# Patient Record
Sex: MALE | Race: WHITE | NOT HISPANIC OR LATINO | Employment: FULL TIME | URBAN - METROPOLITAN AREA
[De-identification: names, ages, dates, MRNs, and addresses within clinical notes are randomized per-mention and may not be internally consistent; named-entity substitution may affect disease eponyms.]

---

## 2023-06-17 ENCOUNTER — APPOINTMENT (OUTPATIENT)
Dept: RADIOLOGY | Facility: REHABILITATION | Age: 68
DRG: 095 | End: 2023-06-17
Attending: PHYSICAL MEDICINE & REHABILITATION
Payer: MEDICARE

## 2023-06-17 ENCOUNTER — HOSPITAL ENCOUNTER (INPATIENT)
Facility: REHABILITATION | Age: 68
LOS: 23 days | DRG: 095 | End: 2023-07-10
Attending: PHYSICAL MEDICINE & REHABILITATION | Admitting: PHYSICAL MEDICINE & REHABILITATION
Payer: MEDICARE

## 2023-06-17 DIAGNOSIS — N31.9 NEUROGENIC BLADDER: ICD-10-CM

## 2023-06-17 DIAGNOSIS — K59.2 NEUROGENIC BOWEL: ICD-10-CM

## 2023-06-17 DIAGNOSIS — E78.5 DYSLIPIDEMIA: ICD-10-CM

## 2023-06-17 DIAGNOSIS — F51.01 PRIMARY INSOMNIA: ICD-10-CM

## 2023-06-17 DIAGNOSIS — M79.2 NEUROPATHIC PAIN: ICD-10-CM

## 2023-06-17 DIAGNOSIS — G61.0 GBS (GUILLAIN BARRE SYNDROME) (HCC): ICD-10-CM

## 2023-06-17 PROBLEM — E87.1 HYPONATREMIA: Status: ACTIVE | Noted: 2023-06-17

## 2023-06-17 PROBLEM — G90.3 NEUROGENIC ORTHOSTATIC HYPOTENSION (HCC): Status: ACTIVE | Noted: 2023-06-17

## 2023-06-17 PROBLEM — D72.825 BANDEMIA: Status: ACTIVE | Noted: 2023-06-17

## 2023-06-17 PROBLEM — N30.01 ACUTE CYSTITIS WITH HEMATURIA: Status: ACTIVE | Noted: 2023-06-17

## 2023-06-17 PROBLEM — D64.89 OTHER SPECIFIED ANEMIAS: Status: ACTIVE | Noted: 2023-06-17

## 2023-06-17 LAB
FLUAV RNA SPEC QL NAA+PROBE: NEGATIVE
FLUBV RNA SPEC QL NAA+PROBE: NEGATIVE
RSV RNA SPEC QL NAA+PROBE: NEGATIVE
SARS-COV-2 RNA RESP QL NAA+PROBE: NOTDETECTED
SPECIMEN SOURCE: NORMAL

## 2023-06-17 PROCEDURE — 0241U HCHG SARS-COV-2 COVID-19 NFCT DS RESP RNA 4 TRGT MIC: CPT

## 2023-06-17 PROCEDURE — 700111 HCHG RX REV CODE 636 W/ 250 OVERRIDE (IP): Performed by: PHYSICAL MEDICINE & REHABILITATION

## 2023-06-17 PROCEDURE — 71045 X-RAY EXAM CHEST 1 VIEW: CPT

## 2023-06-17 PROCEDURE — 94760 N-INVAS EAR/PLS OXIMETRY 1: CPT

## 2023-06-17 PROCEDURE — 700102 HCHG RX REV CODE 250 W/ 637 OVERRIDE(OP): Performed by: PHYSICAL MEDICINE & REHABILITATION

## 2023-06-17 PROCEDURE — 700105 HCHG RX REV CODE 258: Performed by: PHYSICAL MEDICINE & REHABILITATION

## 2023-06-17 PROCEDURE — A9270 NON-COVERED ITEM OR SERVICE: HCPCS | Performed by: PHYSICAL MEDICINE & REHABILITATION

## 2023-06-17 PROCEDURE — 74018 RADEX ABDOMEN 1 VIEW: CPT

## 2023-06-17 PROCEDURE — 99223 1ST HOSP IP/OBS HIGH 75: CPT | Performed by: PHYSICAL MEDICINE & REHABILITATION

## 2023-06-17 PROCEDURE — 770010 HCHG ROOM/CARE - REHAB SEMI PRIVAT*

## 2023-06-17 RX ORDER — SENNOSIDES A AND B 8.6 MG/1
17.2 TABLET, FILM COATED ORAL
Status: DISCONTINUED | OUTPATIENT
Start: 2023-06-17 | End: 2023-06-26

## 2023-06-17 RX ORDER — DOCUSATE SODIUM 100 MG/1
200 CAPSULE, LIQUID FILLED ORAL 2 TIMES DAILY
Status: DISCONTINUED | OUTPATIENT
Start: 2023-06-17 | End: 2023-06-26

## 2023-06-17 RX ORDER — ACETAMINOPHEN 500 MG
1000 TABLET ORAL EVERY 6 HOURS PRN
Status: DISCONTINUED | OUTPATIENT
Start: 2023-06-22 | End: 2023-06-27

## 2023-06-17 RX ORDER — ATORVASTATIN CALCIUM 10 MG/1
10 TABLET, FILM COATED ORAL
Status: DISCONTINUED | OUTPATIENT
Start: 2023-06-19 | End: 2023-07-10 | Stop reason: HOSPADM

## 2023-06-17 RX ORDER — TEMAZEPAM 15 MG/1
15 CAPSULE ORAL
Status: DISCONTINUED | OUTPATIENT
Start: 2023-06-17 | End: 2023-06-29

## 2023-06-17 RX ORDER — ONDANSETRON 4 MG/1
4 TABLET, ORALLY DISINTEGRATING ORAL 4 TIMES DAILY PRN
Status: DISCONTINUED | OUTPATIENT
Start: 2023-06-17 | End: 2023-07-10 | Stop reason: HOSPADM

## 2023-06-17 RX ORDER — SODIUM CHLORIDE 1 G/1
2 TABLET ORAL
Status: DISCONTINUED | OUTPATIENT
Start: 2023-06-17 | End: 2023-06-21

## 2023-06-17 RX ORDER — LANOLIN ALCOHOL/MO/W.PET/CERES
6 CREAM (GRAM) TOPICAL
Status: DISCONTINUED | OUTPATIENT
Start: 2023-06-17 | End: 2023-07-10 | Stop reason: HOSPADM

## 2023-06-17 RX ORDER — CELECOXIB 100 MG/1
200 CAPSULE ORAL 2 TIMES DAILY
Status: DISCONTINUED | OUTPATIENT
Start: 2023-06-17 | End: 2023-06-22

## 2023-06-17 RX ORDER — ONDANSETRON 2 MG/ML
4 INJECTION INTRAMUSCULAR; INTRAVENOUS 4 TIMES DAILY PRN
Status: DISCONTINUED | OUTPATIENT
Start: 2023-06-17 | End: 2023-07-10 | Stop reason: HOSPADM

## 2023-06-17 RX ORDER — BISACODYL 10 MG/1
10 SUPPOSITORY RECTAL
Status: DISCONTINUED | OUTPATIENT
Start: 2023-06-17 | End: 2023-06-26

## 2023-06-17 RX ORDER — ECHINACEA PURPUREA EXTRACT 125 MG
2 TABLET ORAL PRN
Status: DISCONTINUED | OUTPATIENT
Start: 2023-06-17 | End: 2023-07-10 | Stop reason: HOSPADM

## 2023-06-17 RX ORDER — ALUMINA, MAGNESIA, AND SIMETHICONE 2400; 2400; 240 MG/30ML; MG/30ML; MG/30ML
20 SUSPENSION ORAL
Status: DISCONTINUED | OUTPATIENT
Start: 2023-06-17 | End: 2023-07-10 | Stop reason: HOSPADM

## 2023-06-17 RX ORDER — CARBOXYMETHYLCELLULOSE SODIUM 5 MG/ML
1 SOLUTION/ DROPS OPHTHALMIC PRN
Status: DISCONTINUED | OUTPATIENT
Start: 2023-06-17 | End: 2023-07-10 | Stop reason: HOSPADM

## 2023-06-17 RX ORDER — FUROSEMIDE 20 MG/1
20 TABLET ORAL
Status: DISCONTINUED | OUTPATIENT
Start: 2023-06-18 | End: 2023-06-21

## 2023-06-17 RX ORDER — TAMSULOSIN HYDROCHLORIDE 0.4 MG/1
0.4 CAPSULE ORAL
Status: DISCONTINUED | OUTPATIENT
Start: 2023-06-18 | End: 2023-07-10 | Stop reason: HOSPADM

## 2023-06-17 RX ORDER — M-VIT,TX,IRON,MINS/CALC/FOLIC 27MG-0.4MG
1 TABLET ORAL
Status: DISCONTINUED | OUTPATIENT
Start: 2023-06-18 | End: 2023-07-10 | Stop reason: HOSPADM

## 2023-06-17 RX ORDER — ENOXAPARIN SODIUM 100 MG/ML
40 INJECTION SUBCUTANEOUS
Status: DISCONTINUED | OUTPATIENT
Start: 2023-06-17 | End: 2023-06-22

## 2023-06-17 RX ORDER — LISINOPRIL 20 MG/1
20 TABLET ORAL
Status: DISCONTINUED | OUTPATIENT
Start: 2023-06-18 | End: 2023-06-18

## 2023-06-17 RX ORDER — OMEPRAZOLE 20 MG/1
20 CAPSULE, DELAYED RELEASE ORAL DAILY
Status: DISCONTINUED | OUTPATIENT
Start: 2023-06-17 | End: 2023-06-18

## 2023-06-17 RX ORDER — GABAPENTIN 300 MG/1
300 CAPSULE ORAL 3 TIMES DAILY
Status: DISCONTINUED | OUTPATIENT
Start: 2023-06-17 | End: 2023-06-18

## 2023-06-17 RX ORDER — ACETAMINOPHEN 325 MG/1
650 TABLET ORAL EVERY 4 HOURS PRN
Status: DISCONTINUED | OUTPATIENT
Start: 2023-06-17 | End: 2023-06-27

## 2023-06-17 RX ORDER — OXYCODONE HYDROCHLORIDE 10 MG/1
10 TABLET ORAL
Status: DISCONTINUED | OUTPATIENT
Start: 2023-06-17 | End: 2023-06-28

## 2023-06-17 RX ORDER — LIDOCAINE 50 MG/G
2 PATCH TOPICAL EVERY 24 HOURS
Status: DISCONTINUED | OUTPATIENT
Start: 2023-06-17 | End: 2023-07-10

## 2023-06-17 RX ORDER — ASPIRIN 81 MG/1
81 TABLET, CHEWABLE ORAL DAILY
Status: DISCONTINUED | OUTPATIENT
Start: 2023-06-18 | End: 2023-07-10 | Stop reason: HOSPADM

## 2023-06-17 RX ORDER — OXYCODONE HYDROCHLORIDE 5 MG/1
5 TABLET ORAL
Status: DISCONTINUED | OUTPATIENT
Start: 2023-06-17 | End: 2023-06-28

## 2023-06-17 RX ORDER — TRAZODONE HYDROCHLORIDE 50 MG/1
50 TABLET ORAL
Status: DISCONTINUED | OUTPATIENT
Start: 2023-06-17 | End: 2023-07-10 | Stop reason: HOSPADM

## 2023-06-17 RX ORDER — HYDRALAZINE HYDROCHLORIDE 25 MG/1
25 TABLET, FILM COATED ORAL EVERY 8 HOURS PRN
Status: DISCONTINUED | OUTPATIENT
Start: 2023-06-17 | End: 2023-07-10 | Stop reason: HOSPADM

## 2023-06-17 RX ORDER — ACETAMINOPHEN 500 MG
1000 TABLET ORAL 3 TIMES DAILY
Status: COMPLETED | OUTPATIENT
Start: 2023-06-17 | End: 2023-06-22

## 2023-06-17 RX ADMIN — SENNOSIDES 17.2 MG: 8.6 TABLET, FILM COATED ORAL at 13:15

## 2023-06-17 RX ADMIN — OMEPRAZOLE 20 MG: 20 CAPSULE, DELAYED RELEASE ORAL at 21:37

## 2023-06-17 RX ADMIN — CELECOXIB 200 MG: 100 CAPSULE ORAL at 21:36

## 2023-06-17 RX ADMIN — ACETAMINOPHEN 1000 MG: 500 TABLET ORAL at 16:00

## 2023-06-17 RX ADMIN — ACETAMINOPHEN 1000 MG: 500 TABLET ORAL at 21:37

## 2023-06-17 RX ADMIN — SODIUM CHLORIDE 2 G: 1 TABLET ORAL at 13:13

## 2023-06-17 RX ADMIN — GABAPENTIN 300 MG: 300 CAPSULE ORAL at 21:37

## 2023-06-17 RX ADMIN — OXYCODONE HYDROCHLORIDE 10 MG: 10 TABLET ORAL at 13:13

## 2023-06-17 RX ADMIN — DOCUSATE SODIUM 200 MG: 100 CAPSULE, LIQUID FILLED ORAL at 21:37

## 2023-06-17 RX ADMIN — ENOXAPARIN SODIUM 40 MG: 100 INJECTION SUBCUTANEOUS at 21:40

## 2023-06-17 RX ADMIN — CEFTRIAXONE SODIUM 1000 MG: 1 INJECTION, POWDER, FOR SOLUTION INTRAMUSCULAR; INTRAVENOUS at 17:42

## 2023-06-17 RX ADMIN — TEMAZEPAM 15 MG: 15 CAPSULE ORAL at 21:37

## 2023-06-17 RX ADMIN — Medication 6 MG: at 21:36

## 2023-06-17 RX ADMIN — SODIUM CHLORIDE 2 G: 1 TABLET ORAL at 17:42

## 2023-06-17 RX ADMIN — OXYCODONE HYDROCHLORIDE 10 MG: 10 TABLET ORAL at 18:26

## 2023-06-17 RX ADMIN — GABAPENTIN 300 MG: 300 CAPSULE ORAL at 16:00

## 2023-06-17 RX ADMIN — BISACODYL 10 MG: 10 SUPPOSITORY RECTAL at 21:46

## 2023-06-17 ASSESSMENT — PATIENT HEALTH QUESTIONNAIRE - PHQ9
SUM OF ALL RESPONSES TO PHQ9 QUESTIONS 1 AND 2: 0
2. FEELING DOWN, DEPRESSED, IRRITABLE, OR HOPELESS: NOT AT ALL
1. LITTLE INTEREST OR PLEASURE IN DOING THINGS: NOT AT ALL

## 2023-06-17 ASSESSMENT — LIFESTYLE VARIABLES
TOTAL SCORE: 0
EVER HAD A DRINK FIRST THING IN THE MORNING TO STEADY YOUR NERVES TO GET RID OF A HANGOVER: NO
HOW MANY TIMES IN THE PAST YEAR HAVE YOU HAD 5 OR MORE DRINKS IN A DAY: 0
TOTAL SCORE: 0
HAVE PEOPLE ANNOYED YOU BY CRITICIZING YOUR DRINKING: NO
EVER FELT BAD OR GUILTY ABOUT YOUR DRINKING: NO
HAVE YOU EVER FELT YOU SHOULD CUT DOWN ON YOUR DRINKING: NO
ALCOHOL_USE: NO
AVERAGE NUMBER OF DAYS PER WEEK YOU HAVE A DRINK CONTAINING ALCOHOL: 0
TOTAL SCORE: 0
CONSUMPTION TOTAL: NEGATIVE
ON A TYPICAL DAY WHEN YOU DRINK ALCOHOL HOW MANY DRINKS DO YOU HAVE: 0

## 2023-06-17 ASSESSMENT — COPD QUESTIONNAIRES
COPD SCREENING SCORE: 2
HAVE YOU SMOKED AT LEAST 100 CIGARETTES IN YOUR ENTIRE LIFE: NO/DON'T KNOW
DURING THE PAST 4 WEEKS HOW MUCH DID YOU FEEL SHORT OF BREATH: NONE/LITTLE OF THE TIME
DO YOU EVER COUGH UP ANY MUCUS OR PHLEGM?: NO/ONLY WITH OCCASIONAL COLDS OR INFECTIONS

## 2023-06-17 ASSESSMENT — PAIN DESCRIPTION - PAIN TYPE: TYPE: ACUTE PAIN

## 2023-06-17 ASSESSMENT — FIBROSIS 4 INDEX: FIB4 SCORE: 1.09

## 2023-06-17 NOTE — H&P
Physical Medicine & Rehabilitation  History and Physical (H&P)  &     Post Admission Physician Evaluation (RORO)       Date of Admission: 6/17/2023  Date of Service: 6/17/2023   Bull Banegas  RH08/[id number]    [facility] Code to Support Admission: 0003.4 - Neurologic Conditions: Guillain-Barré Syndrome  Etiologic Diagnosis: GBS (Guillain-[place] syndrome) (Formerly [facility])    _______________________________________________    Chief Complaint: Lower extremity weakness    History of Present Illness:      67-year-old gentleman with past medical history of hypercholesterolemia, essential hypertension, osteoarthritis, GERD, hyperkalemia, type 2 diabetes, who presented to [facility] on 6/5 with progressive lower extremity weakness.  Neurology was consulted and he was diagnosed with Guillain-Barré syndrome.  He underwent 5-day course of IVIG without complications, completed on 6/11.  After completion of IVIG he has noted some improvement in strength in lower extremities but still complains of pins-and-needles in bilateral upper extremities.    On presentation to emergency room patient was noted to have significant elevation in systolic blood pressure of over 200.  This was managed with oral antihypertensives and stabilized during acute hospitalization.  Recent MRI of the brain and the spine were negative.  TSH was normal, B12 was normal, RPP and HIV were nonreactive, ammonia level was normal and ESR was within normal limits.  SPEP was negative, acetylcholine receptor antibody was negative, antimuscarinic antibody was not detected.  Hepatitis panel was performed and was negative.  LP showed high protein and normal cell count.  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  His most recent sodium was 126 on 6/17. He was started on sodium chloride tablets 2 g 3 times daily, 620 mg daily, and placed on 1.8 L fluid restriction with 800 cc free water restriction.  Nephrology was  consulted.    He was also noted to have progressing leukocytosis with WBC of 16.3.  He was started on ceftriaxone for presumed urinary tract infection with improving WBC of 14.0 on 6/17.    His hospitalization was complicated by neurogenic bladder with urinary retention.  Blancas was initially attempted to be removed, but patient failed voiding trial required multiple in and out catheterization so Blancas was replaced.  He also had significant constipation and has not had a bowel movement since presentation.          Review of Systems:       Gen: No fatigue, confusion, significant weight loss  Eyes: no blurry vision, double vision or pain in eyes  ENT: no changes in hearing, runny nose, nose bleeds, sinus pain  CV: No CP, palpitations, symptoms of AUTONOMIC DYSREFLEXIA   Lungs: no shortness of breath, changes in secretions, changes in cough, pain with coughing  Abd: No abd pain, nausea, vomiting, pain with eating  : no blood in urine, pain during storage phase, bladder spasms, suprapubic pain  Ext: No swelling in legs, asymmetric atrophy  Neuro: no changes in strength or sensation in the last 24 hours  Skin: no new ulcers/skin breakdown appreciated by patient  Mood: No changes in mood today,  increase in depression or anxiety  Heme: no bruising, or bleeding appreciated by patient      Past Medical History:  Hypertension  Hyperlipidemia  Insomnia  GERD  Type 2 diabetes    Past Surgical History:  He denies any history of surgical interventions    Family History:    He denies any family history of Guillain-Barré syndrome    Medications:  Current Facility-Administered Medications   Medication Dose    Pharmacy Consult Request ...Pain Management Review 1 Each  1 Each    acetaminophen (TYLENOL) tablet 1,000 mg  1,000 mg    Followed by    [START ON 6/22/2023] acetaminophen (TYLENOL) tablet 1,000 mg  1,000 mg    enoxaparin (Lovenox) inj 40 mg  40 mg    acetaminophen (Tylenol) tablet 650 mg  650 mg    omeprazole (PRILOSEC)  capsule 20 mg  20 mg    carboxymethylcellulose (REFRESH TEARS) 0.5 % ophthalmic drops 1 Drop  1 Drop    benzocaine-menthol (Cepacol) lozenge 1 Lozenge  1 Lozenge    mag hydrox-al hydrox-simeth (MAALOX PLUS ES or MYLANTA DS) suspension 20 mL  20 mL    ondansetron (ZOFRAN ODT) dispertab 4 mg  4 mg    Or    ondansetron (ZOFRAN) syringe/vial injection 4 mg  4 mg    traZODone (DESYREL) tablet 50 mg  50 mg    sodium chloride (OCEAN) 0.65 % nasal spray 2 Spray  2 [place]    Respiratory Therapy Consult      oxyCODONE immediate-release (ROXICODONE) tablet 5 mg  5 mg    Or    oxyCODONE immediate release (ROXICODONE) tablet 10 mg  10 mg    hydrALAZINE (APRESOLINE) tablet 25 mg  25 mg    [START ON 6/18/2023] therapeutic multivitamin-minerals (THERAGRAN-M) tablet 1 Tablet  1 Tablet    docusate sodium (COLACE) capsule 200 mg  200 mg    And    sennosides (SENOKOT) 8.6 MG tablet 17.2 mg  17.2 mg    And    bisacodyl (THE MAGIC BULLET) suppository 10 mg  10 mg    And    magnesium hydroxide (MILK OF MAGNESIA) suspension 30 mL  30 mL    [START ON 6/18/2023] lisinopril (PRINIVIL) tablet 20 mg  20 mg    [START ON 6/18/2023] aspirin (ASA) chewable tab 81 mg  81 mg    [START ON 6/19/2023] atorvastatin (LIPITOR) tablet 10 mg  10 mg    celecoxib (CELEBREX) capsule 200 mg  200 mg    [START ON 6/18/2023] furosemide (LASIX) tablet 20 mg  20 mg    melatonin tablet 6 mg  6 mg    temazepam (Restoril) capsule 15 mg  15 mg    cefTRIAXone (Rocephin) 1,000 mg in  mL IVPB  1,000 mg    sodium chloride (SALT) tablet 2 g  2 g    lidocaine (LIDODERM) 5 % 2 Patch  2 Patch    [START ON 6/18/2023] tamsulosin (FLOMAX) capsule 0.4 mg  0.4 mg    gabapentin (NEURONTIN) capsule 300 mg  300 mg       Allergies:  Patient has no allergy information on record.    Psychosocial History:  Living Site:  Home  Living With:  spouse  Caregiver's availability:   spouse and family  Number of stairs:   1 CLOVER        Level of Function Prior to Disability:  Independent with  mobility and self-care    Level of Function Prior to Admission to [facility]:    Grooming min assist, transfers dependent, lower body dressing dependent    CURRENT LEVEL OF FUNCTION:   Same as level of function prior to admission to [facility]    Physical Examination:     Vitals: There were no vitals taken for this visit.  Gen: NAD, There is no height or weight on file to calculate BMI.  HEENT:  NC/AT, PERRLA, moist mucous membranes  Cardio: RRR, no mumurs  Pulm: CTAB, with normal respiratory effort  Abd: Soft NTND, active bowel sounds,   Ext: No peripheral edema. No calf tenderness. No clubbing/cyanosis. +dorsal pedalis pulses bilaterally.    Mental status: answers questions appropriately follows commands  Speech: fluent, no aphasia or dysarthria  CRANIAL NERVES:  2,3: visual acuity grossly intact, PERRL  3,4,6: EOMI bilaterally, no nystagmus or diplopia  7: no facial asymmetry  8: hearing grossly intact  9,10: symmetric palate elevation  11: SCM/Trapezius strength 5/5 bilaterally  12: tongue protrudes midline    Motor:    Motor Exam Upper Extremities   ? Myotome R L   Shoulder flexion C5 5 5   Elbow flexion C5 5 5   Wrist extension C6 5 5   Elbow extension C7 5 5   Finger flexion C8 5 5   Finger abduction T1 5 5    Motor Exam Lower Extremities    ? Myotome R L   Hip flexion L2 2 1   Knee extension L3 3 3   Ankle dorsiflexion L4 3 3   Toe extension L5 3 3   Ankle plantarflexion S1 3 3      Sensory: Altered sensation to light touch of the face and intermittently throughout the entire body    DTRs: 0+ in bilateral biceps and patellar tendons    Negative babinski b/l  Negative Worthington b/l     Radiology:    MRI of cervical spine 6/6:  1.  No enhancing spinal cord mass. Subtle focus of T2/FLAIR signal   hyperintensity at the level C6-C7 could reflect post stenotic myelomalacia.     2.  Moderate multilevel cervical spondylosis with mild acquired spinal canal   narrowing from C4-C5  through C6-C7 with cord contact.     3.  Multilevel neural foraminal narrowing of varying degrees, severe left C3-C4   and bilaterally at C6-C7. Mild-to-moderate other sites.     MRI of the lumbar spine 5/29:  1.  Transitional lumbosacral anatomy with 4 nonrib-bearing lumbar vertebral   bodies and partial sacralization of the L5. For the purposes of this exam, this   will be labeled as such.     2.  L4-L5 mild spondylolisthesis with annular fissuring and small right paracentral disc extrusion contacting the right L5 nerve root.     3.  Moderate left L4-L5 neural foraminal narrowing.     4.  No other areas of narrowing elsewhere.     Chest x-ray 6/9:  1.  Low lung volumes. Bibasilar atelectasis and mild left pleural effusion     Laboratory Values:  Recent Labs     06/15/23  0511 06/16/23  0523 06/17/23  0516   SODIUM 127* 127* 126*   POTASSIUM 4.3 4.8 4.5   CHLORIDE 95* 94* 94*   CO2 27 26 26   GLUCOSE 120* 111* 130*   BUN 23* 15 21*   CREATININE 0.50* 0.50* 0.57*   CALCIUM 8.8 8.8 8.7     Recent Labs     06/15/23  0511 06/16/23  0523 06/17/23  0516   RBC 3.55* 3.61* 3.57*   HEMOGLOBIN 11.0* 11.0* 11.0*   HEMATOCRIT 30.6* 31.1* 30.9*   MCV 86.0 86.2 86.6   MCH 30.9 30.5 30.7   MCHC 35.9 35.4 35.5   RDW 12.8 12.8 13.1   PLATELETCT 263 279 309   MPV 7.8 7.6 7.7           Primary Rehabilitation Diagnosis:    This patient is a 67 y.o. male admitted for acute inpatient rehabilitation with   GBS (Guillain-[place] syndrome) ([facility]).    Impairments:   ADLs/IADLs  Mobility  Swallow    Secondary Diagnosis/Medical Co-morbidities Affecting Function    Guillain-Barré  Weakness in all 4 extremities  Hypertension  Orthostatic hypotension  Hypomagnesemia  Hyponatremia  Leukocytosis  Urinary tract infection  Primary insomnia  Neuropathic pain  Neurogenic bladder  Neurogenic bowel, severe constipation  Dyslipidemia      Relevant Changes Since Preadmission Evaluation:    Status unchanged    The patient's rehabilitation potential is Very  Good  The patient's medical prognosis is good    Rehabilitation Plan:   Discussion and Recommendations:   1. The patient requires an acute inpatient rehabilitation program with a coordinated program of care at an intensity and frequency not available at a lower level of care. This recommendation is substantiated by the patient's medical physicians who recommend that the patient's intervention and assessment of medical issues needs to be done at an acute level of care for patient's safety and maximum outcome.   2. A coordinated program of care will be supplied by an interdisciplinary team of physical therapy, occupational therapy, rehab physician, rehab nursing, and, if needed, speech therapy and rehab psychology. Rehab team presents a patient-specific rehabilitation and education program concentrating on prevention of future problems related to accessibility, mobility, skin, bowel, bladder, sexuality, and psychosocial and medical/surgical problems.   3. Need for Rehabilitation Physician: The rehab physician will be evaluating the patient on a multi-weekly basis to help coordinate the program of care. The rehab physician communicates between medical physicians, therapists, and nurses to maximize the patient's potential outcome. Specific areas in which the rehab physician will be providing daily assessment include the following:   A. Assessing the patient's heart rate and blood pressure response (vitals monitoring) to activity and making adjustments in medications or conservative measures as needed.   B. The rehab physician will be assessing the frequency at which the program can be increased to allow the patient to reach optimal functional outcome.   C. The rehab physician will also provide assessments in daily skin care, especially in light of patient's impairments in mobility.   D. The rehab physician will provide special expertise in understanding how to work with functional impairment and recommend appropriate  interventions, compensatory techniques, and education that will facilitate the patient's outcome.   4. Rehab R.N.   The rehab RN will be working with patient to carry over in room mobility and activities of daily living when the patient is not in 3 hours of skilled therapy. Rehab nursing will be working in conjunction with rehab physician to address all the medical issues above and continue to assess laboratory work and discuss abnormalities with the treating physicians, assess vitals, and response to activity, and discuss and report abnormalities with the rehab physician. Rehab RN will also continue daily skin care, supervise bladder/bowel program, instruct in medication administration, and ensure patient safety.   5. Rehab Therapy: Therapies to treat at intensity and frequency of (may change after completion of evaluation by all therapeutic disciplines):       PT:  Physical therapy to address mobility, transfer, gait training and evaluation for adaptive equipment needs 1 hour/day at least 5 days/week for the duration of the ELOS (see below)       OT:  Occupational therapy to address ADLs, self-care, home management training, functional mobility/transfers and assistive device evaluation, and community re-integration 1  hour/day at least 5 days/week for the duration of the ELOS (see below).        ST/Dysphagia:  Speech therapy to address speech, language, and cognitive deficits as well as swallowing difficulties with retraining/dysphagia management and community re-integration with comprehension, expression, cognitive training 1  hour/day at least 5 days/week for the duration of the ELOS (see below).     Medical management / Rehabilitation Issues/ Adverse Potential as part of rehabilitation plan     Rehabilitation Issues/Adverse Potential      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  Lower extremity weakness and pain as well as  numbness in upper extremities  Patient demonstrates functional deficits in strength, balance, coordination, and ADL's. Patient is admitted to [facility] for comprehensive rehabilitation therapy as described below.   Rehabilitation nursing monitors bowel and bladder control, educates on medication administration, co-morbidities and monitors patient safety.  -PT and OT for mobility and ADLs. Per guidelines, 15 hours per week between PT, OT and SLP.  - The patient will begin a comprehensive interdisciplinary rehabilitation program.     Neurologic respiratory impairment:   Patient is at high risk for respiratory involvement given neurologic symptoms in the upper extremities.   -We will consult respiratory therapy team to follow the patient during the hospital stay, for education on impaired respiratory status, importance of incentive spirometry, risk of respiratory infection and prevention process.  -Vital capacity daily  - Incentive spirometry  - Check chest x-ray    Hyponatremia: Likely SIADH, followed by nephrology during [facility] hospitalization  - Sodium of 126 on 6/17  - Check CMP in a.m.  - Sodium chloride tablets 2 g 3 times daily  - Lasix 20 mg daily    Hypomagnesemia: Low magnesium during acute hospitalization, supplemented with p.o. magnesium  - Check magnesium level in a.m.    Urinary tract infection/pyelonephritis: Catheter associated UTI in the setting of neurogenic bladder  - WBC of 14 on 6/17, check CBC in a.m.  - Peripheral IV was removed prior to transfer to acute rehabilitation we will replace IV.  - Ceftriaxone 1 g every 24 hours, continue for another 8 days, total of 10 days given systemic factor  --We will work with Eric Riley on following up culture and sensitivities    Neurogenic bladder: Inappropriate management of neurogenic bladder can result and hydronephrosis, increased risk of pyelonephritis, and renal failure  - Plan to continue the Blancas catheter for now to allow  the patient to acclimate to the rehabilitation setting. Following this we will discuss bladder management options with the patient.  Bowel management but well may require large volume administration of medication which would be contraindicated in the setting of intermittent catheterization.    Neurogenic bowel: Inappropriate neurogenic bowel can result in severe constipation, bowel dilation and rupture.  Severe constipation with prolonged period of time without BM.  - We will check KUB to evaluate for stool load and colonic dilation in the setting of neurogenic bowel  - Upper motor neuron neurogenic bowel program with senna 2 tablets q. noon, Colace 200 mg twice daily and Magic bullet suppository WI daily and digital stimulation    orthostatic hypotension  Because of significant autonomic dysfunction associated with some cases of AIDP, the patient is at high risk for orthostatic hypotension.  Goal of SBP greater than 100.  -  Mechanical compression with teds and Tubi  and abd binder used prior to out of bed    Essential hypertension: SBP of greater than 200 on presentation to acute hospital.  - Lisinopril 20 mg daily    Dyslipidemia:  - Check lipid panel in a.m.  -Lipitor 10 mg every Monday Wednesday Friday    Dysphagia: Concern for swallow dysfunction in the setting of certain variants of AIDP  - Consult speech therapy for swallow evaluation.    Skin  Due to the sensory impairments following AIDP, skin protection principles are of the utmost importance.     - every two-hour turning pattern overnight while the patient is in bed. When the patient is out of bed, an every 15 minute repositioning or weight shifting pattern will be utilized in order to help train the patient for long-term skin protection. We will also discuss skin monitoring and its importance with the patient and the caregivers.    Pain:  Postoperative pain:  - Oxycodone 5-10 mg every 3 hours as needed moderate-severe pain  -Tylenol 1000 mg 3 times  daily, given high-dose Tylenol will check LFTs in a.m.  - Lidocaine patch x2 on either side of incision, on for 12 hours off for 12 hours    Neuropathic pain: Burning and tingling in all 4 extremities  - Gabapentin 300 mg 3 times daily, will plan to titrate up depending on tolerance and effect, may require other neuropathic pain agents.  -May benefit from a nonpharmaceutical modalities such as TENS units, compression, ice, heat, will discuss with therapy team.    Vitamin deficiency: High risk of vitamin D deficiency in this population, goal of vitamin D level greater than 30, has been linked to improvement and central nervous system recovery  - Check vitamin D level in a.m.    Insomnia: Significant difficulty during acute hospitalization  - Restarted on Restoril 15 mg nightly  - Trazodone 50 mg nightly as needed insomnia    Nutrition  With the assistance of our dietitian team, we will work to optimize nutrition for wound healing and recovery from spinal cord injury. We will also discussed long-term dietary needs following a spinal injury in order to prevent excessive weight gain in the future.    Mood: Adjustment disorder  Given the abrupt and significant change in the patient's level of function, we will consult our rehabilitation psychologist to evaluate the patient and provide psychological support as necessary. We may also consider the use of an antidepressant medication if needed to help improve the patient's mood.    DVT prophylaxis  In the setting of AIDP, the patient is at high risk of deep venous thrombosis given decreased mobility and alteration to autonomic nervous system. Because of this we have elected to pursue prophylactic anticoagulation utilizing Lovenox 40 mg daily.     I personally performed a complete drug regimen review and no potential clinically significant medication issues were identified.     Goals/Expected Level of Function Based on Current Medical and Functional Status:  (may change based  on patient's medical status and rate of impairment recovery)  Transfers:   Minimal Assistance  Mobility/Gait:   Supervision wheelchair level  ADL's:   Minimal Assistance  Swallowing: Least restrictive diet    DISPOSITION: Discharge to pre-morbid independent living setting with the supportive care of patient's spouse and family.    ELOS: 14-21 days  ____________________________________    Dr. Mario Terrell DO  [facility] - Physical Medicine & Rehabilitation   [facility] - Spinal Cord Injury Medicine  ____________________________________    Pt was seen today for 78 min, and entire time spent in face-to-face contact was >50% in counseling and coordination of care as detailed in A/P above.

## 2023-06-17 NOTE — FLOWSHEET NOTE
"   06/17/23 1600   Incentive Spirometry Treatment   Height 1.727 m (5' 7.99\")   Predicted Inspiratory Capacity 2700   60% of predicted IS capacity 1620 mL   40% of predicted IS capacity 1080 mL   Incentive Spirometer Volume 2000 mL       "

## 2023-06-17 NOTE — FLOWSHEET NOTE
06/17/23 1600   Protocol Assessment   Initial Assessment Yes   Patient History   Pulmonary Diagnosis none here for GBS   Home O2 No   Nocturnal CPAP No   Home Treatments/Frequency No   Sleep Apnea Screening   Have you had a sleep study? No   Have you been diagnosed with sleep apnea? No   S - Have you been told that you SNORE? 1   T - Are you often TIRED during the day? 1   O - Do you know if you stop breathing or has anyone witnessed you stop breathing while you were asleep? (OBSTRUCTION) 0   P - Do you have high blood PRESSURE or on medication to control high blood pressure? 1   B - Is your Body Mass Index greater than 35? (BMI) 1   A - Are you 50 years old or older? (AGE) 1   N - Are you a male with a NECK circumference greater than 17 inches, or a female with a neck circumference greater than 16 inches? 1   G - Are you male? (GENDER) 1   Stop Bang Total Score 7   COPD Risk Screening   Do you have a history of COPD? No   COPD Population Screener   During the past 4 weeks, how much did you feel short of breath? 0   Do you ever cough up any mucus or phlegm? 0   In the past 12 months, you do less than you used to because of your breathing problems 0   Have you smoked at least 100 cigarettes in your entire life? 0   How old are you? 2   COPD Screening Score 2   COPD Coordinator Not Recommended Yes   Protocol Pathways   Protocol Pathways None

## 2023-06-17 NOTE — FLOWSHEET NOTE
06/17/23 1600   Events/Summary/Plan   Events/Summary/Plan new pt assessment done pt on room air doing well, no pulmonary hx   Vital Signs   Pulse 92   Respiration 18   Pulse Oximetry 94 %   $ Pulse Oximetry (Spot Check) Yes   Respiratory Assessment   Respiratory Pattern Within Normal Limits   Level of Consciousness Alert   Chest Exam   Work Of Breathing / Effort Within Normal Limits   Breath Sounds   RUL Breath Sounds Clear   RML Breath Sounds Clear;Diminished   RLL Breath Sounds Diminished   JC Breath Sounds Clear   LLL Breath Sounds Diminished   Oxygen   O2 (LPM) 0   FiO2% 21 %   O2 Delivery Device None - Room Air

## 2023-06-18 ENCOUNTER — APPOINTMENT (OUTPATIENT)
Dept: PHYSICAL THERAPY | Facility: REHABILITATION | Age: 68
DRG: 095 | End: 2023-06-18
Attending: PHYSICAL MEDICINE & REHABILITATION
Payer: MEDICARE

## 2023-06-18 ENCOUNTER — APPOINTMENT (OUTPATIENT)
Dept: OCCUPATIONAL THERAPY | Facility: REHABILITATION | Age: 68
DRG: 095 | End: 2023-06-18
Attending: PHYSICAL MEDICINE & REHABILITATION
Payer: MEDICARE

## 2023-06-18 ENCOUNTER — APPOINTMENT (OUTPATIENT)
Dept: SPEECH THERAPY | Facility: REHABILITATION | Age: 68
DRG: 095 | End: 2023-06-18
Attending: PHYSICAL MEDICINE & REHABILITATION
Payer: MEDICARE

## 2023-06-18 ENCOUNTER — APPOINTMENT (OUTPATIENT)
Dept: RADIOLOGY | Facility: REHABILITATION | Age: 68
DRG: 095 | End: 2023-06-18
Attending: PHYSICAL MEDICINE & REHABILITATION
Payer: MEDICARE

## 2023-06-18 PROBLEM — I10 ESSENTIAL HYPERTENSION: Status: ACTIVE | Noted: 2023-06-18

## 2023-06-18 PROBLEM — N39.0 UTI (URINARY TRACT INFECTION): Status: ACTIVE | Noted: 2023-06-18

## 2023-06-18 PROBLEM — E55.9 VITAMIN D DEFICIENCY: Status: ACTIVE | Noted: 2023-06-18

## 2023-06-18 LAB
25(OH)D3 SERPL-MCNC: 19 NG/ML (ref 30–100)
ALBUMIN SERPL BCP-MCNC: 3 G/DL (ref 3.2–4.9)
ALBUMIN/GLOB SERPL: 0.8 G/DL
ALP SERPL-CCNC: 76 U/L (ref 30–99)
ALT SERPL-CCNC: 30 U/L (ref 2–50)
ANION GAP SERPL CALC-SCNC: 10 MMOL/L (ref 7–16)
AST SERPL-CCNC: 28 U/L (ref 12–45)
BASOPHILS # BLD AUTO: 0.4 % (ref 0–1.8)
BASOPHILS # BLD: 0.05 K/UL (ref 0–0.12)
BILIRUB SERPL-MCNC: 1.1 MG/DL (ref 0.1–1.5)
BUN SERPL-MCNC: 20 MG/DL (ref 8–22)
CALCIUM ALBUM COR SERPL-MCNC: 9.6 MG/DL (ref 8.5–10.5)
CALCIUM SERPL-MCNC: 8.8 MG/DL (ref 8.5–10.5)
CHLORIDE SERPL-SCNC: 93 MMOL/L (ref 96–112)
CHOLEST SERPL-MCNC: 137 MG/DL (ref 100–199)
CO2 SERPL-SCNC: 22 MMOL/L (ref 20–33)
CREAT SERPL-MCNC: 0.48 MG/DL (ref 0.5–1.4)
EOSINOPHIL # BLD AUTO: 0.28 K/UL (ref 0–0.51)
EOSINOPHIL NFR BLD: 2 % (ref 0–6.9)
ERYTHROCYTE [DISTWIDTH] IN BLOOD BY AUTOMATED COUNT: 41.3 FL (ref 35.9–50)
EST. AVERAGE GLUCOSE BLD GHB EST-MCNC: 123 MG/DL
GFR SERPLBLD CREATININE-BSD FMLA CKD-EPI: 114 ML/MIN/1.73 M 2
GLOBULIN SER CALC-MCNC: 4 G/DL (ref 1.9–3.5)
GLUCOSE SERPL-MCNC: 110 MG/DL (ref 65–99)
HBA1C MFR BLD: 5.9 % (ref 4–5.6)
HCT VFR BLD AUTO: 29.3 % (ref 42–52)
HDLC SERPL-MCNC: 44 MG/DL
HGB BLD-MCNC: 10.4 G/DL (ref 14–18)
IMM GRANULOCYTES # BLD AUTO: 0.16 K/UL (ref 0–0.11)
IMM GRANULOCYTES NFR BLD AUTO: 1.1 % (ref 0–0.9)
INR PPP: 1.03 (ref 0.87–1.13)
LDLC SERPL CALC-MCNC: 75 MG/DL
LYMPHOCYTES # BLD AUTO: 3.45 K/UL (ref 1–4.8)
LYMPHOCYTES NFR BLD: 24.6 % (ref 22–41)
MAGNESIUM SERPL-MCNC: 2 MG/DL (ref 1.5–2.5)
MCH RBC QN AUTO: 31.2 PG (ref 27–33)
MCHC RBC AUTO-ENTMCNC: 35.5 G/DL (ref 32.3–36.5)
MCV RBC AUTO: 88 FL (ref 81.4–97.8)
MONOCYTES # BLD AUTO: 0.91 K/UL (ref 0–0.85)
MONOCYTES NFR BLD AUTO: 6.5 % (ref 0–13.4)
NEUTROPHILS # BLD AUTO: 9.16 K/UL (ref 1.82–7.42)
NEUTROPHILS NFR BLD: 65.4 % (ref 44–72)
NRBC # BLD AUTO: 0.02 K/UL
NRBC BLD-RTO: 0.1 /100 WBC (ref 0–0.2)
PHOSPHATE SERPL-MCNC: 3.9 MG/DL (ref 2.5–4.5)
PLATELET # BLD AUTO: 285 K/UL (ref 164–446)
PMV BLD AUTO: 9.7 FL (ref 9–12.9)
POTASSIUM SERPL-SCNC: 4.3 MMOL/L (ref 3.6–5.5)
PROT SERPL-MCNC: 7 G/DL (ref 6–8.2)
PROTHROMBIN TIME: 13.4 SEC (ref 12–14.6)
RBC # BLD AUTO: 3.33 M/UL (ref 4.7–6.1)
SODIUM SERPL-SCNC: 125 MMOL/L (ref 135–145)
TRIGL SERPL-MCNC: 88 MG/DL (ref 0–149)
TSH SERPL DL<=0.005 MIU/L-ACNC: 3.39 UIU/ML (ref 0.38–5.33)
WBC # BLD AUTO: 14 K/UL (ref 4.8–10.8)

## 2023-06-18 PROCEDURE — 97167 OT EVAL HIGH COMPLEX 60 MIN: CPT

## 2023-06-18 PROCEDURE — 97530 THERAPEUTIC ACTIVITIES: CPT

## 2023-06-18 PROCEDURE — 80061 LIPID PANEL: CPT

## 2023-06-18 PROCEDURE — 97162 PT EVAL MOD COMPLEX 30 MIN: CPT

## 2023-06-18 PROCEDURE — 36415 COLL VENOUS BLD VENIPUNCTURE: CPT

## 2023-06-18 PROCEDURE — 700102 HCHG RX REV CODE 250 W/ 637 OVERRIDE(OP): Performed by: HOSPITALIST

## 2023-06-18 PROCEDURE — A9270 NON-COVERED ITEM OR SERVICE: HCPCS | Performed by: HOSPITALIST

## 2023-06-18 PROCEDURE — 84100 ASSAY OF PHOSPHORUS: CPT

## 2023-06-18 PROCEDURE — 83036 HEMOGLOBIN GLYCOSYLATED A1C: CPT

## 2023-06-18 PROCEDURE — 770010 HCHG ROOM/CARE - REHAB SEMI PRIVAT*

## 2023-06-18 PROCEDURE — 94760 N-INVAS EAR/PLS OXIMETRY 1: CPT

## 2023-06-18 PROCEDURE — 84443 ASSAY THYROID STIM HORMONE: CPT

## 2023-06-18 PROCEDURE — 82306 VITAMIN D 25 HYDROXY: CPT

## 2023-06-18 PROCEDURE — 94150 VITAL CAPACITY TEST: CPT

## 2023-06-18 PROCEDURE — 700102 HCHG RX REV CODE 250 W/ 637 OVERRIDE(OP): Performed by: PHYSICAL MEDICINE & REHABILITATION

## 2023-06-18 PROCEDURE — 74018 RADEX ABDOMEN 1 VIEW: CPT

## 2023-06-18 PROCEDURE — 700105 HCHG RX REV CODE 258: Performed by: PHYSICAL MEDICINE & REHABILITATION

## 2023-06-18 PROCEDURE — 700111 HCHG RX REV CODE 636 W/ 250 OVERRIDE (IP): Performed by: PHYSICAL MEDICINE & REHABILITATION

## 2023-06-18 PROCEDURE — 85025 COMPLETE CBC W/AUTO DIFF WBC: CPT

## 2023-06-18 PROCEDURE — A9270 NON-COVERED ITEM OR SERVICE: HCPCS | Performed by: PHYSICAL MEDICINE & REHABILITATION

## 2023-06-18 PROCEDURE — 83735 ASSAY OF MAGNESIUM: CPT

## 2023-06-18 PROCEDURE — 97535 SELF CARE MNGMENT TRAINING: CPT

## 2023-06-18 PROCEDURE — 80053 COMPREHEN METABOLIC PANEL: CPT

## 2023-06-18 PROCEDURE — 99222 1ST HOSP IP/OBS MODERATE 55: CPT | Performed by: HOSPITALIST

## 2023-06-18 PROCEDURE — 85610 PROTHROMBIN TIME: CPT

## 2023-06-18 RX ORDER — GABAPENTIN 400 MG/1
400 CAPSULE ORAL 3 TIMES DAILY
Status: DISCONTINUED | OUTPATIENT
Start: 2023-06-18 | End: 2023-06-19

## 2023-06-18 RX ORDER — MIDODRINE HYDROCHLORIDE 2.5 MG/1
5 TABLET ORAL
Status: DISCONTINUED | OUTPATIENT
Start: 2023-06-18 | End: 2023-06-20

## 2023-06-18 RX ORDER — VITAMIN B COMPLEX
1000 TABLET ORAL DAILY
Status: DISCONTINUED | OUTPATIENT
Start: 2023-06-18 | End: 2023-06-20

## 2023-06-18 RX ORDER — OMEPRAZOLE 20 MG/1
20 CAPSULE, DELAYED RELEASE ORAL DAILY
Status: DISCONTINUED | OUTPATIENT
Start: 2023-06-19 | End: 2023-07-10 | Stop reason: HOSPADM

## 2023-06-18 RX ORDER — MUSCLE RUB CREAM 100; 150 MG/G; MG/G
CREAM TOPICAL 2 TIMES DAILY
Status: DISCONTINUED | OUTPATIENT
Start: 2023-06-18 | End: 2023-07-10 | Stop reason: HOSPADM

## 2023-06-18 RX ADMIN — ACETAMINOPHEN 1000 MG: 500 TABLET ORAL at 20:07

## 2023-06-18 RX ADMIN — SENNOSIDES 17.2 MG: 8.6 TABLET, FILM COATED ORAL at 11:38

## 2023-06-18 RX ADMIN — MENTHOL, METHYL SALICYLATE: 10; 15 CREAM TOPICAL at 13:14

## 2023-06-18 RX ADMIN — CELECOXIB 200 MG: 100 CAPSULE ORAL at 08:26

## 2023-06-18 RX ADMIN — Medication 6 MG: at 20:07

## 2023-06-18 RX ADMIN — Medication 1000 UNITS: at 13:14

## 2023-06-18 RX ADMIN — GABAPENTIN 400 MG: 400 CAPSULE ORAL at 14:52

## 2023-06-18 RX ADMIN — MIDODRINE HYDROCHLORIDE 5 MG: 2.5 TABLET ORAL at 17:46

## 2023-06-18 RX ADMIN — TAMSULOSIN HYDROCHLORIDE 0.4 MG: 0.4 CAPSULE ORAL at 17:47

## 2023-06-18 RX ADMIN — DOCUSATE SODIUM 200 MG: 100 CAPSULE, LIQUID FILLED ORAL at 08:24

## 2023-06-18 RX ADMIN — ASPIRIN 81 MG CHEWABLE TABLET 81 MG: 81 TABLET CHEWABLE at 08:26

## 2023-06-18 RX ADMIN — GABAPENTIN 300 MG: 300 CAPSULE ORAL at 08:26

## 2023-06-18 RX ADMIN — MENTHOL, METHYL SALICYLATE: 10; 15 CREAM TOPICAL at 20:11

## 2023-06-18 RX ADMIN — CELECOXIB 200 MG: 100 CAPSULE ORAL at 20:06

## 2023-06-18 RX ADMIN — ACETAMINOPHEN 1000 MG: 500 TABLET ORAL at 08:25

## 2023-06-18 RX ADMIN — MULTIPLE VITAMINS W/ MINERALS TAB 1 TABLET: TAB at 11:38

## 2023-06-18 RX ADMIN — LISINOPRIL 20 MG: 20 TABLET ORAL at 05:30

## 2023-06-18 RX ADMIN — SODIUM CHLORIDE 2 G: 1 TABLET ORAL at 11:38

## 2023-06-18 RX ADMIN — ENOXAPARIN SODIUM 40 MG: 100 INJECTION SUBCUTANEOUS at 20:05

## 2023-06-18 RX ADMIN — OXYCODONE HYDROCHLORIDE 10 MG: 10 TABLET ORAL at 08:23

## 2023-06-18 RX ADMIN — DOCUSATE SODIUM 200 MG: 100 CAPSULE, LIQUID FILLED ORAL at 20:06

## 2023-06-18 RX ADMIN — CEFTRIAXONE SODIUM 1000 MG: 1 INJECTION, POWDER, FOR SOLUTION INTRAMUSCULAR; INTRAVENOUS at 17:53

## 2023-06-18 RX ADMIN — FUROSEMIDE 20 MG: 20 TABLET ORAL at 05:30

## 2023-06-18 RX ADMIN — SODIUM CHLORIDE 2 G: 1 TABLET ORAL at 17:47

## 2023-06-18 RX ADMIN — ACETAMINOPHEN 1000 MG: 500 TABLET ORAL at 14:52

## 2023-06-18 RX ADMIN — OXYCODONE HYDROCHLORIDE 10 MG: 10 TABLET ORAL at 14:53

## 2023-06-18 RX ADMIN — GABAPENTIN 400 MG: 400 CAPSULE ORAL at 20:06

## 2023-06-18 RX ADMIN — MIDODRINE HYDROCHLORIDE 5 MG: 2.5 TABLET ORAL at 11:38

## 2023-06-18 RX ADMIN — TEMAZEPAM 15 MG: 15 CAPSULE ORAL at 20:06

## 2023-06-18 RX ADMIN — SODIUM CHLORIDE 2 G: 1 TABLET ORAL at 08:25

## 2023-06-18 ASSESSMENT — BRIEF INTERVIEW FOR MENTAL STATUS (BIMS)
WHAT YEAR IS IT: CORRECT
ASKED TO RECALL BED: YES, AFTER CUEING (A PIECE OF FURNITURE")"
WHAT DAY OF THE WEEK IS IT: CORRECT
WHAT MONTH IS IT: ACCURATE WITHIN 5 DAYS
INITIAL REPETITION OF BED BLUE SOCK - FIRST ATTEMPT: 3
ASKED TO RECALL SOCK: YES, NO CUE REQUIRED
BIMS SUMMARY SCORE: 14
ASKED TO RECALL BLUE: YES, NO CUE REQUIRED

## 2023-06-18 ASSESSMENT — PAIN DESCRIPTION - PAIN TYPE: TYPE: ACUTE PAIN

## 2023-06-18 ASSESSMENT — ACTIVITIES OF DAILY LIVING (ADL): TOILETING: INDEPENDENT

## 2023-06-18 ASSESSMENT — GAIT ASSESSMENTS: GAIT LEVEL OF ASSIST: UNABLE TO PARTICIPATE

## 2023-06-18 ASSESSMENT — PULMONARY FUNCTION TESTS: FVC: 4.07

## 2023-06-18 NOTE — PROGRESS NOTES
Transfer Level & Assistive Devices Used: max    Patient Position: bed     Adaptive Equipment Used? n     Patient Sensation and Bowel Urgency Present? y     Incontinence? y     BM Results: no BM    1. Caregiver Present and actively participating in training? n    2. Patient Demonstrated Understanding with Bowel Medications and Purpose: y     3. Patient Participation with Suppository Placement: n     4. Patient Participation with Digital Stimulation: n     5. Patient Participation with Clothing Management: n     6. Patient Participation with Hygiene: n        (If patient meets all 6 of the criteria numbered above, consider rescheduling bowel program to evening 1700 or 0500.)      Other: Pt have two small bm brown mucous in 3 hours after suppository.

## 2023-06-18 NOTE — FLOWSHEET NOTE
06/18/23 1018   Pulmonary Function Group   $ FVC / Vital Capacity (liters)  4.07  (95% of predicted)

## 2023-06-18 NOTE — CARE PLAN
Problem: Knowledge Deficit - Standard  Goal: Patient and family/care givers will demonstrate understanding of plan of care, disease process/condition, diagnostic tests and medications  Outcome: Progressing   Pt education given regarding plan of care with emphasis on adequate hydration, pt shows good understanding, will continue to reinforce education and continue to monitor.         Problem: Fall Risk - Rehab  Goal: Patient will remain free from falls  Outcome: Progressing   Pt education given regarding fall precautions AND safety measures, pt shows good understanding, has not attempted to self transfer this shift, will continue to reinforce education and continue to monitor.

## 2023-06-18 NOTE — FLOWSHEET NOTE
06/18/23 0904   Events/Summary/Plan   Events/Summary/Plan RA pulse ox check and IS   Vital Signs   Pulse 87   Respiration 18   Pulse Oximetry 93 %   $ Pulse Oximetry (Spot Check) Yes   Respiratory Assessment   Level of Consciousness Alert   Chest Exam   Work Of Breathing / Effort Within Normal Limits   Breath Sounds   RUL Breath Sounds Clear   RML Breath Sounds Clear   RLL Breath Sounds Diminished   JC Breath Sounds Clear   LLL Breath Sounds Diminished   Oxygen   O2 Delivery Device Room air w/o2 available

## 2023-06-18 NOTE — PROGRESS NOTES
Pt arrived at [facility] from [facility] via pt transport. MD Terrell to follow for diagnosis of Guillain-[place] Syndrome. Initial assessment initiated. Pt oriented to room and facility routine and safety measures; pt education binder provided and discussed. Pt A/O x 4, cont of bowel and bladder. Max 2 assist for transfers. All wounds photographed and documented; photos uploaded to . Pt reports pain or discomfort at this time. Pt positioned for comfort in bed. Call light within reach, safety measures in place. Will continue to monitor.

## 2023-06-18 NOTE — ASSESSMENT & PLAN NOTE
Was on fluid restriction, Lasix, and NaCl tabs  Lisinopril discontinued per Dr. Ruelas  Na+ levels resolved and now stable  Off NaCl, Lasix, and fluid restriction

## 2023-06-18 NOTE — CARE PLAN
Problem: Pain - Standard  Goal: Alleviation of pain or a reduction in pain to the patient’s comfort goal  Outcome: Progressing   The patient is Watcher - Medium risk of patient condition declining or worsening

## 2023-06-18 NOTE — CARE PLAN
Problem: Knowledge Deficit - Standard  Goal: Patient and family/care givers will demonstrate understanding of plan of care, disease process/condition, diagnostic tests and medications  Outcome: Progressing   Pt education given regarding plan of care with emphasis on adequate hydration, pt education given on Powerade and water consumption limits, pt shows good understanding, will continue to reinforce education and continue to monitor.            Problem: Fall Risk - Rehab  Goal: Patient will remain free from falls  Outcome: Progressing   Pt education given regarding fall precautions AND safety measures, pt shows good understanding, was able to stand w therapy and the Sarasteady this shift. Has not attempted to self transfer this shift, will continue to reinforce education and continue to monitor.

## 2023-06-18 NOTE — ASSESSMENT & PLAN NOTE
Had progressive BLE weakness  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[facility]  S/P IVIG x 5 doses  On Midodrine and Flomax  Ongoing management per Physiatry

## 2023-06-18 NOTE — THERAPY
"Physical Therapy   Daily Treatment     Patient Name: Bull Banegas  Age:  67 y.o., Sex:  male  Medical Record #: [id number]  Today's Date: 6/18/2023     Precautions  Precautions: (P) Fall Risk  Comments: (P) lo, blurred vision    Subjective    \"I'm so tired. I've barely gotten any sleep.\" Pt in bed at arrival, agreeable to PT evaluation. Tech present and assisting for session.     Objective       06/18/23 1245   PT Charge Group   PT Therapeutic Activities (Units) 1   PT Evaluation PT Evaluation Mod   PT Total Time Spent   PT Individual Total Time Spent (Mins) 60   Prior Living Situation   Prior Services Home-Independent   Housing / Facility 1 Story House   Steps Into Home 0, threshold only   Steps In Home 0   Equipment Owned Front-Wheel Walker   Lives with - Patient's Self Care Capacity Spouse;Alone and Able to Care For Self   Comments lives with spouse, ind at baseline   Prior Level of Functional Mobility   Bed Mobility Independent   Transfer Status Independent   Ambulation Independent   Distance Ambulation (Feet)   (community distances, occasionally limited by back pain c/ R radiculopathy)   Assistive Devices Used None   Wheelchair   (N/A)   Stairs Independent   Prior Functioning: Everyday Activities   Self Care Independent   Indoor Mobility (Ambulation) Independent   Stairs Independent   Functional Cognition Independent   Prior Device Use None of the given options  (Independent)   Vitals   Pulse 82   Blood Pressure  107/62   Pulse Oximetry 94 %   O2 Delivery Device None - Room Air   Cognition    Cognition / Consciousness WDL   Passive ROM Lower Body   Passive ROM Lower Body WDL   Active ROM Lower Body    Active ROM Lower Body  X   Gross Active ROM   (decreased hip flx, ext, knee flx/ext due to strength deficits)   Strength Lower Body   Lower Body Strength  X   Rt Hip Extension Strength 2 (P)   Rt Hip Flexion Strength 2 (P)   Rt Hip Abduction Strength 2 (P)   Rt Hip Adduction Strength 2 (P)   Rt Knee Flexion " Strength 2 (P)   Rt Knee Extension Strength 2 (P)   Rt Ankle Dorsiflexion Strength 3 (F)   Rt Ankle Plantar Flexion Strength 3 (F)   Lt Hip Extension Strength 2 (P)   Lt Hip Flexion Strength 2 (P)   Lt Hip Abduction Strength 2 (P)   Lt Hip Adduction Strength 2 (P)   Lt Knee Flexion Strength 2 (P)   Lt Knee Extension Strength 2 (P)   Lt Ankle Dorsiflexion Strength 3 (F)   Lt Ankle Plantar Flexion Strength 3 (F)   Comments Inv/Ev 3/5 BLE   Sensation Lower Body   Lower Extremity Sensation   WDL   Comments to light touch localization   Balance Assessment   Sitting Balance (Static) Fair -   Sitting Balance (Dynamic) Poor   Standing Balance (Static) Dependent   Standing Balance (Dynamic) Dependent   Weight Shift Sitting Fair   Weight Shift Standing Absent   Bed Mobility    Supine to Sit Maximal Assist  (help for BLE and trunk to EOB, help to steady and align self at EOB)   Sit to Supine Total Assist  (help at trunk and BLE into bed, 2nd person CGA at trunk for safety)   Sit to Stand Total Assist  (Max assist x 2 c/ Padmini Steady)   Scooting Maximal Assist   Rolling Maximum Assist to Lt.;Maximal Assist to Rt.   Roll Left and Right   Assistance Needed Physical assistance   Physical Assistance Level 76% or more   CARE Score - Roll Left and Right 2   Roll Left and Right Discharge Goal   Discharge Goal 6   Sit to Lying   Assistance Needed Physical assistance;Verbal cues   Physical Assistance Level Total assistance   CARE Score - Sit to Lying 1   Sit to Lying Discharge Goal   Discharge Goal 6   Lying to Sitting on Side of Bed   Assistance Needed Physical assistance;Verbal cues   Physical Assistance Level 76% or more   CARE Score - Lying to Sitting on Side of Bed 2   Lying to Sitting on Side of Bed Discharge Goal   Discharge Goal 6   Sit to Stand   Assistance Needed Physical assistance;Verbal cues   Physical Assistance Level Total assistance   CARE Score - Sit to Stand 1   Sit to Stand Discharge Goal   Discharge Goal 3    Chair/Bed-to-Chair Transfer   Assistance Needed Physical assistance;Verbal cues   Physical Assistance Level Total assistance   CARE Score - Chair/Bed-to-Chair Transfer 1   Chair/Bed-to-Chair Transfer Discharge Goal   Discharge Goal 3   Toilet Transfer   Reason if not Attempted Safety concerns   CARE Score - Toilet Transfer 88   Car Transfer   Reason if not Attempted Safety concerns   CARE Score - Car Transfer 88   Car Transfer Discharge Goal   Discharge Goal 3   Walk 10 Feet   Reason if not Attempted Safety concerns   CARE Score - Walk 10 Feet 88   Walk 10 Feet Discharge Goal   Discharge Goal 3   Walk 50 Feet with Two Turns   Reason if not Attempted Safety concerns   CARE Score - Walk 50 Feet with Two Turns 88   Walk 50 Feet with Two Turns Discharge Goal   Discharge Goal 3   Walk 150 Feet   Reason if not Attempted Safety concerns   CARE Score - Walk 150 Feet 88   Walk 150 Feet Discharge Goal   Discharge Goal 2   Walking 10 Feet on Uneven Surfaces   Reason if not Attempted Safety concerns   CARE Score - Walking 10 Feet on Uneven Surfaces 88   Walking 10 Feet on Uneven Surfaces Discharge Goal   Discharge Goal 2   1 Step (Curb)   Reason if not Attempted Safety concerns   CARE Score - 1 Step (Curb) 88   1 Step (Curb) Discharge Goal   Discharge Goal 3   4 Steps   Reason if not Attempted Safety concerns   CARE Score - 4 Steps 88   4 Steps Discharge Goal   Discharge Goal 9   12 Steps   Reason if not Attempted Safety concerns   CARE Score - 12 Steps 88   12 Steps Discharge Goal   Discharge Goal 9   Picking Up Object   Reason if not Attempted Safety concerns   CARE Score - Picking Up Object 88   Picking Up Object Discharge Goal   Discharge Goal 6   Wheel 50 Feet with Two Turns   Assistance Needed Incidental touching;Verbal cues;Physical assistance   Physical Assistance Level 25% or less   CARE Score - Wheel 50 Feet with Two Turns 3   Wheel 50 Feet with Two Turns Discharge Goal   Discharge Goal 6   Wheel 150 Feet    Assistance Needed Physical assistance;Incidental touching;Verbal cues   Physical Assistance Level 25% or less   CARE Score - Wheel 150 Feet 3   Wheel 150 Feet Discharge Goal   Discharge Goal 6   Gait Functional Level of Assist    Gait Level Of Assist Unable to Participate   Wheelchair Functional Level of Assist   Wheelchair Assist Minimal Assist   Distance Wheelchair (Feet or Distance) 150   Wheelchair Description Extra time;Leg rest management;Safety concerns;Supervision for safety;Verbal cueing;Requires incidental assist  (improved with gloves donned for improved )   Stairs Functional Level of Assist   Level of Assist with Stairs Unable to Participate   Transfer Functional Level of Assist   Bed, Chair, Wheelchair Transfer Total Assist X 2   Bed Chair Wheelchair Transfer Description Requires lift;Slideboard transfer from bed to wheelchair;Other (comment)  (Total A x 2 for SaraSteady, MaxA x 1, 2nd person CGA<>SBA for slideboard transfer chair to bed)   Problem List    Problems Impaired Bed Mobility;Impaired Transfers;Impaired Ambulation;Functional ROM Deficit;Impaired Balance;Functional Strength Deficit;Impaired Coordination;Impaired Vision;Decreased Activity Tolerance;Safety Awareness Deficits / Cognition;Motor Planning / Sequencing   Precautions   Precautions Fall Risk   Comments teresita blurred vision   Current Discharge Plan   Current Discharge Plan Return to Prior Living Situation   Interdisciplinary Plan of Care Collaboration   IDT Collaboration with  Nursing;Therapy Tech   Patient Position at End of Therapy In Bed;Call Light within Reach;Tray Table within Reach;Phone within Reach   Collaboration Comments physical assist, meds, vitals, CLOF   Benefit   Therapy Benefit Patient Would Benefit from Inpatient Rehabilitation Physical Therapy to Maximize Functional [place] with ADLs, IADLs and Mobility.   Strengths & Barriers   Strengths Able to follow instructions;Alert and oriented;Effective  communication skills;Good carryover of learning;Independent prior level of function;Making steady progress towards goals;Motivated for self care and independence;Pleasant and cooperative;Supportive family;Willingly participates in therapeutic activities   Barriers Decreased endurance;Fatigue;Generalized weakness;Home accessibility;Impaired activity tolerance;Impaired balance;Limited mobility  (Paraplegia)     Utilized SaraSteady for initial STS assessment, orthostatic vitals assessment.      06/18/23 1300 06/18/23 1314   Vitals   Pulse 92 98   Blood Pressure  112/77  Seated /86  (standing c/ sarasteady)   Pulse Oximetry 96 % 96 %   O2 Delivery Device None - Room Air Room air w/o2 available     Patient education: reviewed PT plan of care, rehab expectations, mobility needs and patient passport, orientation to unit, role of PT, fall risk and use of call light. Also discussed safe progression of activity, pathogenesis and prognosis c/ GBS, and not pushing to point of mm fatigue for safety. He reports understanding.    Initiated slideboard transfers from chair to bed at [facility] c/ 2nd person CGA/SBA for help c/ board and incidental steadying.     Assessment    The patient is a 67 y.o. male admitted to [facility] inpatient rehabilitation 6/17/2023 with severe functional debility after dx of GBS. Per H&P, past medical history significant for hypercholesterolemia, essential hypertension, osteoarthritis, GERD, hyperkalemia, type 2 diabetes.     He presented to [facility] on 6/5 with progressive lower extremity weakness.  Neurology was consulted and he was diagnosed with Guillain-Barré syndrome.  He underwent 5-day course of IVIG without complications, completed on 6/11.  After completion of IVIG he has noted some improvement in strength in lower extremities but still complains of pins-and-needles in bilateral upper extremities.    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  Blancas was initially attempted to be removed, but patient failed voiding trial required multiple in and out catheterization so Blancas was replaced. He also had significant constipation and has not had a bowel movement since presentation.       PT evaluation performed today; functional performance at today's assessment is as above. Primary deficits are significant LE weakness, greater proximally than distally, decreased activity tolerance. Pt also endorses new onset vision changes since hospitalization, initially diplopia now double vision and some dizziness sitting EOB that persisted > 5 minutes without hypotension. Visual exam limited by fatigue and decreased room light and merits further assessment.    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 Additional factors influencing patient status and prognosis include: prior level of function, history of LBP c/ RLE radicular sx + herniated disc at L5 on MRI, and the above comorbidities.     The patient is performing well below their baseline level of function and will benefit from an interdisciplinary high intensity rehabilitation program to maximize functional independence, decrease burden of care, and support a safe return to home with family support.     Plan  Recommend Physical Therapy  minutes per day 5-7 days per week for 4 weeks for the following treatments:  PT Group Therapy, PT E Stim Attended, PT Orthotics Training, PT Gait Training, PT Self Care/Home Eval, PT Therapeutic Exercises, PT TENS Application, PT Neuro Re-Ed/Balance, PT Aquatic Therapy, PT Therapeutic Activity, PT Manual Therapy, and PT Evaluation.    Passport items to be completed:  Get in/out of bed safely, in/out of a vehicle, safely use mobility device, walk or wheel around  home/community, navigate up and down stairs, show how to get up/down from the ground, ensure home is accessible, demonstrate HEP, complete caregiver training    Goals:  Long term and short term goals have been discussed with patient and they are in agreement.      Physical Therapy Problems (Active)       Problem: Balance       Dates: Start:  06/18/23         Goal: STG-Within one week, patient will maintain static standing c/ BUE support >/= 2 minutes to support participation in ADLs.       Dates: Start:  06/18/23               Problem: Mobility       Dates: Start:  06/18/23         Goal: STG-Within one week, patient will demonstrate ability to manage wheelchair leg rests, arm rests, and brakes in preparation for transfers at [place] or better.        Dates: Start:  06/18/23               Problem: Mobility Transfers       Dates: Start:  06/18/23         Goal: STG-Within one week, patient will transfer bed to chair c/ LRAD (slideboard or reach pivot) and ModA or better.        Dates: Start:  06/18/23            Goal: STG-Within one week, patient will move supine to sit at SBA or better and use of bed rail.        Dates: Start:  06/18/23               Problem: PT-Long Term Goals       Dates: Start:  06/18/23         Goal: LTG-By discharge, patient will propel wheelchair >/= 300' and navigate inclines up to 3% (curb cuts or equivalent) c/ SBA or better.        Dates: Start:  06/18/23            Goal: LTG-By discharge, patient will ambulate >/= 50' c/ ModA or better and FWW.        Dates: Start:  06/18/23            Goal: LTG-By discharge, patient will transfer one surface to another c/ Naila.        Dates: Start:  06/18/23            Goal: LTG-By discharge, patient will transfer in/out of a car c/ ModA or better.        Dates: Start:  06/18/23

## 2023-06-18 NOTE — PROGRESS NOTES
4 Eyes Skin Assessment Completed by Scotty NOALN and Olga NOLAN.    Head WDL  Ears WDL  Nose WDL  Mouth WDL  Neck WDL  Breast/Chest WDL  Shoulder Blades WDL  Spine WDL  (R) Arm/Elbow/Hand Bruising  (L) Arm/Elbow/Hand Bruising  Abdomen Bruising  Groin WDL  Scrotum/Coccyx/Buttocks WDL  (R) Leg WDL  (L) Leg WDL  (R) Heel/Foot/Toe WDL  (L) Heel/Foot/Toe WDL          Devices In Places Blancas      Interventions In Place N/A    Possible Skin Injury No    Pictures Uploaded Into Epic Yes  Wound Consult Placed N/A  RN Wound Prevention Protocol Ordered No

## 2023-06-18 NOTE — CONSULTS
DATE OF SERVICE:  6/18/2023    REQUESTING PHYSICIAN:  Mario Terrell DO    CHIEF COMPLAINT / REASON FOR CONSULTATION:   Hypertension  Hyponatremia    HISTORY OF PRESENT ILLNESS:  This is a 66 y/o male with a PMH significant for hypertension, osteoarthritis, GERD, and type 2 diabetes who presented to [facility] on 6/5 with progressive lower extremity weakness.  Neurology was consulted and he was diagnosed with Guillain-Barré syndrome.  He underwent 5-day course of IVIG without complications, completed on 6/11.  After completion of IVIG he has noted some improvement in strength in lower extremities but still complains of pins-and-needles in bilateral upper extremities.  Pt was also noted to have significant elevation in systolic blood pressure of over 200.  This was managed with oral antihypertensives and stabilized during acute hospitalization.  MRI of the brain and the spine were negative.  TSH was normal, B12 was normal, RPP and HIV were nonreactive, ammonia level was normal and ESR was within normal limits.  SPEP was negative, acetylcholine receptor antibody was negative, antimuscarinic antibody was not detected.  Hepatitis panel was performed and was negative.  LP showed high protein and normal cell count.  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  Nephrology was consulted and was started on salt tablets 2 g 3 times daily, 620 mg daily, started Lasix, and placed on 1.8 L fluid restriction.  He was also noted to have progressing leukocytosis with WBC of 16.3.  He was started on ceftriaxone for presumed urinary tract infection with improving WBC of 14.0 on 6/17.       Because of the patient's weakness and debility, Rehab was consulted, evaluated the patient, and was deemed a good Rehab candidate.  The patient was transferred over to the Rehab facility on 6/17/2022.      The patient denies fever, chills, nausea, vomiting, headaches, blurry vision, or  chest pain.    REVIEW OF SYSTEMS: All review of systems are negative pre AMA and CMS criteria except for that stated in the HPI.    PAST MEDICAL HISTORY:  No past medical history on file.    PAST SURGICAL HISTORY:  No past surgical history on file.    No Known Allergies    CURRENT MEDICATIONS:    Current Facility-Administered Medications:     midodrine    menthol-methyl salicycate    gabapentin    Pharmacy Consult Request    acetaminophen **FOLLOWED BY** [START ON 6/22/2023] acetaminophen    enoxaparin    acetaminophen    omeprazole    carboxymethylcellulose    benzocaine-menthol    mag hydrox-al hydrox-simeth    ondansetron **OR** ondansetron    traZODone    sodium chloride    Respiratory Therapy Consult    oxyCODONE immediate-release **OR** oxyCODONE immediate-release    hydrALAZINE    therapeutic multivitamin-minerals    docusate sodium **AND** sennosides **AND** bisacodyl **AND** magnesium hydroxide    lisinopril    aspirin    [START ON 6/19/2023] atorvastatin    celecoxib    furosemide    melatonin    temazepam    cefTRIAXone (ROCEPHIN) IV    sodium chloride    lidocaine    tamsulosin         FAMILY HISTORY:  was reviewed and is not pertinent to this consultation.    PHYSICAL EXAMINATION:  VITAL SIGNS:  Temp is 98.1, blood pressure is 102/68, heart rate is 82, respiratory rate is 18.  GENERAL:  Patient was lying in bed in no distress.  HEENT:  Pupils were equal, round and reactive to light and accomodation.  Oral mucosa was pink and moist.  NECK:  Soft.  Supple.  No JVD.  HEART:  Regular rate and rhythm.  Normal S1 and S2.  No murmurs were appreciated.  LUNGS:  Are clear to auscultation bilaterally.  ABDOMEN:  Soft, non tender, non distended.  Bowels sound were positive in all four quadrants.  EXTREMITIES:  No clubbing, cyanosis.  There was no lower extremity edema.  NEUROLOGIC:  Cranial nerves two through twelve were grossly intact.    LABS:  Lab Results   Component Value Date/Time    SODIUM 125 (L) 06/18/2023  05:30 AM    POTASSIUM 4.3 06/18/2023 05:30 AM    CHLORIDE 93 (L) 06/18/2023 05:30 AM    CO2 22 06/18/2023 05:30 AM    GLUCOSE 110 (H) 06/18/2023 05:30 AM    BUN 20 06/18/2023 05:30 AM    CREATININE 0.48 (L) 06/18/2023 05:30 AM    BUNCREATRAT 26 (H) 04/06/2022 04:45 AM    GLOMRATE 143 06/17/2023 05:16 AM      Lab Results   Component Value Date/Time    WBC 14.0 (H) 06/18/2023 05:30 AM    RBC 3.33 (L) 06/18/2023 05:30 AM    HEMOGLOBIN 10.4 (L) 06/18/2023 05:30 AM    HEMATOCRIT 29.3 (L) 06/18/2023 05:30 AM    MCV 88.0 06/18/2023 05:30 AM    MCH 31.2 06/18/2023 05:30 AM    MCHC 35.5 06/18/2023 05:30 AM    MPV 9.7 06/18/2023 05:30 AM    NEUTSPOLYS 65.40 06/18/2023 05:30 AM    LYMPHOCYTES 24.60 06/18/2023 05:30 AM    MONOCYTES 6.50 06/18/2023 05:30 AM    EOSINOPHILS 2.00 06/18/2023 05:30 AM    BASOPHILS 0.40 06/18/2023 05:30 AM      Lab Results   Component Value Date/Time    PROTHROMBTM 13.4 06/18/2023 05:30 AM    INR 1.03 06/18/2023 05:30 AM        GBS (Guillain-Poyen syndrome) (HCC)  Had progressive LE weakness  Dx with GBS at [facility]  S/P IVIG x 5 rounds    Essential hypertension  BP ok  On Lisinopril --> will d/c 2nd to possible SIADH with low NA  If BP goes up after Lisinopril d/c'd, will start another antihypertensive med  Note: pt was on Lisinopril for a while at home  Cont to monitor    Vitamin D deficiency  Vit D: 19  Will start supplements    UTI (urinary tract infection)  Apparently uUrine Cx (+) at [facility]  Has been afebrile  WBC's: 16.3 --> 14.0 (6/19)  CXR (6/17): showed mild left basilar atelectasis  Cont Rocephin  Mgt per Physiatry    Hyponatremia  Na: 127 --> 126 --> 125 (6/18)  Has recent hx since 6/5/23 (at adm to [facility])  Will d/c Lisinopril (has SE of SIADH)  Cont salt tabs: 2g tid  Cont Lasix: 20 mg daily  Cont fluid restriction of 1800 cc/day  K+: 4.3 (6/18)  Note: Nephro saw pt at [facility] and started salt tabs, Lasix, and fl restrictions  Note: pt was on Lisinopril for a while at home  Cont to monitor       This  case has been discussed with the attending Physiatrist.    Thank you for the consultation.  Will follow the patient with you.

## 2023-06-18 NOTE — THERAPY
Occupational Therapy   Initial Evaluation     Patient Name: Bull Banegas  Age:  67 y.o., Sex:  male  Medical Record #: [id number]  Today's Date: 6/18/2023     Subjective    Pt asleep upon arrival, easily awoken. Stating he only got 1.5 hours of sleep last night d/t bowel movements during night following a suppository in the evening yesterday. Pt agreeable to attempt bed level activities.     Objective       06/18/23 0701   OT Charge Group   Charges Yes   OT Self Care / ADL (Units) 1   OT Evaluation OT Evaluation High   OT Total Time Spent   OT Individual Total Time Spent (Mins) 60   Prior Living Situation   Prior Services Home-Independent   Housing / Facility 1 Story House   Steps Into Home 0   Steps In Home 0   Bathroom Set up Walk In Shower;Shower Glass Doors;Bathtub / Shower Combination;Shower Curtain;Shower Chair  (pt has both walk-in shower with glass doors a suction cup GB, and shower chiar as well as a tub/shower combo with shower curtain)   Equipment Owned Front-Wheel Walker;Tub / Shower Seat;Hand Held Shower;Reacher   Lives with - Patient's Self Care Capacity Spouse;Alone and Able to Care For Self   Comments Lives in [place] with spouse who is retired   Prior Level of ADL Function   Self Feeding Independent   Grooming / Hygiene Independent   Bathing Independent   Dressing Independent   Toileting Independent   Prior Level of IADL Function   Medication Management Independent   Laundry Independent   Kitchen Mobility Independent   Finances Independent   Home Management Independent   Shopping Independent   Prior Level Of Mobility Independent Without Device in Community;Independent Without Device in Home   Driving / Transportation Driving Independent   Occupation (Pre-Hospital Vocational) Employed Full Time   Leisure Interests Family;Gardening;Pets;Television  (pt has 2 dogs)   Prior Functioning: Everyday Activities   Self Care Independent   Indoor Mobility (Ambulation) Independent   Stairs Independent   Functional  "Cognition Independent   Prior Device Use None of the given options   Cognitive Pattern Assessment   Cognitive Pattern Assessment Used BIMS   Brief Interview for Mental Status (BIMS)   Repetition of Three Words (First Attempt) 3   Temporal Orientation: Year Correct   Temporal Orientation: Month Accurate within 5 days   Temporal Orientation: Day Correct   Recall: \"Sock\" Yes, no cue required   Recall: \"Blue\" Yes, no cue required   Recall: \"Bed\" Yes, after cueing (\"a piece of furniture\")   BIMS Summary Score 14   Confusion Assessment Method (CAM)   Is there evidence of an acute change in mental status from the patient's baseline? No   Inattention Behavior not present   Disorganized thinking Behavior not present   Altered level of consciousness Behavior not present   Vision Screen   Vision   (Reading glasses, vision has gotten blurry since hospitalization per report. )   Active ROM Upper Body   Active ROM Upper Body  WDL   Dominant Hand Right   Strength Upper Body   Upper Body Strength  X   Sensation Upper Body   Upper Extremity Sensation    (pt reports pins and needles feeling in BUE)   Eating   Assistance Needed Supervision   Physical Assistance Level No physical assistance   CARE Score - Eating 4   Oral Hygiene   Assistance Needed Supervision   Physical Assistance Level No physical assistance   CARE Score - Oral Hygiene 4   Oral Hygiene Discharge Goal   Discharge Goal 6   Shower/Bathe Self   Assistance Needed Physical assistance   Physical Assistance Level 51%-75%   CARE Score - Shower/Bathe Self 2   Shower/Bathe Self Discharge Goal   Discharge Goal 4   Upper Body Dressing   Assistance Needed Physical assistance   Physical Assistance Level 25% or less   CARE Score - Upper Body Dressing 3   Upper Body Dressing Discharge Goal   Discharge Goal 6   Lower Body Dressing   Assistance Needed Physical assistance   Physical Assistance Level Total assistance   CARE Score - Lower Body Dressing 1   Lower Body Dressing Discharge " Goal   Discharge Goal 4   Putting On/Taking Off Footwear   Assistance Needed Physical assistance   Physical Assistance Level Total assistance   CARE Score - Putting On/Taking Off Footwear 1   Putting On/Taking Off Footwear Discharge Goal   Discharge Goal 6   Toileting Hygiene   Assistance Needed Physical assistance   Physical Assistance Level Total assistance   CARE Score - Toileting Hygiene 1   Toileting Hygiene Discharge Goal   Discharge Goal 4   Toilet Transfer   Reason if not Attempted Environmental limitations   CARE Score - Toilet Transfer 10   Toilet Transfer Discharge Goal   Discharge Goal 4   Hearing, Speech, and Vision   Ability to Hear Adequate   Ability to See in Adequate Light Adequate   Expression of Ideas and Wants Without difficulty   Understanding Verbal and Non-Verbal Content Understands   Functional Level of Assist   Eating Supervision  (set-up as needed)   Grooming Supervision;Seated   Bathing Maximal Assist  (completed bed bath only)   Upper Body Dressing Minimal Assist   Lower Body Dressing Total Assist   Toileting Total Assist   Toilet Transfers Unable to Participate   Tub / Shower Transfers Unable to Participate   Problem List   Problem List Decreased Active Daily Living Skills;Decreased Homemaking Skills;Decreased Functional Mobility;Decreased Activity Tolerance;Impaired Sensation Right Upper Extremity;Impaired Sensation Left Upper Extremity;Impaired Vision;Impaired Postural Control / Balance   Precautions   Precautions Fall Risk   Current Discharge Plan   Current Discharge Plan Return to Prior Living Situation   Benefit    Therapy Benefit Patient Would Benefit from Inpatient Rehab Occupational Therapy to Maximize [place] with ADLs, IADLs and Functional Mobility.   Interdisciplinary Plan of Care Collaboration   IDT Collaboration with  Speech Therapist;Nursing;Physical Therapist   Patient Position at End of Therapy In Bed;Bed Alarm On;Call Light within Reach;Tray Table within Reach;Phone  within Reach   Collaboration Comments CLOF   Strengths & Barriers   Strengths Able to follow instructions;Alert and oriented;Effective communication skills;Independent prior level of function;Motivated for self care and independence;Pleasant and cooperative;Willingly participates in therapeutic activities       Assessment  Patient is 67 y.o. male with a diagnosis of Guillain-Barré Syndrome.  Pt with PMH of hypercholesterolemia, essential hypertension, osteoarthritis, GERD, hyperkalemia, and type 2 diabetes. He underwent 5-day course of IVIG without complications, completed on 6/11.    Pt lives in a 1 story home with threshold CLOVER. Pt reports bathroom set-up includes: Walk-in Shower, Tub/Shower Combo, Glass Doors, Shower Curtain, and Shower chair. Pt reports independent with both ADLs and IADLs including driving and working as a dispatcher.    Pt with adequate UB strength in shoulder flexion/extension, elbow flexion/extension, and wrist flexion/extension. R fist stronger than L at this times, though needs further formal testing.     At time of evaluation patient presents below functional baseline w/ primary barriers being overall activity tolerance, endurance, fatigue, reports of visual changes (more blurry than baseline), impaired OOB tolerance, likely requires second person for bathroom transfers d/t overall LB weakness. They will benefit from daily skilled OT to maximize independence w/ ADLs, IADLs, and functional mobility.      Plan  Recommend Occupational Therapy  minutes per day 5-7 days per week for 3 weeks for the following treatments:  OT Self Care/ADL, OT Community Reintegration, OT Neuro Re-Ed/Balance, OT Sensory Int Techniques, OT Therapeutic Activity, OT Evaluation, and OT Therapeutic Exercise.    Passport items to be completed:  Perform bathroom transfers, complete dressing, complete feeding, get ready for the day, prepare a simple meal, participate in household tasks, adapt home for safety needs,  demonstrate home exercise program, complete caregiver training     Goals:  Long term and short term goals have been discussed with patient and they are in agreement.    Occupational Therapy Goals (Active)       Problem: Bathing       Dates: Start:  06/18/23         Goal: STG-Within one week, patient will bathe with min A overall using AE/DME as needed.       Dates: Start:  06/18/23               Problem: Dressing       Dates: Start:  06/18/23         Goal: STG-Within one week, patient will dress UB with supervision overall using AE/DME as needed.       Dates: Start:  06/18/23            Goal: STG-Within one week, patient will dress LB with mod A overall using AE/DME as needed.       Dates: Start:  06/18/23               Problem: Functional Transfers       Dates: Start:  06/18/23         Goal: STG-Within one week, patient will transfer to toilet with max A x1 overall using AE/DME as needed.       Dates: Start:  06/18/23               Problem: OT Long Term Goals       Dates: Start:  06/18/23         Goal: LTG-By discharge, patient will complete basic self care tasks with min-supervision overall using AE/DME as needed.       Dates: Start:  06/18/23            Goal: LTG-By discharge, patient will perform bathroom transfers with SBA overall using AE/DME as needed.       Dates: Start:  06/18/23               Problem: Toileting       Dates: Start:  06/18/23         Goal: STG-Within one week, patient will complete toileting tasks with Max A overall using AE/DME as needed       Dates: Start:  06/18/23

## 2023-06-18 NOTE — FLOWSHEET NOTE
06/18/23 1017   Events/Summary/Plan   Events/Summary/Plan FVC. Laying in bed   Vital Signs   Pulse 82   Respiration 18   Pulse Oximetry 92 %   $ Pulse Oximetry (Spot Check) Yes   Respiratory Assessment   Level of Consciousness Alert   Chest Exam   Work Of Breathing / Effort Within Normal Limits   Oxygen   O2 Delivery Device Room air w/o2 available

## 2023-06-18 NOTE — PROGRESS NOTES
Late entry, upon admit of pt to rehab, pt unable to stand and can not tolerate taking of ortho vital signs.

## 2023-06-18 NOTE — THERAPY
Speech Language Pathology   Initial Assessment     Patient Name: Bull Banegas  AGE:  67 y.o., SEX:  male  Medical Record #: [id number]  Today's Date: 6/18/2023     Subjective    Pt pleasant and cooperative during evaluation.      Objective       06/18/23 0801   Evaluation Charges   Charges Yes   SLP Total Time Spent   SLP Individual Total Time Spent (Mins) 45   Prior Living Situation   Prior Services Home-Independent   Housing / Facility 1 Story House   Lives with - Patient's Self Care Capacity Spouse;Alone and Able to Care For Self   Prior Level Of Function   Communication Within Functional Limits   Swallow Within Functional Limits   Dentition Intact   Dentures None   Hearing Within Functional Limits for Evaluation   Hearing Aid None   Vision Wears Corrective Lenses;Reading    Patient's Primary Language English   Occupation (Pre-Hospital Vocational) Employed Full Time   Speech Mechanisms / Voice Production   Speech Mechanisms / Voice Production (WDL) WDL   Labial Function   Labial Function (WDL) WDL   Lingual Function   Lingual Function (WDL) WDL   Laryngeal Function   Laryngeal Function (WDL) WDL   Swallowing   Swallowing (WDL) WDL   Swallowing/Nutritional Status   Swallowing/Nutritional Status Regular food   Functional Level of Assist   Eating Independent   Current Discharge Plan   Current Discharge Plan Return to Prior Living Situation   Benefit   Therapy Benefit Patient would not benefit from Inpatient Rehab Speech-Language Pathology.  No further Speech Therapy recommended at this time.   Interdisciplinary Plan of Care Collaboration   IDT Collaboration with  Nursing;Occupational Therapist   Collaboration Comments CLOF; medication administration   Strengths & Barriers   Strengths Able to follow instructions;Alert and oriented;Independent prior level of function;Pleasant and cooperative;Willingly participates in therapeutic activities   Speech Language Pathologist Assigned   Assigned SLP / Extension MICHELLE AN          Assessment    Patient is 67 y.o. male with a diagnosis of Guillain Webb syndrome.  Additional factors influencing patient status/progress (ie: cognitive factors, co-morbidities, social support, etc): Bedside swallow evaluation completed this day.  Oral motor examination was unremarkable.  Pt tolerated thin liquids by cup, and by straw without overt s/s of aspiration.  Pt tolerated regular textures without oral residue.  Pt tolerated multiple whole medications, with thin liquid wash, without overt s/s of aspiration.  Pt reported 'no difficulty' with swallow function at this time.  Pt education regarding s/s of aspiration.  Recommend pt inform MD, or nursing, if he notices a change in swallow function.  Pt verbalized understanding.  Lungs clear per nursing.   Skilled dysphagia tx is not warranted at this time.  Pt may be referred back to this service if deemed appropriate.     Plan      AL ST    Speech Therapy Problems (Active)       There are no active problems.

## 2023-06-18 NOTE — ASSESSMENT & PLAN NOTE
Apparently uUrine Cx (+) at [facility]  Has been afebrile  WBC's: 14.0 (6/19)  CXR (6/17): showed mild left basilar atelectasis  Cont Rocephin  Mgt per Physiatry

## 2023-06-19 ENCOUNTER — APPOINTMENT (OUTPATIENT)
Dept: PHYSICAL THERAPY | Facility: REHABILITATION | Age: 68
DRG: 095 | End: 2023-06-19
Attending: PHYSICAL MEDICINE & REHABILITATION
Payer: MEDICARE

## 2023-06-19 ENCOUNTER — APPOINTMENT (OUTPATIENT)
Dept: OCCUPATIONAL THERAPY | Facility: REHABILITATION | Age: 68
DRG: 095 | End: 2023-06-19
Attending: PHYSICAL MEDICINE & REHABILITATION
Payer: MEDICARE

## 2023-06-19 LAB
ANION GAP SERPL CALC-SCNC: 11 MMOL/L (ref 7–16)
BUN SERPL-MCNC: 17 MG/DL (ref 8–22)
CALCIUM SERPL-MCNC: 8.9 MG/DL (ref 8.5–10.5)
CHLORIDE SERPL-SCNC: 94 MMOL/L (ref 96–112)
CO2 SERPL-SCNC: 24 MMOL/L (ref 20–33)
CREAT SERPL-MCNC: 0.54 MG/DL (ref 0.5–1.4)
GFR SERPLBLD CREATININE-BSD FMLA CKD-EPI: 110 ML/MIN/1.73 M 2
GLUCOSE SERPL-MCNC: 101 MG/DL (ref 65–99)
POTASSIUM SERPL-SCNC: 4.8 MMOL/L (ref 3.6–5.5)
SODIUM SERPL-SCNC: 129 MMOL/L (ref 135–145)

## 2023-06-19 PROCEDURE — 700111 HCHG RX REV CODE 636 W/ 250 OVERRIDE (IP): Performed by: PHYSICAL MEDICINE & REHABILITATION

## 2023-06-19 PROCEDURE — 80048 BASIC METABOLIC PNL TOTAL CA: CPT

## 2023-06-19 PROCEDURE — 700101 HCHG RX REV CODE 250: Performed by: PHYSICAL MEDICINE & REHABILITATION

## 2023-06-19 PROCEDURE — 700102 HCHG RX REV CODE 250 W/ 637 OVERRIDE(OP): Performed by: HOSPITALIST

## 2023-06-19 PROCEDURE — 97112 NEUROMUSCULAR REEDUCATION: CPT

## 2023-06-19 PROCEDURE — 97530 THERAPEUTIC ACTIVITIES: CPT

## 2023-06-19 PROCEDURE — 97110 THERAPEUTIC EXERCISES: CPT

## 2023-06-19 PROCEDURE — A9270 NON-COVERED ITEM OR SERVICE: HCPCS | Performed by: PHYSICAL MEDICINE & REHABILITATION

## 2023-06-19 PROCEDURE — 94760 N-INVAS EAR/PLS OXIMETRY 1: CPT

## 2023-06-19 PROCEDURE — 700102 HCHG RX REV CODE 250 W/ 637 OVERRIDE(OP): Performed by: PHYSICAL MEDICINE & REHABILITATION

## 2023-06-19 PROCEDURE — 700105 HCHG RX REV CODE 258: Performed by: PHYSICAL MEDICINE & REHABILITATION

## 2023-06-19 PROCEDURE — 99232 SBSQ HOSP IP/OBS MODERATE 35: CPT | Performed by: HOSPITALIST

## 2023-06-19 PROCEDURE — 770010 HCHG ROOM/CARE - REHAB SEMI PRIVAT*

## 2023-06-19 PROCEDURE — 94150 VITAL CAPACITY TEST: CPT

## 2023-06-19 PROCEDURE — 36415 COLL VENOUS BLD VENIPUNCTURE: CPT

## 2023-06-19 PROCEDURE — 97535 SELF CARE MNGMENT TRAINING: CPT

## 2023-06-19 PROCEDURE — 99232 SBSQ HOSP IP/OBS MODERATE 35: CPT | Performed by: PHYSICAL MEDICINE & REHABILITATION

## 2023-06-19 PROCEDURE — A9270 NON-COVERED ITEM OR SERVICE: HCPCS | Performed by: HOSPITALIST

## 2023-06-19 RX ORDER — VITAMIN B COMPLEX
2000 TABLET ORAL DAILY
Status: DISCONTINUED | OUTPATIENT
Start: 2023-06-19 | End: 2023-07-10 | Stop reason: HOSPADM

## 2023-06-19 RX ORDER — ENEMA 19; 7 G/133ML; G/133ML
1 ENEMA RECTAL ONCE
Status: COMPLETED | OUTPATIENT
Start: 2023-06-19 | End: 2023-06-19

## 2023-06-19 RX ORDER — GABAPENTIN 300 MG/1
600 CAPSULE ORAL 3 TIMES DAILY
Status: DISCONTINUED | OUTPATIENT
Start: 2023-06-19 | End: 2023-06-20

## 2023-06-19 RX ADMIN — ENOXAPARIN SODIUM 40 MG: 100 INJECTION SUBCUTANEOUS at 20:13

## 2023-06-19 RX ADMIN — ATORVASTATIN CALCIUM 10 MG: 10 TABLET, FILM COATED ORAL at 20:15

## 2023-06-19 RX ADMIN — MIDODRINE HYDROCHLORIDE 5 MG: 2.5 TABLET ORAL at 10:52

## 2023-06-19 RX ADMIN — SENNOSIDES 17.2 MG: 8.6 TABLET, FILM COATED ORAL at 12:09

## 2023-06-19 RX ADMIN — OMEPRAZOLE 20 MG: 20 CAPSULE, DELAYED RELEASE ORAL at 08:43

## 2023-06-19 RX ADMIN — CELECOXIB 200 MG: 100 CAPSULE ORAL at 08:42

## 2023-06-19 RX ADMIN — OXYCODONE HYDROCHLORIDE 10 MG: 10 TABLET ORAL at 12:08

## 2023-06-19 RX ADMIN — ASPIRIN 81 MG CHEWABLE TABLET 81 MG: 81 TABLET CHEWABLE at 08:43

## 2023-06-19 RX ADMIN — ACETAMINOPHEN 1000 MG: 500 TABLET ORAL at 08:43

## 2023-06-19 RX ADMIN — MULTIPLE VITAMINS W/ MINERALS TAB 1 TABLET: TAB at 10:52

## 2023-06-19 RX ADMIN — MENTHOL, METHYL SALICYLATE: 10; 15 CREAM TOPICAL at 08:45

## 2023-06-19 RX ADMIN — ACETAMINOPHEN 1000 MG: 500 TABLET ORAL at 20:12

## 2023-06-19 RX ADMIN — DOCUSATE SODIUM 200 MG: 100 CAPSULE, LIQUID FILLED ORAL at 20:12

## 2023-06-19 RX ADMIN — Medication 1000 UNITS: at 08:43

## 2023-06-19 RX ADMIN — GABAPENTIN 600 MG: 300 CAPSULE ORAL at 15:54

## 2023-06-19 RX ADMIN — TAMSULOSIN HYDROCHLORIDE 0.4 MG: 0.4 CAPSULE ORAL at 17:51

## 2023-06-19 RX ADMIN — CEFTRIAXONE SODIUM 1000 MG: 1 INJECTION, POWDER, FOR SOLUTION INTRAMUSCULAR; INTRAVENOUS at 17:54

## 2023-06-19 RX ADMIN — OXYCODONE HYDROCHLORIDE 10 MG: 10 TABLET ORAL at 08:44

## 2023-06-19 RX ADMIN — Medication 2000 UNITS: at 15:54

## 2023-06-19 RX ADMIN — MIDODRINE HYDROCHLORIDE 5 MG: 2.5 TABLET ORAL at 17:51

## 2023-06-19 RX ADMIN — SODIUM CHLORIDE 2 G: 1 TABLET ORAL at 17:51

## 2023-06-19 RX ADMIN — FUROSEMIDE 20 MG: 20 TABLET ORAL at 05:52

## 2023-06-19 RX ADMIN — Medication 6 MG: at 20:12

## 2023-06-19 RX ADMIN — DOCUSATE SODIUM 200 MG: 100 CAPSULE, LIQUID FILLED ORAL at 08:42

## 2023-06-19 RX ADMIN — GABAPENTIN 400 MG: 400 CAPSULE ORAL at 08:45

## 2023-06-19 RX ADMIN — MIDODRINE HYDROCHLORIDE 5 MG: 2.5 TABLET ORAL at 08:48

## 2023-06-19 RX ADMIN — SODIUM PHOSPHATE 133 ML: 7; 19 ENEMA RECTAL at 20:07

## 2023-06-19 RX ADMIN — TEMAZEPAM 15 MG: 15 CAPSULE ORAL at 20:12

## 2023-06-19 RX ADMIN — BISACODYL 10 MG: 10 SUPPOSITORY RECTAL at 20:08

## 2023-06-19 RX ADMIN — GABAPENTIN 600 MG: 300 CAPSULE ORAL at 20:11

## 2023-06-19 RX ADMIN — CELECOXIB 200 MG: 100 CAPSULE ORAL at 20:12

## 2023-06-19 RX ADMIN — ACETAMINOPHEN 1000 MG: 500 TABLET ORAL at 15:54

## 2023-06-19 RX ADMIN — OXYCODONE HYDROCHLORIDE 10 MG: 10 TABLET ORAL at 20:19

## 2023-06-19 RX ADMIN — SODIUM CHLORIDE 2 G: 1 TABLET ORAL at 08:43

## 2023-06-19 RX ADMIN — SODIUM CHLORIDE 2 G: 1 TABLET ORAL at 10:52

## 2023-06-19 SDOH — ECONOMIC STABILITY: TRANSPORTATION INSECURITY
IN THE PAST 12 MONTHS, HAS LACK OF RELIABLE TRANSPORTATION KEPT YOU FROM MEDICAL APPOINTMENTS, MEETINGS, WORK OR FROM GETTING THINGS NEEDED FOR DAILY LIVING?: NO

## 2023-06-19 SDOH — ECONOMIC STABILITY: TRANSPORTATION INSECURITY
IN THE PAST 12 MONTHS, HAS THE LACK OF TRANSPORTATION KEPT YOU FROM MEDICAL APPOINTMENTS OR FROM GETTING MEDICATIONS?: NO

## 2023-06-19 ASSESSMENT — PAIN DESCRIPTION - PAIN TYPE
TYPE: ACUTE PAIN
TYPE: ACUTE PAIN

## 2023-06-19 ASSESSMENT — ENCOUNTER SYMPTOMS
ABDOMINAL PAIN: 0
FEVER: 0
DIARRHEA: 0
NAUSEA: 0
CHILLS: 0
VOMITING: 0
SHORTNESS OF BREATH: 0
NERVOUS/ANXIOUS: 0

## 2023-06-19 ASSESSMENT — PULMONARY FUNCTION TESTS: FVC: 4.02

## 2023-06-19 NOTE — DISCHARGE PLANNING
CM attempted to meet with patient.  Patient in bathroom.  Will attempt at another time.  CM available as needs arise.

## 2023-06-19 NOTE — PROGRESS NOTES
[facility] Medicine Daily Progress Note    Date of Service  6/19/2023    Chief Complaint:  Hypertension  Hyponatremia    Interval History:  Discussed about his salt level have improve decently today.    Review of Systems  Review of Systems   Constitutional:  Negative for chills and fever.   Respiratory:  Negative for shortness of breath.    Cardiovascular:  Negative for chest pain.   Gastrointestinal:  Negative for abdominal pain, diarrhea, nausea and vomiting.   Psychiatric/Behavioral:  The patient is not nervous/anxious.         Physical Exam  Temp:  [36.6 °C (97.8 °F)-37 °C (98.6 °F)] 37 °C (98.6 °F)  Pulse:  [75-98] 80  Resp:  [18-19] 18  BP: (106-115)/(62-86) 106/64  SpO2:  [92 %-96 %] 93 %    Physical Exam  Vitals and nursing note reviewed.   Constitutional:       Appearance: Normal appearance.   HENT:      Head: Atraumatic.   Eyes:      Conjunctiva/sclera: Conjunctivae normal.      Pupils: Pupils are equal, round, and reactive to light.   Cardiovascular:      Rate and Rhythm: Normal rate and regular rhythm.      Heart sounds: No murmur heard.  Pulmonary:      Effort: Pulmonary effort is normal.      Breath sounds: No stridor. No wheezing or rales.   Abdominal:      General: There is no distension.      Palpations: Abdomen is soft.      Tenderness: There is no abdominal tenderness.   Musculoskeletal:      Cervical back: Normal range of motion and neck supple.      Right lower leg: No edema.      Left lower leg: No edema.   Skin:     General: Skin is warm and dry.      Findings: No rash.   Neurological:      Mental Status: He is alert and oriented to person, place, and time.   Psychiatric:         Mood and Affect: Mood normal.         Behavior: Behavior normal.         Fluids    Intake/Output Summary (Last 24 hours) at 6/19/2023 0932  Last data filed at 6/19/2023 0502  Gross per 24 hour   Intake 360 ml   Output 2400 ml   Net -2040 ml       Laboratory  Recent Labs     06/17/23  0516 06/18/23  0530   WBC  --  14.0*    RBC 3.57* 3.33*   HEMOGLOBIN 11.0* 10.4*   HEMATOCRIT 30.9* 29.3*   MCV 86.6 88.0   MCH 30.7 31.2   MCHC 35.5 35.5   RDW 13.1 41.3   PLATELETCT 309 285   MPV 7.7 9.7     Recent Labs     06/17/23  0516 06/18/23  0530 06/19/23  0518   SODIUM 126* 125* 129*   POTASSIUM 4.5 4.3 4.8   CHLORIDE 94* 93* 94*   CO2 26 22 24   GLUCOSE 130* 110* 101*   BUN 21* 20 17   CREATININE 0.57* 0.48* 0.54   CALCIUM 8.7 8.8 8.9     Recent Labs     06/18/23  0530   INR 1.03         Recent Labs     06/18/23  0530   TRIGLYCERIDE 88   HDL 44   LDL 75       Imaging    Assessment/Plan  * GBS (Guillain-Abrams syndrome) ([facility])  Assessment & Plan  Had progressive LE weakness  Dx with GBS at [facility]  S/P IVIG x 5 rounds    Vitamin D deficiency  Assessment & Plan  Vit D: 19  Cont supplements    UTI (urinary tract infection)  Assessment & Plan  Apparently uUrine Cx (+) at [facility]  Has been afebrile  WBC's: 16.3 --> 14.0 (6/19)  CXR (6/17): showed mild left basilar atelectasis  Cont Rocephin  Mgt per Physiatry    Essential hypertension  Assessment & Plan  BP ok  Off Lisinopril -- 2nd to possible SIADH  Now on Midodrine  Note: pt was on Lisinopril for a while at home  Management per Physiatry    Hyponatremia  Assessment & Plan  Na: 127 --> 126 --> 125 (6/18) --> 129 (6/19)  Has recent hx since 6/5/23 (at adm to [facility])  Off Lisinopril (has SE of SIADH) -- last dose 6/18 -- not the reason Na went up today but may be a contributing factor  Cont salt tabs: 2g tid  Cont Lasix: 20 mg daily  Cont fluid restriction of 1800 cc/day  K+: 4.8 (6/19)  Note: Nephro saw pt at [facility] and started salt tabs, Lasix, and fl restrictions  Note: pt was on Lisinopril for a while at home  Cont to monitor

## 2023-06-19 NOTE — THERAPY
Physical Therapy   Daily Treatment     Patient Name: Bull Banegas  Age:  67 y.o., Sex:  male  Medical Record #: [id number]  Today's Date: 6/19/2023     Precautions  Precautions: Fall Risk  Comments: lo, blurred vision    Subjective    Pt agreeable to both sessions. Activity tolerance limited by severe R LE pain     Objective       06/19/23 1101   PT Charge Group   PT Therapeutic Exercise (Units) 1   PT Therapeutic Activities (Units) 1   PT Total Time Spent   PT Individual Total Time Spent (Mins) 30   Pain 0 - 10 Group   Location Leg   Location Orientation Right   Pain Rating Scale (NPRS) 4   Description Sharp;Shooting   Comfort Goal Perform Activity;Comfort with Movement   Therapist Pain Assessment During Activity  (with active hip flexion in supine)   Supine Lower Body Exercise   Short Arc Quad 1 set of 10;Right ;Left   Ankle Pumps 2 sets of 10;Bilateral   Gluteal Isometrics Bilateral;1 set of 10  (5 sec hold)   Quadriceps Isometrics 1 set of 10;Bilateral  (5 sec hold)   Bed Mobility    Rolling Moderate Assist to Rt.;Moderate Assist to Lt.  (with use of bed rails flat bed)   Neuro-Muscular Treatments   Neuro-Muscular Treatments Compensatory Strategies;Facilitation   Comments rolling training with use of bed rail flat bed   Interdisciplinary Plan of Care Collaboration   IDT Collaboration with  Nursing   Patient Position at End of Therapy In Bed;Bed Alarm On;Call Light within Reach;Tray Table within Reach;Phone within Reach   Collaboration Comments MD requested pt returned to bed after being up in WC for >1hr     1st tx- Pt received supine in bed ,agreeable to bed level therex and bed mobility training. Instructed pt in supine HEP, see above. Pt reported OOB tolerance was limited by shooting pain in R LE     06/19/23 1501   PT Charge Group   PT Neuromuscular Re-Education / Balance (Units) 2   PT Therapeutic Activities (Units) 2   PT Total Time Spent   PT Individual Total Time Spent (Mins) 60   Pain 0 - 10 Group    Location Back;Leg   Location Orientation Right;Anterior   Pain Rating Scale (NPRS) 8   Description Sharp;Burning   Comfort Goal Perform Activity   Therapist Pain Assessment During Activity;Nurse Notified   Wheelchair Functional Level of Assist   Wheelchair Assist Stand by Assist   Distance Wheelchair (Feet or Distance) 150   Wheelchair Description Extra time;Verbal cueing;Supervision for safety;Leg rest management   Transfer Functional Level of Assist   Bed, Chair, Wheelchair Transfer Total Assist X 2  (modA pull to stand SaraStedy; modA transfer board with CGA of second person for safety)   Bed Chair Wheelchair Transfer Description Adaptive equipment;Set-up of equipment;Supervision for safety;Verbal cueing;Slideboard transfer from bed to wheelchair;Increased time;Initial preparation for task   Supine Lower Body Exercise   Bridges Two Legged;1 set of 15  (minimal liftoff with VC's for smooth vs ballistic muscle contraction)   Sitting Lower Body Exercises   Long Arc Quad 1 set of 10;Right;Left   Bed Mobility    Supine to Sit Maximal Assist   Sit to Supine Maximal Assist   Sit to Stand Moderate Assist  (pull to stand, B knee hyperextension and decr hip extension)   Scooting Moderate Assist  (edge of firm mat, B knee block)   Rolling Minimal Assist to Rt.;Minimum Assist to Lt.   Neuro-Muscular Treatments   Neuro-Muscular Treatments Postural Facilitation;Co-Contraction;Tactile Cuing;Verbal Cuing;Anterior weight shift   Comments scooting edge of mat with increased anterior weight shift to facilitate liftoff; standing frame 2 bouts with 5 reps sit to stand from sling raised near total extension   Interdisciplinary Plan of Care Collaboration   IDT Collaboration with  Nursing;Therapy Tech   Patient Position at End of Therapy In Bed;Call Light within Reach;Family / Friend in Room;Tray Table within Reach   Collaboration Comments handoff of care, assist with care     2nd tx- Educated pt in logroll to protect spine with  supine<>sit  Completed NRE for scooting and SB transfer mechanics  Completed standing frame with emphasis on glute and quad facilitation to prevent hyperextension of B knees. Pt only able to sustain for 1-2 seconds. Pt did not report dizziness or lightheadedness in standing though he did attest to dizziness after supine>sit with no nystagmus observed  Applied manual lumbar traction in partial hooklying with reported improvement in R LE pain. Pt refused to trial lying prone for pain relief    Assessment    Pt is limited by B LE weakness, particularly in quads and glutes, resulting in significantly impaired stance stability in static standing. He was able to achieve partial liftoff with bridging on firm mat with approximation at femurs and almost achieved full range active knee extension in sitting (lacking approximately 15 degrees). Pt's radicular low back pain into R LE significantly reduces his activity tolerance though he very motivated to participate in therapy.  Strengths: Able to follow instructions, Alert and oriented, Effective communication skills, Good carryover of learning, Independent prior level of function, Making steady progress towards goals, Motivated for self care and independence, Pleasant and cooperative, Supportive family, Willingly participates in therapeutic activities  Barriers: Decreased endurance, Fatigue, Generalized weakness, Home accessibility, Impaired activity tolerance, Impaired balance, Limited mobility (Paraplegia)    Plan    Transfer board training  standing frame standing tolerance  STS with standing frame from elevated mat vs raised sling  B LE NRE  Bed mobility with log roll  Continue problem-solving interventions to ease radicular back pain    Passport items to be completed:  Get in/out of bed safely, in/out of a vehicle, safely use mobility device, walk or wheel around home/community, navigate up and down stairs, show how to get up/down from the ground, ensure home is  accessible, demonstrate HEP, complete caregiver training    Physical Therapy Problems (Active)       Problem: Balance       Dates: Start:  06/18/23         Goal: STG-Within one week, patient will maintain static standing c/ BUE support >/= 2 minutes to support participation in ADLs.       Dates: Start:  06/18/23               Problem: Mobility       Dates: Start:  06/18/23         Goal: STG-Within one week, patient will demonstrate ability to manage wheelchair leg rests, arm rests, and brakes in preparation for transfers at [place] or better.        Dates: Start:  06/18/23               Problem: Mobility Transfers       Dates: Start:  06/18/23         Goal: STG-Within one week, patient will transfer bed to chair c/ LRAD (slideboard or reach pivot) and ModA or better.        Dates: Start:  06/18/23            Goal: STG-Within one week, patient will move supine to sit at SBA or better and use of bed rail.        Dates: Start:  06/18/23               Problem: PT-Long Term Goals       Dates: Start:  06/18/23         Goal: LTG-By discharge, patient will propel wheelchair >/= 300' and navigate inclines up to 3% (curb cuts or equivalent) c/ SBA or better.        Dates: Start:  06/18/23            Goal: LTG-By discharge, patient will ambulate >/= 50' c/ ModA or better and FWW.        Dates: Start:  06/18/23            Goal: LTG-By discharge, patient will transfer one surface to another c/ Naila.        Dates: Start:  06/18/23            Goal: LTG-By discharge, patient will transfer in/out of a car c/ ModA or better.        Dates: Start:  06/18/23

## 2023-06-19 NOTE — THERAPY
Recreational Therapy   Initial Evaluation     Patient Name: Bull Banegas  Age:  67 y.o., Sex:  male  Medical Record #: [id number]  Today's Date: 6/19/2023     Subjective    Patient ready and agreeable to recreation therapy session.     Objective       06/19/23 1031   Procedural Tracking   Procedural Tracking Community Re-Integration;Community Skills Development;Leisure Skills Development;Leisure Skills Awareness;Cognitive Skills Training;Group Treatment;Fine Motor Functional Leisure Skills;Gross Motor Functional Leisure Skills;Social Skills Training   Treatment Time   Total Time Spent (mins) 30   Total Time Missed 0   Leisure History   Leisure Interests Family;Gardening;Pets;Television;Hobbies  (2 pitbulls, loves dogs, still works, son passed about ~14 mo ago in car accident, enjoys exploring with wife and dogs, taking care of house)   Pre-Morbid Leisure Lifestyle Individual;Sedentary;Occasionally Active   Prior Living Arrangements   Lives with - Patient's Self Care Capacity Spouse;Alone and Able to Care For Self   Steps Into Home 0   Steps In Home 0   Ambulation Independent   Assistive Devices Used None   Driving / Transportation Driving Independent   Functional Ability Status - Physical   Endurance Good    Right  Weak   Left  Weak   Right Arm Weak   Left Arm Weak   Right Leg Weak   Left Leg Weak   Functional Ability Status - Cognitive   Attention Span Remains on Task   Comprehension Follows One Step Commands;Follows Two Step Commands   Judgment Able to Make Independent Decisions   Functional Ability Status - Emotional    Affect Appropriate   Mood Appropriate   Behavior Appropriate;Cooperative   Leisure Competence Measure   Leisure Awareness Minimal Assist   Leisure Attitude Minimal Assist   Leisure Skills Minimal Assist   Cultural / Social Behaviors Minimal Assist   Interpersonal Skills Minimal Assist   Community Integration Skills Minimal Assist   Social Contact Minimal Assist   Community Participation  Minimal Assist   Clinical Impression   Clinical Impression Impaired Fine Motor Leisure Functioning;Impaired Gross Motor Leisure Functioning;Impaired Endurance;Impaired Leisure Skills;Impaired Relaxation and Coping Skills;Impaired Community Skills;Impaired Group Interaction Skills   Current Discharge Plan   Current Discharge Plan Return to Prior Living Situation   Benefit    Benefit Patient would Benefit from Inpatient Recreational Therapy to Maximize Independent Leisure Functioning    Interdisciplinary Plan of Care Collaboration   IDT Collaboration with  Nursing   Patient Position at End of Therapy Seated;In Bed;Phone within Reach;Tray Table within Reach;Call Light within Reach   Strengths & Barriers   Strengths Able to follow instructions;Alert and oriented;Willingly participates in therapeutic activities;Pleasant and cooperative;Making steady progress towards goals;Motivated for self care and independence   Barriers Decreased endurance;Fatigue;Generalized weakness;Impaired activity tolerance;Impaired balance;Limited mobility;Pain         Assessment  Patient is 67 y.o. male with a diagnosis of GBC.  Additional factors influencing patient status / progress (ie: cognitive factors, co-morbidities, social support, etc): past medical history of hypercholesterolemia, essential hypertension, osteoarthritis, GERD, hyperkalemia, type 2 diabetes, who presented to [facility] on 6/5 with progressive lower extremity weakness.  Neurology was consulted and he was diagnosed with Guillain-Barré syndrome.  He underwent 5-day course of IVIG without complications, completed on 6/11.  After completion of IVIG he has noted some improvement in strength in lower extremities but still complains of pins-and-needles in bilateral upper extremities.        Plan  Recommend Recreational Therapy 30-60 minutes per day 3-4 days per week for 10 days for the following treatments:  Community Re-Integration, Community Skills Development,  Leisure Skills Awareness, Leisure Skills Development, Social Skills Training, Cognitive Skills Training, Gross Motor Functional Leisure Skills, Fine Motor Functional Leisure Skills, and Group Treatment    Passport items to be completed:  Verbalize two positive leisure activities, discuss returning to work, hobbies, community groups or volunteer activities, explore community resources     Goals:  Long term and short term goals have been discussed with patient and they are in agreement.    Recreation Therapy Problems (Active)       Problem: Recreation Therapy       Dates: Start:  06/19/23         Goal: STG-Within one week, patient will identify two new leisure skills.        Dates: Start:  06/19/23            Goal: STG-Within one week, patient will demonstrate a standing tolerance of 30 sec x 3.        Dates: Start:  06/19/23            Goal: LTG-By discharge, patient will identify and demonstrate three new leisure skills.        Dates: Start:  06/19/23            Goal: LTG-By discharge, patient will demonstrate a standing tolerance of 1 min x 3.        Dates: Start:  06/19/23

## 2023-06-19 NOTE — CARE PLAN
"  Problem: Pain - Standard  Goal: Alleviation of pain or a reduction in pain to the patient’s comfort goal  Note: Pain is manageable with scheduled medications at this time.Repositioned with pillows for comfort.Will continue to monitor and assess pain level and medicate as needed.     Problem: Bladder / Voiding  Goal: Patient will establish and maintain regular urinary output  Note: Pt has lo catheter in place which is draining adequate amount of urine.Will continue to monitor.     Problem: Neurogenic Bowel  Goal: Patient will verbalize signs and symptoms of constipation and how to prevent/alleviate  Note: Pt refused scheduled suppository at .Education given on the importance of medication, pt verbalized understanding continues to refused. \" This is too late for me to do suppository, it will keep me awake like last night. I would like to do it after dinner.\" Verbalized by the pt.LBM 6/18 per report.Will continue to monitor.         "

## 2023-06-19 NOTE — THERAPY
Occupational Therapy  Daily Treatment     Patient Name: Bull Banegas  Age:  67 y.o., Sex:  male  Medical Record #: [id number]  Today's Date: 6/19/2023     Precautions  Precautions: (P) Fall Risk  Comments: (P) lo, blurred vision         Subjective    Pt in bed upon arrival, agreeable to attempt shower this date.      Objective       06/19/23 0831   OT Charge Group   OT Self Care / ADL (Units) 5   OT Therapy Activity (Units) 1   OT Total Time Spent   OT Individual Total Time Spent (Mins) 90   Precautions   Precautions Fall Risk   Comments lo, blurred vision   Functional Level of Assist   Grooming Supervision;Seated   Grooming Description Initial preparation for task;Seated in wheelchair at sink  (for oral hygiene)   Bathing Moderate Assist  (A with BLE for feet and lower part of legs only, back and buttocks)   Bathing Description Grab bar;Hand held shower;Tub bench;Assit with back;Assit wtih lower extremities;Increased time   Upper Body Dressing Supervision   Upper Body Dressing Description   (seated in wc)   Lower Body Dressing Total Assist   Toileting Total Assist  (lo in place, at this time req A for clothing managment.)   Bed, Chair, Wheelchair Transfer Total Assist X 2   Bed Chair Wheelchair Transfer Description   (total A x 2 for SaraSteady)   Toilet Transfers Total Assist X 2  (total A x 2 for SaraSteady)   Tub / Shower Transfers Total Assist X 2  (total A x 2 for SaraSteady)   Balance   Sitting Balance (Static) Fair -   Outcome Measures   Outcome Measures Utilized 9 Hole Peg Test;Box and Blocks Test   9 Hole Peg Test   Right Hand (seconds) 40.89   Left Hand (seconds) 56.16   Box and Blocks Test   Right hand blocks moved in 60 seconds 33   Left hand blocks moved in 60 seconds 31         Assessment    Utilized Sarasteady throughout for transfers this date for safety of patient and staff at this time. PT able to complete pulling up with UB and hold self forwards for A with pants management. Once seated on  tub bench, pt req A with feet, back and buttocks but able to complete all other parts of task. P required increased time for all tasks. Pt noted that he feels like FM tasks are harder attila with L hand. Complete both 9-hole pegs test to assess finger dexterity and in hand manipulation skills as well as the box and blocks test for overall gross dexterity of hand and UE. Compared to the average 20.7 sec on the R and 22.9 sec on the L, pt scored below average for BUE on 9-hole peg test with multiple drops noted during practice round and increased time needed for L hand to complete d/t overall difficulty with in hand manipulation. Compared to the normed average of 68.5 for R hand and 67.5 for L hand, pt scored well below average on box and blocks test indicating impaired FM skills. Pt will benefit from work on these tasks in order to improve ability to complete FM tasks required for ADLs and IADL tasks.     Strengths: Able to follow instructions, Alert and oriented, Effective communication skills, Independent prior level of function, Motivated for self care and independence, Pleasant and cooperative, Willingly participates in therapeutic activities    Plan    UB strengthening, functional transfers, ADLs with AE as needed, IADLs, incorporate FM skills for BUE      Occupational Therapy Goals (Active)       Problem: Bathing       Dates: Start:  06/18/23         Goal: STG-Within one week, patient will bathe with min A overall using AE/DME as needed.       Dates: Start:  06/18/23               Problem: Dressing       Dates: Start:  06/18/23         Goal: STG-Within one week, patient will dress UB with supervision overall using AE/DME as needed.       Dates: Start:  06/18/23            Goal: STG-Within one week, patient will dress LB with mod A overall using AE/DME as needed.       Dates: Start:  06/18/23               Problem: Functional Transfers       Dates: Start:  06/18/23         Goal: STG-Within one week, patient will  transfer to toilet with max A x1 overall using AE/DME as needed.       Dates: Start:  06/18/23               Problem: OT Long Term Goals       Dates: Start:  06/18/23         Goal: LTG-By discharge, patient will complete basic self care tasks with min-supervision overall using AE/DME as needed.       Dates: Start:  06/18/23            Goal: LTG-By discharge, patient will perform bathroom transfers with SBA overall using AE/DME as needed.       Dates: Start:  06/18/23               Problem: Toileting       Dates: Start:  06/18/23         Goal: STG-Within one week, patient will complete toileting tasks with Max A overall using AE/DME as needed       Dates: Start:  06/18/23

## 2023-06-19 NOTE — PROGRESS NOTES
"  Physical Medicine & Rehabilitation Progress Note    Encounter Date: 6/19/2023    Chief Complaint: Weakness and altered sensation in all 4 extremities    Interval Events (Subjective):    He was evaluated in his room sitting up in manual wheelchair, dressed.  He complains of significant burning and tingling going into both legs as well as low back pain.  He also complains of burning and tingling in bilateral hands.  He did note some improvement with gabapentin.  He complains of significant fatigue after therapy this morning.    He reports improvement and bloating after multiple BMs on 6/17  Bladder: Blancas, patient is resistant to removal of the Blancas catheter, asking if we can push it off until tomorrow or the next day.  Bowel: Refused suppository last night, multiple BMs the night before    ROS:  Gen: No fatigue, confusion, significant weight loss  Eyes: no blurry vision, double vision or pain in eyes  ENT: no changes in hearing, runny nose, nose bleeds, sinus pain  CV: No CP, palpitations,   Lungs: no shortness of breath, changes in secretions, changes in cough, pain with coughing  Abd: No abd pain, nausea, vomiting, pain with eating  : no blood in urine, pain during storage phase, bladder spasms, suprapubic pain  Ext: No swelling in legs, asymmetric atrophy  Neuro: no changes in strength or sensation  Skin: no new ulcers/skin breakdown appreciated by patient  Mood: No changes in mood today, increase in depression or anxiety  Heme: no bruising, or bleeding    Objective:  Vitals: /64   Pulse 80   Temp 37 °C (98.6 °F) (Oral)   Resp 18   Ht 1.727 m (5' 7.99\")   Wt 113 kg (250 lb 3.6 oz)   SpO2 93%   Gen: NAD, Body mass index is 38.06 kg/m².  HEENT:  NC/AT, PERRLA, moist mucous membranes  Cardio: RRR, no mumurs  Pulm: CTAB, with normal respiratory effort  Abd: Soft NTND, active bowel sounds,   Ext: No peripheral edema. No calf tenderness. No clubbing/cyanosis. +dorsal pedalis pulses bilaterally.    Motor " Exam Lower Extremities    ? Myotome R L   Hip flexion L2 2 1   Knee extension L3 3 3   Ankle dorsiflexion L4 3 3   Toe extension L5 3 3   Ankle plantarflexion S1 3 3           Laboratory Values:  Recent Results (from the past 72 hour(s))   COMPLETE CBC & AUTO DIFF WBC    Collection Time: 06/17/23  5:16 AM   Result Value Ref Range    Leukocytes, Absolute 14.0 (H) 3.7 - 10.6 x10E3/uL    RBC 3.57 (L) 4.50 - 5.70 x10e6/uL    Hemoglobin 11.0 (L) 13.0 - 16.7 g/dL    Hematocrit 30.9 (L) 38.8 - 49.7 %    MCV 86.6 83.0 - 99.0 fL    MCH 30.7 28.0 - 33.8 pg    MCHC 35.5 33.1 - 36.5 g/dL    RDW 13.1 11.8 - 14.0 %    Platelet Count 309 146 - 390 x10E3/uL    MPV 7.7 6.4 - 10.2 fL    Neutrophils-Polys 68.6 41.7 - 82.3 %    Lymphocytes 22.1 10.8 - 44.4 %    Monocytes 6.5 5.0 - 12.8 %    Eosinophils 2.1 0.0 - 6.6 %    Basophils 0.7 0.0 - 1.3 %    Neutrophils (Absolute) 9.60 (H) 1.40 - 8.00 x10E3/uL    Lymphs (Absolute) 3.10 1.00 - 5.20 x10E3/uL    Monos (Absolute) 0.90 0.16 - 1.00 x10E3/uL    Eos (Absolute) 0.30 0.00 - 0.80 x10E3/uL    Baso (Absolute) 0.10 0.00 - 0.30 x10E3/uL   BASIC METABOLIC PANEL    Collection Time: 06/17/23  5:16 AM   Result Value Ref Range    Sodium 126 (L) 136 - 144 mmol/L    Potassium 4.5 3.6 - 5.1 mmol/L    Chloride 94 (L) 102 - 110 mmol/L    Co2 26 22 - 32 mmol/L    Bun 21 (H) 8 - 20 mg/dL    Creatinine 0.57 (L) 0.80 - 1.40 mg/dL    Calcium 8.7 8.7 - 10.3 mg/dL    Anion Gap 6 2 - 11 mmol/L    Glom Filt Rate, Est 143 >60 mL/min/1.7    Glucose 130 (H) 60 - 99 mg/dL   CoV-2, Flu A/B, And RSV by PCR (fitogram)    Collection Time: 06/17/23 12:15 PM    Specimen: Nasopharyngeal; Respirate   Result Value Ref Range    Influenza virus A RNA Negative Negative    Influenza virus B, PCR Negative Negative    RSV, PCR Negative Negative    SARS-CoV-2 by PCR NotDetected     SARS-CoV-2 Source NP Swab    CBC with Differential    Collection Time: 06/18/23  5:30 AM   Result Value Ref Range    WBC 14.0 (H) 4.8 - 10.8 K/uL    RBC  3.33 (L) 4.70 - 6.10 M/uL    Hemoglobin 10.4 (L) 14.0 - 18.0 g/dL    Hematocrit 29.3 (L) 42.0 - 52.0 %    MCV 88.0 81.4 - 97.8 fL    MCH 31.2 27.0 - 33.0 pg    MCHC 35.5 32.3 - 36.5 g/dL    RDW 41.3 35.9 - 50.0 fL    Platelet Count 285 164 - 446 K/uL    MPV 9.7 9.0 - 12.9 fL    Neutrophils-Polys 65.40 44.00 - 72.00 %    Lymphocytes 24.60 22.00 - 41.00 %    Monocytes 6.50 0.00 - 13.40 %    Eosinophils 2.00 0.00 - 6.90 %    Basophils 0.40 0.00 - 1.80 %    Immature Granulocytes 1.10 (H) 0.00 - 0.90 %    Nucleated RBC 0.10 0.00 - 0.20 /100 WBC    Neutrophils (Absolute) 9.16 (H) 1.82 - 7.42 K/uL    Lymphs (Absolute) 3.45 1.00 - 4.80 K/uL    Monos (Absolute) 0.91 (H) 0.00 - 0.85 K/uL    Eos (Absolute) 0.28 0.00 - 0.51 K/uL    Baso (Absolute) 0.05 0.00 - 0.12 K/uL    Immature Granulocytes (abs) 0.16 (H) 0.00 - 0.11 K/uL    NRBC (Absolute) 0.02 K/uL   Comp Metabolic Panel (CMP)    Collection Time: 06/18/23  5:30 AM   Result Value Ref Range    Sodium 125 (L) 135 - 145 mmol/L    Potassium 4.3 3.6 - 5.5 mmol/L    Chloride 93 (L) 96 - 112 mmol/L    Co2 22 20 - 33 mmol/L    Anion Gap 10.0 7.0 - 16.0    Glucose 110 (H) 65 - 99 mg/dL    Bun 20 8 - 22 mg/dL    Creatinine 0.48 (L) 0.50 - 1.40 mg/dL    Calcium 8.8 8.5 - 10.5 mg/dL    AST(SGOT) 28 12 - 45 U/L    ALT(SGPT) 30 2 - 50 U/L    Alkaline Phosphatase 76 30 - 99 U/L    Total Bilirubin 1.1 0.1 - 1.5 mg/dL    Albumin 3.0 (L) 3.2 - 4.9 g/dL    Total Protein 7.0 6.0 - 8.2 g/dL    Globulin 4.0 (H) 1.9 - 3.5 g/dL    A-G Ratio 0.8 g/dL   Prothrombin time    Collection Time: 06/18/23  5:30 AM   Result Value Ref Range    PT 13.4 12.0 - 14.6 sec    INR 1.03 0.87 - 1.13   TSH with Reflex to FT4    Collection Time: 06/18/23  5:30 AM   Result Value Ref Range    TSH 3.390 0.380 - 5.330 uIU/mL   Vitamin D, 25-hydroxy (blood)    Collection Time: 06/18/23  5:30 AM   Result Value Ref Range    25-Hydroxy   Vitamin D 25 19 (L) 30 - 100 ng/mL   Lipid Profile    Collection Time: 06/18/23  5:30 AM    Result Value Ref Range    Cholesterol,Tot 137 100 - 199 mg/dL    Triglycerides 88 0 - 149 mg/dL    HDL 44 >=40 mg/dL    LDL 75 <100 mg/dL   MAGNESIUM    Collection Time: 06/18/23  5:30 AM   Result Value Ref Range    Magnesium 2.0 1.5 - 2.5 mg/dL   PHOSPHORUS    Collection Time: 06/18/23  5:30 AM   Result Value Ref Range    Phosphorus 3.9 2.5 - 4.5 mg/dL   HEMOGLOBIN A1C    Collection Time: 06/18/23  5:30 AM   Result Value Ref Range    Glycohemoglobin 5.9 (H) 4.0 - 5.6 %    Est Avg Glucose 123 mg/dL   ESTIMATED GFR    Collection Time: 06/18/23  5:30 AM   Result Value Ref Range    GFR (CKD-EPI) 114 >60 mL/min/1.73 m 2   CORRECTED CALCIUM    Collection Time: 06/18/23  5:30 AM   Result Value Ref Range    Correct Calcium 9.6 8.5 - 10.5 mg/dL   Basic Metabolic Panel    Collection Time: 06/19/23  5:18 AM   Result Value Ref Range    Sodium 129 (L) 135 - 145 mmol/L    Potassium 4.8 3.6 - 5.5 mmol/L    Chloride 94 (L) 96 - 112 mmol/L    Co2 24 20 - 33 mmol/L    Glucose 101 (H) 65 - 99 mg/dL    Bun 17 8 - 22 mg/dL    Creatinine 0.54 0.50 - 1.40 mg/dL    Calcium 8.9 8.5 - 10.5 mg/dL    Anion Gap 11.0 7.0 - 16.0   ESTIMATED GFR    Collection Time: 06/19/23  5:18 AM   Result Value Ref Range    GFR (CKD-EPI) 110 >60 mL/min/1.73 m 2       Medications:  Scheduled Medications   Medication Dose Frequency    midodrine  5 mg TID WITH MEALS    menthol-methyl salicycate   BID    gabapentin  400 mg TID    vitamin D3  1,000 Units DAILY    omeprazole  20 mg DAILY    Pharmacy Consult Request  1 Each PHARMACY TO DOSE    acetaminophen  1,000 mg TID    enoxaparin  40 mg QHS    therapeutic multivitamin-minerals  1 Tablet DAILY WITH LUNCH    docusate sodium  200 mg BID    And    sennosides  17.2 mg DAILY AT NOON    And    bisacodyl  10 mg QDAY    aspirin  81 mg DAILY    atorvastatin  10 mg MO, WE + FR    celecoxib  200 mg BID    furosemide  20 mg Q DAY    melatonin  6 mg QHS    temazepam  15 mg QHS    cefTRIAXone (ROCEPHIN) IV  1,000 mg Q24HRS     sodium chloride  2 g TID WITH MEALS    lidocaine  2 Patch Q24HR    tamsulosin  0.4 mg AFTER DINNER     PRN medications: acetaminophen **FOLLOWED BY** [START ON 6/22/2023] acetaminophen, acetaminophen, carboxymethylcellulose, benzocaine-menthol, mag hydrox-al hydrox-simeth, ondansetron **OR** ondansetron, traZODone, sodium chloride, Respiratory Therapy Consult, oxyCODONE immediate-release **OR** oxyCODONE immediate-release, hydrALAZINE, docusate sodium **AND** sennosides **AND** bisacodyl **AND** magnesium hydroxide    Diet:  Current Diet Order   Procedures    Diet Order Diet: Regular; Fluid modifications: (optional): 1800 ml Fluid Restriction       Assessment:  Active Hospital Problems    Diagnosis     *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  Lower extremity weakness and pain as well as numbness in upper extremities.  No known premorbid infection.  -PT and OT for mobility and ADLs. Per guidelines, 15 hours per week between PT, OT.  Cleared by speech therapy for swallow and speech, transition time to PT and OT  - Continue comprehensive acute inpatient rehabilitation     Neurologic respiratory impairment:   Patient is at high risk for respiratory involvement given neurologic symptoms in the upper extremities.   -We will consult respiratory therapy team to follow the patient during the hospital stay, for education on impaired respiratory status, importance of incentive spirometry, risk of respiratory infection and prevention process.  -Vital capacity daily  - Incentive spirometry  - Mild left basilar atelectasis from chest x-ray on 6/17     Hyponatremia: Likely SIADH, followed by nephrology during [facility] hospitalization  - Sodium of 126 on 6/17  --> 129 on 6/19  - Check BMP in a.m.  - Sodium chloride tablets 2 g 3 times daily  - Lasix 20 mg daily  -Free water restriction of 500 cc/day and total p.o. fluid intake of 1.8 L     Hypomagnesemia: Low magnesium during acute hospitalization, supplemented with p.o. magnesium  - Magnesium of 2.0 on 6/18     Urinary tract infection/pyelonephritis: Catheter associated UTI in the setting of neurogenic bladder  - WBC of 14 on 6/17  - Peripheral IV was removed prior to transfer to acute rehabilitation we will replace IV.  - Ceftriaxone 1 g every 24 hours, continue for another 8 days, total of 10 days given systemic factor  --We will work with Eric Riley on following up culture and sensitivities  -Recheck CBC in a.m.     Neurogenic bladder: Inappropriate management of neurogenic bladder can result and hydronephrosis, increased risk of pyelonephritis, and renal failure  - Continue Blancas catheter, patient is resistant to removal of Blancas catheter at this time we will plan for removal of catheter over the next couple of days  - Initiation of Flomax 0.4 mg nightly     Neurogenic bowel: Inappropriate neurogenic bowel can result in severe constipation, bowel dilation and rupture.  Severe constipation with prolonged period of time without BM.  - KUB on 6/18 shows significant amount of stool in the ascending and descending colon  - Upper motor neuron neurogenic bowel program with senna 2 tablets q. noon, Colace 200 mg twice daily and Magic bullet suppository UT daily and digital stimulation  -Fleets enema on 6/19     orthostatic hypotension  Because of significant autonomic dysfunction associated with some cases of AIDP, the patient is at high risk for orthostatic hypotension.  Goal of SBP greater than 100.  -  Mechanical compression with teds and Tubi  and abd binder used prior to out of bed  -Midodrine 5 mg 3 times daily     Essential hypertension: SBP of greater than 200 on presentation to acute hospital.  -112 in  the last 24 hours  - Lisinopril 20 mg daily     Dyslipidemia: Total cholesterol 137, HDL 44, LDL 75  -Lipitor 10 mg every Monday Wednesday Friday     Dysphagia: Concern for swallow dysfunction in the setting of certain variants of AIDP  - Consult speech therapy for swallow evaluation.     Skin  Due to the sensory impairments following AIDP, skin protection principles are of the utmost importance.      - every two-hour turning pattern overnight while the patient is in bed. When the patient is out of bed, an every 15 minute repositioning or weight shifting pattern will be utilized in order to help train the patient for long-term skin protection. We will also discuss skin monitoring and its importance with the patient and the caregivers.     Pain:  Postoperative pain:  - Oxycodone 5-10 mg every 3 hours as needed moderate-severe pain  -Tylenol 1000 mg 3 times daily, AST 28, ALT 30, alk phos 76, within normal limits, continue current dose of scheduled Tylenol  - Lidocaine patch x2 on either side of incision, on for 12 hours off for 12 hours     Neuropathic pain: Burning and tingling in all 4 extremities  - Increase gabapentin to 600 mg 3 times a day, will plan to titrate up depending on tolerance and effect, may require other neuropathic pain agents.  -May benefit from a nonpharmaceutical modalities such as TENS units, compression, ice, heat, will discuss with therapy team.     Vitamin D deficiency: High risk of vitamin D deficiency in this population, goal of vitamin D level greater than 30, has been linked to improvement and central nervous system recovery  - Vitamin D level of 19 on 6/18  - Cholecalciferol 2000 units daily     Insomnia: Significant difficulty during acute hospitalization  - Restarted on Restoril 15 mg nightly  - Trazodone 50 mg nightly as needed insomnia        DVT prophylaxis  In the setting of AIDP, the patient is at high risk of deep venous thrombosis given decreased mobility and alteration to  autonomic nervous system. Because of this we have elected to pursue prophylactic anticoagulation utilizing Lovenox 40 mg daily.     ____________________________________    Mario Terrell DO  [facility] - Physical Medicine & Rehabilitation   [facility] - Spinal Cord Injury Medicine  ____________________________________

## 2023-06-19 NOTE — CARE PLAN
Problem: Knowledge Deficit - Standard  Goal: Patient and family/care givers will demonstrate understanding of plan of care, disease process/condition, diagnostic tests and medications  Outcome: Progressing   Pt education reinforced regarding plan of care with emphasis on adequate hydration, pt education reinforced on Powerade and water consumption limits, pt shows good understanding, will continue to reinforce education and continue to monitor.            Problem: Fall Risk - Rehab  Goal: Patient will remain free from falls  Outcome: Progressing   Pt education given regarding fall precautions AND safety measures, pt shows good understanding. Has not attempted to self transfer this shift, will continue to reinforce education and continue to monitor.

## 2023-06-19 NOTE — DISCHARGE PLANNING
CASE MANAGEMENT INITIAL ASSESSMENT    Admit Date:  6/17/2023     I met with patient to discuss role of case management / discharge planning / team conference.   Patient is a  67 y.o. male transferred from [facility].    Diagnosis: GBS (Guillain [place] syndrome) ([facility]) [G61.0]    Co-morbidities:   Patient Active Problem List    Diagnosis Date Noted    Essential hypertension 06/18/2023    UTI (urinary tract infection) 06/18/2023    Vitamin D deficiency 06/18/2023    GBS (Guillain-[place] syndrome) ([facility]) 06/17/2023    Neuropathic pain 06/17/2023    Hyponatremia 06/17/2023    Other specified anemias 06/17/2023    Neurogenic bowel 06/17/2023    Acute cystitis with hematuria 06/17/2023    Neurogenic bladder 06/17/2023    Neurogenic orthostatic hypotension ([facility]) 06/17/2023    Bandemia 06/17/2023    GBS (Guillain [place] syndrome) ([facility]) 06/17/2023     Prior Living Situation:  Housing / Facility: [address]  Lives with - Patient's Self Care Capacity: Spouse, Alone and Able to Care For Self    Prior Level of Function:  Medication Management: Independent  Finances: Independent  Home Management: Independent  Shopping: Independent  Prior Level Of Mobility: Independent Without Device in Community, Independent Without Device in Home  Driving / Transportation: Driving Independent    Support Systems:  Primary : Basilio Banegas-spouse: [phone]         Previous Services Utilized:   Equipment Owned: Front-Wheel Walker  Prior Services: Home-Independent    Other Information:  Occupation (Pre-Hospital Vocational): Employed Full Time     Primary Payor Source: Medicare A, Medicare B  Secondary Payor Source:  ([place] [employer] [place])    Patient / Family Goal:  Patient / Family Goal: Return home    Plan:  1. Continue to follow patient through hospitalization and provide discharge planning in collaboration with patient, family, physicians and ancillary services.     2. Utilize community resources to ensure a safe discharge.

## 2023-06-20 ENCOUNTER — APPOINTMENT (OUTPATIENT)
Dept: OCCUPATIONAL THERAPY | Facility: REHABILITATION | Age: 68
DRG: 095 | End: 2023-06-20
Attending: PHYSICAL MEDICINE & REHABILITATION
Payer: MEDICARE

## 2023-06-20 ENCOUNTER — APPOINTMENT (OUTPATIENT)
Dept: RADIOLOGY | Facility: MEDICAL CENTER | Age: 68
DRG: 095 | End: 2023-06-20
Attending: PHYSICAL MEDICINE & REHABILITATION
Payer: MEDICARE

## 2023-06-20 ENCOUNTER — APPOINTMENT (OUTPATIENT)
Dept: RADIOLOGY | Facility: REHABILITATION | Age: 68
DRG: 095 | End: 2023-06-20
Attending: PHYSICAL MEDICINE & REHABILITATION
Payer: MEDICARE

## 2023-06-20 ENCOUNTER — ANCILLARY PROCEDURE (OUTPATIENT)
Dept: CARDIOLOGY | Facility: REHABILITATION | Age: 68
DRG: 095 | End: 2023-06-20
Attending: PHYSICAL MEDICINE & REHABILITATION
Payer: MEDICARE

## 2023-06-20 PROBLEM — D64.9 ANEMIA: Status: ACTIVE | Noted: 2023-06-17

## 2023-06-20 PROBLEM — E78.5 DYSLIPIDEMIA: Status: ACTIVE | Noted: 2023-06-20

## 2023-06-20 LAB
ANION GAP SERPL CALC-SCNC: 11 MMOL/L (ref 7–16)
BASOPHILS # BLD AUTO: 0.4 % (ref 0–1.8)
BASOPHILS # BLD: 0.04 K/UL (ref 0–0.12)
BUN SERPL-MCNC: 18 MG/DL (ref 8–22)
CALCIUM SERPL-MCNC: 9 MG/DL (ref 8.5–10.5)
CHLORIDE SERPL-SCNC: 98 MMOL/L (ref 96–112)
CO2 SERPL-SCNC: 24 MMOL/L (ref 20–33)
CREAT SERPL-MCNC: 0.53 MG/DL (ref 0.5–1.4)
EOSINOPHIL # BLD AUTO: 0.21 K/UL (ref 0–0.51)
EOSINOPHIL NFR BLD: 2 % (ref 0–6.9)
ERYTHROCYTE [DISTWIDTH] IN BLOOD BY AUTOMATED COUNT: 45.4 FL (ref 35.9–50)
GFR SERPLBLD CREATININE-BSD FMLA CKD-EPI: 111 ML/MIN/1.73 M 2
GLUCOSE SERPL-MCNC: 138 MG/DL (ref 65–99)
HCT VFR BLD AUTO: 31.8 % (ref 42–52)
HGB BLD-MCNC: 10.6 G/DL (ref 14–18)
IMM GRANULOCYTES # BLD AUTO: 0.09 K/UL (ref 0–0.11)
IMM GRANULOCYTES NFR BLD AUTO: 0.8 % (ref 0–0.9)
LYMPHOCYTES # BLD AUTO: 2.64 K/UL (ref 1–4.8)
LYMPHOCYTES NFR BLD: 24.6 % (ref 22–41)
MCH RBC QN AUTO: 30.5 PG (ref 27–33)
MCHC RBC AUTO-ENTMCNC: 33.3 G/DL (ref 32.3–36.5)
MCV RBC AUTO: 91.6 FL (ref 81.4–97.8)
MONOCYTES # BLD AUTO: 0.47 K/UL (ref 0–0.85)
MONOCYTES NFR BLD AUTO: 4.4 % (ref 0–13.4)
NEUTROPHILS # BLD AUTO: 7.29 K/UL (ref 1.82–7.42)
NEUTROPHILS NFR BLD: 67.8 % (ref 44–72)
NRBC # BLD AUTO: 0.03 K/UL
NRBC BLD-RTO: 0.3 /100 WBC (ref 0–0.2)
PLATELET # BLD AUTO: 314 K/UL (ref 164–446)
PMV BLD AUTO: 9.6 FL (ref 9–12.9)
POTASSIUM SERPL-SCNC: 4.3 MMOL/L (ref 3.6–5.5)
RBC # BLD AUTO: 3.47 M/UL (ref 4.7–6.1)
SODIUM SERPL-SCNC: 133 MMOL/L (ref 135–145)
WBC # BLD AUTO: 10.7 K/UL (ref 4.8–10.8)

## 2023-06-20 PROCEDURE — 97530 THERAPEUTIC ACTIVITIES: CPT

## 2023-06-20 PROCEDURE — 36415 COLL VENOUS BLD VENIPUNCTURE: CPT

## 2023-06-20 PROCEDURE — 94760 N-INVAS EAR/PLS OXIMETRY 1: CPT

## 2023-06-20 PROCEDURE — 700102 HCHG RX REV CODE 250 W/ 637 OVERRIDE(OP): Performed by: PHYSICAL MEDICINE & REHABILITATION

## 2023-06-20 PROCEDURE — 99232 SBSQ HOSP IP/OBS MODERATE 35: CPT | Performed by: HOSPITALIST

## 2023-06-20 PROCEDURE — 93971 EXTREMITY STUDY: CPT | Mod: 26,LT | Performed by: INTERNAL MEDICINE

## 2023-06-20 PROCEDURE — 700102 HCHG RX REV CODE 250 W/ 637 OVERRIDE(OP): Performed by: HOSPITALIST

## 2023-06-20 PROCEDURE — 74018 RADEX ABDOMEN 1 VIEW: CPT

## 2023-06-20 PROCEDURE — 97110 THERAPEUTIC EXERCISES: CPT

## 2023-06-20 PROCEDURE — A9270 NON-COVERED ITEM OR SERVICE: HCPCS | Performed by: PHYSICAL MEDICINE & REHABILITATION

## 2023-06-20 PROCEDURE — 97535 SELF CARE MNGMENT TRAINING: CPT

## 2023-06-20 PROCEDURE — 93971 EXTREMITY STUDY: CPT | Mod: LT

## 2023-06-20 PROCEDURE — 700101 HCHG RX REV CODE 250: Performed by: PHYSICAL MEDICINE & REHABILITATION

## 2023-06-20 PROCEDURE — A9270 NON-COVERED ITEM OR SERVICE: HCPCS | Performed by: HOSPITALIST

## 2023-06-20 PROCEDURE — 97032 APPL MODALITY 1+ESTIM EA 15: CPT

## 2023-06-20 PROCEDURE — 700105 HCHG RX REV CODE 258: Performed by: PHYSICAL MEDICINE & REHABILITATION

## 2023-06-20 PROCEDURE — 770010 HCHG ROOM/CARE - REHAB SEMI PRIVAT*

## 2023-06-20 PROCEDURE — 85025 COMPLETE CBC W/AUTO DIFF WBC: CPT

## 2023-06-20 PROCEDURE — 80048 BASIC METABOLIC PNL TOTAL CA: CPT

## 2023-06-20 PROCEDURE — 700111 HCHG RX REV CODE 636 W/ 250 OVERRIDE (IP): Performed by: PHYSICAL MEDICINE & REHABILITATION

## 2023-06-20 PROCEDURE — 94150 VITAL CAPACITY TEST: CPT

## 2023-06-20 PROCEDURE — 99232 SBSQ HOSP IP/OBS MODERATE 35: CPT | Performed by: PHYSICAL MEDICINE & REHABILITATION

## 2023-06-20 RX ORDER — MIDODRINE HYDROCHLORIDE 10 MG/1
10 TABLET ORAL
Status: DISCONTINUED | OUTPATIENT
Start: 2023-06-20 | End: 2023-07-07

## 2023-06-20 RX ORDER — ENEMA 19; 7 G/133ML; G/133ML
1 ENEMA RECTAL ONCE
Status: DISCONTINUED | OUTPATIENT
Start: 2023-06-20 | End: 2023-06-21

## 2023-06-20 RX ORDER — GABAPENTIN 400 MG/1
800 CAPSULE ORAL 3 TIMES DAILY
Status: DISCONTINUED | OUTPATIENT
Start: 2023-06-20 | End: 2023-06-22

## 2023-06-20 RX ORDER — POLYETHYLENE GLYCOL 3350 17 G/17G
1 POWDER, FOR SOLUTION ORAL
Status: COMPLETED | OUTPATIENT
Start: 2023-06-20 | End: 2023-06-20

## 2023-06-20 RX ORDER — TRAMADOL HYDROCHLORIDE 50 MG/1
50 TABLET ORAL 2 TIMES DAILY
Status: DISCONTINUED | OUTPATIENT
Start: 2023-06-20 | End: 2023-06-20

## 2023-06-20 RX ORDER — TRAMADOL HYDROCHLORIDE 50 MG/1
50 TABLET ORAL EVERY 6 HOURS PRN
Status: DISCONTINUED | OUTPATIENT
Start: 2023-06-20 | End: 2023-07-06

## 2023-06-20 RX ADMIN — CEFTRIAXONE SODIUM 1000 MG: 1 INJECTION, POWDER, FOR SOLUTION INTRAMUSCULAR; INTRAVENOUS at 17:39

## 2023-06-20 RX ADMIN — SODIUM CHLORIDE 2 G: 1 TABLET ORAL at 17:26

## 2023-06-20 RX ADMIN — TRAMADOL HYDROCHLORIDE 50 MG: 50 TABLET, COATED ORAL at 12:33

## 2023-06-20 RX ADMIN — ENOXAPARIN SODIUM 40 MG: 100 INJECTION SUBCUTANEOUS at 20:16

## 2023-06-20 RX ADMIN — DOCUSATE SODIUM 200 MG: 100 CAPSULE, LIQUID FILLED ORAL at 20:00

## 2023-06-20 RX ADMIN — LIDOCAINE 2 PATCH: 50 PATCH TOPICAL at 12:15

## 2023-06-20 RX ADMIN — POLYETHYLENE GLYCOL 3350 1 PACKET: 17 POWDER, FOR SOLUTION ORAL at 18:22

## 2023-06-20 RX ADMIN — POLYETHYLENE GLYCOL 3350 1 PACKET: 17 POWDER, FOR SOLUTION ORAL at 15:44

## 2023-06-20 RX ADMIN — MIDODRINE HYDROCHLORIDE 5 MG: 2.5 TABLET ORAL at 11:45

## 2023-06-20 RX ADMIN — POLYETHYLENE GLYCOL 3350 1 PACKET: 17 POWDER, FOR SOLUTION ORAL at 17:26

## 2023-06-20 RX ADMIN — Medication 6 MG: at 20:00

## 2023-06-20 RX ADMIN — MENTHOL, METHYL SALICYLATE: 10; 15 CREAM TOPICAL at 20:15

## 2023-06-20 RX ADMIN — GABAPENTIN 800 MG: 400 CAPSULE ORAL at 14:38

## 2023-06-20 RX ADMIN — OMEPRAZOLE 20 MG: 20 CAPSULE, DELAYED RELEASE ORAL at 08:28

## 2023-06-20 RX ADMIN — DOCUSATE SODIUM 200 MG: 100 CAPSULE, LIQUID FILLED ORAL at 08:28

## 2023-06-20 RX ADMIN — CELECOXIB 200 MG: 100 CAPSULE ORAL at 20:00

## 2023-06-20 RX ADMIN — ACETAMINOPHEN 1000 MG: 500 TABLET ORAL at 14:38

## 2023-06-20 RX ADMIN — GABAPENTIN 600 MG: 300 CAPSULE ORAL at 08:28

## 2023-06-20 RX ADMIN — Medication 1000 UNITS: at 08:28

## 2023-06-20 RX ADMIN — Medication 2000 UNITS: at 08:29

## 2023-06-20 RX ADMIN — OXYCODONE HYDROCHLORIDE 10 MG: 10 TABLET ORAL at 09:28

## 2023-06-20 RX ADMIN — SENNOSIDES 17.2 MG: 8.6 TABLET, FILM COATED ORAL at 11:45

## 2023-06-20 RX ADMIN — MIDODRINE HYDROCHLORIDE 5 MG: 2.5 TABLET ORAL at 08:28

## 2023-06-20 RX ADMIN — MULTIPLE VITAMINS W/ MINERALS TAB 1 TABLET: TAB at 11:44

## 2023-06-20 RX ADMIN — ASPIRIN 81 MG CHEWABLE TABLET 81 MG: 81 TABLET CHEWABLE at 08:29

## 2023-06-20 RX ADMIN — POLYETHYLENE GLYCOL 3350 1 PACKET: 17 POWDER, FOR SOLUTION ORAL at 16:29

## 2023-06-20 RX ADMIN — TAMSULOSIN HYDROCHLORIDE 0.4 MG: 0.4 CAPSULE ORAL at 17:26

## 2023-06-20 RX ADMIN — ACETAMINOPHEN 1000 MG: 500 TABLET ORAL at 08:28

## 2023-06-20 RX ADMIN — GABAPENTIN 800 MG: 400 CAPSULE ORAL at 20:00

## 2023-06-20 RX ADMIN — SODIUM CHLORIDE 2 G: 1 TABLET ORAL at 11:45

## 2023-06-20 RX ADMIN — ACETAMINOPHEN 1000 MG: 500 TABLET ORAL at 20:01

## 2023-06-20 RX ADMIN — MIDODRINE HYDROCHLORIDE 10 MG: 10 TABLET ORAL at 17:26

## 2023-06-20 RX ADMIN — CELECOXIB 200 MG: 100 CAPSULE ORAL at 08:28

## 2023-06-20 RX ADMIN — FUROSEMIDE 20 MG: 20 TABLET ORAL at 05:12

## 2023-06-20 RX ADMIN — OXYCODONE 5 MG: 5 TABLET ORAL at 20:02

## 2023-06-20 RX ADMIN — SODIUM CHLORIDE 2 G: 1 TABLET ORAL at 08:28

## 2023-06-20 RX ADMIN — TEMAZEPAM 15 MG: 15 CAPSULE ORAL at 20:00

## 2023-06-20 RX ADMIN — MENTHOL, METHYL SALICYLATE: 10; 15 CREAM TOPICAL at 08:31

## 2023-06-20 ASSESSMENT — PAIN DESCRIPTION - PAIN TYPE: TYPE: ACUTE PAIN

## 2023-06-20 ASSESSMENT — ACTIVITIES OF DAILY LIVING (ADL)
BED_CHAIR_WHEELCHAIR_TRANSFER_DESCRIPTION: SLIDEBOARD TRANSFER FROM BED TO WHEELCHAIR;SET-UP OF EQUIPMENT;SUPERVISION FOR SAFETY;VERBAL CUEING;INCREASED TIME

## 2023-06-20 ASSESSMENT — ENCOUNTER SYMPTOMS
COUGH: 0
VOMITING: 0
POLYDIPSIA: 0
NAUSEA: 0
PALPITATIONS: 0
WEAKNESS: 1
FEVER: 0
EYES NEGATIVE: 1
SHORTNESS OF BREATH: 0
BRUISES/BLEEDS EASILY: 0
CHILLS: 0
ABDOMINAL PAIN: 0

## 2023-06-20 ASSESSMENT — PULMONARY FUNCTION TESTS: FVC: 2.66

## 2023-06-20 NOTE — CARE PLAN
Problem: Pain - Standard  Goal: Alleviation of pain or a reduction in pain to the patient’s comfort goal  Outcome: Progressing     Problem: Bowel Elimination  Goal: Patient will participate in bowel management program  Outcome: Progressing     Problem: Neurogenic Bowel  Goal: Patient will perform adequate hygiene with incontinent episodes  Outcome: Progressing     Problem: Fall Risk - Rehab  Goal: Patient will remain free from falls  Outcome: Progressing       The patient is Stable - Low risk of patient condition declining or worsening

## 2023-06-20 NOTE — THERAPY
Occupational Therapy  Daily Treatment     Patient Name: Bull Banegas  Age:  67 y.o., Sex:  male  Medical Record #: [id number]  Today's Date: 6/20/2023     Precautions  Precautions: Fall Risk  Comments: teresita, blurred vision         Subjective    Pt supine in bed and agreeable to OT tx.     Objective       06/20/23 0701   OT Charge Group   OT Self Care / ADL (Units) 1   OT Therapy Activity (Units) 3   OT Total Time Spent   OT Individual Total Time Spent (Mins) 60   Vitals   Pulse 91   Patient BP Position Supine   Blood Pressure  105/66   Pulse Oximetry 94 %   Cognition    Level of Consciousness Alert   Functional Level of Assist   Eating Supervision   Eating Description Set-up of equipment or meal/tube feeding  (setup for opening packages,)   Grooming Seated;Supervision   Grooming Description Initial preparation for task;Seated in wheelchair at sink  (for oral care)   Bed, Chair, Wheelchair Transfer Minimal Assist   Bed Chair Wheelchair Transfer Description Slideboard transfer from bed to wheelchair;Set-up of equipment;Supervision for safety;Verbal cueing;Increased time  (TA for SB placement, Min A-CGA x1 for EOB>w/c<>EOM, completing 4 transfers total progressing to Min A for uphill and CGA for down.)   Balance   Skilled Intervention Postural Facilitation;Sequencing;Verbal Cuing   Comments Pt completed 2x sit<>stands in //bars in preparation for standing toilet transfers. Mod A for sit<>standing with gait belt. Second bout able to stand for 23 seconds. Seated RB required   Interdisciplinary Plan of Care Collaboration   IDT Collaboration with  Therapy Tech;Physical Therapist   Patient Position at End of Therapy Seated;Chair Alarm On;Self Releasing Lap Belt Applied  (Pt left seated in w/c in dining room for breakfast. Added privacy bag for catheter.)   Collaboration Comments tech assisted with session. PT re:CLOF     Reviewed desensitization using rough wash cloth for hands, with pt able to tolerate about 2 min well.  Pt  completed functional wheelchair propel in room and on unit at SBA level.  Pt reporting distal distance diplopia at end of session.    Assessment    Pt tolerated OT session well with focus on functional transfer training, oral care, and desensitization. He required frequent RB due to SOB and fatigue, but able to recover well and gives max effort. Pt may be pushing himself too hard, based on diagnosis but will assess throughout pt's stay.   Strengths: Able to follow instructions, Alert and oriented, Effective communication skills, Independent prior level of function, Motivated for self care and independence, Pleasant and cooperative, Willingly participates in therapeutic activities    Plan    UB strengthening as limited by fatigue, functional transfers progressing to standing from SB, ADLs with AE as needed, IADLs, incorporate FM skills for BUE    Occupational Therapy Goals (Active)       Problem: Bathing       Dates: Start:  06/18/23         Goal: STG-Within one week, patient will bathe with min A overall using AE/DME as needed.       Dates: Start:  06/18/23               Problem: Dressing       Dates: Start:  06/18/23         Goal: STG-Within one week, patient will dress UB with supervision overall using AE/DME as needed.       Dates: Start:  06/18/23            Goal: STG-Within one week, patient will dress LB with mod A overall using AE/DME as needed.       Dates: Start:  06/18/23               Problem: Functional Transfers       Dates: Start:  06/18/23         Goal: STG-Within one week, patient will transfer to toilet with max A x1 overall using AE/DME as needed.       Dates: Start:  06/18/23               Problem: OT Long Term Goals       Dates: Start:  06/18/23         Goal: LTG-By discharge, patient will complete basic self care tasks with min-supervision overall using AE/DME as needed.       Dates: Start:  06/18/23            Goal: LTG-By discharge, patient will perform bathroom transfers with SBA overall  using AE/DME as needed.       Dates: Start:  06/18/23               Problem: Toileting       Dates: Start:  06/18/23         Goal: STG-Within one week, patient will complete toileting tasks with Max A overall using AE/DME as needed       Dates: Start:  06/18/23

## 2023-06-20 NOTE — FLOWSHEET NOTE
06/20/23 0725   Events/Summary/Plan   Events/Summary/Plan RA pulse ox check. Sitting on the edge of bed. OT at bedside   Vital Signs   Pulse (!) 104   Respiration 18   Pulse Oximetry 94 %   $ Pulse Oximetry (Spot Check) Yes   Oxygen   O2 Delivery Device Room air w/o2 available

## 2023-06-20 NOTE — THERAPY
Occupational Therapy  Daily Treatment     Patient Name: Bull Banegas  Age:  67 y.o., Sex:  male  Medical Record #: [id number]  Today's Date: 6/20/2023     Precautions  Precautions: Fall Risk  Comments: lo, blurred vision         Subjective    Pt supine in bed upon arrival, pleasant and cooperative, agreeable to therapy       Objective       06/20/23 1001   OT Charge Group   OT Therapeutic Exercise (Units) 2   OT Total Time Spent   OT Individual Total Time Spent (Mins) 30   Supine Upper Body Exercises   Comments pt at bed pink theraband provided for UB therex - shoulder external rotation pulls, chest pull, diagonal pulls, bicep/tricep pulls x 20 each   Interdisciplinary Plan of Care Collaboration   Patient Position at End of Therapy Call Light within Reach;Tray Table within Reach;In Bed         Assessment    Pt completed UB exercises to improve overall strength and cardiovascular endurance in order to maximize independence and safety with ADL's and functional mobility tasks. Pt completed to the best of their abilities with no complaints of pain.     Strengths: Able to follow instructions, Alert and oriented, Effective communication skills, Independent prior level of function, Motivated for self care and independence, Pleasant and cooperative, Willingly participates in therapeutic activities    Plan    Refer to Primary OT POC/goals     Occupational Therapy Goals (Active)       Problem: Bathing       Dates: Start:  06/18/23         Goal: STG-Within one week, patient will bathe with min A overall using AE/DME as needed.       Dates: Start:  06/18/23               Problem: Dressing       Dates: Start:  06/18/23         Goal: STG-Within one week, patient will dress UB with supervision overall using AE/DME as needed.       Dates: Start:  06/18/23            Goal: STG-Within one week, patient will dress LB with mod A overall using AE/DME as needed.       Dates: Start:  06/18/23               Problem: Functional Transfers        Dates: Start:  06/18/23         Goal: STG-Within one week, patient will transfer to toilet with max A x1 overall using AE/DME as needed.       Dates: Start:  06/18/23               Problem: OT Long Term Goals       Dates: Start:  06/18/23         Goal: LTG-By discharge, patient will complete basic self care tasks with min-supervision overall using AE/DME as needed.       Dates: Start:  06/18/23            Goal: LTG-By discharge, patient will perform bathroom transfers with SBA overall using AE/DME as needed.       Dates: Start:  06/18/23               Problem: Toileting       Dates: Start:  06/18/23         Goal: STG-Within one week, patient will complete toileting tasks with Max A overall using AE/DME as needed       Dates: Start:  06/18/23

## 2023-06-20 NOTE — PROGRESS NOTES
Hospital Medicine Daily Progress Note        Chief Complaint:  Hypertension  Hyponatremia    Interval History:  Pt c/o fatigue after therapies this morning.  Labs reviewed.    Review of Systems  Review of Systems   Constitutional:  Negative for chills and fever.   HENT: Negative.     Eyes: Negative.    Respiratory:  Negative for cough and shortness of breath.    Cardiovascular:  Negative for chest pain and palpitations.   Gastrointestinal:  Negative for abdominal pain, nausea and vomiting.   Skin:  Negative for itching and rash.   Neurological:  Positive for weakness.   Endo/Heme/Allergies:  Negative for polydipsia. Does not bruise/bleed easily.        Physical Exam  Temp:  [36.4 °C (97.6 °F)-37.1 °C (98.8 °F)] 36.9 °C (98.4 °F)  Pulse:  [] 98  Resp:  [18] 18  BP: ()/(58-88) 101/65  SpO2:  [92 %-98 %] 94 %    Physical Exam  Vitals reviewed.   Constitutional:       General: He is not in acute distress.     Appearance: Normal appearance. He is not ill-appearing.   HENT:      Head: Normocephalic and atraumatic.      Right Ear: External ear normal.      Left Ear: External ear normal.      Nose: Nose normal.      Mouth/Throat:      Pharynx: Oropharynx is clear.   Eyes:      General:         Right eye: No discharge.         Left eye: No discharge.      Extraocular Movements: Extraocular movements intact.      Conjunctiva/sclera: Conjunctivae normal.   Cardiovascular:      Rate and Rhythm: Normal rate and regular rhythm.   Pulmonary:      Effort: Pulmonary effort is normal. No respiratory distress.      Breath sounds: Normal breath sounds. No wheezing.   Abdominal:      General: Bowel sounds are normal. There is no distension.      Palpations: Abdomen is soft.      Tenderness: There is no abdominal tenderness.   Musculoskeletal:      Cervical back: Normal range of motion and neck supple.      Right lower leg: No edema.      Left lower leg: No edema.   Skin:     General: Skin is warm and dry.   Neurological:       Mental Status: He is alert and oriented to person, place, and time.         Fluids    Intake/Output Summary (Last 24 hours) at 6/20/2023 1611  Last data filed at 6/20/2023 1400  Gross per 24 hour   Intake 1000 ml   Output 1700 ml   Net -700 ml       Laboratory  Recent Labs     06/18/23  0530 06/20/23  0638   WBC 14.0* 10.7   RBC 3.33* 3.47*   HEMOGLOBIN 10.4* 10.6*   HEMATOCRIT 29.3* 31.8*   MCV 88.0 91.6   MCH 31.2 30.5   MCHC 35.5 33.3   RDW 41.3 45.4   PLATELETCT 285 314   MPV 9.7 9.6     Recent Labs     06/18/23  0530 06/19/23  0518 06/20/23  0638   SODIUM 125* 129* 133*   POTASSIUM 4.3 4.8 4.3   CHLORIDE 93* 94* 98   CO2 22 24 24   GLUCOSE 110* 101* 138*   BUN 20 17 18   CREATININE 0.48* 0.54 0.53   CALCIUM 8.8 8.9 9.0     Recent Labs     06/18/23  0530   INR 1.03         Recent Labs     06/18/23  0530   TRIGLYCERIDE 88   HDL 44   LDL 75           Assessment/Plan  * 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[facility])  Assessment & Plan  Had progressive BLE weakness  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[facility]  S/P IVIG x 5 doses  Ongoing management per Physiatry    Dyslipidemia  Assessment & Plan  On Lipitor    Vitamin D deficiency  Assessment & Plan  Vit D level 19  Continue supplementation    UTI (urinary tract infection)  Assessment & Plan  Reportedly had +UCx at [facility]  CXR atx, start IS  WBC normalized  Continue Rocephin per Physiatry    Essential hypertension  Assessment & Plan  Observe blood pressure trends on Lasix and Midodrine  Monitor for orthostatic hypotension on Flomax    Anemia  Assessment & Plan  Has normocytic indices  Check Fe Panel w/ next draw  Closely monitor H/H on ASA and Celebrex    Hyponatremia  Assessment & Plan  On fluid restriction, Lasix, and NaCl tabs  Off Lisinopril per Dr. Ruelas  Na+ levels steadily improving      Full Code

## 2023-06-20 NOTE — THERAPY
Physical Therapy   Daily Treatment     Patient Name: Bull Banegas  Age:  67 y.o., Sex:  male  Medical Record #: [id number]  Today's Date: 6/20/2023     Precautions  Precautions: Fall Risk  Comments: lo, blurred vision    Subjective    Pt reporting radiating R thigh pain and fatigue. Agreeable to trial TENS with bed level therex. Pt did report some pain relief with TENS. Also c/o feeling dehydrated and increased double vision     Objective       06/20/23 0901   PT Charge Group   PT Electrical Stimulation Attended (Units) 1   PT Therapeutic Exercise (Units) 1   PT Total Time Spent   PT Individual Total Time Spent (Mins) 30   Pain   Intervention Cold Pack;Repositioned  (TENS to low back and anterior R thigh)   Pain 0 - 10 Group   Location Hip;Leg   Location Orientation Right   Pain Rating Scale (NPRS) 8   Description Burning;Shooting   Comfort Goal Comfort with Movement;Perform Activity   Therapist Pain Assessment During Activity;Nurse Notified   Wheelchair Functional Level of Assist   Wheelchair Assist Stand by Assist   Distance Wheelchair (Feet or Distance) 5   Wheelchair Description Leg rest management  (assist to improve positioning for transfer)   Transfer Functional Level of Assist   Bed, Chair, Wheelchair Transfer Moderate Assist   Bed Chair Wheelchair Transfer Description Slideboard transfer from bed to wheelchair;Supervision for safety;Verbal cueing;Increased time;Initial preparation for task  (depA to place board, VC's and assist to improve liftoff for scooting to reduce shear)   Supine Lower Body Exercise   Short Arc Quad 2 sets of 10;Right ;Left   Gluteal Isometrics 1 set of 10;Bilateral   Bed Mobility    Sit to Supine Maximal Assist   Interdisciplinary Plan of Care Collaboration   IDT Collaboration with  Nursing;Therapy Tech   Patient Position at End of Therapy In Bed;Bed Alarm On;Call Light within Reach;Tray Table within Reach;Phone within Reach   Collaboration Comments RN present to administer meds,  tech present but not needed for physical assistance     Completed SB transfer training with emphasis on WC positioning and anterior weightshift to facilitate liftoff. Applied TENS for 15 min with reported pain relief    Assessment    Pt was limited by radiating back pain into R anterior thigh and fatigue limiting OOB activity. He demonstrated fair carryover of SB transfer training and was able to perform with one person to assist. After discussion with team, pt to be placed on 15hr/week set schedule to facilitate participation and benefit of therapy.  Strengths: Able to follow instructions, Alert and oriented, Effective communication skills, Good carryover of learning, Independent prior level of function, Making steady progress towards goals, Motivated for self care and independence, Pleasant and cooperative, Supportive family, Willingly participates in therapeutic activities  Barriers: Decreased endurance, Fatigue, Generalized weakness, Home accessibility, Impaired activity tolerance, Impaired balance, Limited mobility (Paraplegia)    Plan    Transfer board training  standing frame standing tolerance  STS with standing frame from elevated mat vs raised sling  B LE NRE  Bed mobility with log roll  Continue problem-solving interventions to ease radicular back pain     Passport items to be completed:  Get in/out of bed safely, in/out of a vehicle, safely use mobility device, walk or wheel around home/community, navigate up and down stairs, show how to get up/down from the ground, ensure home is accessible, demonstrate HEP, complete caregiver training        Physical Therapy Problems (Active)       Problem: Balance       Dates: Start:  06/18/23         Goal: STG-Within one week, patient will maintain static standing c/ BUE support >/= 2 minutes to support participation in ADLs.       Dates: Start:  06/18/23               Problem: Mobility       Dates: Start:  06/18/23         Goal: STG-Within one week, patient will  demonstrate ability to manage wheelchair leg rests, arm rests, and brakes in preparation for transfers at [place] or better.        Dates: Start:  06/18/23               Problem: Mobility Transfers       Dates: Start:  06/18/23         Goal: STG-Within one week, patient will transfer bed to chair c/ LRAD (slideboard or reach pivot) and ModA or better.        Dates: Start:  06/18/23            Goal: STG-Within one week, patient will move supine to sit at SBA or better and use of bed rail.        Dates: Start:  06/18/23               Problem: PT-Long Term Goals       Dates: Start:  06/18/23         Goal: LTG-By discharge, patient will propel wheelchair >/= 300' and navigate inclines up to 3% (curb cuts or equivalent) c/ SBA or better.        Dates: Start:  06/18/23            Goal: LTG-By discharge, patient will ambulate >/= 50' c/ ModA or better and FWW.        Dates: Start:  06/18/23            Goal: LTG-By discharge, patient will transfer one surface to another c/ [place].        Dates: Start:  06/18/23            Goal: LTG-By discharge, patient will transfer in/out of a car c/ ModA or better.        Dates: Start:  06/18/23

## 2023-06-20 NOTE — THERAPY
Missed Therapy    Patient Name: Bull Banegas  Age:  67 y.o., Sex:  male  Medical Record #: [id number]  Today's Date: 6/20/2023    Discussed missed therapy with MD and . Altered orders for set schedule on 15hr week due to high fatigability associated with GBS and pt's poor endurance currently       06/20/23 1330   Therapy Missed   15 hour? Y   Missed Therapy (Minutes) 60   Reason For Missed Therapy Medical - Patient not Able To Participate  (Pt reporting feeling sluggish and fatigued limiting participation in PT. Discussed with team and recommend altering to 15hr/wk due to high fatigability with GBS)

## 2023-06-20 NOTE — FLOWSHEET NOTE
"   06/20/23 1305   Incentive Spirometry Treatment   Height 1.727 m (5' 7.99\")   Predicted Inspiratory Capacity 2700   60% of predicted IS capacity 1620 mL   40% of predicted IS capacity 1080 mL   Incentive Spirometer Volume 2500 mL   Pulmonary Function Group   $ FVC / Vital Capacity (liters)  2.66  (60% of predicted. Slumped down in bed)       "

## 2023-06-20 NOTE — CARE PLAN
"The patient is Watcher - Medium risk of patient condition declining or worsening         Progress made toward(s) clinical / shift goals:    Problem: Skin Integrity  Goal: Skin integrity is maintained or improved  Outcome: Progressing  Note: Patient's skin remains intact and free from new or accidental injury this shift. Groin area red and moist. Cleansed, and kept it dry. Patient might need Nystatin powder, will notify MD in am.   Will continue to monitor.      Problem: Pain - Standard  Goal: Alleviation of pain or a reduction in pain to the patient’s comfort goal  Note: Patient able to verbalize pain level and verbalize an acceptable level of pain. Oxycodone 10 mg PO given for C/O 8/10 right leg pain with relief.      Transfer Level & Assistive Devices Used: Max Assist    Patient Position:  Bed.  Refused to be OOB at this time.    Adaptive Equipment Used? N/A (E.g., digital stimulator, toileting aid for hygiene, suppository )      Patient Sensation and Bowel Urgency Present? Yes     Incontinence? Yes     BM Results: 3X, small, medium, loose and soft, brown.    1. Caregiver Present and actively participating in training? No    2. Patient Demonstrated Understanding with Bowel Medications and Purpose: Yes     3. Patient Participation with Suppository Placement: No     4. Patient Participation with Digital Stimulation: No     5. Patient Participation with Clothing Management: No     6. Patient Participation with Hygiene: No          Other:  Patient prefer BM program on day time. Patient stated \" I want to sleep and rest at night, I don't want to this at night, this is making me go a few times. \".       "

## 2023-06-20 NOTE — FLOWSHEET NOTE
06/20/23 1305   Events/Summary/Plan   Events/Summary/Plan FVC/IS   Vital Signs   Pulse 96   Respiration 18   Pulse Oximetry 92 %   $ Pulse Oximetry (Spot Check) Yes   Respiratory Assessment   Respiratory Pattern Within Normal Limits   Level of Consciousness Alert   Breath Sounds   RUL Breath Sounds Clear   RML Breath Sounds Clear   RLL Breath Sounds Diminished   JC Breath Sounds Clear   LLL Breath Sounds Diminished   Oxygen   O2 Delivery Device Room air w/o2 available

## 2023-06-20 NOTE — PROGRESS NOTES
"  Physical Medicine & Rehabilitation Progress Note    Encounter Date: 6/20/2023    Chief Complaint: weakness in LE    Interval Events (Subjective):  Patient was evaluated in his room laying in bed, easily arousable.  He complains of significant fatigue after working with physical and occupational therapy this morning.  He is very proud of his progression in his increased strength but concerned that this caused his pain in bilateral lower extremities to worsen.  He describes the pain as pins-and-needles and burning sensation mainly going from the hip down the entire thigh, worse on the right, pain with any light touch to the area.      Bladder: lo, resistant to removal today  Bowel: multiple lg BM yesterday after suppository with BTP.  Begin fatigue after BTP  PRN: 45 morphine equivalents 6/19    ROS:  Gen: No fatigue, confusion, significant weight loss  Eyes: no blurry vision, + double vision when looking to the right  no pain in eyes  ENT: no changes in hearing, runny nose, nose bleeds, sinus pain  CV: No CP, palpitations,   Lungs: no shortness of breath, changes in secretions, changes in cough, pain with coughing  Abd: No abd pain, nausea, vomiting, pain with eating  : no blood in urine, pain during storage phase, bladder spasms, suprapubic pain  Ext: No swelling in legs, asymmetric atrophy  Neuro: no changes in strength or sensation  Skin: no new ulcers/skin breakdown appreciated by patient  Mood: No changes in mood today, increase in depression or anxiety  Heme: no bruising, or bleeding    Objective:  Vitals: /66   Pulse (!) 104   Temp 36.4 °C (97.6 °F) (Oral)   Resp 18   Ht 1.727 m (5' 7.99\")   Wt 113 kg (250 lb 3.6 oz)   SpO2 94%   Gen: NAD, Body mass index is 38.06 kg/m².  HEENT:  NC/AT, PERRLA, moist mucous membranes  Cardio: RRR, no mumurs  Pulm: CTAB, with normal respiratory effort  Abd: Soft NTND, active bowel sounds,   Ext: Left greater than right lower extremity swelling. No calf " tenderness. No clubbing/cyanosis. +dorsal pedalis pulses bilaterally.      Laboratory Values:  Recent Results (from the past 72 hour(s))   CoV-2, Flu A/B, And RSV by PCR ([facility])    Collection Time: 06/17/23 12:15 PM    Specimen: Nasopharyngeal; Respirate   Result Value Ref Range    Influenza virus A RNA Negative Negative    Influenza virus B, PCR Negative Negative    RSV, PCR Negative Negative    SARS-CoV-2 by PCR NotDetected     SARS-CoV-2 Source NP Swab    CBC with Differential    Collection Time: 06/18/23  5:30 AM   Result Value Ref Range    WBC 14.0 (H) 4.8 - 10.8 K/uL    RBC 3.33 (L) 4.70 - 6.10 M/uL    Hemoglobin 10.4 (L) 14.0 - 18.0 g/dL    Hematocrit 29.3 (L) 42.0 - 52.0 %    MCV 88.0 81.4 - 97.8 fL    MCH 31.2 27.0 - 33.0 pg    MCHC 35.5 32.3 - 36.5 g/dL    RDW 41.3 35.9 - 50.0 fL    Platelet Count 285 164 - 446 K/uL    MPV 9.7 9.0 - 12.9 fL    Neutrophils-Polys 65.40 44.00 - 72.00 %    Lymphocytes 24.60 22.00 - 41.00 %    Monocytes 6.50 0.00 - 13.40 %    Eosinophils 2.00 0.00 - 6.90 %    Basophils 0.40 0.00 - 1.80 %    Immature Granulocytes 1.10 (H) 0.00 - 0.90 %    Nucleated RBC 0.10 0.00 - 0.20 /100 WBC    Neutrophils (Absolute) 9.16 (H) 1.82 - 7.42 K/uL    Lymphs (Absolute) 3.45 1.00 - 4.80 K/uL    Monos (Absolute) 0.91 (H) 0.00 - 0.85 K/uL    Eos (Absolute) 0.28 0.00 - 0.51 K/uL    Baso (Absolute) 0.05 0.00 - 0.12 K/uL    Immature Granulocytes (abs) 0.16 (H) 0.00 - 0.11 K/uL    NRBC (Absolute) 0.02 K/uL   Comp Metabolic Panel (CMP)    Collection Time: 06/18/23  5:30 AM   Result Value Ref Range    Sodium 125 (L) 135 - 145 mmol/L    Potassium 4.3 3.6 - 5.5 mmol/L    Chloride 93 (L) 96 - 112 mmol/L    Co2 22 20 - 33 mmol/L    Anion Gap 10.0 7.0 - 16.0    Glucose 110 (H) 65 - 99 mg/dL    Bun 20 8 - 22 mg/dL    Creatinine 0.48 (L) 0.50 - 1.40 mg/dL    Calcium 8.8 8.5 - 10.5 mg/dL    AST(SGOT) 28 12 - 45 U/L    ALT(SGPT) 30 2 - 50 U/L    Alkaline Phosphatase 76 30 - 99 U/L    Total Bilirubin 1.1 0.1 - 1.5  mg/dL    Albumin 3.0 (L) 3.2 - 4.9 g/dL    Total Protein 7.0 6.0 - 8.2 g/dL    Globulin 4.0 (H) 1.9 - 3.5 g/dL    A-G Ratio 0.8 g/dL   Prothrombin time    Collection Time: 06/18/23  5:30 AM   Result Value Ref Range    PT 13.4 12.0 - 14.6 sec    INR 1.03 0.87 - 1.13   TSH with Reflex to FT4    Collection Time: 06/18/23  5:30 AM   Result Value Ref Range    TSH 3.390 0.380 - 5.330 uIU/mL   Vitamin D, 25-hydroxy (blood)    Collection Time: 06/18/23  5:30 AM   Result Value Ref Range    25-Hydroxy   Vitamin D 25 19 (L) 30 - 100 ng/mL   Lipid Profile    Collection Time: 06/18/23  5:30 AM   Result Value Ref Range    Cholesterol,Tot 137 100 - 199 mg/dL    Triglycerides 88 0 - 149 mg/dL    HDL 44 >=40 mg/dL    LDL 75 <100 mg/dL   MAGNESIUM    Collection Time: 06/18/23  5:30 AM   Result Value Ref Range    Magnesium 2.0 1.5 - 2.5 mg/dL   PHOSPHORUS    Collection Time: 06/18/23  5:30 AM   Result Value Ref Range    Phosphorus 3.9 2.5 - 4.5 mg/dL   HEMOGLOBIN A1C    Collection Time: 06/18/23  5:30 AM   Result Value Ref Range    Glycohemoglobin 5.9 (H) 4.0 - 5.6 %    Est Avg Glucose 123 mg/dL   ESTIMATED GFR    Collection Time: 06/18/23  5:30 AM   Result Value Ref Range    GFR (CKD-EPI) 114 >60 mL/min/1.73 m 2   CORRECTED CALCIUM    Collection Time: 06/18/23  5:30 AM   Result Value Ref Range    Correct Calcium 9.6 8.5 - 10.5 mg/dL   Basic Metabolic Panel    Collection Time: 06/19/23  5:18 AM   Result Value Ref Range    Sodium 129 (L) 135 - 145 mmol/L    Potassium 4.8 3.6 - 5.5 mmol/L    Chloride 94 (L) 96 - 112 mmol/L    Co2 24 20 - 33 mmol/L    Glucose 101 (H) 65 - 99 mg/dL    Bun 17 8 - 22 mg/dL    Creatinine 0.54 0.50 - 1.40 mg/dL    Calcium 8.9 8.5 - 10.5 mg/dL    Anion Gap 11.0 7.0 - 16.0   ESTIMATED GFR    Collection Time: 06/19/23  5:18 AM   Result Value Ref Range    GFR (CKD-EPI) 110 >60 mL/min/1.73 m 2   CBC WITH DIFFERENTIAL    Collection Time: 06/20/23  6:38 AM   Result Value Ref Range    WBC 10.7 4.8 - 10.8 K/uL    RBC  3.47 (L) 4.70 - 6.10 M/uL    Hemoglobin 10.6 (L) 14.0 - 18.0 g/dL    Hematocrit 31.8 (L) 42.0 - 52.0 %    MCV 91.6 81.4 - 97.8 fL    MCH 30.5 27.0 - 33.0 pg    MCHC 33.3 32.3 - 36.5 g/dL    RDW 45.4 35.9 - 50.0 fL    Platelet Count 314 164 - 446 K/uL    MPV 9.6 9.0 - 12.9 fL    Neutrophils-Polys 67.80 44.00 - 72.00 %    Lymphocytes 24.60 22.00 - 41.00 %    Monocytes 4.40 0.00 - 13.40 %    Eosinophils 2.00 0.00 - 6.90 %    Basophils 0.40 0.00 - 1.80 %    Immature Granulocytes 0.80 0.00 - 0.90 %    Nucleated RBC 0.30 (H) 0.00 - 0.20 /100 WBC    Neutrophils (Absolute) 7.29 1.82 - 7.42 K/uL    Lymphs (Absolute) 2.64 1.00 - 4.80 K/uL    Monos (Absolute) 0.47 0.00 - 0.85 K/uL    Eos (Absolute) 0.21 0.00 - 0.51 K/uL    Baso (Absolute) 0.04 0.00 - 0.12 K/uL    Immature Granulocytes (abs) 0.09 0.00 - 0.11 K/uL    NRBC (Absolute) 0.03 K/uL   Basic Metabolic Panel    Collection Time: 06/20/23  6:38 AM   Result Value Ref Range    Co2 24 20 - 33 mmol/L    Glucose 138 (H) 65 - 99 mg/dL    Bun 18 8 - 22 mg/dL    Creatinine 0.53 0.50 - 1.40 mg/dL    Calcium 9.0 8.5 - 10.5 mg/dL   ESTIMATED GFR    Collection Time: 06/20/23  6:38 AM   Result Value Ref Range    GFR (CKD-EPI) 111 >60 mL/min/1.73 m 2       Medications:  Scheduled Medications   Medication Dose Frequency    gabapentin  600 mg TID    vitamin D3  2,000 Units DAILY    midodrine  5 mg TID WITH MEALS    menthol-methyl salicycate   BID    vitamin D3  1,000 Units DAILY    omeprazole  20 mg DAILY    Pharmacy Consult Request  1 Each PHARMACY TO DOSE    acetaminophen  1,000 mg TID    enoxaparin  40 mg QHS    therapeutic multivitamin-minerals  1 Tablet DAILY WITH LUNCH    docusate sodium  200 mg BID    And    sennosides  17.2 mg DAILY AT NOON    And    bisacodyl  10 mg QDAY    aspirin  81 mg DAILY    atorvastatin  10 mg MO, WE + FR    celecoxib  200 mg BID    furosemide  20 mg Q DAY    melatonin  6 mg QHS    temazepam  15 mg QHS    cefTRIAXone (ROCEPHIN) IV  1,000 mg Q24HRS     sodium chloride  2 g TID WITH MEALS    lidocaine  2 Patch Q24HR    tamsulosin  0.4 mg AFTER DINNER     PRN medications: acetaminophen **FOLLOWED BY** [START ON 6/22/2023] acetaminophen, acetaminophen, carboxymethylcellulose, benzocaine-menthol, mag hydrox-al hydrox-simeth, ondansetron **OR** ondansetron, traZODone, sodium chloride, Respiratory Therapy Consult, oxyCODONE immediate-release **OR** oxyCODONE immediate-release, hydrALAZINE, docusate sodium **AND** sennosides **AND** bisacodyl **AND** magnesium hydroxide    Diet:  Current Diet Order   Procedures    Diet Order Diet: Regular; Fluid modifications: (optional): 1800 ml Fluid Restriction       Assessment:  Active Hospital Problems    Diagnosis     *GBS (Guillain-Bushnell syndrome) (HCC)     Essential hypertension     UTI (urinary tract infection)     Vitamin D deficiency     Hyponatremia        Medical Decision Making and Plan:    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  Lower extremity weakness and pain as well as numbness in upper extremities.  No known premorbid infection.  -PT and OT for mobility and ADLs. Per guidelines, 15 hours per week between PT, OT.  Cleared by speech therapy for swallow and speech, transition time to PT and OT  - Continue comprehensive acute inpatient rehabilitation     Neurologic respiratory impairment:   Patient is at high risk for respiratory involvement given neurologic symptoms in the upper extremities.   -We will consult respiratory therapy team to follow the patient during the hospital stay, for education on impaired respiratory status, importance of incentive spirometry, risk of respiratory infection and prevention process.  -Vital capacity daily, 1.72 liters on 6/20  - Incentive spirometry  - Mild left basilar atelectasis from chest x-ray on 6/17     Lower extremity swelling: Left greater than right  - Check urgent left lower extremity  Doppler to evaluate for clot formation    Hyponatremia: Likely SIADH, followed by nephrology during [facility] hospitalization  - Sodium of 126 on 6/17 --> 129 on 6/19 --> 133 on 6/20  - Check BMP in a.m.  - Sodium chloride tablets 2 g 3 times daily  - Lasix 20 mg daily  -Free water restriction of 500 cc/day and total p.o. fluid intake of 1.8 L, if sodium continues to improve goal will be to decrease fluid restriction.     Hypomagnesemia: Low magnesium during acute hospitalization, supplemented with p.o. magnesium  - Magnesium of 2.0 on 6/18     Urinary tract infection/pyelonephritis: Catheter associated UTI in the setting of neurogenic bladder  - WBC of 14 on 6/17 --> WBC of 10.7 on 6/20  - Peripheral IV was removed prior to transfer to acute rehabilitation we will replace IV.  - Ceftriaxone 1 g every 24 hours, continue for another 8 days, total of 10 days given systemic factor  --We will work with [facility] on following up culture and sensitivities     Neurogenic bladder: Inappropriate management of neurogenic bladder can result and hydronephrosis, increased risk of pyelonephritis, and renal failure  - Continue Blancas catheter, patient is resistant to removal of Blancas catheter at this time we will plan for removal of catheter over the next couple of days  - Continue Flomax 0.4 mg nightly     Neurogenic bowel: Inappropriate neurogenic bowel can result in severe constipation, bowel dilation and rupture.  Severe constipation with prolonged period of time without BM.  - Recheck KUB-KUB on 6/20 personally evaluated and interpreted as large amount of stool and descending and transverse colon  - Upper motor neuron neurogenic bowel program with senna 2 tablets q. noon, Colace 200 mg twice daily and Magic bullet suppository SC daily and digital stimulation  -Given significant stool on KUB will administer MiraLAX 17 g every hour x4 doses followed by fleets enema on 6/20       orthostatic hypotension  Because of  significant autonomic dysfunction associated with some cases of AIDP, the patient is at high risk for orthostatic hypotension.  Goal of SBP greater than 100.  -  Mechanical compression with teds and Tubi  and abd binder used prior to out of bed  - Increase midodrine to 10 mg 3 times daily     Essential hypertension: SBP of greater than 200 on presentation to acute hospital.  -105 in the last 24 hours  - Lisinopril 20 mg daily, discontinued lisinopril secondary to orthostatic hypotension as above, may restart as an outpatient with recovery of autonomic nervous system     Dyslipidemia: Total cholesterol 137, HDL 44, LDL 75  -Lipitor 10 mg every Monday Wednesday Friday     Dysphagia: Concern for swallow dysfunction in the setting of certain variants of AIDP  - Consult speech therapy for swallow evaluation.  Cleared by speech therapy     Skin  Due to the sensory impairments following AIDP, skin protection principles are of the utmost importance.      - every two-hour turning pattern overnight while the patient is in bed. When the patient is out of bed, an every 15 minute repositioning or weight shifting pattern will be utilized in order to help train the patient for long-term skin protection. We will also discuss skin monitoring and its importance with the patient and the caregivers.     Pain:  Postoperative pain:  - Oxycodone 5-10 mg every 3 hours as needed moderate-severe pain  -Tylenol 1000 mg 3 times daily, AST 28, ALT 30, alk phos 76, within normal limits, continue current dose of scheduled Tylenol  - Lidocaine patch x2 on either side of incision, on for 12 hours off for 12 hours  -Tramadol 50 mg twice daily prior to therapy    Neuropathic pain: Burning and tingling in all 4 extremities  - Increase gabapentin to 800 mg 3 times a day, will plan to titrate up depending on tolerance and effect, may require other neuropathic pain agents.  -Celebrex 200 mg twice daily  -Tramadol 50 mg twice daily prior to  therapy  -May benefit from a nonpharmaceutical modalities such as TENS units, compression, ice, heat, will discuss with therapy team.     Vitamin D deficiency: High risk of vitamin D deficiency in this population, goal of vitamin D level greater than 30, has been linked to improvement and central nervous system recovery  - Vitamin D level of 19 on 6/18  - Cholecalciferol 2000 units daily     Insomnia: Significant difficulty during acute hospitalization  - Restarted on Restoril 15 mg nightly  - Trazodone 50 mg nightly as needed insomnia        DVT prophylaxis  In the setting of AIDP, the patient is at high risk of deep venous thrombosis given decreased mobility and alteration to autonomic nervous system. Because of this we have elected to pursue prophylactic anticoagulation utilizing Lovenox 40 mg daily.   ____________________________________    Mario Terrell DO  [facility] - Physical Medicine & Rehabilitation   [facility] - Spinal Cord Injury Medicine  ____________________________________

## 2023-06-21 ENCOUNTER — APPOINTMENT (OUTPATIENT)
Dept: RADIOLOGY | Facility: REHABILITATION | Age: 68
DRG: 095 | End: 2023-06-21
Attending: PHYSICAL MEDICINE & REHABILITATION
Payer: MEDICARE

## 2023-06-21 ENCOUNTER — APPOINTMENT (OUTPATIENT)
Dept: PHYSICAL THERAPY | Facility: REHABILITATION | Age: 68
End: 2023-06-21
Attending: PHYSICAL MEDICINE & REHABILITATION
Payer: COMMERCIAL

## 2023-06-21 ENCOUNTER — APPOINTMENT (OUTPATIENT)
Dept: OCCUPATIONAL THERAPY | Facility: REHABILITATION | Age: 68
End: 2023-06-21
Attending: PHYSICAL MEDICINE & REHABILITATION
Payer: COMMERCIAL

## 2023-06-21 PROBLEM — R79.89 AZOTEMIA: Status: ACTIVE | Noted: 2023-06-21

## 2023-06-21 LAB
ANION GAP SERPL CALC-SCNC: 10 MMOL/L (ref 7–16)
BUN SERPL-MCNC: 24 MG/DL (ref 8–22)
CALCIUM SERPL-MCNC: 8.7 MG/DL (ref 8.5–10.5)
CHLORIDE SERPL-SCNC: 101 MMOL/L (ref 96–112)
CO2 SERPL-SCNC: 25 MMOL/L (ref 20–33)
CREAT SERPL-MCNC: 0.51 MG/DL (ref 0.5–1.4)
GFR SERPLBLD CREATININE-BSD FMLA CKD-EPI: 112 ML/MIN/1.73 M 2
GLUCOSE SERPL-MCNC: 104 MG/DL (ref 65–99)
POTASSIUM SERPL-SCNC: 4.5 MMOL/L (ref 3.6–5.5)
SODIUM SERPL-SCNC: 136 MMOL/L (ref 135–145)

## 2023-06-21 PROCEDURE — 700102 HCHG RX REV CODE 250 W/ 637 OVERRIDE(OP): Performed by: HOSPITALIST

## 2023-06-21 PROCEDURE — 97535 SELF CARE MNGMENT TRAINING: CPT

## 2023-06-21 PROCEDURE — 700101 HCHG RX REV CODE 250: Performed by: PHYSICAL MEDICINE & REHABILITATION

## 2023-06-21 PROCEDURE — 97110 THERAPEUTIC EXERCISES: CPT

## 2023-06-21 PROCEDURE — 99232 SBSQ HOSP IP/OBS MODERATE 35: CPT | Performed by: PHYSICAL MEDICINE & REHABILITATION

## 2023-06-21 PROCEDURE — 770010 HCHG ROOM/CARE - REHAB SEMI PRIVAT*

## 2023-06-21 PROCEDURE — 36415 COLL VENOUS BLD VENIPUNCTURE: CPT

## 2023-06-21 PROCEDURE — 97530 THERAPEUTIC ACTIVITIES: CPT

## 2023-06-21 PROCEDURE — 700105 HCHG RX REV CODE 258: Performed by: PHYSICAL MEDICINE & REHABILITATION

## 2023-06-21 PROCEDURE — A9270 NON-COVERED ITEM OR SERVICE: HCPCS | Performed by: PHYSICAL MEDICINE & REHABILITATION

## 2023-06-21 PROCEDURE — 99232 SBSQ HOSP IP/OBS MODERATE 35: CPT | Performed by: HOSPITALIST

## 2023-06-21 PROCEDURE — 700111 HCHG RX REV CODE 636 W/ 250 OVERRIDE (IP): Performed by: PHYSICAL MEDICINE & REHABILITATION

## 2023-06-21 PROCEDURE — A9270 NON-COVERED ITEM OR SERVICE: HCPCS | Performed by: HOSPITALIST

## 2023-06-21 PROCEDURE — 80048 BASIC METABOLIC PNL TOTAL CA: CPT

## 2023-06-21 PROCEDURE — 700102 HCHG RX REV CODE 250 W/ 637 OVERRIDE(OP): Performed by: PHYSICAL MEDICINE & REHABILITATION

## 2023-06-21 PROCEDURE — 97112 NEUROMUSCULAR REEDUCATION: CPT

## 2023-06-21 PROCEDURE — 94760 N-INVAS EAR/PLS OXIMETRY 1: CPT

## 2023-06-21 PROCEDURE — 74018 RADEX ABDOMEN 1 VIEW: CPT

## 2023-06-21 PROCEDURE — 94150 VITAL CAPACITY TEST: CPT

## 2023-06-21 RX ORDER — PREGABALIN 25 MG/1
50 CAPSULE ORAL 3 TIMES DAILY
Status: DISCONTINUED | OUTPATIENT
Start: 2023-06-21 | End: 2023-06-22

## 2023-06-21 RX ORDER — ENEMA 19; 7 G/133ML; G/133ML
1 ENEMA RECTAL ONCE
Status: DISCONTINUED | OUTPATIENT
Start: 2023-06-21 | End: 2023-06-21

## 2023-06-21 RX ORDER — POLYETHYLENE GLYCOL 3350 17 G/17G
1 POWDER, FOR SOLUTION ORAL
Status: COMPLETED | OUTPATIENT
Start: 2023-06-21 | End: 2023-06-21

## 2023-06-21 RX ORDER — ENEMA 19; 7 G/133ML; G/133ML
1 ENEMA RECTAL ONCE
Status: COMPLETED | OUTPATIENT
Start: 2023-06-21 | End: 2023-06-21

## 2023-06-21 RX ORDER — SODIUM CHLORIDE 1 G/1
1 TABLET ORAL
Status: DISCONTINUED | OUTPATIENT
Start: 2023-06-21 | End: 2023-06-24

## 2023-06-21 RX ADMIN — DOCUSATE SODIUM 200 MG: 100 CAPSULE, LIQUID FILLED ORAL at 08:47

## 2023-06-21 RX ADMIN — TAMSULOSIN HYDROCHLORIDE 0.4 MG: 0.4 CAPSULE ORAL at 17:29

## 2023-06-21 RX ADMIN — POLYETHYLENE GLYCOL 3350 1 PACKET: 17 POWDER, FOR SOLUTION ORAL at 15:10

## 2023-06-21 RX ADMIN — POLYETHYLENE GLYCOL 3350 1 PACKET: 17 POWDER, FOR SOLUTION ORAL at 12:18

## 2023-06-21 RX ADMIN — ATORVASTATIN CALCIUM 10 MG: 10 TABLET, FILM COATED ORAL at 20:01

## 2023-06-21 RX ADMIN — ASPIRIN 81 MG CHEWABLE TABLET 81 MG: 81 TABLET CHEWABLE at 08:47

## 2023-06-21 RX ADMIN — MENTHOL, METHYL SALICYLATE: 10; 15 CREAM TOPICAL at 08:54

## 2023-06-21 RX ADMIN — GABAPENTIN 800 MG: 400 CAPSULE ORAL at 13:48

## 2023-06-21 RX ADMIN — BISACODYL 10 MG: 10 SUPPOSITORY RECTAL at 20:01

## 2023-06-21 RX ADMIN — POLYETHYLENE GLYCOL 3350 1 PACKET: 17 POWDER, FOR SOLUTION ORAL at 15:00

## 2023-06-21 RX ADMIN — MIDODRINE HYDROCHLORIDE 10 MG: 10 TABLET ORAL at 12:18

## 2023-06-21 RX ADMIN — FUROSEMIDE 20 MG: 20 TABLET ORAL at 05:08

## 2023-06-21 RX ADMIN — MIDODRINE HYDROCHLORIDE 10 MG: 10 TABLET ORAL at 08:51

## 2023-06-21 RX ADMIN — SODIUM CHLORIDE 1 G: 1 TABLET ORAL at 17:29

## 2023-06-21 RX ADMIN — CELECOXIB 200 MG: 100 CAPSULE ORAL at 08:49

## 2023-06-21 RX ADMIN — ACETAMINOPHEN 1000 MG: 500 TABLET ORAL at 13:48

## 2023-06-21 RX ADMIN — POLYETHYLENE GLYCOL 3350 1 PACKET: 17 POWDER, FOR SOLUTION ORAL at 13:47

## 2023-06-21 RX ADMIN — GABAPENTIN 800 MG: 400 CAPSULE ORAL at 20:02

## 2023-06-21 RX ADMIN — OXYCODONE HYDROCHLORIDE 10 MG: 10 TABLET ORAL at 08:45

## 2023-06-21 RX ADMIN — ACETAMINOPHEN 1000 MG: 500 TABLET ORAL at 08:50

## 2023-06-21 RX ADMIN — MULTIPLE VITAMINS W/ MINERALS TAB 1 TABLET: TAB at 12:18

## 2023-06-21 RX ADMIN — ENOXAPARIN SODIUM 40 MG: 100 INJECTION SUBCUTANEOUS at 20:00

## 2023-06-21 RX ADMIN — GABAPENTIN 800 MG: 400 CAPSULE ORAL at 08:48

## 2023-06-21 RX ADMIN — CEFTRIAXONE SODIUM 1000 MG: 1 INJECTION, POWDER, FOR SOLUTION INTRAMUSCULAR; INTRAVENOUS at 17:34

## 2023-06-21 RX ADMIN — PREGABALIN 50 MG: 25 CAPSULE ORAL at 16:27

## 2023-06-21 RX ADMIN — MENTHOL, METHYL SALICYLATE: 10; 15 CREAM TOPICAL at 20:03

## 2023-06-21 RX ADMIN — PREGABALIN 50 MG: 25 CAPSULE ORAL at 20:02

## 2023-06-21 RX ADMIN — SODIUM PHOSPHATE 133 ML: 7; 19 ENEMA RECTAL at 18:21

## 2023-06-21 RX ADMIN — OMEPRAZOLE 20 MG: 20 CAPSULE, DELAYED RELEASE ORAL at 08:51

## 2023-06-21 RX ADMIN — MIDODRINE HYDROCHLORIDE 10 MG: 10 TABLET ORAL at 17:29

## 2023-06-21 RX ADMIN — CELECOXIB 200 MG: 100 CAPSULE ORAL at 20:01

## 2023-06-21 RX ADMIN — SODIUM CHLORIDE 2 G: 1 TABLET ORAL at 08:47

## 2023-06-21 RX ADMIN — SENNOSIDES 17.2 MG: 8.6 TABLET, FILM COATED ORAL at 12:18

## 2023-06-21 RX ADMIN — Medication 2000 UNITS: at 08:49

## 2023-06-21 RX ADMIN — OXYCODONE HYDROCHLORIDE 10 MG: 10 TABLET ORAL at 22:00

## 2023-06-21 RX ADMIN — ACETAMINOPHEN 1000 MG: 500 TABLET ORAL at 20:01

## 2023-06-21 RX ADMIN — TEMAZEPAM 15 MG: 15 CAPSULE ORAL at 20:01

## 2023-06-21 RX ADMIN — Medication 6 MG: at 20:02

## 2023-06-21 RX ADMIN — DOCUSATE SODIUM 200 MG: 100 CAPSULE, LIQUID FILLED ORAL at 20:01

## 2023-06-21 ASSESSMENT — ENCOUNTER SYMPTOMS
VOMITING: 0
WEAKNESS: 1
PALPITATIONS: 0
EYES NEGATIVE: 1
FEVER: 0
CHILLS: 0
SHORTNESS OF BREATH: 0
ABDOMINAL PAIN: 0
COUGH: 0
BRUISES/BLEEDS EASILY: 0
POLYDIPSIA: 0
NAUSEA: 0

## 2023-06-21 ASSESSMENT — PAIN DESCRIPTION - PAIN TYPE: TYPE: ACUTE PAIN

## 2023-06-21 ASSESSMENT — PULMONARY FUNCTION TESTS: FVC: 2.91

## 2023-06-21 NOTE — THERAPY
"Physical Therapy   Daily Treatment     Patient Name: Bull Banegas  Age:  67 y.o., Sex:  male  Medical Record #: [id number]  Today's Date: 6/21/2023     Precautions  Precautions: Fall Risk  Comments: lo, blurred vision    Subjective    \"My whole body feels like it's vibrating\"  \"This feels so good to be able to do\" -referring to motomed     Objective       06/21/23 1330   PT Charge Group   PT Therapeutic Exercise (Units) 1   PT Neuromuscular Re-Education / Balance (Units) 1   PT Therapeutic Activities (Units) 2   PT Total Time Spent   PT Individual Total Time Spent (Mins) 60   Wheelchair Functional Level of Assist   Wheelchair Assist Stand by Assist   Distance Wheelchair (Feet or Distance) 150   Wheelchair Description Leg rest management;Extra time;Limited by fatigue  (B UE propulsion, trialed BLE+UE however too difficult for pt to propel >30ft)   Transfer Functional Level of Assist   Bed, Chair, Wheelchair Transfer Minimal Assist   Bed Chair Wheelchair Transfer Description Slideboard transfer from bed to wheelchair;Set-up of equipment;Supervision for safety;Verbal cueing;Increased time;Initial preparation for task   Supine Lower Body Exercise   Heel Slide 1 set of 10;Right;Left  (AAROM)   Other Exercises R 90/90 hamstring stretch x30sec due to report of cramping pain   Sitting Lower Body Exercises   Other Exercises B LE motomed level 1 10min 1.41mi avg 46spm   Bed Mobility    Supine to Sit Moderate Assist   Sit to Supine Minimal Assist  (logroll)   Sit to Stand Moderate Assist  (pull to stand // bars)   Rolling Contact Guard Assist   Neuro-Muscular Treatments   Neuro-Muscular Treatments Co-Contraction;Postural Facilitation;Verbal Cuing  (demonstration)   Comments static standing balance 2 bouts x1 min ea with emphasis on glute facilitation and reducing R>L knee hyperextension; initially required heavy use of B UE but able to reduce within session   Interdisciplinary Plan of Care Collaboration   IDT Collaboration " with  Physician;Nursing;Occupational Therapist   Patient Position at End of Therapy In Bed;Bed Alarm On;Call Light within Reach;Tray Table within Reach;Phone within Reach   Collaboration Comments fluid restriction, kristyn colored urin, vibration sensation throughout whole body and increased double vision     Educated pt in body mechanics for SB transfer with improved liftoff on transfer returning to bed  Pt initially standing with shoulders and upper trunk excessively anterior to pelvis, able to correct with verbal and tactile cueing  Pt improved in ability to reduce R>L hyperextension with improved posture and cueing for glute recruitment    Assessment    Pt reported increased blurry vision, full body vibration sensation, and double vision that improved with closing one eye. However this did not limit his participation in therapy. He was able to perform 2 bouts of standing with Cathleen and B knee protective block though he did not demonstrate any buckling. Pt was able to correct B knee hyperextension in standing however had difficulty maintaining > 10-15sec.  Strengths: Able to follow instructions, Alert and oriented, Effective communication skills, Good carryover of learning, Independent prior level of function, Making steady progress towards goals, Motivated for self care and independence, Pleasant and cooperative, Supportive family, Willingly participates in therapeutic activities  Barriers: Decreased endurance, Fatigue, Generalized weakness, Home accessibility, Impaired activity tolerance, Impaired balance, Limited mobility (Paraplegia)    Plan    B LE therex with caution not to overfatigue  Static standing tolerance progressing to pregait with WC follow in // bars as appropriate  Seated and supine trunk NRE    Passport items to be completed:  Get in/out of bed safely, in/out of a vehicle, safely use mobility device, walk or wheel around home/community, navigate up and down stairs, show how to get up/down from the  ground, ensure home is accessible, demonstrate HEP, complete caregiver training    Physical Therapy Problems (Active)       Problem: Balance       Dates: Start:  06/18/23         Goal: STG-Within one week, patient will maintain static standing c/ BUE support >/= 2 minutes to support participation in ADLs.       Dates: Start:  06/18/23               Problem: Mobility       Dates: Start:  06/18/23         Goal: STG-Within one week, patient will demonstrate ability to manage wheelchair leg rests, arm rests, and brakes in preparation for transfers at [facility] or better.        Dates: Start:  06/18/23               Problem: Mobility Transfers       Dates: Start:  06/18/23         Goal: STG-Within one week, patient will transfer bed to chair c/ LRAD (slideboard or reach pivot) and ModA or better.        Dates: Start:  06/18/23            Goal: STG-Within one week, patient will move supine to sit at SBA or better and use of bed rail.        Dates: Start:  06/18/23               Problem: PT-Long Term Goals       Dates: Start:  06/18/23         Goal: LTG-By discharge, patient will propel wheelchair >/= 300' and navigate inclines up to 3% (curb cuts or equivalent) c/ SBA or better.        Dates: Start:  06/18/23            Goal: LTG-By discharge, patient will ambulate >/= 50' c/ ModA or better and FWW.        Dates: Start:  06/18/23            Goal: LTG-By discharge, patient will transfer one surface to another c/ Naila.        Dates: Start:  06/18/23            Goal: LTG-By discharge, patient will transfer in/out of a car c/ ModA or better.        Dates: Start:  06/18/23

## 2023-06-21 NOTE — PROGRESS NOTES
Transfer Level & Assistive Devices Used: NA (refused)    Patient Position: Refused suppository (E.g., bed, bed side commode, mobile shower commode, commode over toilet, regular toilet)     Adaptive Equipment Used? refused (E.g., digital stimulator, toileting aid for hygiene, suppository )      Patient Sensation and Bowel Urgency Present? No     Incontinence? No     BM Results: Refused    1. Caregiver Present and actively participating in training? No    2. Patient Demonstrated Understanding with Bowel Medications and Purpose: Reinforce teaching and bowel program     3. Patient Participation with Suppository Placement: No     4. Patient Participation with Digital Stimulation: No     5. Patient Participation with Clothing Management: No     6. Patient Participation with Hygiene: No        (If patient meets all 6 of the criteria numbered above, consider rescheduling bowel program to evening 1700 or 0500.)      Other:

## 2023-06-21 NOTE — THERAPY
"Occupational Therapy  Daily Treatment     Patient Name: Bull Banegas  Age:  67 y.o., Sex:  male  Medical Record #: [id number]  Today's Date: 6/21/2023     Precautions  Precautions: Fall Risk  Comments: lo, blurred vision         Subjective    \"It feels like my whole body is vibrating.\" RN aware and message sent to MD.     Objective       06/21/23 0701   OT Charge Group   OT Self Care / ADL (Units) 3   OT Therapy Activity (Units) 1   OT Total Time Spent   OT Individual Total Time Spent (Mins) 60   Vitals   Vitals Comments See values for BP at 0713, 0720, and 0723   Pain   Intervention Medication (see MAR);Emotional Support;Nurse Notified;Repositioned;Rest   Pain 0 - 10 Group   Location Back   Location Orientation Lower   Description   (\"stiff\")   Comfort Goal Comfort with Movement;Perform Activity   Therapist Pain Assessment Post Activity Pain Same as Prior to Activity   Cognition    Level of Consciousness Alert   Functional Level of Assist   Grooming Seated;Supervision   Grooming Description Set-up of equipment;Supervision for safety  (setup seated EOB for oral care)   Bathing Minimal Assist   Bathing Description Supervision for safety;Set-up of equipment;Initial preparation for task;Increased time  (Setup for bed bath, per pt request. Assist to wash rear, knee to toe, and back. Pt able to reach all other parts seated, and then supine for groin.)   Upper Body Dressing Supervision   Upper Body Dressing Description Set-up of equipment   Lower Body Dressing Maximal Assist   Lower Body Dressing Description Assist with threading into pant leg;Increased time;Initial preparation for task;Set-up of equipment;Supervision for safety;Verbal cueing  (Supine to don/doff with pt bridging and with bilateral rolling for hip hike. Assist to thread pant/UW)   Balance   Comments Pt tolerated sitting EOB for 45 min during session at SPV-Mod I level.   Bed Mobility    Supine to Sit Moderate Assist   Sit to Supine Minimal Assist "   Rolling Contact Guard Assist   Interdisciplinary Plan of Care Collaboration   IDT Collaboration with  Nursing;Therapy Tech   Patient Position at End of Therapy In Bed;Bed Alarm On;Call Light within Reach;Tray Table within Reach;Phone within Reach   Collaboration Comments RN notified of pt's BP and sensory issues.       Education on energy conservation and progression of GB. Pt reported that he usually pushes himself prior to GB. Receptive to education on pacing.    Assessment    Pt with low tolerance to tx, and required significant increased time due to fatigue. Cues for pursed lip breathing and pacing.   Strengths: Able to follow instructions, Alert and oriented, Effective communication skills, Independent prior level of function, Motivated for self care and independence, Pleasant and cooperative, Willingly participates in therapeutic activities    Plan    UB strengthening as limited by fatigue, functional transfers progressing to standing from SB, ADLs with AE as needed, IADLs, incorporate FM skills for BUE    Occupational Therapy Goals (Active)       Problem: Bathing       Dates: Start:  06/18/23         Goal: STG-Within one week, patient will bathe with min A overall using AE/DME as needed.       Dates: Start:  06/18/23               Problem: Dressing       Dates: Start:  06/18/23         Goal: STG-Within one week, patient will dress UB with supervision overall using AE/DME as needed.       Dates: Start:  06/18/23            Goal: STG-Within one week, patient will dress LB with mod A overall using AE/DME as needed.       Dates: Start:  06/18/23               Problem: Functional Transfers       Dates: Start:  06/18/23         Goal: STG-Within one week, patient will transfer to toilet with max A x1 overall using AE/DME as needed.       Dates: Start:  06/18/23               Problem: OT Long Term Goals       Dates: Start:  06/18/23         Goal: LTG-By discharge, patient will complete basic self care tasks with  min-supervision overall using AE/DME as needed.       Dates: Start:  06/18/23            Goal: LTG-By discharge, patient will perform bathroom transfers with SBA overall using AE/DME as needed.       Dates: Start:  06/18/23               Problem: Toileting       Dates: Start:  06/18/23         Goal: STG-Within one week, patient will complete toileting tasks with Max A overall using AE/DME as needed       Dates: Start:  06/18/23

## 2023-06-21 NOTE — FLOWSHEET NOTE
06/21/23 0830   Events/Summary/Plan   Events/Summary/Plan FVC. Slouched down in bed   Vital Signs   Pulse 91   Respiration 18   Pulse Oximetry 94 %   $ Pulse Oximetry (Spot Check) Yes   Respiratory Assessment   Respiratory Pattern Within Normal Limits   Level of Consciousness Alert   Breath Sounds   RUL Breath Sounds Clear   RML Breath Sounds Clear   RLL Breath Sounds Diminished   JC Breath Sounds Clear   LLL Breath Sounds Diminished   Oxygen   O2 Delivery Device Room air w/o2 available

## 2023-06-21 NOTE — PROGRESS NOTES
"Hospital Medicine Daily Progress Note        Chief Complaint:  Hypertension  Hyponatremia    Interval History:  Pt c/o that he \"feels like my whole body is vibrating\" after therapies this morning.  Labs reviewed and discussed.    Review of Systems  Review of Systems   Constitutional:  Negative for chills and fever.   HENT: Negative.     Eyes: Negative.    Respiratory:  Negative for cough and shortness of breath.    Cardiovascular:  Negative for chest pain and palpitations.   Gastrointestinal:  Negative for abdominal pain, nausea and vomiting.   Skin:  Negative for itching and rash.   Neurological:  Positive for weakness.   Endo/Heme/Allergies:  Negative for polydipsia. Does not bruise/bleed easily.        Physical Exam  Temp:  [36.3 °C (97.3 °F)-36.8 °C (98.2 °F)] 36.7 °C (98.1 °F)  Pulse:  [76-95] 76  Resp:  [18-20] 18  BP: (108-139)/(61-89) 126/78  SpO2:  [92 %-96 %] 96 %    Physical Exam  Vitals reviewed.   Constitutional:       General: He is not in acute distress.     Appearance: Normal appearance. He is not ill-appearing.   HENT:      Head: Normocephalic and atraumatic.      Right Ear: External ear normal.      Left Ear: External ear normal.      Nose: Nose normal.      Mouth/Throat:      Pharynx: Oropharynx is clear.   Eyes:      General:         Right eye: No discharge.         Left eye: No discharge.      Extraocular Movements: Extraocular movements intact.      Conjunctiva/sclera: Conjunctivae normal.   Cardiovascular:      Rate and Rhythm: Normal rate and regular rhythm.   Pulmonary:      Effort: Pulmonary effort is normal. No respiratory distress.      Breath sounds: Normal breath sounds. No wheezing.   Abdominal:      General: Bowel sounds are normal. There is no distension.      Palpations: Abdomen is soft.      Tenderness: There is no abdominal tenderness.   Musculoskeletal:      Cervical back: Normal range of motion and neck supple.      Right lower leg: No edema.      Left lower leg: No edema. "   Skin:     General: Skin is warm and dry.   Neurological:      Mental Status: He is alert and oriented to person, place, and time.         Fluids    Intake/Output Summary (Last 24 hours) at 6/21/2023 1537  Last data filed at 6/21/2023 1449  Gross per 24 hour   Intake 660 ml   Output 1200 ml   Net -540 ml       Laboratory  Recent Labs     06/20/23  0638   WBC 10.7   RBC 3.47*   HEMOGLOBIN 10.6*   HEMATOCRIT 31.8*   MCV 91.6   MCH 30.5   MCHC 33.3   RDW 45.4   PLATELETCT 314   MPV 9.6     Recent Labs     06/19/23  0518 06/20/23  0638 06/21/23  0538   SODIUM 129* 133* 136   POTASSIUM 4.8 4.3 4.5   CHLORIDE 94* 98 101   CO2 24 24 25   GLUCOSE 101* 138* 104*   BUN 17 18 24*   CREATININE 0.54 0.53 0.51   CALCIUM 8.9 9.0 8.7                           Assessment/Plan  * 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[facility])  Assessment & Plan  Had progressive BLE weakness  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[facility]  S/P IVIG x 5 doses  Ongoing management per Physiatry    Azotemia  Assessment & Plan  Discontinue Lasix and decrease NaCl  Follow renal function    Dyslipidemia  Assessment & Plan  On Lipitor    Vitamin D deficiency  Assessment & Plan  Vit D level 19  Continue supplementation    UTI (urinary tract infection)  Assessment & Plan  Reportedly had +UCx at [facility]  CXR atx, continue IS  WBC normalized  On Rocephin per Physiatry    Essential hypertension  Assessment & Plan  Observe blood pressure trends off Lasix  On Midodrine and Flomax per Physiatry    Anemia  Assessment & Plan  Has normocytic indices  Check Fe Panel w/ next draw  Closely monitor H/H on ASA and Celebrex    Hyponatremia  Assessment & Plan  On fluid restriction, Lasix, and NaCl tabs  Off Lisinopril per Dr. Ruelas  Na+ levels now normalized  Will discontinue Lasix and decrease NaCl      Full Code    Reviewed w/ RN and Dr. Terrell

## 2023-06-21 NOTE — CARE PLAN
Problem: Knowledge Deficit - Standard  Goal: Patient and family/care givers will demonstrate understanding of plan of care, disease process/condition, diagnostic tests and medications  Outcome: Progressing     Problem: Skin Integrity  Goal: Skin integrity is maintained or improved  Note: Patient's skin remains intact and free from new or accidental injury this shift.  Will continue to monitor.    The patient is Watcher - Medium risk of patient condition declining or worsening    Shift Goals  Clinical Goals: Safety

## 2023-06-21 NOTE — CARE PLAN
"  Note: Patient refused fleet enema, reinforce importance of fleet enema but patient still refused despite health teaching. Pt stated, \" I don't want to have continuous bowel movement when my therapy starts.\" Endorse to next shift RN.    The patient is Stable - Low risk of patient condition declining or worsening    Progress made toward(s) clinical / shift goals:    Problem: Pain - Standard  Goal: Alleviation of pain or a reduction in pain to the patient’s comfort goal  Outcome: Progressing  Note: Patient able to verbalize pain level and verbalize an acceptable level of pain. Patient had oxycodone available as needed.      Problem: Fall Risk - Rehab  Goal: Patient will remain free from falls  Outcome: Progressing  Note: Moss Jaspal Fall risk Assessment Score: 27    High fall risk Interventions   - Alarming seatbelt  - Wander guard  - Bed and strip alarm   - Yellow sign by the door   - Yellow wrist band \"Fall risk\"  - Room near to the nurse station  - Do not leave patient unattended in the bathroom  - Fall risk education provided                      " Patients Father needs to get the patients Anxiety medication refilled but does not know which medication. Please call

## 2023-06-21 NOTE — FLOWSHEET NOTE
"   06/21/23 0830   Incentive Spirometry Treatment   Height 1.727 m (5' 7.99\")   Predicted Inspiratory Capacity 2700   60% of predicted IS capacity 1620 mL   40% of predicted IS capacity 1080 mL   Incentive Spirometer Volume 2500 mL   Pulmonary Function Group   $ FVC / Vital Capacity (liters)  2.91  (66% of predicted. Good effort)       "

## 2023-06-21 NOTE — PROGRESS NOTES
"  Physical Medicine & Rehabilitation Progress Note    Encounter Date: 6/21/2023    Chief Complaint: Weakness in bilateral lower extremities    Interval Events (Subjective):    Patient was evaluated in his room sitting comfortably in bed.    He complains of significant fatigue today as well as his back feeling like it is going on a board.    He reports his arms and legs are all vibrating, pins-and-needles sensation.  This is limiting his participation with therapy.  He also complains of double vision when looking off to the right or the left and extremes.  He is concerned that he pushed himself too hard yesterday.  He refused bowel program last night, did receive MiraLAX x4, is willing to have an enema tonight.    Last BM: 06/19/23, refused suppository/BTP overnight  He was given 4 doses of MiraLAX yesterday without response    ROS:  Gen: No fatigue, confusion, significant weight loss  Eyes: no blurry vision, + double vision no pain in eyes  ENT: no changes in hearing, runny nose, nose bleeds, sinus pain  CV: No CP, palpitations,   Lungs: no shortness of breath, changes in secretions, changes in cough, pain with coughing  Abd: No abd pain, nausea, vomiting, pain with eating  : no blood in urine, pain during storage phase, bladder spasms, suprapubic pain  Ext: No swelling in legs, asymmetric atrophy  Neuro: no changes in strength or sensation  Skin: no new ulcers/skin breakdown appreciated by patient  Mood: No changes in mood today, increase in depression or anxiety  Heme: no bruising, or bleeding    Objective:  Vitals: /61   Pulse 90   Temp 36.8 °C (98.2 °F) (Oral)   Resp 20   Ht 1.727 m (5' 7.99\")   Wt 113 kg (250 lb 3.6 oz)   SpO2 92%   Gen: NAD, Body mass index is 38.06 kg/m².  HEENT:  NC/AT, conjugate gaze, limited smooth pursuit, moist mucous membranes  Cardio: RRR, no mumurs  Pulm: CTAB, with normal respiratory effort  Abd: Soft NTND, active bowel sounds,   Ext: No peripheral edema. No calf " tenderness. No clubbing/cyanosis. +dorsal pedalis pulses bilaterally.      Laboratory Values:  Recent Results (from the past 72 hour(s))   Basic Metabolic Panel    Collection Time: 06/19/23  5:18 AM   Result Value Ref Range    Sodium 129 (L) 135 - 145 mmol/L    Potassium 4.8 3.6 - 5.5 mmol/L    Chloride 94 (L) 96 - 112 mmol/L    Co2 24 20 - 33 mmol/L    Glucose 101 (H) 65 - 99 mg/dL    Bun 17 8 - 22 mg/dL    Creatinine 0.54 0.50 - 1.40 mg/dL    Calcium 8.9 8.5 - 10.5 mg/dL    Anion Gap 11.0 7.0 - 16.0   ESTIMATED GFR    Collection Time: 06/19/23  5:18 AM   Result Value Ref Range    GFR (CKD-EPI) 110 >60 mL/min/1.73 m 2   CBC WITH DIFFERENTIAL    Collection Time: 06/20/23  6:38 AM   Result Value Ref Range    WBC 10.7 4.8 - 10.8 K/uL    RBC 3.47 (L) 4.70 - 6.10 M/uL    Hemoglobin 10.6 (L) 14.0 - 18.0 g/dL    Hematocrit 31.8 (L) 42.0 - 52.0 %    MCV 91.6 81.4 - 97.8 fL    MCH 30.5 27.0 - 33.0 pg    MCHC 33.3 32.3 - 36.5 g/dL    RDW 45.4 35.9 - 50.0 fL    Platelet Count 314 164 - 446 K/uL    MPV 9.6 9.0 - 12.9 fL    Neutrophils-Polys 67.80 44.00 - 72.00 %    Lymphocytes 24.60 22.00 - 41.00 %    Monocytes 4.40 0.00 - 13.40 %    Eosinophils 2.00 0.00 - 6.90 %    Basophils 0.40 0.00 - 1.80 %    Immature Granulocytes 0.80 0.00 - 0.90 %    Nucleated RBC 0.30 (H) 0.00 - 0.20 /100 WBC    Neutrophils (Absolute) 7.29 1.82 - 7.42 K/uL    Lymphs (Absolute) 2.64 1.00 - 4.80 K/uL    Monos (Absolute) 0.47 0.00 - 0.85 K/uL    Eos (Absolute) 0.21 0.00 - 0.51 K/uL    Baso (Absolute) 0.04 0.00 - 0.12 K/uL    Immature Granulocytes (abs) 0.09 0.00 - 0.11 K/uL    NRBC (Absolute) 0.03 K/uL   Basic Metabolic Panel    Collection Time: 06/20/23  6:38 AM   Result Value Ref Range    Sodium 133 (L) 135 - 145 mmol/L    Potassium 4.3 3.6 - 5.5 mmol/L    Chloride 98 96 - 112 mmol/L    Co2 24 20 - 33 mmol/L    Glucose 138 (H) 65 - 99 mg/dL    Bun 18 8 - 22 mg/dL    Creatinine 0.53 0.50 - 1.40 mg/dL    Calcium 9.0 8.5 - 10.5 mg/dL    Anion Gap 11.0 7.0  - 16.0   ESTIMATED GFR    Collection Time: 06/20/23  6:38 AM   Result Value Ref Range    GFR (CKD-EPI) 111 >60 mL/min/1.73 m 2   Basic Metabolic Panel    Collection Time: 06/21/23  5:38 AM   Result Value Ref Range    Sodium 136 135 - 145 mmol/L    Potassium 4.5 3.6 - 5.5 mmol/L    Chloride 101 96 - 112 mmol/L    Co2 25 20 - 33 mmol/L    Glucose 104 (H) 65 - 99 mg/dL    Bun 24 (H) 8 - 22 mg/dL    Creatinine 0.51 0.50 - 1.40 mg/dL    Calcium 8.7 8.5 - 10.5 mg/dL    Anion Gap 10.0 7.0 - 16.0   ESTIMATED GFR    Collection Time: 06/21/23  5:38 AM   Result Value Ref Range    GFR (CKD-EPI) 112 >60 mL/min/1.73 m 2       Medications:  Scheduled Medications   Medication Dose Frequency    gabapentin  800 mg TID    midodrine  10 mg TID WITH MEALS    sodium phosphate  1 Each Once    vitamin D3  2,000 Units DAILY    menthol-methyl salicycate   BID    omeprazole  20 mg DAILY    Pharmacy Consult Request  1 Each PHARMACY TO DOSE    acetaminophen  1,000 mg TID    enoxaparin  40 mg QHS    therapeutic multivitamin-minerals  1 Tablet DAILY WITH LUNCH    docusate sodium  200 mg BID    And    sennosides  17.2 mg DAILY AT NOON    And    bisacodyl  10 mg QDAY    aspirin  81 mg DAILY    atorvastatin  10 mg MO, WE + FR    celecoxib  200 mg BID    furosemide  20 mg Q DAY    melatonin  6 mg QHS    temazepam  15 mg QHS    cefTRIAXone (ROCEPHIN) IV  1,000 mg Q24HRS    sodium chloride  2 g TID WITH MEALS    lidocaine  2 Patch Q24HR    tamsulosin  0.4 mg AFTER DINNER     PRN medications: traMADol, acetaminophen **FOLLOWED BY** [START ON 6/22/2023] acetaminophen, acetaminophen, carboxymethylcellulose, benzocaine-menthol, mag hydrox-al hydrox-simeth, ondansetron **OR** ondansetron, traZODone, sodium chloride, Respiratory Therapy Consult, oxyCODONE immediate-release **OR** oxyCODONE immediate-release, hydrALAZINE, docusate sodium **AND** sennosides **AND** bisacodyl **AND** magnesium hydroxide    Diet:  Current Diet Order   Procedures    Diet Order  Diet: Regular; Fluid modifications: (optional): 1800 ml Fluid Restriction       Assessment:  Active Hospital Problems    Diagnosis     *GBS (Guillain-Humphrey syndrome) (HCC)     Dyslipidemia     Essential hypertension     UTI (urinary tract infection)     Vitamin D deficiency     Hyponatremia     Anemia        Medical Decision Making and Plan:    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  Lower extremity weakness and pain as well as numbness in upper extremities.  No known premorbid infection.  -PT and OT for mobility and ADLs. Per guidelines, 15 hours per week between PT, OT.  Cleared by speech therapy for swallow and speech, transition time to PT and OT  - Continue comprehensive acute inpatient rehabilitation     Neurologic respiratory impairment:   Patient is at high risk for respiratory involvement given neurologic symptoms in the upper extremities.   -Vital capacity daily, 1.72 liters on 6/20  - Incentive spirometry  - Mild left basilar atelectasis from chest x-ray on 6/17     Lower extremity swelling: Left greater than right  - Check urgent left lower extremity Doppler to evaluate for clot formation     Hyponatremia: Likely SIADH, followed by nephrology during [facility] hospitalization  - Sodium of 126 on 6/17 --> 129 on 6/19 --> 133 on 6/20 --> 136 on 6/21  - Check BMP in a.m.  - Sodium chloride tablets 2 g 3 times daily  - Lasix 20 mg daily, will discuss with hospitalist about discontinuing Lasix versus liberalizing fluid restriction  -Free water restriction of 500 cc/day and total p.o. fluid intake of 1.8 L, if sodium continues to improve goal will be to decrease fluid restriction.     Hypomagnesemia: Low magnesium during acute hospitalization, supplemented with p.o. magnesium  - Magnesium of 2.0 on 6/18     Urinary tract infection/pyelonephritis: Catheter associated UTI in the setting of neurogenic bladder  - WBC of 14 on  6/17 --> WBC of 10.7 on 6/20  - Peripheral IV was removed prior to transfer to acute rehabilitation we will replace IV.  - Ceftriaxone 1 g every 24 hours, continue for another 8 days, total of 10 days given systemic factor  - We will work with Eric Riley on following up culture and sensitivities     Neurogenic bladder: Inappropriate management of neurogenic bladder can result and hydronephrosis, increased risk of pyelonephritis, and renal failure  - Continue Blancas catheter, patient is resistant to removal of Blancas catheter at this time we will plan for removal of catheter over the next couple of days  - Continue Flomax 0.4 mg nightly  -Prolonged discussion with patient on removal of Blancas, he is requesting that this be done tomorrow.     Neurogenic bowel: Inappropriate neurogenic bowel can result in severe constipation, bowel dilation and rupture.  Severe constipation with prolonged period of time without BM.  - Recheck KUB  - Upper motor neuron neurogenic bowel program with senna 2 tablets q. noon, Colace 200 mg twice daily and Magic bullet suppository MS daily and digital stimulation  - Repeat MiraLAX 17 g every 1 hour X4 followed by fleets enema     orthostatic hypotension  Because of significant autonomic dysfunction associated with some cases of AIDP, the patient is at high risk for orthostatic hypotension.  Goal of SBP greater than 100.  -  Mechanical compression with teds and Tubi  and abd binder used prior to out of bed  - Continue midodrine 10 mg 3 times daily     Essential hypertension: SBP of greater than 200 on presentation to acute hospital.  -105 in the last 24 hours  - Lisinopril 20 mg daily, discontinued lisinopril secondary to orthostatic hypotension as above, may restart as an outpatient with recovery of autonomic nervous system     Dyslipidemia: Total cholesterol 137, HDL 44, LDL 75  -Lipitor 10 mg every Monday Wednesday Friday     Dysphagia: Concern for swallow dysfunction in the  setting of certain variants of AIDP  - Consult speech therapy for swallow evaluation.  Cleared by speech therapy     Skin  Due to the sensory impairments following AIDP, skin protection principles are of the utmost importance.      - every two-hour turning pattern overnight while the patient is in bed. When the patient is out of bed, an every 15 minute repositioning or weight shifting pattern will be utilized in order to help train the patient for long-term skin protection. We will also discuss skin monitoring and its importance with the patient and the caregivers.     Pain:  Postoperative pain:  - Oxycodone 5-10 mg every 3 hours as needed moderate-severe pain  -Tylenol 1000 mg 3 times daily, AST 28, ALT 30, alk phos 76, within normal limits, continue current dose of scheduled Tylenol  - Lidocaine patch x2 on either side of incision, on for 12 hours off for 12 hours  - Change tramadol 50 mg every 6 hours as needed pain     Neuropathic pain: Burning and tingling in all 4 extremities  - Continue gabapentin 800 mg 3 times a day, will plan to titrate up depending on tolerance and effect, may require other neuropathic pain agents.  -Initiate Lyrica 50 mg 3 times daily  -Celebrex 200 mg twice daily  - Tramadol 50 mg every 6 hours as needed pain  -May benefit from a nonpharmaceutical modalities such as TENS units, compression, ice, heat, will discuss with therapy team.     Vitamin D deficiency: High risk of vitamin D deficiency in this population, goal of vitamin D level greater than 30, has been linked to improvement and central nervous system recovery  - Vitamin D level of 19 on 6/18  - Cholecalciferol 2000 units daily     Insomnia: Significant difficulty during acute hospitalization  - Restarted on Restoril 15 mg nightly  - Trazodone 50 mg nightly as needed insomnia     DVT prophylaxis  In the setting of AIDP, the patient is at high risk of deep venous thrombosis given decreased mobility and alteration to autonomic  nervous system. Because of this we have elected to pursue prophylactic anticoagulation utilizing Lovenox 40 mg daily.   ____________________________________    Mario Terrell DO  [facility] - Physical Medicine & Rehabilitation   [facility] - Spinal Cord Injury Medicine  ____________________________________

## 2023-06-21 NOTE — DISCHARGE PLANNING
CM spoke with patients wife Basilio to update on IDT tomorrow and DC date.  Basilio has p-work to complete for patients place of employment and will email to this CM. CM available as needs arise.

## 2023-06-22 ENCOUNTER — APPOINTMENT (OUTPATIENT)
Dept: PHYSICAL THERAPY | Facility: REHABILITATION | Age: 68
End: 2023-06-22
Attending: PHYSICAL MEDICINE & REHABILITATION
Payer: COMMERCIAL

## 2023-06-22 ENCOUNTER — APPOINTMENT (OUTPATIENT)
Dept: RADIOLOGY | Facility: REHABILITATION | Age: 68
DRG: 095 | End: 2023-06-22
Attending: PHYSICAL MEDICINE & REHABILITATION
Payer: MEDICARE

## 2023-06-22 ENCOUNTER — APPOINTMENT (OUTPATIENT)
Dept: OCCUPATIONAL THERAPY | Facility: REHABILITATION | Age: 68
End: 2023-06-22
Attending: PHYSICAL MEDICINE & REHABILITATION
Payer: COMMERCIAL

## 2023-06-22 ENCOUNTER — APPOINTMENT (OUTPATIENT)
Dept: PHYSICAL THERAPY | Facility: REHABILITATION | Age: 68
DRG: 095 | End: 2023-06-22
Attending: PHYSICAL MEDICINE & REHABILITATION
Payer: MEDICARE

## 2023-06-22 PROCEDURE — 700111 HCHG RX REV CODE 636 W/ 250 OVERRIDE (IP): Performed by: PHYSICAL MEDICINE & REHABILITATION

## 2023-06-22 PROCEDURE — 700102 HCHG RX REV CODE 250 W/ 637 OVERRIDE(OP): Performed by: HOSPITALIST

## 2023-06-22 PROCEDURE — 97535 SELF CARE MNGMENT TRAINING: CPT

## 2023-06-22 PROCEDURE — 700102 HCHG RX REV CODE 250 W/ 637 OVERRIDE(OP): Performed by: PHYSICAL MEDICINE & REHABILITATION

## 2023-06-22 PROCEDURE — 770010 HCHG ROOM/CARE - REHAB SEMI PRIVAT*

## 2023-06-22 PROCEDURE — A9270 NON-COVERED ITEM OR SERVICE: HCPCS | Performed by: HOSPITALIST

## 2023-06-22 PROCEDURE — 97530 THERAPEUTIC ACTIVITIES: CPT

## 2023-06-22 PROCEDURE — 94760 N-INVAS EAR/PLS OXIMETRY 1: CPT

## 2023-06-22 PROCEDURE — A9270 NON-COVERED ITEM OR SERVICE: HCPCS | Performed by: PHYSICAL MEDICINE & REHABILITATION

## 2023-06-22 PROCEDURE — 99233 SBSQ HOSP IP/OBS HIGH 50: CPT | Performed by: PHYSICAL MEDICINE & REHABILITATION

## 2023-06-22 PROCEDURE — 94150 VITAL CAPACITY TEST: CPT

## 2023-06-22 PROCEDURE — 97110 THERAPEUTIC EXERCISES: CPT

## 2023-06-22 PROCEDURE — 74018 RADEX ABDOMEN 1 VIEW: CPT

## 2023-06-22 PROCEDURE — 97112 NEUROMUSCULAR REEDUCATION: CPT

## 2023-06-22 PROCEDURE — 99232 SBSQ HOSP IP/OBS MODERATE 35: CPT | Performed by: HOSPITALIST

## 2023-06-22 PROCEDURE — 700105 HCHG RX REV CODE 258: Performed by: PHYSICAL MEDICINE & REHABILITATION

## 2023-06-22 RX ORDER — GABAPENTIN 300 MG/1
600 CAPSULE ORAL 3 TIMES DAILY
Status: DISCONTINUED | OUTPATIENT
Start: 2023-06-22 | End: 2023-06-26

## 2023-06-22 RX ORDER — PREGABALIN 75 MG/1
75 CAPSULE ORAL 3 TIMES DAILY
Status: DISCONTINUED | OUTPATIENT
Start: 2023-06-22 | End: 2023-06-26

## 2023-06-22 RX ADMIN — APIXABAN 5 MG: 5 TABLET, FILM COATED ORAL at 15:17

## 2023-06-22 RX ADMIN — CELECOXIB 200 MG: 100 CAPSULE ORAL at 08:25

## 2023-06-22 RX ADMIN — OXYCODONE HYDROCHLORIDE 10 MG: 10 TABLET ORAL at 22:55

## 2023-06-22 RX ADMIN — ASPIRIN 81 MG CHEWABLE TABLET 81 MG: 81 TABLET CHEWABLE at 08:25

## 2023-06-22 RX ADMIN — OMEPRAZOLE 20 MG: 20 CAPSULE, DELAYED RELEASE ORAL at 08:25

## 2023-06-22 RX ADMIN — TAMSULOSIN HYDROCHLORIDE 0.4 MG: 0.4 CAPSULE ORAL at 16:55

## 2023-06-22 RX ADMIN — GABAPENTIN 600 MG: 300 CAPSULE ORAL at 20:54

## 2023-06-22 RX ADMIN — DOCUSATE SODIUM 200 MG: 100 CAPSULE, LIQUID FILLED ORAL at 20:54

## 2023-06-22 RX ADMIN — MIDODRINE HYDROCHLORIDE 10 MG: 10 TABLET ORAL at 11:50

## 2023-06-22 RX ADMIN — PREGABALIN 75 MG: 75 CAPSULE ORAL at 20:54

## 2023-06-22 RX ADMIN — DOCUSATE SODIUM 200 MG: 100 CAPSULE, LIQUID FILLED ORAL at 08:24

## 2023-06-22 RX ADMIN — Medication 6 MG: at 20:54

## 2023-06-22 RX ADMIN — CEFTRIAXONE SODIUM 1000 MG: 1 INJECTION, POWDER, FOR SOLUTION INTRAMUSCULAR; INTRAVENOUS at 16:55

## 2023-06-22 RX ADMIN — SENNOSIDES 17.2 MG: 8.6 TABLET, FILM COATED ORAL at 11:50

## 2023-06-22 RX ADMIN — GABAPENTIN 600 MG: 300 CAPSULE ORAL at 15:17

## 2023-06-22 RX ADMIN — APIXABAN 5 MG: 5 TABLET, FILM COATED ORAL at 20:53

## 2023-06-22 RX ADMIN — Medication 2000 UNITS: at 08:24

## 2023-06-22 RX ADMIN — SODIUM CHLORIDE 1 G: 1 TABLET ORAL at 08:25

## 2023-06-22 RX ADMIN — PREGABALIN 75 MG: 75 CAPSULE ORAL at 15:17

## 2023-06-22 RX ADMIN — MIDODRINE HYDROCHLORIDE 10 MG: 10 TABLET ORAL at 08:25

## 2023-06-22 RX ADMIN — MENTHOL, METHYL SALICYLATE: 10; 15 CREAM TOPICAL at 20:55

## 2023-06-22 RX ADMIN — SODIUM CHLORIDE 1 G: 1 TABLET ORAL at 16:55

## 2023-06-22 RX ADMIN — ACETAMINOPHEN 1000 MG: 500 TABLET ORAL at 08:24

## 2023-06-22 RX ADMIN — TEMAZEPAM 15 MG: 15 CAPSULE ORAL at 20:54

## 2023-06-22 RX ADMIN — GABAPENTIN 800 MG: 400 CAPSULE ORAL at 08:24

## 2023-06-22 RX ADMIN — SODIUM CHLORIDE 1 G: 1 TABLET ORAL at 11:50

## 2023-06-22 RX ADMIN — PREGABALIN 50 MG: 25 CAPSULE ORAL at 08:25

## 2023-06-22 RX ADMIN — OXYCODONE HYDROCHLORIDE 10 MG: 10 TABLET ORAL at 15:19

## 2023-06-22 RX ADMIN — MIDODRINE HYDROCHLORIDE 10 MG: 10 TABLET ORAL at 16:55

## 2023-06-22 RX ADMIN — MULTIPLE VITAMINS W/ MINERALS TAB 1 TABLET: TAB at 11:50

## 2023-06-22 RX ADMIN — OXYCODONE HYDROCHLORIDE 10 MG: 10 TABLET ORAL at 11:52

## 2023-06-22 ASSESSMENT — ENCOUNTER SYMPTOMS
PALPITATIONS: 0
EYES NEGATIVE: 1
VOMITING: 0
NAUSEA: 0
POLYDIPSIA: 0
WEAKNESS: 1
CHILLS: 0
FEVER: 0
SHORTNESS OF BREATH: 0
BRUISES/BLEEDS EASILY: 0
COUGH: 0
ABDOMINAL PAIN: 0

## 2023-06-22 ASSESSMENT — PAIN DESCRIPTION - PAIN TYPE
TYPE: ACUTE PAIN

## 2023-06-22 ASSESSMENT — GAIT ASSESSMENTS
ASSISTIVE DEVICE: PARALLEL BARS
GAIT LEVEL OF ASSIST: TOTAL ASSIST X 2
DEVIATION: STEP TO;INCREASED BASE OF SUPPORT;BRADYKINETIC
DISTANCE (FEET): 5

## 2023-06-22 ASSESSMENT — PULMONARY FUNCTION TESTS: FVC: 2.9

## 2023-06-22 ASSESSMENT — ACTIVITIES OF DAILY LIVING (ADL)
TOILETING_LEVEL_OF_ASSIST_DESCRIPTION: OTHER (COMMENT)
BED_CHAIR_WHEELCHAIR_TRANSFER_DESCRIPTION: ADAPTIVE EQUIPMENT;INCREASED TIME;INITIAL PREPARATION FOR TASK;SET-UP OF EQUIPMENT;SUPERVISION FOR SAFETY;VERBAL CUEING
BED_CHAIR_WHEELCHAIR_TRANSFER_DESCRIPTION: SLIDEBOARD TRANSFER FROM BED TO WHEELCHAIR;SET-UP OF EQUIPMENT;SUPERVISION FOR SAFETY;VERBAL CUEING;INCREASED TIME

## 2023-06-22 NOTE — CARE PLAN
Problem: Skin Integrity  Goal: Patient's skin integrity will be maintained or improve  Outcome: Progressing  Note: Patient's skin remains intact and free from new or accidental injury this shift.  Will continue to monitor.    The patient is Stable - Low risk of patient condition declining or worsening    Shift Goals  Clinical Goals: Safety    Progress made toward(s) clinical / shift goals:  pt skin intact, no skin injury    Patient is not progressing towards the following goals:

## 2023-06-22 NOTE — DISCHARGE PLANNING
CM met with with patient to update on IDT and DC date of 7/10/23.  Answered questions.  CM LM on VM of wife Basilio to update on above.      6/29/23: CM spoke with patients dilcia Richmond to update on above.  She was in with patient today and is very pleased with patients progress.  CM answered questions and will continue to monitor for DC needs.

## 2023-06-22 NOTE — CARE PLAN
Problem: Toileting  Goal: STG-Within one week, patient will complete toileting tasks with Max A overall using AE/DME as needed  Outcome: Not Met     Problem: Dressing  Goal: STG-Within one week, patient will dress LB with mod A overall using AE/DME as needed.  Outcome: Progressing     Problem: Bathing  Goal: STG-Within one week, patient will bathe with min A overall using AE/DME as needed.  Outcome: Met     Problem: Dressing  Goal: STG-Within one week, patient will dress UB with supervision overall using AE/DME as needed.  Outcome: Met

## 2023-06-22 NOTE — PROGRESS NOTES
"  Physical Medicine & Rehabilitation Progress Note    Encounter Date: 6/22/2023    Chief Complaint: Weakness in all 4 extremities    Interval Events (Subjective):    Patient was evaluated and his room, lying comfortably in bed.  He reports having a good therapy session with occupational therapy this morning.  He was really happy with his ability to  the parallel bars yesterday with physical therapy.  He reports mild improving burning and tingling in upper and lower extremities, also described as vibration.  He is okay with removal of Blancas today.  He is having multiple bruising from Lovenox.    Bowel: Small incontinence this morning, multiple bowel movements overnight after bowel cleanout  Bladder: Blancas, red-tinged  PRN: 30 morphine equivalents yesterday    ROS:  Gen: No fatigue, confusion, significant weight loss  Eyes: no blurry vision, double vision or pain in eyes  ENT: no changes in hearing, runny nose, nose bleeds, sinus pain  CV: No CP, palpitations,   Lungs: no shortness of breath, changes in secretions, changes in cough, pain with coughing  Abd: No abd pain, nausea, vomiting, pain with eating  : no blood in urine, pain during storage phase, bladder spasms, suprapubic pain  Ext: No swelling in legs, asymmetric atrophy  Neuro: no changes in strength or sensation  Skin: no new ulcers/skin breakdown appreciated by patient  Mood: No changes in mood today, increase in depression or anxiety  Heme: no bruising, or bleeding    Objective:  Vitals: /74   Pulse 81   Temp 37 °C (98.6 °F) (Oral)   Resp 18   Ht 1.727 m (5' 7.99\")   Wt 113 kg (250 lb 3.6 oz)   SpO2 93%   Gen: NAD, Body mass index is 38.06 kg/m².  HEENT:  NC/AT, PERRLA, moist mucous membranes  Cardio: RRR, no mumurs  Pulm: CTAB, with normal respiratory effort  Abd: Soft NTND, active bowel sounds, multiple bruising around the anterior abdomen, small area of umbilical hernia.  Ext: No peripheral edema. No calf tenderness. No " clubbing/cyanosis. +dorsal pedalis pulses bilaterally.      Motor Exam Lower Extremities    ? Myotome R L   Hip flexion L2 3 2   Knee extension L3 4 4   Ankle dorsiflexion L4 4 4   Toe extension L5 4 4   Ankle plantarflexion S1 4 4        Laboratory Values:  Recent Results (from the past 72 hour(s))   CBC WITH DIFFERENTIAL    Collection Time: 06/20/23  6:38 AM   Result Value Ref Range    WBC 10.7 4.8 - 10.8 K/uL    RBC 3.47 (L) 4.70 - 6.10 M/uL    Hemoglobin 10.6 (L) 14.0 - 18.0 g/dL    Hematocrit 31.8 (L) 42.0 - 52.0 %    MCV 91.6 81.4 - 97.8 fL    MCH 30.5 27.0 - 33.0 pg    MCHC 33.3 32.3 - 36.5 g/dL    RDW 45.4 35.9 - 50.0 fL    Platelet Count 314 164 - 446 K/uL    MPV 9.6 9.0 - 12.9 fL    Neutrophils-Polys 67.80 44.00 - 72.00 %    Lymphocytes 24.60 22.00 - 41.00 %    Monocytes 4.40 0.00 - 13.40 %    Eosinophils 2.00 0.00 - 6.90 %    Basophils 0.40 0.00 - 1.80 %    Immature Granulocytes 0.80 0.00 - 0.90 %    Nucleated RBC 0.30 (H) 0.00 - 0.20 /100 WBC    Neutrophils (Absolute) 7.29 1.82 - 7.42 K/uL    Lymphs (Absolute) 2.64 1.00 - 4.80 K/uL    Monos (Absolute) 0.47 0.00 - 0.85 K/uL    Eos (Absolute) 0.21 0.00 - 0.51 K/uL    Baso (Absolute) 0.04 0.00 - 0.12 K/uL    Immature Granulocytes (abs) 0.09 0.00 - 0.11 K/uL    NRBC (Absolute) 0.03 K/uL   Basic Metabolic Panel    Collection Time: 06/20/23  6:38 AM   Result Value Ref Range    Sodium 133 (L) 135 - 145 mmol/L    Potassium 4.3 3.6 - 5.5 mmol/L    Chloride 98 96 - 112 mmol/L    Co2 24 20 - 33 mmol/L    Glucose 138 (H) 65 - 99 mg/dL    Bun 18 8 - 22 mg/dL    Creatinine 0.53 0.50 - 1.40 mg/dL    Calcium 9.0 8.5 - 10.5 mg/dL    Anion Gap 11.0 7.0 - 16.0   ESTIMATED GFR    Collection Time: 06/20/23  6:38 AM   Result Value Ref Range    GFR (CKD-EPI) 111 >60 mL/min/1.73 m 2   Basic Metabolic Panel    Collection Time: 06/21/23  5:38 AM   Result Value Ref Range    Sodium 136 135 - 145 mmol/L    Potassium 4.5 3.6 - 5.5 mmol/L    Chloride 101 96 - 112 mmol/L    Co2 25 20 -  33 mmol/L    Glucose 104 (H) 65 - 99 mg/dL    Bun 24 (H) 8 - 22 mg/dL    Creatinine 0.51 0.50 - 1.40 mg/dL    Calcium 8.7 8.5 - 10.5 mg/dL    Anion Gap 10.0 7.0 - 16.0   ESTIMATED GFR    Collection Time: 06/21/23  5:38 AM   Result Value Ref Range    GFR (CKD-EPI) 112 >60 mL/min/1.73 m 2       Medications:  Scheduled Medications   Medication Dose Frequency    sodium chloride  1 g TID WITH MEALS    pregabalin  50 mg TID    gabapentin  800 mg TID    midodrine  10 mg TID WITH MEALS    vitamin D3  2,000 Units DAILY    menthol-methyl salicycate   BID    omeprazole  20 mg DAILY    Pharmacy Consult Request  1 Each PHARMACY TO DOSE    enoxaparin  40 mg QHS    therapeutic multivitamin-minerals  1 Tablet DAILY WITH LUNCH    docusate sodium  200 mg BID    And    sennosides  17.2 mg DAILY AT NOON    And    bisacodyl  10 mg QDAY    aspirin  81 mg DAILY    atorvastatin  10 mg MO, WE + FR    celecoxib  200 mg BID    melatonin  6 mg QHS    temazepam  15 mg QHS    cefTRIAXone (ROCEPHIN) IV  1,000 mg Q24HRS    lidocaine  2 Patch Q24HR    tamsulosin  0.4 mg AFTER DINNER     PRN medications: traMADol, [COMPLETED] acetaminophen **FOLLOWED BY** acetaminophen, acetaminophen, carboxymethylcellulose, benzocaine-menthol, mag hydrox-al hydrox-simeth, ondansetron **OR** ondansetron, traZODone, sodium chloride, Respiratory Therapy Consult, oxyCODONE immediate-release **OR** oxyCODONE immediate-release, hydrALAZINE, docusate sodium **AND** sennosides **AND** bisacodyl **AND** magnesium hydroxide    Diet:  Current Diet Order   Procedures    Diet Order Diet: Regular; Fluid modifications: (optional): 1800 ml Fluid Restriction       Assessment:  Active Hospital Problems    Diagnosis     *GBS (Guillain-[place] syndrome) ([facility])     Azotemia     Dyslipidemia     Essential hypertension     UTI (urinary tract infection)     Vitamin D deficiency     Hyponatremia     Anemia        Medical Decision Making and Plan:    Guillain-Barré syndrome/AIDP: Positive  bulbar symptoms with difficulty with speech, right ptosis of the eye and altered taste.  Lower extremity weakness and pain as well as numbness in upper extremities.  No known premorbid infection.  -PT and OT for mobility and ADLs. Per guidelines, 15 hours per week between PT, OT.  Cleared by speech therapy for swallow and speech, transition time to PT and OT  - Continue comprehensive acute inpatient rehabilitation     Neurologic respiratory impairment:   Patient is at high risk for respiratory involvement given neurologic symptoms in the upper extremities.   -Vital capacity daily, 1.72 liters on 6/20  - Incentive spirometry  - Mild left basilar atelectasis from chest x-ray on 6/17     Lower extremity swelling: Left greater than right  - Lower extremity Doppler was negative for DVT     Hyponatremia: Likely SIADH, followed by nephrology during [facility] hospitalization  - Sodium of 126 on 6/17 --> 129 on 6/19 --> 133 on 6/20 --> 136 on 6/21  - Check BMP in a.m.  - Decrease sodium chloride tablets 1 g 3 times daily as per hospitalist  - Lasix was discontinued on 6/21 as per hospitalist discontinuing Lasix versus liberalizing fluid restriction  -Free water restriction of 500 cc/day and total p.o. fluid intake of 1.8 L, if sodium continues to improve goal will be to decrease fluid restriction.     Hypomagnesemia: Low magnesium during acute hospitalization, supplemented with p.o. magnesium  - Magnesium of 2.0 on 6/18     Urinary tract infection/pyelonephritis: Catheter associated UTI in the setting of neurogenic bladder  - WBC of 14 on 6/17 --> WBC of 10.7 on 6/20  - Peripheral IV was removed prior to transfer to acute rehabilitation we will replace IV.  - Ceftriaxone 1 g every 24 hours, continue for another 8 days, total of 10 days given systemic factor  - We will work with Eric Riley on following up culture and sensitivities     Neurogenic bladder: Inappropriate management of neurogenic bladder can result and  hydronephrosis, increased risk of pyelonephritis, and renal failure  - Discontinue Blancas on 6/22, Start voiding trial, if can't void in 6 hours or prn check pvr and if >500cc then ICP,if able to void then check PVR, if PVR is >200cc then ICP  - Continue Flomax 0.4 mg nightly  -Prolonged discussion with patient on removal of Blancas, he is requesting that this be done tomorrow.     Neurogenic bowel: Inappropriate neurogenic bowel can result in severe constipation, bowel dilation and rupture.  Severe constipation with prolonged period of time without BM.  - Recheck KUB after bowel cleanout  - Continue upper motor neuron neurogenic bowel program with senna 2 tablets q. noon, Colace 200 mg twice daily and Magic bullet suppository OH daily and digital stimulation     orthostatic hypotension  Because of significant autonomic dysfunction associated with some cases of AIDP, the patient is at high risk for orthostatic hypotension.  Goal of SBP greater than 100.  -  Mechanical compression with teds and Tubi  and abd binder used prior to out of bed  - Continue midodrine 10 mg 3 times daily     Essential hypertension: SBP of greater than 200 on presentation to acute hospital.  -126 in the last 24 hours  - Lisinopril 20 mg daily, discontinued lisinopril secondary to orthostatic hypotension as above, may restart as an outpatient with recovery of autonomic nervous system     Dyslipidemia: Total cholesterol 137, HDL 44, LDL 75  -Lipitor 10 mg every Monday Wednesday Friday     Dysphagia: Concern for swallow dysfunction in the setting of certain variants of AIDP  - Consult speech therapy for swallow evaluation.  Cleared by speech therapy     Skin  Due to the sensory impairments following AIDP, skin protection principles are of the utmost importance.      - every two-hour turning pattern overnight while the patient is in bed. When the patient is out of bed, an every 15 minute repositioning or weight shifting pattern will be  utilized in order to help train the patient for long-term skin protection. We will also discuss skin monitoring and its importance with the patient and the caregivers.     Pain:  Postoperative pain:  - Oxycodone 5-10 mg every 3 hours as needed moderate-severe pain  -Tylenol 1000 mg 3 times daily, AST 28, ALT 30, alk phos 76, within normal limits, continue current dose of scheduled Tylenol  - Lidocaine patch x2 on either side of incision, on for 12 hours off for 12 hours  - Continue tramadol 50 mg every 6 hours as needed for pain     Neuropathic pain: Burning and tingling in all 4 extremities  -Decrease gabapentin to 600 mg 3 times a day, concern for fatigue side effect  - Increase Lyrica to 75 mg 3 times daily  - Celebrex 200 mg twice daily  - Tramadol 50 mg every 6 hours as needed pain  -May benefit from a nonpharmaceutical modalities such as TENS units, compression, ice, heat, will discuss with therapy team.     Vitamin D deficiency: High risk of vitamin D deficiency in this population, goal of vitamin D level greater than 30, has been linked to improvement and central nervous system recovery  - Vitamin D level of 19 on 6/18  - Cholecalciferol 2000 units daily     Insomnia: Significant difficulty during acute hospitalization  - Restarted on Restoril 15 mg nightly  - Trazodone 50 mg nightly as needed insomnia     DVT prophylaxis  In the setting of AIDP, the patient is at high risk of deep venous thrombosis given decreased mobility and alteration to autonomic nervous system.   -DC Lovenox  - Eliquis 5 mg twice daily for prophylaxis    Physician's Interdisciplinary Team Conference Note    Nursing staff, Physical Therapist(s), Occupational Therapist(s), Case Management and Pharmacy staff were present and reported. Where appropriate Speech, Respiratory, and Recreational Therapists were present and reported.     IMario D.O., was present and led the interdisciplinary team conference on 6/22/2023.  I led the  IDT conference and agree with the IDT conference documentation and plan of care as noted below.     RN:  Diet    % Meal     Pain Neuropathic pain   Sleep    Bowel Bowel clean out    Bladder Removal of lo   In's & Out's      Barriers: nerve pain, bowel movements    PT:  Bed Mobility    Transfers Min A with transfer board   Mobility SBA at W/C level   Stairs    Stand for 1 min yesterday  Barriers: balance and LE weakness    OT:  Eating    Grooming    Bathing    UB Dressing    LB Dressing Max A   Toileting Total A   Shower & Transfer 2 person assist transfer out of wet surface   Min A with slide board tx  Barriers:bowel incont         Rec:  Energy conservation      CM:  Continues to work on disposition and DME needs.       DC/Disposition:    7/10    Above is an abridged version of the patient's status and plan of care.  For complete details please see Weekly Interdisciplinary Team Conference Note from this date.     All reporting clinicians have a working knowledge of this patient's plan of care.        ____________________________________    Mario Terrell DO  ABP - Physical Medicine & Rehabilitation   [facility]  ____________________________________    Total time:  58 minutes.  I spent greater than 50% of the time for patient care and coordination on this date, including unit/floor time, and face-to-face time with the patient as per assessment and plan above.  Discussion included coordination of care as per IDT above, neuropathic pain, decrease gabapentin discussed side effects, increase Lyrica to 75 mg 3 times daily, neurogenic bowel, check KUB after bowel cleanout, neurogenic bladder, DC Lo, initiate voiding trial

## 2023-06-22 NOTE — PROGRESS NOTES
Transfer Level & Assistive Devices Used: bed    Patient Position: bed (E.g., bed, bed side commode, mobile shower commode, commode over toilet, regular toilet)     Adaptive Equipment Used? Dig stim, supp. (E.g., digital stimulator, toileting aid for hygiene, suppository )      Patient Sensation and Bowel Urgency Present? yes     Incontinence? yes     BM Results: large bm multiple times     1. Caregiver Present and actively participating in training? no    2. Patient Demonstrated Understanding with Bowel Medications and Purpose: yes     3. Patient Participation with Suppository Placement: no     4. Patient Participation with Digital Stimulation: no     5. Patient Participation with Clothing Management: no     6. Patient Participation with Hygiene: no        (If patient meets all 6 of the criteria numbered above, consider rescheduling bowel program to evening 1700 or 0500.)      Other:

## 2023-06-22 NOTE — FLOWSHEET NOTE
06/22/23 0801   Events/Summary/Plan   Events/Summary/Plan RA pulse ox check. Appears to be napping   Vital Signs   Pulse 81   Respiration 18   Pulse Oximetry 93 %   $ Pulse Oximetry (Spot Check) Yes   Respiratory Assessment   Level of Consciousness Responds to voice   Chest Exam   Work Of Breathing / Effort Within Normal Limits   Oxygen   O2 Delivery Device Room air w/o2 available

## 2023-06-22 NOTE — CARE PLAN
Problem: Balance  Goal: STG-Within one week, patient will maintain static standing c/ BUE support >/= 2 minutes to support participation in ADLs.  Outcome: Progressing  Note: Limited to 1 minute in parallel bars currently     Problem: Mobility  Goal: STG-Within one week, patient will demonstrate ability to manage wheelchair leg rests, arm rests, and brakes in preparation for transfers at [facility] or better.   Outcome: Progressing  Note: Cathleen for leg rest management     Problem: Mobility Transfers  Goal: STG-Within one week, patient will move supine to sit at SBA or better and use of bed rail.   Outcome: Progressing  Note: Fluctuates min-modA     Problem: Mobility Transfers  Goal: STG-Within one week, patient will transfer bed to chair c/ LRAD (slideboard or reach pivot) and ModA or better.   Outcome: Met

## 2023-06-22 NOTE — THERAPY
"Physical Therapy   Daily Treatment     Patient Name: Bull Banegas  Age:  67 y.o., Sex:  male  Medical Record #: [id number]  Today's Date: 6/22/2023     Precautions  Precautions: Fall Risk  Comments: lo, blurred vision    Subjective    Pt reports that \"buzzing\" and \"vibration\" sensations as well as blurry vision increase with exertion     Objective       06/22/23 1401   PT Charge Group   PT Therapeutic Exercise (Units) 1   PT Neuromuscular Re-Education / Balance (Units) 1   PT Therapeutic Activities (Units) 2   PT Total Time Spent   PT Individual Total Time Spent (Mins) 60   Gait Functional Level of Assist    Gait Level Of Assist Total Assist X 2  (modA B knee block and close WC follow)   Assistive Device Parallel Bars   Distance (Feet) 5   # of Times Distance was Traveled 1   Deviation Step To;Increased Base Of Support;Bradykinetic  (B knee instability in stance, heavy use of UEs)   Wheelchair Functional Level of Assist   Wheelchair Assist Stand by Assist   Distance Wheelchair (Feet or Distance) 150x2   Wheelchair Description Extra time;Limited by fatigue;Supervision for safety   Stairs Functional Level of Assist   Level of Assist with Stairs Unable to Participate   Transfer Functional Level of Assist   Bed, Chair, Wheelchair Transfer Moderate Assist  (CGA SB WC<>firm mat with mod assist for board placement; Cathleen squat pivot WC>bed to L)   Bed Chair Wheelchair Transfer Description Adaptive equipment;Increased time;Initial preparation for task;Set-up of equipment;Supervision for safety;Verbal cueing   Supine Lower Body Exercise   Short Arc Quad 1 set of 10;Right ;Left   Sitting Lower Body Exercises   Other Exercises B LE motomed level 2 10min 1.59mi   Bed Mobility    Sit to Supine Minimal Assist  (cues for logroll, reliant upon momentum)   Sit to Stand Moderate Assist  (pull to stand parallel bars)   Scooting Minimal Assist  (B LE stabilization for bridge)   Rolling Contact Guard Assist  (flat bed with bed rail) "   Neuro-Muscular Treatments   Neuro-Muscular Treatments Co-Contraction;Facilitation;Sequencing;Verbal Cuing;Tactile Cuing;Weight Shift Left   Comments static standing balance 1 min Cathleen B UE support and B knee block with mirror feedback for improved midline due to tendency to list R; pregait 5 ft see above   Interdisciplinary Plan of Care Collaboration   IDT Collaboration with  Occupational Therapist;Therapy Tech;Nursing   Patient Position at End of Therapy In Bed;Bed Alarm On;Call Light within Reach;Tray Table within Reach;Phone within Reach   Collaboration Comments CLOF; pt curious about fluid restriction as he states hospitalist removed it; assist with care     Reviewed SB transfer set up, pt able to set up with SPV for WC parts and mod assist for board placement. Pt able to place shorter board with less assistance than 35in board. Pt able to transfer with CGA and improved liftoff  Showed pt aquatic therapy room and discussed adding aquatic txs once incontinence improves    Completed depA clean up for incontinent BM at bed level    Assessment    Pt was able to ambulate 5 ft in parallel bars today with modA and close WC follow. He has bilateral knee instability fluctuating from near-buckling to hyperextension. Pt improved significantly in his ability to weightshift forward and liftoff with slideboard and squat pivot transfers, as well as good retention of head/hip relationship he was instructed in earlier.  Strengths: Able to follow instructions, Alert and oriented, Effective communication skills, Good carryover of learning, Independent prior level of function, Making steady progress towards goals, Motivated for self care and independence, Pleasant and cooperative, Supportive family, Willingly participates in therapeutic activities  Barriers: Decreased endurance, Fatigue, Generalized weakness, Home accessibility, Impaired activity tolerance, Impaired balance, Limited mobility (Paraplegia)    Plan    B LE therex  with caution not to overfatigue  Static standing tolerance progressing to pregait with WC follow in // bars as appropriate  Seated and supine trunk NRE     Passport items to be completed:  Get in/out of bed safely, in/out of a vehicle, safely use mobility device, walk or wheel around home/community, navigate up and down stairs, show how to get up/down from the ground, ensure home is accessible, demonstrate HEP, complete caregiver training        Physical Therapy Problems (Active)       Problem: Balance       Dates: Start:  06/18/23         Goal: STG-Within one week, patient will maintain static standing c/ BUE support >/= 2 minutes to support participation in ADLs.       Dates: Start:  06/18/23         Goal Note filed on 06/22/23 0942 by Colton Guadalupe, PT       Limited to 1 minute in parallel bars currently                 Problem: Mobility       Dates: Start:  06/18/23         Goal: STG-Within one week, patient will demonstrate ability to manage wheelchair leg rests, arm rests, and brakes in preparation for transfers at [facility] or better.        Dates: Start:  06/18/23         Goal Note filed on 06/22/23 0942 by Colton Guadalupe, PT       Cathleen for leg rest management                 Problem: Mobility Transfers       Dates: Start:  06/18/23         Goal: STG-Within one week, patient will move supine to sit at SBA or better and use of bed rail.        Dates: Start:  06/18/23         Goal Note filed on 06/22/23 0942 by Colton Guadalupe, PT       Fluctuates min-modA                 Problem: PT-Long Term Goals       Dates: Start:  06/18/23         Goal: LTG-By discharge, patient will propel wheelchair >/= 300' and navigate inclines up to 3% (curb cuts or equivalent) c/ SBA or better.        Dates: Start:  06/18/23            Goal: LTG-By discharge, patient will ambulate >/= 50' c/ ModA or better and FWW.        Dates: Start:  06/18/23            Goal: LTG-By discharge, patient will transfer one surface to another c/ Naila.         Dates: Start:  06/18/23            Goal: LTG-By discharge, patient will transfer in/out of a car c/ ModA or better.        Dates: Start:  06/18/23

## 2023-06-22 NOTE — THERAPY
Occupational Therapy  Daily Treatment     Patient Name: Bull Banegas  Age:  67 y.o., Sex:  male  Medical Record #: [id number]  Today's Date: 6/22/2023     Precautions  Precautions: Fall Risk  Comments: teresita, blurred vision         Subjective    Pt seen 2x this day and agreeable to both sessions.     Objective       06/22/23 0831 06/22/23 1331   OT Charge Group   OT Self Care / ADL (Units) 3 1   OT Therapy Activity (Units) 1 1   OT Total Time Spent   OT Individual Total Time Spent (Mins) 60 30   Pain   Intervention Declines  --    Cognition    Level of Consciousness Alert  --    Functional Level of Assist   Bathing Minimal Assist  --    Bathing Description Hand held shower;Long handled bath tool;Tub bench;Assit with back;Increased time;Assit with perineal;Initial preparation for task;Set-up of equipment;Set up for shower sleeve;Supervision for safety;Verbal cueing  (Katerina to wash rear with lateral lean and back. Issued LHS with pt able to reach all remaining parts seated on fold down shower bench with cues for pacing.)  --    Upper Body Dressing Supervision  --    Upper Body Dressing Description Set-up of equipment  (to don/doff pullover shirt)  --    Lower Body Dressing Maximal Assist  --    Lower Body Dressing Description Assist with threading into pant leg;Increased time;Initial preparation for task;Set-up of equipment;Supervision for safety;Verbal cueing;Reacher  (Seated on shower bench to doff brief with lateral leaning and reacher for socks.) Other (comment);Shoe horn;Dressing stick  (Use of dressing stick shoe horn seated EOB for donning slip on shoes with increased time and cues for pacing.)   Toileting Total Assist  --    Toileting Description Other (comment)  (pt with bowel incontinence in bed. Total A for all parts)  --    Bed, Chair, Wheelchair Transfer Total Assist X 2 Contact Guard Assist   Bed Chair Wheelchair Transfer Description Slideboard transfer from bed to wheelchair;Adaptive equipment;Increased  time;Set-up of equipment;Supervision for safety;Verbal cueing  (SB Min A x1 EOB>w/c, Min A x2 following bathing w/c>EOB. Dry towel placed under pt to assist with transfer on SB.) Slideboard transfer from bed to wheelchair;Set-up of equipment;Supervision for safety;Verbal cueing;Increased time  (Assist for SB placement, CGA for scooting on board EOB>w/c)   Toilet Transfers  --  Moderate Assist   Toilet Transfer Description  --  Grab bar;Adaptive equipment;Set-up of equipment;Supervision for safety;Verbal cueing;Increased time  (SQPT w/c>bariatric drop arm commode over toilet with cues for hand/foot placement and using of GB with L-hand. Min A >toilet, Mod A >w/c.)   Tub / Shower Transfers Total Assist X 2  --    Tub Shower Transfer Description Shower bench;Adaptive equipment;Increased time;Set-up of equipment;Supervision for safety;Verbal cueing  (Min A x2 SB using towel under rear and over SB due to increased moisture and to prevent sheering.)  --    Bed Mobility    Sit to Supine Contact Guard Assist  --    Scooting Standby Assist  --    Interdisciplinary Plan of Care Collaboration   IDT Collaboration with  Therapy Tech;Physical Therapist;Certified Nursing Assistant Physical Therapist   Patient Position at End of Therapy In Bed;Other (Comments)  (Pass off to PT at end of session.) Seated;Chair Alarm On   Collaboration Comments Tech, CNA, and PT assisted with SB transfers at various times during session. Pass off at end of session in main gym         Assessment    Pt tolerated both OT sessions well with focus on dressing, bathing, and toilet transfers. Pt demonstrated good carryover with AE use for LB dressing, and sustained energy levels with adequate breaks taken during tasks completed. He was receptive to education on pursed lip breathing and pacing during tasks, but requires continued cues for initiation of breaks.  Strengths: Able to follow instructions, Alert and oriented, Effective communication skills,  Independent prior level of function, Motivated for self care and independence, Pleasant and cooperative, Willingly participates in therapeutic activities    Plan    UB strengthening as limited by fatigue, functional transfers progressing to standing from SB, ADLs with AE as needed, IADLs, incorporate FM skills for BUE    Occupational Therapy Goals (Active)       Problem: Dressing       Dates: Start:  06/18/23         Goal: STG-Within one week, patient will dress LB with mod A overall using AE/DME as needed.       Dates: Start:  06/18/23               Problem: Functional Transfers       Dates: Start:  06/18/23         Goal: STG-Within one week, patient will transfer to toilet with max A x1 overall using AE/DME as needed.       Dates: Start:  06/18/23               Problem: OT Long Term Goals       Dates: Start:  06/18/23         Goal: LTG-By discharge, patient will complete basic self care tasks with min-supervision overall using AE/DME as needed.       Dates: Start:  06/18/23            Goal: LTG-By discharge, patient will perform bathroom transfers with SBA overall using AE/DME as needed.       Dates: Start:  06/18/23               Problem: Toileting       Dates: Start:  06/18/23         Goal: STG-Within one week, patient will complete toileting tasks with Max A overall using AE/DME as needed       Dates: Start:  06/18/23

## 2023-06-22 NOTE — FLOWSHEET NOTE
06/22/23 1036   Events/Summary/Plan   Events/Summary/Plan IS/FVC. Slouched down in bed   Vital Signs   Pulse 90   Respiration 18   Pulse Oximetry 92 %   $ Pulse Oximetry (Spot Check) Yes   Respiratory Assessment   Level of Consciousness Alert   Chest Exam   Work Of Breathing / Effort Within Normal Limits   Breath Sounds   RUL Breath Sounds Clear   RML Breath Sounds Clear   RLL Breath Sounds Diminished   JC Breath Sounds Clear   LLL Breath Sounds Diminished   Oxygen   O2 Delivery Device Room air w/o2 available

## 2023-06-22 NOTE — THERAPY
Physical Therapy   Daily Treatment     Patient Name: Bull Banegas  Age:  67 y.o., Sex:  male  Medical Record #: [id number]  Today's Date: 6/22/2023     Precautions  Precautions: Fall Risk  Comments: lo, blurred vision    Subjective    Hand off from OT.  Pt seated in w/c after shower and agreeable to treatment.  Pt reported fatigue.  Assisted OT with transfer.      Objective       06/22/23 0931   PT Charge Group   PT Therapeutic Activities (Units) 2   PT Total Time Spent   PT Individual Total Time Spent (Mins) 30   Precautions   Precautions Fall Risk   Bed Mobility    Rolling Supervised   Interdisciplinary Plan of Care Collaboration   IDT Collaboration with  Physical Therapist   Patient Position at End of Therapy In Bed;Call Light within Reach;Tray Table within Reach;Phone within Reach   Collaboration Comments Review of goals, CLOF     Completed LBD with mod A in supine (pt unable to thread shorts, but able to pull up using reacher and dressing stick and roll to pull up).    Pt able to verbalize sequencing of w/c set up and SB transfer including managing w/c components.  Additional information provided in regards to mechanics of transfer.   Continued education provided on pacing.     Assessment    Pt able to verbalize w/c set up and component management with minor cueing needed for sequencing of transfer with slideboard to maximize heads/hip relationship.  Strengths: Able to follow instructions, Alert and oriented, Effective communication skills, Good carryover of learning, Independent prior level of function, Making steady progress towards goals, Motivated for self care and independence, Pleasant and cooperative, Supportive family, Willingly participates in therapeutic activities  Barriers: Decreased endurance, Fatigue, Generalized weakness, Home accessibility, Impaired activity tolerance, Impaired balance, Limited mobility (Paraplegia)    Plan    B LE therex with caution not to overfatigue  Static standing  tolerance progressing to pregait with WC follow in // bars as appropriate  Seated and supine trunk NRE     Passport items to be completed:  Get in/out of bed safely, in/out of a vehicle, safely use mobility device, walk or wheel around home/community, navigate up and down stairs, show how to get up/down from the ground, ensure home is accessible, demonstrate HEP, complete caregiver training       Physical Therapy Problems (Active)       Problem: Balance       Dates: Start:  06/18/23         Goal: STG-Within one week, patient will maintain static standing c/ BUE support >/= 2 minutes to support participation in ADLs.       Dates: Start:  06/18/23         Goal Note filed on 06/22/23 0942 by Colton Guadalupe, PT       Limited to 1 minute in parallel bars currently                 Problem: Mobility       Dates: Start:  06/18/23         Goal: STG-Within one week, patient will demonstrate ability to manage wheelchair leg rests, arm rests, and brakes in preparation for transfers at [facility] or better.        Dates: Start:  06/18/23         Goal Note filed on 06/22/23 0942 by Colton Guadalupe, PT       Cathleen for leg rest management                 Problem: Mobility Transfers       Dates: Start:  06/18/23         Goal: STG-Within one week, patient will move supine to sit at SBA or better and use of bed rail.        Dates: Start:  06/18/23         Goal Note filed on 06/22/23 0942 by Colton Guadalupe, PT       Fluctuates min-modA                 Problem: PT-Long Term Goals       Dates: Start:  06/18/23         Goal: LTG-By discharge, patient will propel wheelchair >/= 300' and navigate inclines up to 3% (curb cuts or equivalent) c/ SBA or better.        Dates: Start:  06/18/23            Goal: LTG-By discharge, patient will ambulate >/= 50' c/ ModA or better and FWW.        Dates: Start:  06/18/23            Goal: LTG-By discharge, patient will transfer one surface to another c/ Naila.        Dates: Start:  06/18/23            Goal: LTG-By  discharge, patient will transfer in/out of a car c/ ModA or better.        Dates: Start:  06/18/23

## 2023-06-22 NOTE — PROGRESS NOTES
Hospital Medicine Daily Progress Note        Chief Complaint:  Hypertension  Hyponatremia    Interval History:  Pt denies new complaints.    Review of Systems  Review of Systems   Constitutional:  Negative for chills and fever.   HENT: Negative.     Eyes: Negative.    Respiratory:  Negative for cough and shortness of breath.    Cardiovascular:  Negative for chest pain and palpitations.   Gastrointestinal:  Negative for abdominal pain, nausea and vomiting.   Skin:  Negative for itching and rash.   Neurological:  Positive for weakness.   Endo/Heme/Allergies:  Negative for polydipsia. Does not bruise/bleed easily.        Physical Exam  Temp:  [36.7 °C (98.1 °F)-37 °C (98.6 °F)] 37 °C (98.6 °F)  Pulse:  [76-99] 90  Resp:  [16-20] 18  BP: (108-126)/(61-78) 108/74  SpO2:  [92 %-96 %] 92 %    Physical Exam  Vitals reviewed.   Constitutional:       General: He is not in acute distress.     Appearance: Normal appearance. He is not ill-appearing.   HENT:      Head: Normocephalic and atraumatic.      Right Ear: External ear normal.      Left Ear: External ear normal.      Nose: Nose normal.      Mouth/Throat:      Pharynx: Oropharynx is clear.   Eyes:      General:         Right eye: No discharge.         Left eye: No discharge.      Extraocular Movements: Extraocular movements intact.      Conjunctiva/sclera: Conjunctivae normal.   Cardiovascular:      Rate and Rhythm: Normal rate and regular rhythm.   Pulmonary:      Effort: Pulmonary effort is normal. No respiratory distress.      Breath sounds: Normal breath sounds. No wheezing.   Abdominal:      General: Bowel sounds are normal. There is no distension.      Palpations: Abdomen is soft.      Tenderness: There is no abdominal tenderness.   Musculoskeletal:      Cervical back: Normal range of motion and neck supple.      Right lower leg: No edema.      Left lower leg: No edema.   Skin:     General: Skin is warm and dry.   Neurological:      Mental Status: He is alert and  oriented to person, place, and time.         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[facility])  Assessment & Plan  Had progressive BLE weakness  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[facility]  S/P IVIG x 5 doses  Ongoing management per Physiatry    Azotemia  Assessment & Plan  Discontinued Lasix and decreased NaCl  Follow renal function  Check F/U labs in AM    Dyslipidemia  Assessment & Plan  On Lipitor    Vitamin D deficiency  Assessment & Plan  Vit D level 19  Continue supplementation    UTI (urinary tract infection)  Assessment & Plan  Reportedly had +UCx at [facility]  CXR atx, continue IS  WBC normalized  On Rocephin per Physiatry    Essential hypertension  Assessment & Plan  On Midodrine and Flomax per Physiatry    Anemia  Assessment & Plan  Has normocytic indices  Check Fe Panel  Now off Celebrex  Follow H/H  Check F/U labs in AM    Hyponatremia  Assessment & Plan  On fluid restriction, Lasix, and NaCl tabs  Off Lisinopril per Dr. Ruelas  Na+ levels now normalized  Discontinued Lasix and decreased NaCl  Check F/U labs in AM      Full Code

## 2023-06-22 NOTE — FLOWSHEET NOTE
"   06/22/23 1036   Incentive Spirometry Treatment   Height 1.727 m (5' 7.99\")   Predicted Inspiratory Capacity 2700   60% of predicted IS capacity 1620 mL   40% of predicted IS capacity 1080 mL   Incentive Spirometer Volume 2500 mL   Pulmonary Function Group   $ FVC / Vital Capacity (liters)  2.9  (65% of predicted. FVC done with pt slouching down in bed and sitting higher up in bed.)       "

## 2023-06-23 ENCOUNTER — APPOINTMENT (OUTPATIENT)
Dept: PHYSICAL THERAPY | Facility: REHABILITATION | Age: 68
End: 2023-06-23
Attending: PHYSICAL MEDICINE & REHABILITATION
Payer: COMMERCIAL

## 2023-06-23 ENCOUNTER — APPOINTMENT (OUTPATIENT)
Dept: OCCUPATIONAL THERAPY | Facility: REHABILITATION | Age: 68
End: 2023-06-23
Attending: PHYSICAL MEDICINE & REHABILITATION
Payer: COMMERCIAL

## 2023-06-23 LAB
ANION GAP SERPL CALC-SCNC: 12 MMOL/L (ref 7–16)
BUN SERPL-MCNC: 14 MG/DL (ref 8–22)
CALCIUM SERPL-MCNC: 8.9 MG/DL (ref 8.5–10.5)
CHLORIDE SERPL-SCNC: 101 MMOL/L (ref 96–112)
CO2 SERPL-SCNC: 23 MMOL/L (ref 20–33)
CREAT SERPL-MCNC: 0.46 MG/DL (ref 0.5–1.4)
ERYTHROCYTE [DISTWIDTH] IN BLOOD BY AUTOMATED COUNT: 61.2 FL (ref 35.9–50)
GFR SERPLBLD CREATININE-BSD FMLA CKD-EPI: 116 ML/MIN/1.73 M 2
GLUCOSE SERPL-MCNC: 91 MG/DL (ref 65–99)
HCT VFR BLD AUTO: 31.1 % (ref 42–52)
HGB BLD-MCNC: 10.2 G/DL (ref 14–18)
IRON SATN MFR SERPL: 34 % (ref 15–55)
IRON SERPL-MCNC: 96 UG/DL (ref 50–180)
MCH RBC QN AUTO: 31.4 PG (ref 27–33)
MCHC RBC AUTO-ENTMCNC: 32.8 G/DL (ref 32.3–36.5)
MCV RBC AUTO: 95.7 FL (ref 81.4–97.8)
PLATELET # BLD AUTO: 305 K/UL (ref 164–446)
PMV BLD AUTO: 9.3 FL (ref 9–12.9)
POTASSIUM SERPL-SCNC: 4.2 MMOL/L (ref 3.6–5.5)
RBC # BLD AUTO: 3.25 M/UL (ref 4.7–6.1)
SODIUM SERPL-SCNC: 136 MMOL/L (ref 135–145)
TIBC SERPL-MCNC: 280 UG/DL (ref 250–450)
UIBC SERPL-MCNC: 184 UG/DL (ref 110–370)
WBC # BLD AUTO: 7.6 K/UL (ref 4.8–10.8)

## 2023-06-23 PROCEDURE — 97535 SELF CARE MNGMENT TRAINING: CPT

## 2023-06-23 PROCEDURE — 85027 COMPLETE CBC AUTOMATED: CPT

## 2023-06-23 PROCEDURE — 97110 THERAPEUTIC EXERCISES: CPT

## 2023-06-23 PROCEDURE — 700102 HCHG RX REV CODE 250 W/ 637 OVERRIDE(OP): Performed by: HOSPITALIST

## 2023-06-23 PROCEDURE — A9270 NON-COVERED ITEM OR SERVICE: HCPCS | Performed by: HOSPITALIST

## 2023-06-23 PROCEDURE — 700105 HCHG RX REV CODE 258: Performed by: PHYSICAL MEDICINE & REHABILITATION

## 2023-06-23 PROCEDURE — 97112 NEUROMUSCULAR REEDUCATION: CPT

## 2023-06-23 PROCEDURE — 97116 GAIT TRAINING THERAPY: CPT | Mod: CQ

## 2023-06-23 PROCEDURE — 700102 HCHG RX REV CODE 250 W/ 637 OVERRIDE(OP): Performed by: PHYSICAL MEDICINE & REHABILITATION

## 2023-06-23 PROCEDURE — 94760 N-INVAS EAR/PLS OXIMETRY 1: CPT

## 2023-06-23 PROCEDURE — 94150 VITAL CAPACITY TEST: CPT

## 2023-06-23 PROCEDURE — 36415 COLL VENOUS BLD VENIPUNCTURE: CPT

## 2023-06-23 PROCEDURE — 770010 HCHG ROOM/CARE - REHAB SEMI PRIVAT*

## 2023-06-23 PROCEDURE — 80048 BASIC METABOLIC PNL TOTAL CA: CPT

## 2023-06-23 PROCEDURE — A9270 NON-COVERED ITEM OR SERVICE: HCPCS | Performed by: PHYSICAL MEDICINE & REHABILITATION

## 2023-06-23 PROCEDURE — 700111 HCHG RX REV CODE 636 W/ 250 OVERRIDE (IP): Performed by: PHYSICAL MEDICINE & REHABILITATION

## 2023-06-23 PROCEDURE — 83550 IRON BINDING TEST: CPT

## 2023-06-23 PROCEDURE — 99232 SBSQ HOSP IP/OBS MODERATE 35: CPT | Performed by: HOSPITALIST

## 2023-06-23 PROCEDURE — 99232 SBSQ HOSP IP/OBS MODERATE 35: CPT | Performed by: PHYSICAL MEDICINE & REHABILITATION

## 2023-06-23 PROCEDURE — 83540 ASSAY OF IRON: CPT

## 2023-06-23 RX ADMIN — Medication 2000 UNITS: at 08:20

## 2023-06-23 RX ADMIN — SODIUM CHLORIDE 1 G: 1 TABLET ORAL at 08:21

## 2023-06-23 RX ADMIN — GABAPENTIN 600 MG: 300 CAPSULE ORAL at 20:52

## 2023-06-23 RX ADMIN — OMEPRAZOLE 20 MG: 20 CAPSULE, DELAYED RELEASE ORAL at 08:21

## 2023-06-23 RX ADMIN — ATORVASTATIN CALCIUM 10 MG: 10 TABLET, FILM COATED ORAL at 20:52

## 2023-06-23 RX ADMIN — APIXABAN 5 MG: 5 TABLET, FILM COATED ORAL at 08:21

## 2023-06-23 RX ADMIN — DOCUSATE SODIUM 200 MG: 100 CAPSULE, LIQUID FILLED ORAL at 08:21

## 2023-06-23 RX ADMIN — PREGABALIN 75 MG: 75 CAPSULE ORAL at 08:21

## 2023-06-23 RX ADMIN — APIXABAN 5 MG: 5 TABLET, FILM COATED ORAL at 20:52

## 2023-06-23 RX ADMIN — MULTIPLE VITAMINS W/ MINERALS TAB 1 TABLET: TAB at 11:21

## 2023-06-23 RX ADMIN — CEFTRIAXONE SODIUM 1000 MG: 1 INJECTION, POWDER, FOR SOLUTION INTRAMUSCULAR; INTRAVENOUS at 17:04

## 2023-06-23 RX ADMIN — DOCUSATE SODIUM 200 MG: 100 CAPSULE, LIQUID FILLED ORAL at 20:52

## 2023-06-23 RX ADMIN — SODIUM CHLORIDE 1 G: 1 TABLET ORAL at 11:21

## 2023-06-23 RX ADMIN — PREGABALIN 75 MG: 75 CAPSULE ORAL at 14:50

## 2023-06-23 RX ADMIN — MIDODRINE HYDROCHLORIDE 10 MG: 10 TABLET ORAL at 08:21

## 2023-06-23 RX ADMIN — SENNOSIDES 17.2 MG: 8.6 TABLET, FILM COATED ORAL at 11:20

## 2023-06-23 RX ADMIN — MIDODRINE HYDROCHLORIDE 10 MG: 10 TABLET ORAL at 11:22

## 2023-06-23 RX ADMIN — SODIUM CHLORIDE 1 G: 1 TABLET ORAL at 17:00

## 2023-06-23 RX ADMIN — PREGABALIN 75 MG: 75 CAPSULE ORAL at 20:52

## 2023-06-23 RX ADMIN — TAMSULOSIN HYDROCHLORIDE 0.4 MG: 0.4 CAPSULE ORAL at 17:00

## 2023-06-23 RX ADMIN — MIDODRINE HYDROCHLORIDE 10 MG: 10 TABLET ORAL at 17:00

## 2023-06-23 RX ADMIN — MENTHOL, METHYL SALICYLATE: 10; 15 CREAM TOPICAL at 21:00

## 2023-06-23 RX ADMIN — GABAPENTIN 600 MG: 300 CAPSULE ORAL at 14:50

## 2023-06-23 RX ADMIN — GABAPENTIN 600 MG: 300 CAPSULE ORAL at 08:21

## 2023-06-23 RX ADMIN — Medication 6 MG: at 20:52

## 2023-06-23 RX ADMIN — OXYCODONE 5 MG: 5 TABLET ORAL at 08:25

## 2023-06-23 RX ADMIN — ASPIRIN 81 MG CHEWABLE TABLET 81 MG: 81 TABLET CHEWABLE at 08:21

## 2023-06-23 RX ADMIN — TEMAZEPAM 15 MG: 15 CAPSULE ORAL at 21:04

## 2023-06-23 ASSESSMENT — PULMONARY FUNCTION TESTS: FVC: 2.86

## 2023-06-23 ASSESSMENT — GAIT ASSESSMENTS
DEVIATION: STEP TO;INCREASED BASE OF SUPPORT;BRADYKINETIC
ASSISTIVE DEVICE: PARALLEL BARS
GAIT LEVEL OF ASSIST: TOTAL ASSIST X 2

## 2023-06-23 ASSESSMENT — ENCOUNTER SYMPTOMS
SHORTNESS OF BREATH: 0
VOMITING: 0
NAUSEA: 0
PALPITATIONS: 0
BRUISES/BLEEDS EASILY: 0
EYES NEGATIVE: 1
COUGH: 0
POLYDIPSIA: 0
WEAKNESS: 1
ABDOMINAL PAIN: 0
FEVER: 0
CHILLS: 0

## 2023-06-23 ASSESSMENT — ACTIVITIES OF DAILY LIVING (ADL)
TOILETING_LEVEL_OF_ASSIST_DESCRIPTION: ASSIST FOR HYGIENE;ASSIST TO PULL PANTS UP;ASSIST TO PULL PANTS DOWN;ASSIST FOR STANDING BALANCE;GRAB BAR;INCREASED TIME;INITIAL PREPARATION FOR TASK;SET-UP OF EQUIPMENT;SUPERVISION FOR SAFETY;VERBAL CUEING

## 2023-06-23 ASSESSMENT — PAIN DESCRIPTION - PAIN TYPE: TYPE: ACUTE PAIN

## 2023-06-23 NOTE — PROGRESS NOTES
Received shift report and assumed care of patient.  Patient awake, calm and stable, currently positioned in bed for comfort and safety; call light within reach.  Complains of minimal generalized pain at this time.  Will continue to monitor.

## 2023-06-23 NOTE — FLOWSHEET NOTE
"   06/23/23 1400   Incentive Spirometry Treatment   Height 1.727 m (5' 7.99\")   Predicted Inspiratory Capacity 2700   60% of predicted IS capacity 1620 mL   40% of predicted IS capacity 1080 mL   Incentive Spirometer Volume 2900 mL   Pulmonary Function Group   $ FVC / Vital Capacity (liters)  2.86  (64% of predicted. Sitting up in bed)       "

## 2023-06-23 NOTE — PROGRESS NOTES
Hospital Medicine Daily Progress Note        Chief Complaint:  Hypertension  Hyponatremia    Interval History:  Doing well.  Labs reviewed and discussed.    Review of Systems  Review of Systems   Constitutional:  Negative for chills and fever.   HENT: Negative.     Eyes: Negative.    Respiratory:  Negative for cough and shortness of breath.    Cardiovascular:  Negative for chest pain and palpitations.   Gastrointestinal:  Negative for abdominal pain, nausea and vomiting.   Skin:  Negative for itching and rash.   Neurological:  Positive for weakness.   Endo/Heme/Allergies:  Negative for polydipsia. Does not bruise/bleed easily.        Physical Exam  Temp:  [36.6 °C (97.9 °F)-37.1 °C (98.8 °F)] 36.6 °C (97.9 °F)  Pulse:  [] 106  Resp:  [18-20] 18  BP: (109-127)/(68-73) 127/69  SpO2:  [92 %-95 %] 92 %    Physical Exam  Vitals reviewed.   Constitutional:       General: He is not in acute distress.     Appearance: Normal appearance. He is not ill-appearing.   HENT:      Head: Normocephalic and atraumatic.      Right Ear: External ear normal.      Left Ear: External ear normal.      Nose: Nose normal.      Mouth/Throat:      Pharynx: Oropharynx is clear.   Eyes:      General:         Right eye: No discharge.         Left eye: No discharge.      Extraocular Movements: Extraocular movements intact.      Conjunctiva/sclera: Conjunctivae normal.   Cardiovascular:      Rate and Rhythm: Normal rate and regular rhythm.   Pulmonary:      Effort: Pulmonary effort is normal. No respiratory distress.      Breath sounds: Normal breath sounds. No wheezing.   Abdominal:      General: Bowel sounds are normal. There is no distension.      Palpations: Abdomen is soft.      Tenderness: There is no abdominal tenderness.   Musculoskeletal:      Cervical back: Normal range of motion and neck supple.      Right lower leg: No edema.      Left lower leg: No edema.   Skin:     General: Skin is warm and dry.   Neurological:      Mental  Status: He is alert and oriented to person, place, and time.         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[facility])  Assessment & Plan  Had progressive BLE weakness  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[facility]  S/P IVIG x 5 doses  Ongoing management per Physiatry    Azotemia  Assessment & Plan  Discontinued Lasix and decreased NaCl  Renal function resolved    Dyslipidemia  Assessment & Plan  On Lipitor    Vitamin D deficiency  Assessment & Plan  Vit D level 19  Continue supplementation    UTI (urinary tract infection)  Assessment & Plan  Reportedly had +UCx at [facility]  CXR atx, continue IS  WBC normalized  On Rocephin per Physiatry    Essential hypertension  Assessment & Plan  On Midodrine and Flomax per Physiatry    Anemia  Assessment & Plan  Has normocytic indices  Fe 96  Now off Celebrex  Continue to follow H/H    Hyponatremia  Assessment & Plan  On fluid restriction, Lasix, and NaCl tabs  Off Lisinopril per Dr. Ruelas  Na+ levels now normalized  Discontinued Lasix and decreased NaCl  Will also stop fluid restriction      Full Code

## 2023-06-23 NOTE — CARE PLAN
The patient is Stable - Low risk of patient condition declining or worsening      Problem: Pain - Standard  Goal: Alleviation of pain or a reduction in pain to the patient’s comfort goal  Outcome: Progressing  Note: Patient able to verbalize pain level and verbalize an acceptable level of pain.         Problem: Bowel Elimination  Goal: Patient will participate in bowel management program  Outcome: Progressing    Note: Patient on bowel program. Patient refused scheduled suppository. Education provided. Patient had medium, soft BM.

## 2023-06-23 NOTE — FLOWSHEET NOTE
06/23/23 1403   Events/Summary/Plan   Events/Summary/Plan FVC/IS   Vital Signs   Pulse (!) 106   Respiration 18   Pulse Oximetry 92 %   $ Pulse Oximetry (Spot Check) Yes   Respiratory Assessment   Respiratory Pattern Within Normal Limits   Level of Consciousness Alert   Breath Sounds   RUL Breath Sounds Clear   RML Breath Sounds Clear   RLL Breath Sounds Diminished   JC Breath Sounds Clear   LLL Breath Sounds Diminished   Oxygen   O2 Delivery Device Room air w/o2 available

## 2023-06-23 NOTE — THERAPY
Physical Therapy   Daily Treatment     Patient Name: Bull Banegas  Age:  67 y.o., Sex:  male  Medical Record #: [id number]  Today's Date: 6/23/2023     Precautions  Precautions: Fall Risk  Comments: lo, blurred vision    Subjective    Pt seated in w/c upon arrival, agreeable to session.  Pt reported blurred vision after 1st bout of standing tolerance, resolved w/ seated RB.     Objective       06/23/23 1001   PT Charge Group   PT Gait Training (Units) 1   PT Neuromuscular Re-Education / Balance (Units) 1   Supervising Physical Therapist Colton Guadalupe   PT Total Time Spent   PT Individual Total Time Spent (Mins) 30   Cognition    Level of Consciousness Alert   Gait Functional Level of Assist    Gait Level Of Assist Total Assist X 2  (modA for balance+ B knee block and w/c follow)   Assistive Device Parallel Bars   Distance (Feet)   (7+5)   # of Times Distance was Traveled 2   Deviation Step To;Increased Base Of Support;Bradykinetic  (B knee instability in stance, heavy use of UEs)   Wheelchair Functional Level of Assist   Wheelchair Assist Stand by Assist   Distance Wheelchair (Feet or Distance) 100   Wheelchair Description Extra time;Leg rest management;Supervision for safety   Bed Mobility    Sit to Stand Minimal Assist  (// bars)   Neuro-Muscular Treatments   Neuro-Muscular Treatments Postural Facilitation;Facilitation   Comments static standing in // bars w/ BUEs support x2, [id number], 2.5 mins x1. initially CGA for balance but required Cathleen when fatigue and bilateral knee blocking.   Interdisciplinary Plan of Care Collaboration   IDT Collaboration with  Therapy Tech;Occupational Therapist   Patient Position at End of Therapy Seated  (transition care to OT)   Collaboration Comments [facility] assisted         Assessment    Pt is progressing in standing tolerance and ambulation distance today. Continues to be limited by fatigue and bilateral knees stability. Pt is very motivated and pleasant throughout  session.    Strengths: Able to follow instructions, Alert and oriented, Effective communication skills, Good carryover of learning, Independent prior level of function, Making steady progress towards goals, Motivated for self care and independence, Pleasant and cooperative, Supportive family, Willingly participates in therapeutic activities  Barriers: Decreased endurance, Fatigue, Generalized weakness, Home accessibility, Impaired activity tolerance, Impaired balance, Limited mobility (Paraplegia)    Plan    B LE therex with caution not to overfatigue  Static standing tolerance progressing to pregait with WC follow in // bars as appropriate  Seated and supine trunk NRE     Passport items to be completed:  Get in/out of bed safely, in/out of a vehicle, safely use mobility device, walk or wheel around home/community, navigate up and down stairs, show how to get up/down from the ground, ensure home is accessible, demonstrate HEP, complete caregiver training          Physical Therapy Problems (Active)       Problem: Balance       Dates: Start:  06/18/23         Goal: STG-Within one week, patient will maintain static standing c/ BUE support >/= 2 minutes to support participation in ADLs.       Dates: Start:  06/18/23         Goal Note filed on 06/22/23 0942 by Colton Guadalupe, PT       Limited to 1 minute in parallel bars currently                 Problem: Mobility       Dates: Start:  06/18/23         Goal: STG-Within one week, patient will demonstrate ability to manage wheelchair leg rests, arm rests, and brakes in preparation for transfers at [facility] or better.        Dates: Start:  06/18/23         Goal Note filed on 06/22/23 0942 by Colton Guadalupe, PT       Cathleen for leg rest management                 Problem: Mobility Transfers       Dates: Start:  06/18/23         Goal: STG-Within one week, patient will move supine to sit at SBA or better and use of bed rail.        Dates: Start:  06/18/23         Goal Note filed on  06/22/23 0942 by Colton Guadalupe, PT       Fluctuates min-modA                 Problem: PT-Long Term Goals       Dates: Start:  06/18/23         Goal: LTG-By discharge, patient will propel wheelchair >/= 300' and navigate inclines up to 3% (curb cuts or equivalent) c/ SBA or better.        Dates: Start:  06/18/23            Goal: LTG-By discharge, patient will ambulate >/= 50' c/ ModA or better and FWW.        Dates: Start:  06/18/23            Goal: LTG-By discharge, patient will transfer one surface to another c/ Naila.        Dates: Start:  06/18/23            Goal: LTG-By discharge, patient will transfer in/out of a car c/ ModA or better.        Dates: Start:  06/18/23

## 2023-06-23 NOTE — THERAPY
Physical Therapy   Daily Treatment     Patient Name: Bull Banegas  Age:  67 y.o., Sex:  male  Medical Record #: [id number]  Today's Date: 6/23/2023     Precautions  Precautions: Fall Risk  Comments: lo, blurred vision    Subjective    Pt agreeable to tx     Objective       06/23/23 1230   PT Charge Group   PT Therapeutic Exercise (Units) 1   PT Neuromuscular Re-Education / Balance (Units) 1   PT Total Time Spent   PT Individual Total Time Spent (Mins) 30   Supine Lower Body Exercise   Short Arc Quad 2 sets of 10;Right ;Left  (5 sec hold)   Sitting Lower Body Exercises   Long Arc Quad 1 set of 10;Right;Left  (focus on eccentric lowering)   Bed Mobility    Supine to Sit Standby Assist  (with HOBE)   Sit to Supine Contact Guard Assist   Sit to Stand Minimal Assist  (B knee block from elevated bed with bariatric FWW)   Scooting Standby Assist   Neuro-Muscular Treatments   Comments static standing tolerance with emphasis on quad/hamstring co-contraction and glute activation with B UE support on FWW, B knee block due to instability   Interdisciplinary Plan of Care Collaboration   IDT Collaboration with  Occupational Therapist   Patient Position at End of Therapy In Bed;Call Light within Reach;Tray Table within Reach;Phone within Reach   Collaboration Comments possible overexertion     Educated pt on progression of mobility with FWW and goals for next week  Reiterated to avoid overexertion over the weekend while slowly progressing standing mobility    Assessment    Pt was able to progress to performing sit to stand from elevated surface with FWW. He utilizes B UE extension to assist in performing due to significant B LE weakness. His quad strength has progressed to 3/5 though he fatigues quickly.  Strengths: Able to follow instructions, Alert and oriented, Effective communication skills, Good carryover of learning, Independent prior level of function, Making steady progress towards goals, Motivated for self care and  independence, Pleasant and cooperative, Supportive family, Willingly participates in therapeutic activities  Barriers: Decreased endurance, Fatigue, Generalized weakness, Home accessibility, Impaired activity tolerance, Impaired balance, Limited mobility (Paraplegia)    Plan       B LE therex with caution not to overfatigue  Static standing tolerance progressing to pregait with WC follow in // bars as appropriate  Seated and supine trunk NRE  Squat pivot transfer training  B LE motomed vs Nustep     Passport items to be completed:  Get in/out of bed safely, in/out of a vehicle, safely use mobility device, walk or wheel around home/community, navigate up and down stairs, show how to get up/down from the ground, ensure home is accessible, demonstrate HEP, complete caregiver training      Physical Therapy Problems (Active)       Problem: Balance       Dates: Start:  06/18/23         Goal: STG-Within one week, patient will maintain static standing c/ BUE support >/= 2 minutes to support participation in ADLs.       Dates: Start:  06/18/23         Goal Note filed on 06/22/23 0942 by Colton Guadalupe, PT       Limited to 1 minute in parallel bars currently                 Problem: Mobility       Dates: Start:  06/18/23         Goal: STG-Within one week, patient will demonstrate ability to manage wheelchair leg rests, arm rests, and brakes in preparation for transfers at [facility] or better.        Dates: Start:  06/18/23         Goal Note filed on 06/22/23 0942 by Colton Guadalupe, PT       Cathleen for leg rest management                 Problem: Mobility Transfers       Dates: Start:  06/18/23         Goal: STG-Within one week, patient will move supine to sit at SBA or better and use of bed rail.        Dates: Start:  06/18/23         Goal Note filed on 06/22/23 0942 by Colton Guadalupe, PT       Fluctuates min-modA                 Problem: PT-Long Term Goals       Dates: Start:  06/18/23         Goal: LTG-By discharge, patient will  propel wheelchair >/= 300' and navigate inclines up to 3% (curb cuts or equivalent) c/ SBA or better.        Dates: Start:  06/18/23            Goal: LTG-By discharge, patient will ambulate >/= 50' c/ ModA or better and FWW.        Dates: Start:  06/18/23            Goal: LTG-By discharge, patient will transfer one surface to another c/ Naila.        Dates: Start:  06/18/23            Goal: LTG-By discharge, patient will transfer in/out of a car c/ ModA or better.        Dates: Start:  06/18/23

## 2023-06-23 NOTE — PROGRESS NOTES
"Rehab Progress Note     Encounter date: 06/23/23      Chief Complaint: urinary retention     Interval Events (Subjective)  Vitals reviewed: WNL   Labs reviewed: Hgb 10.2, stable     Patient seen and examined in room after working with OT. Patient reports he slept well, and pain has improved. Reports he had new sense of bladder urgency and was able to empty his bladder x 1. Yesterday required cathing, retention improving today. Will keep patient on same dose of flomax. Had BM over night, but BM episodes slowed down today. Does not want to change bowel regimen. Will keep bowel meds the same.   Otherwise, Does not report HA, lightheadedness, SOB, CP, abdominal pain, or changes in numbness/tingling/weakness.         Objective:  VITAL SIGNS: /73   Pulse 78   Temp 37.1 °C (98.7 °F) (Oral)   Resp 18   Ht 1.727 m (5' 7.99\")   Wt 113 kg (250 lb 3.6 oz)   SpO2 94%   BMI 38.06 kg/m²     Physical Exam:  Gen: NAD, laying comfortably in bed   Head:  NC/AT   Eyes/ Nose/ Mouth: PERRLA, moist mucous membranes  Cardio: RRR, good distal perfusion, warm extremities  Pulm: normal respiratory effort, no cyanosis,  no witnessed wheezes or coughing   Abd: Soft NTND, negative borborygmi   Extremities:  -LE edema, NATACHA hose in place     Tone: no spasticity noted, no cogwheeling noted    Recent Results (from the past 72 hour(s))   Basic Metabolic Panel    Collection Time: 06/21/23  5:38 AM   Result Value Ref Range    Sodium 136 135 - 145 mmol/L    Potassium 4.5 3.6 - 5.5 mmol/L    Chloride 101 96 - 112 mmol/L    Co2 25 20 - 33 mmol/L    Glucose 104 (H) 65 - 99 mg/dL    Bun 24 (H) 8 - 22 mg/dL    Creatinine 0.51 0.50 - 1.40 mg/dL    Calcium 8.7 8.5 - 10.5 mg/dL    Anion Gap 10.0 7.0 - 16.0   ESTIMATED GFR    Collection Time: 06/21/23  5:38 AM   Result Value Ref Range    GFR (CKD-EPI) 112 >60 mL/min/1.73 m 2   Basic Metabolic Panel    Collection Time: 06/23/23  5:35 AM   Result Value Ref Range    Sodium 136 135 - 145 mmol/L    " Potassium 4.2 3.6 - 5.5 mmol/L    Chloride 101 96 - 112 mmol/L    Co2 23 20 - 33 mmol/L    Glucose 91 65 - 99 mg/dL    Bun 14 8 - 22 mg/dL    Creatinine 0.46 (L) 0.50 - 1.40 mg/dL    Calcium 8.9 8.5 - 10.5 mg/dL    Anion Gap 12.0 7.0 - 16.0   CBC WITHOUT DIFFERENTIAL    Collection Time: 06/23/23  5:35 AM   Result Value Ref Range    WBC 7.6 4.8 - 10.8 K/uL    RBC 3.25 (L) 4.70 - 6.10 M/uL    Hemoglobin 10.2 (L) 14.0 - 18.0 g/dL    Hematocrit 31.1 (L) 42.0 - 52.0 %    MCV 95.7 81.4 - 97.8 fL    MCH 31.4 27.0 - 33.0 pg    MCHC 32.8 32.3 - 36.5 g/dL    RDW 61.2 (H) 35.9 - 50.0 fL    Platelet Count 305 164 - 446 K/uL    MPV 9.3 9.0 - 12.9 fL   IRON/TOTAL IRON BIND    Collection Time: 06/23/23  5:35 AM   Result Value Ref Range    Iron 96 50 - 180 ug/dL    Total Iron Binding 280 250 - 450 ug/dL    Unsat Iron Binding 184 110 - 370 ug/dL    % Saturation 34 15 - 55 %   ESTIMATED GFR    Collection Time: 06/23/23  5:35 AM   Result Value Ref Range    GFR (CKD-EPI) 116 >60 mL/min/1.73 m 2       Current Facility-Administered Medications   Medication Frequency    pregabalin (LYRICA) capsule 75 mg TID    gabapentin (NEURONTIN) capsule 600 mg TID    apixaban (ELIQUIS) tablet 5 mg BID    sodium chloride (SALT) tablet 1 g TID WITH MEALS    midodrine (PROAMATINE) tablet 10 mg TID WITH MEALS    traMADol (Ultram) 50 MG tablet 50 mg Q6HRS PRN    vitamin D3 (cholecalciferol) tablet 2,000 Units DAILY    menthol-methyl salicycate (MUSCLE RUB) cream BID    omeprazole (PRILOSEC) capsule 20 mg DAILY    Pharmacy Consult Request ...Pain Management Review 1 Each PHARMACY TO DOSE    acetaminophen (TYLENOL) tablet 1,000 mg Q6HRS PRN    acetaminophen (Tylenol) tablet 650 mg Q4HRS PRN    carboxymethylcellulose (REFRESH TEARS) 0.5 % ophthalmic drops 1 Drop PRN    benzocaine-menthol (Cepacol) lozenge 1 Lozenge Q2HRS PRN    mag hydrox-al hydrox-simeth (MAALOX PLUS ES or MYLANTA DS) suspension 20 mL Q2HRS PRN    ondansetron (ZOFRAN ODT) dispertab 4 mg  4X/DAY PRN    Or    ondansetron (ZOFRAN) syringe/vial injection 4 mg 4X/DAY PRN    traZODone (DESYREL) tablet 50 mg QHS PRN    sodium chloride (OCEAN) 0.65 % nasal spray 2 Spray PRN    Respiratory Therapy Consult Continuous RT    oxyCODONE immediate-release (ROXICODONE) tablet 5 mg Q3HRS PRN    Or    oxyCODONE immediate release (ROXICODONE) tablet 10 mg Q3HRS PRN    hydrALAZINE (APRESOLINE) tablet 25 mg Q8HRS PRN    therapeutic multivitamin-minerals (THERAGRAN-M) tablet 1 Tablet DAILY WITH LUNCH    docusate sodium (COLACE) capsule 200 mg BID    And    sennosides (SENOKOT) 8.6 MG tablet 17.2 mg DAILY AT NOON    And    bisacodyl (THE MAGIC BULLET) suppository 10 mg QDAY    And    magnesium hydroxide (MILK OF MAGNESIA) suspension 30 mL QDAY PRN    aspirin (ASA) chewable tab 81 mg DAILY    atorvastatin (LIPITOR) tablet 10 mg MO, WE + FR    melatonin tablet 6 mg QHS    temazepam (Restoril) capsule 15 mg QHS    cefTRIAXone (Rocephin) 1,000 mg in  mL IVPB Q24HRS    lidocaine (LIDODERM) 5 % 2 Patch Q24HR    tamsulosin (FLOMAX) capsule 0.4 mg AFTER DINNER       Orders Placed This Encounter   Procedures    Diet Order Diet: Regular; Fluid modifications: (optional): 1800 ml Fluid Restriction     Standing Status:   Standing     Number of Occurrences:   1     Order Specific Question:   Diet:     Answer:   Regular [1]     Order Specific Question:   Fluid modifications: (optional)     Answer:   1800 ml Fluid Restriction [10]       Assessment:  Active Hospital Problems    Diagnosis     *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 Terrell's A/P on 6/22   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  Lower extremity weakness and pain as well as numbness in  upper extremities.  No known premorbid infection.  -PT and OT for mobility and ADLs. Per guidelines, 15 hours per week between PT, OT.  Cleared by speech therapy for swallow and speech, transition time to PT and OT  - Continue comprehensive acute inpatient rehabilitation     Neurologic respiratory impairment:   Patient is at high risk for respiratory involvement given neurologic symptoms in the upper extremities.   -Vital capacity daily, 1.72 liters on 6/20  - Incentive spirometry  - Mild left basilar atelectasis from chest x-ray on 6/17     Lower extremity swelling, improved : Left greater than right  - Lower extremity Doppler was negative for DVT     Hyponatremia: Likely SIADH, followed by nephrology during [facility] hospitalization  - Sodium of 126 on 6/17 --> 129 on 6/19 --> 133 on 6/20 --> 136 on 6/21  - Check BMP in a.m.  - Decrease sodium chloride tablets 1 g 3 times daily as per hospitalist   - Lasix was discontinued on 6/21 as per hospitalist discontinuing Lasix versus liberalizing fluid restriction  -Free water restriction of 500 cc/day and total p.o. fluid intake of 1.8 L, if sodium continues to improve goal will be to decrease fluid restriction.  -6/23 Na 136        Hypomagnesemia: Low magnesium during acute hospitalization, supplemented with p.o. magnesium  - Magnesium of 2.0 on 6/18     Urinary tract infection/pyelonephritis: Catheter associated UTI in the setting of neurogenic bladder  - WBC of 14 on 6/17 --> WBC of 10.7 on 6/20  - Peripheral IV was removed prior to transfer to acute rehabilitation we will replace IV.  - Ceftriaxone 1 g every 24 hours, continue for another 8 days, total of 10 days given systemic factor , stop date 6/24   - We will work with Eric Riley on following up culture and sensitivities     Neurogenic bladder: Inappropriate management of neurogenic bladder can result and hydronephrosis, increased risk of pyelonephritis, and renal failure  - Discontinue Blancas on 6/22, Start  voiding trial, if can't void in 6 hours or prn check pvr and if >500cc then ICP,if able to void then check PVR, if PVR is >200cc then ICP  - Continue Flomax 0.4 mg nightly  - lo removed 6/22   -unable to void, cath volumte 663  on 6/23, yesterday, able to empty bladder this morning, , continue with current void trials and flomax dose      Neurogenic bowel: Inappropriate neurogenic bowel can result in severe constipation, bowel dilation and rupture.  Severe constipation with prolonged period of time without BM.  - Recheck KUB after bowel cleanout  - Continue upper motor neuron neurogenic bowel program with senna 2 tablets q. noon, Colace 200 mg twice daily and Magic bullet suppository OH daily and digital stimulation  - last BM  6/22      orthostatic hypotension, improving   Because of significant autonomic dysfunction associated with some cases of AIDP, the patient is at high risk for orthostatic hypotension.  Goal of SBP greater than 100.  -  Mechanical compression with teds and Tubi  and abd binder used prior to out of bed  - Continue midodrine 10 mg 3 times daily, BP stable as of 6/23      Essential hypertension: SBP of greater than 200 on presentation to acute hospital.  -126 in the last 24 hours  - Lisinopril 20 mg daily, discontinued lisinopril secondary to orthostatic hypotension as above, may restart as an outpatient with recovery of autonomic nervous system     Dyslipidemia: Total cholesterol 137, HDL 44, LDL 75  -Lipitor 10 mg every Monday Wednesday Friday     Dysphagia: Concern for swallow dysfunction in the setting of certain variants of AIDP  - Consult speech therapy for swallow evaluation.  Cleared by speech therapy     Skin  Due to the sensory impairments following AIDP, skin protection principles are of the utmost importance.   - every two-hour turning pattern overnight while the patient is in bed. When the patient is out of bed, an every 15 minute repositioning or weight  shifting pattern will be utilized in order to help train the patient for long-term skin protection. We will also discuss skin monitoring and its importance with the patient and the caregivers.     Pain:  Postoperative pain:  - Oxycodone 5-10 mg every 3 hours as needed moderate-severe pain, is using approx 2-3 times per day   -Tylenol 1000 mg 3 times daily, AST 28, ALT 30, alk phos 76, within normal limits, continue current dose of scheduled Tylenol  - Lidocaine patch x2 on either side of incision, on for 12 hours off for 12 hours  - Continue tramadol 50 mg every 6 hours as needed for pain     Neuropathic pain: Burning and tingling in all 4 extremities  -Decrease gabapentin to 600 mg 3 times a day, concern for fatigue side effect  - Increase Lyrica to 75 mg 3 times daily, pain improved 6/23    - Celebrex 200 mg twice daily  - Tramadol 50 mg every 6 hours as needed pain  -May benefit from a nonpharmaceutical modalities such as TENS units, compression, ice, heat, will discuss with therapy team.     Vitamin D deficiency: High risk of vitamin D deficiency in this population, goal of vitamin D level greater than 30, has been linked to improvement and central nervous system recovery  - Vitamin D level of 19 on 6/18  - Cholecalciferol 2000 units daily     Insomnia: Significant difficulty during acute hospitalization  - Restarted on Restoril 15 mg nightly  - Trazodone 50 mg nightly as needed insomnia     DVT prophylaxis  In the setting of AIDP, the patient is at high risk of deep venous thrombosis given decreased mobility and alteration to autonomic nervous system.   -DC Lovenox  - Eliquis 5 mg twice daily for prophylaxis    Total time:  37 minutes.  I spent greater than 50% of the time for patient care and coordination on this date, including unit/floor time, and face-to-face time with the patient as per assessment and plan above.    Lynette Dudley D.O.     Physical Medicine and Rehabilitation

## 2023-06-23 NOTE — THERAPY
"Occupational Therapy  Daily Treatment     Patient Name: Bull Banegas  Age:  67 y.o., Sex:  male  Medical Record #: [id number]  Today's Date: 6/23/2023     Precautions  Precautions: Fall Risk  Comments: lo, blurred vision         Subjective    Pt seen 2x this day and agreeable to both sessions. \"I just wish I could sleep more.\" Pt reporting several poor nights of sleep due to timing of bowel program.      Objective       06/23/23 0831 06/23/23 1031   OT Charge Group   OT Self Care / ADL (Units) 4  --    OT Therapeutic Exercise (Units)  --  2   OT Total Time Spent   OT Individual Total Time Spent (Mins) 60 30   Cognition    Level of Consciousness Alert Alert   Functional Level of Assist   Grooming Modified Independent;Seated  --    Grooming Description Seated in wheelchair at sink  (seated at sink for shave)  --    Toileting Total Assist x 2  --    Toileting Description Assist for hygiene;Assist to pull pants up;Assist to pull pants down;Assist for standing balance;Grab bar;Increased time;Initial preparation for task;Set-up of equipment;Supervision for safety;Verbal cueing  (pt with BM incontinence and required Mod A x1 for standing balance and Second person for hygiene and clothing management using GB for balance on L-side)  --    Bed, Chair, Wheelchair Transfer Moderate Assist  --    Bed Chair Wheelchair Transfer Description Adaptive equipment;Increased time;Initial preparation for task;Set-up of equipment;Supervision for safety;Verbal cueing;Slideboard transfer from bed to wheelchair  (Mod A x1 SB EOB>w/c)  --    Toilet Transfers Moderate Assist  --    Toilet Transfer Description Grab bar;Adaptive equipment;Set-up of equipment;Supervision for safety;Verbal cueing;Increased time  (SQPT w/c>bariatric drop arm commode over toilet with cues for hand/foot placement and using of GB with L-hand. Min A >toilet, Mod A >w/c.)  --    Sitting Upper Body Exercises   Sitting Upper Body Exercises  --  Yes   Chest Press  --  2 " sets of 15;Bilateral;Weight (See Comments for lbs)   Bilateral Row  --  2 sets of 15;Bilateral;Weight (See Comments for lbs)   Bicep Curls  --  2 sets of 15;Bilateral;Weight (See Comments for lbs)   Elbow Extension  --  2 sets of 15;Bilateral;Weight (See Comments for lbs)   Comments  --  using 3lb hand weights   Interdisciplinary Plan of Care Collaboration   IDT Collaboration with  Certified Nursing Assistant  --    Patient Position at End of Therapy Seated;Chair Alarm On;Self Releasing Lap Belt Applied;Call Light within Reach;Tray Table within Reach Seated;Chair Alarm On;Tray Table within Reach;Call Light within Reach   Collaboration Comments assisted with toileting  --          Assessment    Pt continues to require education and training for energy conservation and pacing r/t GB. Pt demonstrated emerging attention to body with initiating RB. His level of transfers are becoming more consistent between days, though still limited to SB at this time.  Strengths: Able to follow instructions, Alert and oriented, Effective communication skills, Independent prior level of function, Motivated for self care and independence, Pleasant and cooperative, Willingly participates in therapeutic activities    Plan    UB strengthening as limited by fatigue, functional transfers progressing to standing from SB, ADLs with AE as needed, IADLs, incorporate FM skills for BUE    Occupational Therapy Goals (Active)       Problem: Dressing       Dates: Start:  06/18/23         Goal: STG-Within one week, patient will dress LB with mod A overall using AE/DME as needed.       Dates: Start:  06/18/23               Problem: Functional Transfers       Dates: Start:  06/18/23         Goal: STG-Within one week, patient will transfer to toilet with max A x1 overall using AE/DME as needed.       Dates: Start:  06/18/23               Problem: OT Long Term Goals       Dates: Start:  06/18/23         Goal: LTG-By discharge, patient will complete basic  self care tasks with min-supervision overall using AE/DME as needed.       Dates: Start:  06/18/23            Goal: LTG-By discharge, patient will perform bathroom transfers with SBA overall using AE/DME as needed.       Dates: Start:  06/18/23               Problem: Toileting       Dates: Start:  06/18/23         Goal: STG-Within one week, patient will complete toileting tasks with Max A overall using AE/DME as needed       Dates: Start:  06/18/23

## 2023-06-23 NOTE — CARE PLAN
Problem: Self Care  Goal: Patient will have the ability to perform ADLs independently or with assistance  Outcome: Progressing  Note: Patient able to perform regular activities this shift.  Pain controlled this shift.  Pain management includes PRN pain meds as well as non-pharmacological measures such as emotional support, rest, and repositioning.  Will continue to monitor.    The patient is Stable - Low risk of patient condition declining or worsening    Shift Goals  Clinical Goals: Safety    Progress made toward(s) clinical / shift goals:  pt participates with therapy and is cooperative with nursing    Patient is not progressing towards the following goals:

## 2023-06-24 ENCOUNTER — APPOINTMENT (OUTPATIENT)
Dept: OCCUPATIONAL THERAPY | Facility: REHABILITATION | Age: 68
DRG: 095 | End: 2023-06-24
Attending: PHYSICAL MEDICINE & REHABILITATION
Payer: MEDICARE

## 2023-06-24 ENCOUNTER — APPOINTMENT (OUTPATIENT)
Dept: OCCUPATIONAL THERAPY | Facility: REHABILITATION | Age: 68
End: 2023-06-24
Attending: PHYSICAL MEDICINE & REHABILITATION
Payer: COMMERCIAL

## 2023-06-24 ENCOUNTER — APPOINTMENT (OUTPATIENT)
Dept: PHYSICAL THERAPY | Facility: REHABILITATION | Age: 68
DRG: 095 | End: 2023-06-24
Attending: PHYSICAL MEDICINE & REHABILITATION
Payer: MEDICARE

## 2023-06-24 PROCEDURE — A9270 NON-COVERED ITEM OR SERVICE: HCPCS | Performed by: PHYSICAL MEDICINE & REHABILITATION

## 2023-06-24 PROCEDURE — 97110 THERAPEUTIC EXERCISES: CPT

## 2023-06-24 PROCEDURE — 700102 HCHG RX REV CODE 250 W/ 637 OVERRIDE(OP): Performed by: PHYSICAL MEDICINE & REHABILITATION

## 2023-06-24 PROCEDURE — A9270 NON-COVERED ITEM OR SERVICE: HCPCS | Performed by: HOSPITALIST

## 2023-06-24 PROCEDURE — 700105 HCHG RX REV CODE 258: Performed by: PHYSICAL MEDICINE & REHABILITATION

## 2023-06-24 PROCEDURE — 94150 VITAL CAPACITY TEST: CPT

## 2023-06-24 PROCEDURE — 770010 HCHG ROOM/CARE - REHAB SEMI PRIVAT*

## 2023-06-24 PROCEDURE — 99231 SBSQ HOSP IP/OBS SF/LOW 25: CPT | Performed by: HOSPITALIST

## 2023-06-24 PROCEDURE — 94760 N-INVAS EAR/PLS OXIMETRY 1: CPT

## 2023-06-24 PROCEDURE — 97535 SELF CARE MNGMENT TRAINING: CPT

## 2023-06-24 PROCEDURE — 700102 HCHG RX REV CODE 250 W/ 637 OVERRIDE(OP): Performed by: HOSPITALIST

## 2023-06-24 PROCEDURE — 700111 HCHG RX REV CODE 636 W/ 250 OVERRIDE (IP): Performed by: PHYSICAL MEDICINE & REHABILITATION

## 2023-06-24 PROCEDURE — 97530 THERAPEUTIC ACTIVITIES: CPT

## 2023-06-24 RX ORDER — SODIUM CHLORIDE 1 G/1
1 TABLET ORAL 2 TIMES DAILY WITH MEALS
Status: DISCONTINUED | OUTPATIENT
Start: 2023-06-24 | End: 2023-06-27

## 2023-06-24 RX ADMIN — GABAPENTIN 600 MG: 300 CAPSULE ORAL at 14:06

## 2023-06-24 RX ADMIN — GABAPENTIN 600 MG: 300 CAPSULE ORAL at 20:50

## 2023-06-24 RX ADMIN — SENNOSIDES 17.2 MG: 8.6 TABLET, FILM COATED ORAL at 12:05

## 2023-06-24 RX ADMIN — MENTHOL, METHYL SALICYLATE: 10; 15 CREAM TOPICAL at 20:53

## 2023-06-24 RX ADMIN — SODIUM CHLORIDE 1 G: 1 TABLET ORAL at 17:24

## 2023-06-24 RX ADMIN — SODIUM CHLORIDE 1 G: 1 TABLET ORAL at 09:13

## 2023-06-24 RX ADMIN — Medication 6 MG: at 20:50

## 2023-06-24 RX ADMIN — OXYCODONE HYDROCHLORIDE 10 MG: 10 TABLET ORAL at 00:40

## 2023-06-24 RX ADMIN — MULTIPLE VITAMINS W/ MINERALS TAB 1 TABLET: TAB at 12:04

## 2023-06-24 RX ADMIN — PREGABALIN 75 MG: 75 CAPSULE ORAL at 09:12

## 2023-06-24 RX ADMIN — OMEPRAZOLE 20 MG: 20 CAPSULE, DELAYED RELEASE ORAL at 09:12

## 2023-06-24 RX ADMIN — Medication 2000 UNITS: at 09:11

## 2023-06-24 RX ADMIN — CEFTRIAXONE SODIUM 1000 MG: 1 INJECTION, POWDER, FOR SOLUTION INTRAMUSCULAR; INTRAVENOUS at 17:32

## 2023-06-24 RX ADMIN — TAMSULOSIN HYDROCHLORIDE 0.4 MG: 0.4 CAPSULE ORAL at 17:24

## 2023-06-24 RX ADMIN — TEMAZEPAM 15 MG: 15 CAPSULE ORAL at 20:50

## 2023-06-24 RX ADMIN — PREGABALIN 75 MG: 75 CAPSULE ORAL at 20:50

## 2023-06-24 RX ADMIN — GABAPENTIN 600 MG: 300 CAPSULE ORAL at 09:11

## 2023-06-24 RX ADMIN — ASPIRIN 81 MG CHEWABLE TABLET 81 MG: 81 TABLET CHEWABLE at 09:12

## 2023-06-24 RX ADMIN — APIXABAN 5 MG: 5 TABLET, FILM COATED ORAL at 20:50

## 2023-06-24 RX ADMIN — DOCUSATE SODIUM 200 MG: 100 CAPSULE, LIQUID FILLED ORAL at 09:12

## 2023-06-24 RX ADMIN — DOCUSATE SODIUM 200 MG: 100 CAPSULE, LIQUID FILLED ORAL at 20:50

## 2023-06-24 RX ADMIN — APIXABAN 5 MG: 5 TABLET, FILM COATED ORAL at 09:12

## 2023-06-24 RX ADMIN — PREGABALIN 75 MG: 75 CAPSULE ORAL at 14:06

## 2023-06-24 RX ADMIN — MIDODRINE HYDROCHLORIDE 10 MG: 10 TABLET ORAL at 12:04

## 2023-06-24 RX ADMIN — MIDODRINE HYDROCHLORIDE 10 MG: 10 TABLET ORAL at 17:24

## 2023-06-24 RX ADMIN — MIDODRINE HYDROCHLORIDE 10 MG: 10 TABLET ORAL at 09:12

## 2023-06-24 ASSESSMENT — ACTIVITIES OF DAILY LIVING (ADL)
BED_CHAIR_WHEELCHAIR_TRANSFER_DESCRIPTION: INCREASED TIME;INITIAL PREPARATION FOR TASK;SET-UP OF EQUIPMENT;SQUAT PIVOT TRANSFER TO WHEELCHAIR;SUPERVISION FOR SAFETY;VERBAL CUEING
TOILETING_LEVEL_OF_ASSIST_DESCRIPTION: ASSIST TO PULL PANTS UP;ASSIST TO PULL PANTS DOWN;ASSIST FOR STANDING BALANCE;GRAB BAR;INCREASED TIME;INITIAL PREPARATION FOR TASK;SET-UP OF EQUIPMENT;SUPERVISION FOR SAFETY;VERBAL CUEING
TOILETING_LEVEL_OF_ASSIST_DESCRIPTION: ASSIST TO PULL PANTS UP;ASSIST FOR STANDING BALANCE;GRAB BAR;INCREASED TIME;INITIAL PREPARATION FOR TASK;SET-UP OF EQUIPMENT;SUPERVISION FOR SAFETY;VERBAL CUEING
BED_CHAIR_WHEELCHAIR_TRANSFER_DESCRIPTION: INITIAL PREPARATION FOR TASK;SET-UP OF EQUIPMENT;SUPERVISION FOR SAFETY
BED_CHAIR_WHEELCHAIR_TRANSFER_DESCRIPTION: SQUAT PIVOT TRANSFER TO WHEELCHAIR;SUPERVISION FOR SAFETY;SET-UP OF EQUIPMENT
TOILET_TRANSFER_DESCRIPTION: GRAB BAR;INCREASED TIME;INITIAL PREPARATION FOR TASK;SET-UP OF EQUIPMENT;SUPERVISION FOR SAFETY;VERBAL CUEING

## 2023-06-24 ASSESSMENT — ENCOUNTER SYMPTOMS
COUGH: 0
ABDOMINAL PAIN: 0
VOMITING: 0
POLYDIPSIA: 0
FEVER: 0
CHILLS: 0
BRUISES/BLEEDS EASILY: 0
NAUSEA: 0
EYES NEGATIVE: 1
PALPITATIONS: 0
SHORTNESS OF BREATH: 0

## 2023-06-24 ASSESSMENT — PULMONARY FUNCTION TESTS: FVC: 3.08

## 2023-06-24 NOTE — THERAPY
Occupational Therapy  Daily Treatment     Patient Name: Bull Banegas  Age:  67 y.o., Sex:  male  Medical Record #: [id number]  Today's Date: 6/24/2023     Precautions  Precautions: Fall Risk  Comments: Blurred vision         Subjective    Pt seen 2x this day and agreeable to both sessions.     Objective       06/24/23 1101 06/24/23 1301   OT Charge Group   OT Self Care / ADL (Units) 2  --    OT Therapy Activity (Units) 1 2   OT Therapeutic Exercise (Units)  --  1   OT Total Time Spent   OT Individual Total Time Spent (Mins) 45 45   Pain   Intervention Declines Declines   Cognition    Level of Consciousness Alert Alert   Functional Level of Assist   Grooming Modified Independent;Seated  --    Grooming Description Seated in wheelchair at sink  (for hand washing)  --    Toileting Total Assist x 2  --    Toileting Description Assist to pull pants up;Assist for standing balance;Grab bar;Increased time;Initial preparation for task;Set-up of equipment;Supervision for safety;Verbal cueing  (Pt able to complete hygiene seated on toilet following BM. Mod A for standing balance with GB with standing 2x for all clothing management, and eventual requiring of second person due to fatigue.)  --    Bed, Chair, Wheelchair Transfer Contact Guard Assist Standby Assist   Bed Chair Wheelchair Transfer Description Squat pivot transfer to wheelchair;Supervision for safety;Set-up of equipment Initial preparation for task;Set-up of equipment;Supervision for safety   Toilet Transfers Moderate Assist  --    Toilet Transfer Description Verbal cueing;Supervision for safety;Set-up of equipment;Increased time;Grab bar  (Min A SQPT w/c>padded drop arm commode over toilet, Mod A SQPT commode>w/c)  --    Sitting Upper Body Exercises   Sitting Upper Body Exercises  --  Yes   Chest Press  --  2 sets of 15;Bilateral;Medium Resistance Theraband   Front Arm Raise  --  1 set of 10;Bilateral;Medium Resistance Theraband   Bilateral Row  --  2 sets of  "15;Bilateral;Medium Resistance Theraband   Bicep Curls  --  2 sets of 15;Bilateral;Medium Resistance Theraband   Comments  --  3lb dowel with moderate resistance band   Interdisciplinary Plan of Care Collaboration   IDT Collaboration with  Family / Caregiver Family / Caregiver   Patient Position at End of Therapy Seated;Chair Alarm On;Family / Friend in Room Seated;Chair Alarm On;Family / Friend in Room   Collaboration Comments Wife present for observational training Wife present provided pictures of home setup and education on GB                                                                                 Detailed discussion of pt's home setup and adequate doorway widths to accommodate a w/c, if needed. Per the measurements pictured, pt may be required to lower beds to allow for ease and safety of transfers.    Assessment    Pt tolerated both sessions with cues for pacing and continued education on energy conservation with wife present. Based on pictures provided, recommendations to remove bed frame to lower bed were made, as bed sits at 30\" high and pt is currently transferring at 19\" from w/c to hospital bed. This may change over the course of his stay depending on progress made, but both pt and wife were agreeable to remove frame if needed. Both also agreeable to installing GB in shower if needed for safety. As pictured, they already have a shower chair, and pt was previously walking into shower with FWW and passing it off to his wife.  Strengths: Able to follow instructions, Alert and oriented, Effective communication skills, Independent prior level of function, Motivated for self care and independence, Pleasant and cooperative, Willingly participates in therapeutic activities    Plan    UB strengthening as limited by fatigue, functional transfers progressing to standing from SB, ADLs with AE as needed, IADLs, incorporate FM skills for BUE    Occupational Therapy Goals (Active)       Problem: Dressing       " Dates: Start:  06/18/23         Goal: STG-Within one week, patient will dress LB with mod A overall using AE/DME as needed.       Dates: Start:  06/18/23               Problem: Functional Transfers       Dates: Start:  06/18/23         Goal: STG-Within one week, patient will transfer to toilet with max A x1 overall using AE/DME as needed.       Dates: Start:  06/18/23               Problem: OT Long Term Goals       Dates: Start:  06/18/23         Goal: LTG-By discharge, patient will complete basic self care tasks with min-supervision overall using AE/DME as needed.       Dates: Start:  06/18/23            Goal: LTG-By discharge, patient will perform bathroom transfers with SBA overall using AE/DME as needed.       Dates: Start:  06/18/23               Problem: Toileting       Dates: Start:  06/18/23         Goal: STG-Within one week, patient will complete toileting tasks with Max A overall using AE/DME as needed       Dates: Start:  06/18/23

## 2023-06-24 NOTE — PROGRESS NOTES
Hospital Medicine Daily Progress Note        Chief Complaint:  Hypertension  Hyponatremia    Interval History:  No 24 hour clinical changes.    Review of Systems  Review of Systems   Constitutional:  Negative for chills and fever.   HENT: Negative.     Eyes: Negative.    Respiratory:  Negative for cough and shortness of breath.    Cardiovascular:  Negative for chest pain and palpitations.   Gastrointestinal:  Negative for abdominal pain, nausea and vomiting.   Skin:  Negative for itching and rash.   Endo/Heme/Allergies:  Negative for polydipsia. Does not bruise/bleed easily.        Physical Exam  Temp:  [36.7 °C (98.1 °F)-37.4 °C (99.4 °F)] 36.7 °C (98.1 °F)  Pulse:  [] 100  Resp:  [14-18] 18  BP: (108-125)/(70-78) 125/78  SpO2:  [92 %-94 %] 93 %    Physical Exam  Vitals reviewed.   Constitutional:       General: He is not in acute distress.     Appearance: Normal appearance. He is not ill-appearing.   HENT:      Head: Normocephalic and atraumatic.      Right Ear: External ear normal.      Left Ear: External ear normal.      Nose: Nose normal.      Mouth/Throat:      Pharynx: Oropharynx is clear.   Eyes:      General:         Right eye: No discharge.         Left eye: No discharge.      Extraocular Movements: Extraocular movements intact.      Conjunctiva/sclera: Conjunctivae normal.   Cardiovascular:      Rate and Rhythm: Normal rate and regular rhythm.   Pulmonary:      Effort: Pulmonary effort is normal. No respiratory distress.      Breath sounds: Normal breath sounds. No wheezing.   Abdominal:      General: Bowel sounds are normal. There is no distension.      Palpations: Abdomen is soft.      Tenderness: There is no abdominal tenderness.   Musculoskeletal:      Cervical back: Normal range of motion and neck supple.      Right lower leg: No edema.      Left lower leg: No edema.   Skin:     General: Skin is warm and dry.   Neurological:      Mental Status: He is alert and oriented to person, place, and  time.         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[facility])  Assessment & Plan  Had progressive BLE weakness  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[facility]  S/P IVIG x 5 doses  Ongoing management per Physiatry    Dyslipidemia  Assessment & Plan  On Lipitor    Vitamin D deficiency  Assessment & Plan  Vit D level 19  Continue supplementation    UTI (urinary tract infection)  Assessment & Plan  Reportedly had +UCx at [facility]  On Rocephin per Physiatry    Essential hypertension  Assessment & Plan  On Midodrine and Flomax per Physiatry  Observe blood pressure trends    Anemia  Assessment & Plan  Has normocytic indices  Fe 96  Now off Celebrex  Continue to follow H/H    Hyponatremia  Assessment & Plan  Was on fluid restriction, Lasix, and NaCl tabs  Off Lisinopril per Dr. Ruelas  Na+ levels now normalized  Now off Lasix and fluid restriction  Continue to slowly taper off NaCl      Full Code

## 2023-06-24 NOTE — THERAPY
"Physical Therapy   Daily Treatment     Patient Name: Bull Banegas  Age:  67 y.o., Sex:  male  Medical Record #: [id number]  Today's Date: 6/24/2023     Precautions  Precautions: Fall Risk  Comments: Blurred vision    Subjective    Patient asleep when approached for therapy. Agreeable to participate but reports significant fatigue. \"There is no way I can get out of bed right now, I barely made it back in after my last therapy.\" Agreeable to supine exercise.      Objective       06/24/23 1501   PT Charge Group   PT Therapeutic Exercise (Units) 2   PT Total Time Spent   PT Individual Total Time Spent (Mins) 30   Supine Lower Body Exercise   Hip Abduction Hook Lying;2 sets of 10;Bilateral;Light Resistance Theraband   Hip Adduction  2 sets of 10;Bilateral  (hooklying; isometric ball squeeze)   Short Arc Quad 3 sets of 10;Bilateral   Ankle Pumps Bilateral;2 sets of 15   Gluteal Isometrics 2 sets of 10;Bilateral  (5 second hold)   Interdisciplinary Plan of Care Collaboration   IDT Collaboration with  Occupational Therapist   Patient Position at End of Therapy In Bed;Bed Alarm On;Call Light within Reach;Tray Table within Reach;Phone within Reach   Collaboration Comments Collaborated with primary OT re: patient's increase in fatigue this afternoon         Assessment    Patient with significant increase in fatigue this afternoon, requiring increased time and rest breaks to complete supine exercise. May benefit from continued education re: energy conservation and pacing throughout the day. Despite increased fatigue, patient remains motivated to participate.     Strengths: Able to follow instructions, Alert and oriented, Effective communication skills, Good carryover of learning, Independent prior level of function, Making steady progress towards goals, Motivated for self care and independence, Pleasant and cooperative, Supportive family, Willingly participates in therapeutic activities  Barriers: Decreased endurance, Fatigue, " Generalized weakness, Home accessibility, Impaired activity tolerance, Impaired balance, Limited mobility (Paraplegia)    Plan    B LE therex with caution not to overfatigue  Static standing tolerance progressing to pregait with WC follow in // bars as appropriate  Seated and supine trunk NRE  Squat pivot transfer training  B LE motomed vs Nustep     Passport items to be completed:  Get in/out of bed safely, in/out of a vehicle, safely use mobility device, walk or wheel around home/community, navigate up and down stairs, show how to get up/down from the ground, ensure home is accessible, demonstrate HEP, complete caregiver training       Physical Therapy Problems (Active)       Problem: Balance       Dates: Start:  06/18/23         Goal: STG-Within one week, patient will maintain static standing c/ BUE support >/= 2 minutes to support participation in ADLs.       Dates: Start:  06/18/23         Goal Note filed on 06/22/23 0942 by Colton Guadalupe, PT       Limited to 1 minute in parallel bars currently                 Problem: Mobility       Dates: Start:  06/18/23         Goal: STG-Within one week, patient will demonstrate ability to manage wheelchair leg rests, arm rests, and brakes in preparation for transfers at [place] or better.        Dates: Start:  06/18/23         Goal Note filed on 06/22/23 0942 by Colton Guadalupe, PT       Cathleen for leg rest management                 Problem: Mobility Transfers       Dates: Start:  06/18/23         Goal: STG-Within one week, patient will move supine to sit at SBA or better and use of bed rail.        Dates: Start:  06/18/23         Goal Note filed on 06/22/23 0942 by Colton Guadalupe, PT       Fluctuates min-modA                 Problem: PT-Long Term Goals       Dates: Start:  06/18/23         Goal: LTG-By discharge, patient will propel wheelchair >/= 300' and navigate inclines up to 3% (curb cuts or equivalent) c/ SBA or better.        Dates: Start:  06/18/23            Goal:  LTG-By discharge, patient will ambulate >/= 50' c/ ModA or better and FWW.        Dates: Start:  06/18/23            Goal: LTG-By discharge, patient will transfer one surface to another c/ Naila.        Dates: Start:  06/18/23            Goal: LTG-By discharge, patient will transfer in/out of a car c/ ModA or better.        Dates: Start:  06/18/23

## 2023-06-24 NOTE — THERAPY
Occupational Therapy  Daily Treatment     Patient Name: Bull Banegas  Age:  67 y.o., Sex:  male  Medical Record #: [id number]  Today's Date: 6/24/2023     Precautions  Precautions: Fall Risk  Comments: Blurred vision    Subjective    Pt pleasant and motivated to participate in OT  Pt impressed by sock aide     Objective     06/24/23 0931   OT Charge Group   Charges Yes   OT Self Care / ADL (Units) 1   OT Therapy Activity (Units) 1   OT Total Time Spent   OT Individual Total Time Spent (Mins) 30   Precautions   Precautions Fall Risk   Comments Blurred vision   Vitals   O2 Delivery Device None - Room Air   Pain   Intervention Declines   Cognition    Level of Consciousness Alert   ABS (Agitated Behavior Scale)   Agitated Behavior Scale Performed No   Sleep/Wake Cycle   Sleep & Rest Asleep   Functional Level of Assist   Bathing Standby Assist   Bathing Description Increased time;Initial preparation for task;Set-up of equipment;Supervision for safety  (Pt used bath wipes to clean upper thighs, chest, abdomen, and bilat armpits)   Upper Body Dressing Stand by Assist   Upper Body Dressing Description Increased time;Initial preparation for task;Supervision for safety   Lower Body Dressing Maximal Assist   Lower Body Dressing Description Sock aid;Increased time;Initial preparation for task;Set-up of equipment;Supervision for safety;Verbal cueing  (Educated pt on use of sock aide for increased independence in LB dressing. Pt able to demo back w/ mod A for loading socks and v/c for techniques. Pt was max A to change brief.)   Toileting Total Assist x 2   Toileting Description Assist to pull pants up;Assist to pull pants down;Assist for standing balance;Grab bar;Increased time;Initial preparation for task;Set-up of equipment;Supervision for safety;Verbal cueing  (Therapy Tech assisted w/ standing balance, while therapist managed briefs; pt urinated)   Bed, Chair, Wheelchair Transfer Minimal Assist   Bed Chair Wheelchair Transfer  Description Increased time;Initial preparation for task;Set-up of equipment;Squat pivot transfer to wheelchair;Supervision for safety;Verbal cueing   Toilet Transfers Minimal Assist   Toilet Transfer Description Grab bar;Increased time;Initial preparation for task;Set-up of equipment;Supervision for safety;Verbal cueing   Bed Mobility    Supine to Sit Standby Assist   Sit to Supine Standby Assist   Scooting Standby Assist   Rolling Standby Assist   Interdisciplinary Plan of Care Collaboration   IDT Collaboration with  Therapy Tech   Patient Position at End of Therapy In Bed;Bed Alarm On;Call Light within Reach;Tray Table within Reach;Phone within Reach   Collaboration Comments Therapy Tech assist in pt bathroom for standing balance   Strengths & Barriers   Strengths Able to follow instructions;Alert and oriented;Effective communication skills;Independent prior level of function;Motivated for self care and independence;Pleasant and cooperative;Willingly participates in therapeutic activities     Assessment    Pt seen for tx, addressing toileting, full body dressing, and functional transfers. Pt tolerated activity well w/ decreased assist in functional transfers. Pt is progressing towards goals. Continue OT POC.    Strengths: Able to follow instructions, Alert and oriented, Effective communication skills, Independent prior level of function, Motivated for self care and independence, Pleasant and cooperative, Willingly participates in therapeutic activities    Plan    UB strengthening as able  ADLs w/ AE  FM neuro re-ed  Functional transfers    Passport items to be completed:  Perform bathroom transfers, complete dressing, complete feeding, get ready for the day, prepare a simple meal, participate in household tasks, adapt home for safety needs, demonstrate home exercise program, complete caregiver training     Occupational Therapy Goals (Active)       Problem: Dressing       Dates: Start:  06/18/23         Goal:  STG-Within one week, patient will dress LB with mod A overall using AE/DME as needed.       Dates: Start:  06/18/23               Problem: Functional Transfers       Dates: Start:  06/18/23         Goal: STG-Within one week, patient will transfer to toilet with max A x1 overall using AE/DME as needed.       Dates: Start:  06/18/23               Problem: OT Long Term Goals       Dates: Start:  06/18/23         Goal: LTG-By discharge, patient will complete basic self care tasks with min-supervision overall using AE/DME as needed.       Dates: Start:  06/18/23            Goal: LTG-By discharge, patient will perform bathroom transfers with SBA overall using AE/DME as needed.       Dates: Start:  06/18/23               Problem: Toileting       Dates: Start:  06/18/23         Goal: STG-Within one week, patient will complete toileting tasks with Max A overall using AE/DME as needed       Dates: Start:  06/18/23

## 2023-06-24 NOTE — CARE PLAN
The patient is Watcher - Medium risk of patient condition declining or worsening    Shift Goals  Clinical Goals: Safety    Progress made toward(s) clinical / shift goals:    Problem: Pain - Standard  Goal: Alleviation of pain or a reduction in pain to the patient’s comfort goal  Note: Patient able to verbalize pain level and verbalize an acceptable level of pain. Oxycodone 10 mg PO given for back pain with relief.      Problem: Infection  Goal: Patient will remain free from infection  Outcome: Progressing  Note: Patient remains free from s/s infection; afebrile.  Will continue to monitor.      Refused Dulcolax Suppository despite education. Patient stated he had a few BM today, and had large ones. Unable to void for 6 hrs, ICP performed. Sleeping off and on all night.

## 2023-06-24 NOTE — FLOWSHEET NOTE
06/24/23 1020   Events/Summary/Plan   Events/Summary/Plan FVC/IS   Vital Signs   Pulse 100   Respiration 18   Pulse Oximetry 93 %   $ Pulse Oximetry (Spot Check) Yes   Respiratory Assessment   Respiratory Pattern Within Normal Limits   Level of Consciousness Alert   Breath Sounds   RUL Breath Sounds Diminished   RML Breath Sounds Diminished   RLL Breath Sounds Diminished   JC Breath Sounds Diminished   LLL Breath Sounds Diminished   Oxygen   O2 Delivery Device Room air w/o2 available

## 2023-06-24 NOTE — FLOWSHEET NOTE
"   06/24/23 1022   Incentive Spirometry Treatment   Height 1.727 m (5' 7.99\")   Predicted Inspiratory Capacity 2700   60% of predicted IS capacity 1620 mL   40% of predicted IS capacity 1080 mL   Incentive Spirometer Volume 2750 mL   Pulmonary Function Group   $ FVC / Vital Capacity (liters)  3.08  (69% of predicted. Sitting up in bed)       "

## 2023-06-25 ENCOUNTER — APPOINTMENT (OUTPATIENT)
Dept: OCCUPATIONAL THERAPY | Facility: REHABILITATION | Age: 68
DRG: 095 | End: 2023-06-25
Attending: PHYSICAL MEDICINE & REHABILITATION
Payer: MEDICARE

## 2023-06-25 ENCOUNTER — APPOINTMENT (OUTPATIENT)
Dept: PHYSICAL THERAPY | Facility: REHABILITATION | Age: 68
DRG: 095 | End: 2023-06-25
Attending: PHYSICAL MEDICINE & REHABILITATION
Payer: MEDICARE

## 2023-06-25 ENCOUNTER — APPOINTMENT (OUTPATIENT)
Dept: RADIOLOGY | Facility: REHABILITATION | Age: 68
DRG: 095 | End: 2023-06-25
Attending: HOSPITALIST
Payer: MEDICARE

## 2023-06-25 PROBLEM — H92.09 EAR DISCOMFORT: Status: ACTIVE | Noted: 2023-06-25

## 2023-06-25 LAB
ALBUMIN SERPL BCP-MCNC: 3.5 G/DL (ref 3.2–4.9)
ALBUMIN/GLOB SERPL: 1 G/DL
ALP SERPL-CCNC: 89 U/L (ref 30–99)
ALT SERPL-CCNC: 21 U/L (ref 2–50)
ANION GAP SERPL CALC-SCNC: 14 MMOL/L (ref 7–16)
ANISOCYTOSIS BLD QL SMEAR: ABNORMAL
AST SERPL-CCNC: 29 U/L (ref 12–45)
BASOPHILS # BLD AUTO: 0 % (ref 0–1.8)
BASOPHILS # BLD: 0 K/UL (ref 0–0.12)
BILIRUB SERPL-MCNC: 1 MG/DL (ref 0.1–1.5)
BUN SERPL-MCNC: 14 MG/DL (ref 8–22)
CALCIUM ALBUM COR SERPL-MCNC: 9.3 MG/DL (ref 8.5–10.5)
CALCIUM SERPL-MCNC: 8.9 MG/DL (ref 8.5–10.5)
CHLORIDE SERPL-SCNC: 99 MMOL/L (ref 96–112)
CO2 SERPL-SCNC: 22 MMOL/L (ref 20–33)
CREAT SERPL-MCNC: 0.49 MG/DL (ref 0.5–1.4)
EOSINOPHIL # BLD AUTO: 0.05 K/UL (ref 0–0.51)
EOSINOPHIL NFR BLD: 1 % (ref 0–6.9)
ERYTHROCYTE [DISTWIDTH] IN BLOOD BY AUTOMATED COUNT: 62.3 FL (ref 35.9–50)
GFR SERPLBLD CREATININE-BSD FMLA CKD-EPI: 113 ML/MIN/1.73 M 2
GLOBULIN SER CALC-MCNC: 3.4 G/DL (ref 1.9–3.5)
GLUCOSE SERPL-MCNC: 108 MG/DL (ref 65–99)
HCT VFR BLD AUTO: 32.3 % (ref 42–52)
HGB BLD-MCNC: 11 G/DL (ref 14–18)
LYMPHOCYTES # BLD AUTO: 0.41 K/UL (ref 1–4.8)
LYMPHOCYTES NFR BLD: 9 % (ref 22–41)
MACROCYTES BLD QL SMEAR: ABNORMAL
MANUAL DIFF BLD: NORMAL
MCH RBC QN AUTO: 31.9 PG (ref 27–33)
MCHC RBC AUTO-ENTMCNC: 34.1 G/DL (ref 32.3–36.5)
MCV RBC AUTO: 93.6 FL (ref 81.4–97.8)
MONOCYTES # BLD AUTO: 0.05 K/UL (ref 0–0.85)
MONOCYTES NFR BLD AUTO: 1 % (ref 0–13.4)
MORPHOLOGY BLD-IMP: NORMAL
NEUTROPHILS # BLD AUTO: 4.09 K/UL (ref 1.82–7.42)
NEUTROPHILS NFR BLD: 89 % (ref 44–72)
NRBC # BLD AUTO: 0 K/UL
NRBC BLD-RTO: 0 /100 WBC (ref 0–0.2)
PLATELET # BLD AUTO: 258 K/UL (ref 164–446)
PLATELET BLD QL SMEAR: NORMAL
PMV BLD AUTO: 9.9 FL (ref 9–12.9)
POTASSIUM SERPL-SCNC: 4.3 MMOL/L (ref 3.6–5.5)
PROCALCITONIN SERPL-MCNC: 0.42 NG/ML
PROT SERPL-MCNC: 6.9 G/DL (ref 6–8.2)
RBC # BLD AUTO: 3.45 M/UL (ref 4.7–6.1)
RBC BLD AUTO: PRESENT
SODIUM SERPL-SCNC: 135 MMOL/L (ref 135–145)
WBC # BLD AUTO: 4.6 K/UL (ref 4.8–10.8)

## 2023-06-25 PROCEDURE — 700102 HCHG RX REV CODE 250 W/ 637 OVERRIDE(OP): Performed by: HOSPITALIST

## 2023-06-25 PROCEDURE — 87040 BLOOD CULTURE FOR BACTERIA: CPT | Mod: 91

## 2023-06-25 PROCEDURE — 97535 SELF CARE MNGMENT TRAINING: CPT

## 2023-06-25 PROCEDURE — 85025 COMPLETE CBC W/AUTO DIFF WBC: CPT

## 2023-06-25 PROCEDURE — 81003 URINALYSIS AUTO W/O SCOPE: CPT

## 2023-06-25 PROCEDURE — 97110 THERAPEUTIC EXERCISES: CPT

## 2023-06-25 PROCEDURE — 97530 THERAPEUTIC ACTIVITIES: CPT

## 2023-06-25 PROCEDURE — 770010 HCHG ROOM/CARE - REHAB SEMI PRIVAT*

## 2023-06-25 PROCEDURE — A9270 NON-COVERED ITEM OR SERVICE: HCPCS | Performed by: PHYSICAL MEDICINE & REHABILITATION

## 2023-06-25 PROCEDURE — 80053 COMPREHEN METABOLIC PANEL: CPT

## 2023-06-25 PROCEDURE — 94150 VITAL CAPACITY TEST: CPT

## 2023-06-25 PROCEDURE — A9270 NON-COVERED ITEM OR SERVICE: HCPCS | Performed by: HOSPITALIST

## 2023-06-25 PROCEDURE — 99232 SBSQ HOSP IP/OBS MODERATE 35: CPT | Performed by: PHYSICAL MEDICINE & REHABILITATION

## 2023-06-25 PROCEDURE — 700102 HCHG RX REV CODE 250 W/ 637 OVERRIDE(OP): Performed by: PHYSICAL MEDICINE & REHABILITATION

## 2023-06-25 PROCEDURE — 84145 PROCALCITONIN (PCT): CPT

## 2023-06-25 PROCEDURE — 36415 COLL VENOUS BLD VENIPUNCTURE: CPT

## 2023-06-25 PROCEDURE — 85007 BL SMEAR W/DIFF WBC COUNT: CPT

## 2023-06-25 PROCEDURE — 94760 N-INVAS EAR/PLS OXIMETRY 1: CPT

## 2023-06-25 PROCEDURE — 99232 SBSQ HOSP IP/OBS MODERATE 35: CPT | Performed by: HOSPITALIST

## 2023-06-25 PROCEDURE — 71045 X-RAY EXAM CHEST 1 VIEW: CPT

## 2023-06-25 RX ORDER — DOCUSATE SODIUM 50 MG/5ML
50 LIQUID ORAL ONCE
Status: COMPLETED | OUTPATIENT
Start: 2023-06-25 | End: 2023-06-25

## 2023-06-25 RX ORDER — SODIUM CHLORIDE 9 MG/ML
50 INJECTION, SOLUTION INTRAMUSCULAR; INTRAVENOUS; SUBCUTANEOUS
Status: DISCONTINUED | OUTPATIENT
Start: 2023-06-25 | End: 2023-07-10 | Stop reason: HOSPADM

## 2023-06-25 RX ADMIN — MIDODRINE HYDROCHLORIDE 10 MG: 10 TABLET ORAL at 12:00

## 2023-06-25 RX ADMIN — APIXABAN 5 MG: 5 TABLET, FILM COATED ORAL at 08:38

## 2023-06-25 RX ADMIN — APIXABAN 5 MG: 5 TABLET, FILM COATED ORAL at 21:42

## 2023-06-25 RX ADMIN — PREGABALIN 75 MG: 75 CAPSULE ORAL at 15:11

## 2023-06-25 RX ADMIN — DOCUSATE SODIUM 200 MG: 100 CAPSULE, LIQUID FILLED ORAL at 08:38

## 2023-06-25 RX ADMIN — GABAPENTIN 600 MG: 300 CAPSULE ORAL at 21:38

## 2023-06-25 RX ADMIN — ASPIRIN 81 MG CHEWABLE TABLET 81 MG: 81 TABLET CHEWABLE at 08:38

## 2023-06-25 RX ADMIN — GABAPENTIN 600 MG: 300 CAPSULE ORAL at 15:11

## 2023-06-25 RX ADMIN — Medication 1000 MG: at 08:42

## 2023-06-25 RX ADMIN — Medication 6 MG: at 21:38

## 2023-06-25 RX ADMIN — SODIUM CHLORIDE 1 G: 1 TABLET ORAL at 17:02

## 2023-06-25 RX ADMIN — Medication 1000 MG: at 17:02

## 2023-06-25 RX ADMIN — DOCUSATE SODIUM 50 MG: 50 LIQUID ORAL at 15:12

## 2023-06-25 RX ADMIN — GABAPENTIN 600 MG: 300 CAPSULE ORAL at 08:38

## 2023-06-25 RX ADMIN — PREGABALIN 75 MG: 75 CAPSULE ORAL at 21:39

## 2023-06-25 RX ADMIN — MIDODRINE HYDROCHLORIDE 10 MG: 10 TABLET ORAL at 08:38

## 2023-06-25 RX ADMIN — SODIUM CHLORIDE 1 G: 1 TABLET ORAL at 08:38

## 2023-06-25 RX ADMIN — TEMAZEPAM 15 MG: 15 CAPSULE ORAL at 21:39

## 2023-06-25 RX ADMIN — TAMSULOSIN HYDROCHLORIDE 0.4 MG: 0.4 CAPSULE ORAL at 17:03

## 2023-06-25 RX ADMIN — MIDODRINE HYDROCHLORIDE 10 MG: 10 TABLET ORAL at 17:03

## 2023-06-25 RX ADMIN — DOCUSATE SODIUM 200 MG: 100 CAPSULE, LIQUID FILLED ORAL at 21:39

## 2023-06-25 RX ADMIN — Medication 2000 UNITS: at 08:38

## 2023-06-25 RX ADMIN — PREGABALIN 75 MG: 75 CAPSULE ORAL at 08:38

## 2023-06-25 RX ADMIN — SENNOSIDES 17.2 MG: 8.6 TABLET, FILM COATED ORAL at 12:00

## 2023-06-25 RX ADMIN — OMEPRAZOLE 20 MG: 20 CAPSULE, DELAYED RELEASE ORAL at 08:38

## 2023-06-25 RX ADMIN — MULTIPLE VITAMINS W/ MINERALS TAB 1 TABLET: TAB at 12:00

## 2023-06-25 ASSESSMENT — ENCOUNTER SYMPTOMS
SENSORY CHANGE: 1
VOMITING: 0
COUGH: 0
PALPITATIONS: 0
EYES NEGATIVE: 1
MUSCULOSKELETAL NEGATIVE: 1
POLYDIPSIA: 0
SHORTNESS OF BREATH: 0
NAUSEA: 0
FEVER: 0
ABDOMINAL PAIN: 0
BRUISES/BLEEDS EASILY: 0
CHILLS: 0

## 2023-06-25 ASSESSMENT — PULMONARY FUNCTION TESTS: FVC: 2.58

## 2023-06-25 ASSESSMENT — PAIN DESCRIPTION - PAIN TYPE: TYPE: ACUTE PAIN

## 2023-06-25 ASSESSMENT — FIBROSIS 4 INDEX: FIB4 SCORE: 1.09

## 2023-06-25 NOTE — PROGRESS NOTES
"Rehab Progress Note     Encounter date: 06/25/2023      Chief Complaint: urinary retention     Interval Events (Subjective)  Vitals reviewed: WNL   Labs reviewed: Hgb 10.2, stable     Nursing staff reports patient is reporting left ear pain.  He reports that he has had some left ear tenderness and pain.  Woke up cold with chills last night.  No other new symptoms other than above.    Otherwise, Does not report HA, lightheadedness, SOB, CP, abdominal pain, or changes in numbness/tingling/weakness.         Objective:  VITAL SIGNS: /72   Pulse (!) 106   Temp 37.2 °C (98.9 °F) (Oral)   Resp 16   Ht 1.727 m (5' 7.99\")   Wt 116 kg (256 lb 13.4 oz)   SpO2 96%   BMI 39.06 kg/m²     Physical Exam:  Gen: NAD, laying comfortably in bed   Head:  NC/AT, no otitis externa   Eyes/ Nose/ Mouth: PERRLA, moist mucous membranes  Cardio: RRR, good distal perfusion, warm extremities  Pulm: normal respiratory effort, CTA bilaterally   Abd: Soft NTND, negative borborygmi   Extremities:  -LE edema, NATACHA hose in place     Tone: no spasticity noted, no cogwheeling noted    Recent Results (from the past 72 hour(s))   Basic Metabolic Panel    Collection Time: 06/23/23  5:35 AM   Result Value Ref Range    Sodium 136 135 - 145 mmol/L    Potassium 4.2 3.6 - 5.5 mmol/L    Chloride 101 96 - 112 mmol/L    Co2 23 20 - 33 mmol/L    Glucose 91 65 - 99 mg/dL    Bun 14 8 - 22 mg/dL    Creatinine 0.46 (L) 0.50 - 1.40 mg/dL    Calcium 8.9 8.5 - 10.5 mg/dL    Anion Gap 12.0 7.0 - 16.0   CBC WITHOUT DIFFERENTIAL    Collection Time: 06/23/23  5:35 AM   Result Value Ref Range    WBC 7.6 4.8 - 10.8 K/uL    RBC 3.25 (L) 4.70 - 6.10 M/uL    Hemoglobin 10.2 (L) 14.0 - 18.0 g/dL    Hematocrit 31.1 (L) 42.0 - 52.0 %    MCV 95.7 81.4 - 97.8 fL    MCH 31.4 27.0 - 33.0 pg    MCHC 32.8 32.3 - 36.5 g/dL    RDW 61.2 (H) 35.9 - 50.0 fL    Platelet Count 305 164 - 446 K/uL    MPV 9.3 9.0 - 12.9 fL   IRON/TOTAL IRON BIND    Collection Time: 06/23/23  5:35 AM "   Result Value Ref Range    Iron 96 50 - 180 ug/dL    Total Iron Binding 280 250 - 450 ug/dL    Unsat Iron Binding 184 110 - 370 ug/dL    % Saturation 34 15 - 55 %   ESTIMATED GFR    Collection Time: 06/23/23  5:35 AM   Result Value Ref Range    GFR (CKD-EPI) 116 >60 mL/min/1.73 m 2       Current Facility-Administered Medications   Medication Frequency    docusate sodium (Colace) oral solution 50 mg Once    Followed by    normal saline (PF) 50 mL Once PRN    sodium chloride (SALT) tablet 1 g BID WITH MEALS    pregabalin (LYRICA) capsule 75 mg TID    gabapentin (NEURONTIN) capsule 600 mg TID    apixaban (ELIQUIS) tablet 5 mg BID    midodrine (PROAMATINE) tablet 10 mg TID WITH MEALS    traMADol (Ultram) 50 MG tablet 50 mg Q6HRS PRN    vitamin D3 (cholecalciferol) tablet 2,000 Units DAILY    menthol-methyl salicycate (MUSCLE RUB) cream BID    omeprazole (PRILOSEC) capsule 20 mg DAILY    Pharmacy Consult Request ...Pain Management Review 1 Each PHARMACY TO DOSE    acetaminophen (TYLENOL) tablet 1,000 mg Q6HRS PRN    acetaminophen (Tylenol) tablet 650 mg Q4HRS PRN    carboxymethylcellulose (REFRESH TEARS) 0.5 % ophthalmic drops 1 Drop PRN    benzocaine-menthol (Cepacol) lozenge 1 Lozenge Q2HRS PRN    mag hydrox-al hydrox-simeth (MAALOX PLUS ES or MYLANTA DS) suspension 20 mL Q2HRS PRN    ondansetron (ZOFRAN ODT) dispertab 4 mg 4X/DAY PRN    Or    ondansetron (ZOFRAN) syringe/vial injection 4 mg 4X/DAY PRN    traZODone (DESYREL) tablet 50 mg QHS PRN    sodium chloride (OCEAN) 0.65 % nasal spray 2 Spray PRN    Respiratory Therapy Consult Continuous RT    oxyCODONE immediate-release (ROXICODONE) tablet 5 mg Q3HRS PRN    Or    oxyCODONE immediate release (ROXICODONE) tablet 10 mg Q3HRS PRN    hydrALAZINE (APRESOLINE) tablet 25 mg Q8HRS PRN    therapeutic multivitamin-minerals (THERAGRAN-M) tablet 1 Tablet DAILY WITH LUNCH    docusate sodium (COLACE) capsule 200 mg BID    And    sennosides (SENOKOT) 8.6 MG tablet 17.2 mg  DAILY AT NOON    And    bisacodyl (THE MAGIC BULLET) suppository 10 mg QDAY    And    magnesium hydroxide (MILK OF MAGNESIA) suspension 30 mL QDAY PRN    aspirin (ASA) chewable tab 81 mg DAILY    atorvastatin (LIPITOR) tablet 10 mg MO, WE + FR    melatonin tablet 6 mg QHS    temazepam (Restoril) capsule 15 mg QHS    lidocaine (LIDODERM) 5 % 2 Patch Q24HR    tamsulosin (FLOMAX) capsule 0.4 mg AFTER DINNER       Orders Placed This Encounter   Procedures    Diet Order Diet: Regular     Standing Status:   Standing     Number of Occurrences:   1     Order Specific Question:   Diet:     Answer:   Regular [1]       Assessment:  Active Hospital Problems    Diagnosis     *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 Terrell's A/P on 6/22   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  Lower extremity weakness and pain as well as numbness in upper extremities.  No known premorbid infection.  -PT and OT for mobility and ADLs. Per guidelines, 15 hours per week between PT, OT.  Cleared by speech therapy for swallow and speech, transition time to PT and OT  - Continue comprehensive acute inpatient rehabilitation     Neurologic respiratory impairment:   Patient is at high risk for respiratory involvement given neurologic symptoms in the upper extremities.   -Vital capacity daily, 1.72 liters on 6/20  - Incentive spirometry  - Mild left basilar atelectasis from chest x-ray on 6/17     Lower extremity swelling, improved : Left greater than right  - Lower extremity Doppler was negative for DVT     Hyponatremia: Likely SIADH, followed by nephrology during [facility] hospitalization  - Sodium of 126 on 6/17 --> 129 on 6/19 --> 133 on 6/20 --> 136 on 6/21  - Check  BMP in a.m.  - Decrease sodium chloride tablets 1 g 3 times daily as per hospitalist   - Lasix was discontinued on 6/21 as per hospitalist discontinuing Lasix versus liberalizing fluid restriction  -Free water restriction of 500 cc/day and total p.o. fluid intake of 1.8 L, if sodium continues to improve goal will be to decrease fluid restriction.  -6/23 Na 136        Ear pain  Discussed with hospitalist.  Appreciate assistance    Hypomagnesemia: Low magnesium during acute hospitalization, supplemented with p.o. magnesium  - Magnesium of 2.0 on 6/18     Urinary tract infection/pyelonephritis: Catheter associated UTI in the setting of neurogenic bladder  - WBC of 14 on 6/17 --> WBC of 10.7 on 6/20  - Peripheral IV was removed prior to transfer to acute rehabilitation we will replace IV.  - Ceftriaxone 1 g every 24 hours, continue for another 8 days, total of 10 days given systemic factor , stop date 6/24   - We will work with Eric Riley on following up culture and sensitivities     Neurogenic bladder: Inappropriate management of neurogenic bladder can result and hydronephrosis, increased risk of pyelonephritis, and renal failure  - Discontinue Lo on 6/22, Start voiding trial, if can't void in 6 hours or prn check pvr and if >500cc then ICP,if able to void then check PVR, if PVR is >200cc then ICP  - Continue Flomax 0.4 mg nightly  - lo removed 6/22   -unable to void, cath volumte 663  on 6/23, yesterday, able to empty bladder this morning, , continue with current void trials and flomax dose      Neurogenic bowel: Inappropriate neurogenic bowel can result in severe constipation, bowel dilation and rupture.  Severe constipation with prolonged period of time without BM.  - Recheck KUB after bowel cleanout  - Continue upper motor neuron neurogenic bowel program with senna 2 tablets q. noon, Colace 200 mg twice daily and Magic bullet suppository MN daily and digital stimulation  - last BM  6/25       orthostatic hypotension, improving   Because of significant autonomic dysfunction associated with some cases of AIDP, the patient is at high risk for orthostatic hypotension.  Goal of SBP greater than 100.  -  Mechanical compression with teds and Tubi  and abd binder used prior to out of bed  - Continue midodrine 10 mg 3 times daily, BP stable as of 6/23      Essential hypertension: SBP of greater than 200 on presentation to acute hospital.  -126 in the last 24 hours  - Lisinopril 20 mg daily, discontinued lisinopril secondary to orthostatic hypotension as above, may restart as an outpatient with recovery of autonomic nervous system     Dyslipidemia: Total cholesterol 137, HDL 44, LDL 75  -Lipitor 10 mg every Monday Wednesday Friday     Dysphagia: Concern for swallow dysfunction in the setting of certain variants of AIDP  - Consult speech therapy for swallow evaluation.  Cleared by speech therapy     Skin  Due to the sensory impairments following AIDP, skin protection principles are of the utmost importance.   - every two-hour turning pattern overnight while the patient is in bed. When the patient is out of bed, an every 15 minute repositioning or weight shifting pattern will be utilized in order to help train the patient for long-term skin protection. We will also discuss skin monitoring and its importance with the patient and the caregivers.     Pain:  Postoperative pain:  - Oxycodone 5-10 mg every 3 hours as needed moderate-severe pain, is using approx 2-3 times per day   -Tylenol 1000 mg 3 times daily, AST 28, ALT 30, alk phos 76, within normal limits, continue current dose of scheduled Tylenol  - Lidocaine patch x2 on either side of incision, on for 12 hours off for 12 hours  - Continue tramadol 50 mg every 6 hours as needed for pain     Neuropathic pain: Burning and tingling in all 4 extremities  -Decrease gabapentin to 600 mg 3 times a day, concern for fatigue side effect  - Increase Lyrica to  75 mg 3 times daily, pain improved 6/23    - Celebrex 200 mg twice daily  - Tramadol 50 mg every 6 hours as needed pain  -May benefit from a nonpharmaceutical modalities such as TENS units, compression, ice, heat, will discuss with therapy team.     Vitamin D deficiency: High risk of vitamin D deficiency in this population, goal of vitamin D level greater than 30, has been linked to improvement and central nervous system recovery  - Vitamin D level of 19 on 6/18  - Cholecalciferol 2000 units daily     Insomnia: Significant difficulty during acute hospitalization  - Restarted on Restoril 15 mg nightly  - Trazodone 50 mg nightly as needed insomnia     DVT prophylaxis  In the setting of AIDP, the patient is at high risk of deep venous thrombosis given decreased mobility and alteration to autonomic nervous system.   -DC Lovenox  - Eliquis 5 mg twice daily for prophylaxis    Total time: 36 minutes.  I spent greater than 50% of the time for patient care and coordination on this date, including unit/floor time, and face-to-face time with the patient as per assessment and plan above.    Alli Daniel M.D.     Physical Medicine and Rehabilitation

## 2023-06-25 NOTE — PROGRESS NOTES
Hospital Medicine Daily Progress Note        Chief Complaint:  Hypertension  Hyponatremia    Interval History:  Pt c/o left ear pain.  Examination of both ears attempted but difficult to visualize TM's due to poor quality otoscope.    Review of Systems  Review of Systems   Constitutional:  Negative for chills and fever.   HENT:  Positive for ear pain.    Eyes: Negative.    Respiratory:  Negative for cough and shortness of breath.    Cardiovascular:  Negative for chest pain and palpitations.   Gastrointestinal:  Negative for abdominal pain, nausea and vomiting.   Musculoskeletal: Negative.    Skin:  Negative for itching and rash.   Neurological:  Positive for sensory change.   Endo/Heme/Allergies:  Negative for polydipsia. Does not bruise/bleed easily.        Physical Exam  Temp:  [36.6 °C (97.8 °F)-37.2 °C (98.9 °F)] 37.1 °C (98.8 °F)  Pulse:  [] 91  Resp:  [16-18] 18  BP: ()/(61-80) 94/61  SpO2:  [93 %-96 %] 93 %    Physical Exam  Vitals reviewed.   Constitutional:       General: He is not in acute distress.     Appearance: Normal appearance. He is not ill-appearing.   HENT:      Head: Normocephalic and atraumatic.      Right Ear: External ear normal.      Left Ear: External ear normal.      Nose: Nose normal.      Mouth/Throat:      Pharynx: Oropharynx is clear.   Eyes:      General:         Right eye: No discharge.         Left eye: No discharge.      Extraocular Movements: Extraocular movements intact.      Conjunctiva/sclera: Conjunctivae normal.   Cardiovascular:      Rate and Rhythm: Normal rate and regular rhythm.   Pulmonary:      Effort: Pulmonary effort is normal. No respiratory distress.      Breath sounds: Normal breath sounds. No wheezing.   Abdominal:      General: Bowel sounds are normal. There is no distension.      Palpations: Abdomen is soft.      Tenderness: There is no abdominal tenderness.   Musculoskeletal:      Cervical back: Normal range of motion and neck supple.      Right  lower leg: No edema.      Left lower leg: No edema.   Skin:     General: Skin is warm and dry.   Neurological:      Mental Status: He is alert and oriented to person, place, and time.         Fluids    Intake/Output Summary (Last 24 hours) at 6/25/2023 1442  Last data filed at 6/25/2023 1300  Gross per 24 hour   Intake 1000 ml   Output 3900 ml   Net -2900 ml       Laboratory  Recent Labs     06/23/23  0535   WBC 7.6   RBC 3.25*   HEMOGLOBIN 10.2*   HEMATOCRIT 31.1*   MCV 95.7   MCH 31.4   MCHC 32.8   RDW 61.2*   PLATELETCT 305   MPV 9.3     Recent Labs     06/23/23  0535   SODIUM 136   POTASSIUM 4.2   CHLORIDE 101   CO2 23   GLUCOSE 91   BUN 14   CREATININE 0.46*   CALCIUM 8.9                   Assessment/Plan  * GBS (Guillain-[place] syndrome) ([facility])  Assessment & Plan  Had progressive BLE weakness  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[facility]  S/P IVIG x 5 doses  Ongoing management per Physiatry    Ear discomfort  Assessment & Plan  Debrox no longer on hospital formulary  Will trial Docusate solution per Pharmacy recommendations    Dyslipidemia  Assessment & Plan  On Lipitor    Vitamin D deficiency  Assessment & Plan  Vit D level 19  Continue supplementation    UTI (urinary tract infection)  Assessment & Plan  Reportedly had +UCx at [facility]  Completed Rocephin per Physiatry    Essential hypertension  Assessment & Plan  On Midodrine and Flomax per Physiatry  Observe blood pressure trends    Anemia  Assessment & Plan  Has normocytic indices  Fe 96  Now off Celebrex  Continue to follow H/H  Check F/U labs in AM    Hyponatremia  Assessment & Plan  Was on fluid restriction, Lasix, and NaCl tabs  Off Lisinopril per Dr. Ruelas  Na+ levels now normalized  Lasix and fluid restriction discontinued  Continue to slowly taper off NaCl  Check F/U labs in AM      Full Code    Discussed w/ pt, Pharmacy, and Dr. Daniel

## 2023-06-25 NOTE — FLOWSHEET NOTE
"   06/25/23 1453   Incentive Spirometry Treatment   Height 1.727 m (5' 7.99\")   Predicted Inspiratory Capacity 2700   60% of predicted IS capacity 1620 mL   40% of predicted IS capacity 1080 mL   Incentive Spirometer Volume 2250 mL   Pulmonary Function Group   $ FVC / Vital Capacity (liters)  2.58  (57% predicted)       "

## 2023-06-25 NOTE — FLOWSHEET NOTE
06/25/23 1458   Events/Summary/Plan   Events/Summary/Plan spot check done IS and VC done, pt states he had a bad night sleep and is tired and weak today   Vital Signs   Pulse 90   Respiration 18   Pulse Oximetry 92 %   $ Pulse Oximetry (Spot Check) Yes   Respiratory Assessment   Respiratory Pattern Within Normal Limits   Level of Consciousness Alert   Chest Exam   Work Of Breathing / Effort Within Normal Limits   Breath Sounds   RUL Breath Sounds Clear   RML Breath Sounds Clear;Diminished   RLL Breath Sounds Diminished   JC Breath Sounds Clear   LLL Breath Sounds Diminished   Oxygen   O2 (LPM) 0   FiO2% 21 %   O2 Delivery Device Room air w/o2 available

## 2023-06-25 NOTE — THERAPY
Occupational Therapy  Daily Treatment     Patient Name: Bull Banegas  Age:  67 y.o., Sex:  male  Medical Record #: [id number]  Today's Date: 6/25/2023     Precautions  Precautions: Fall Risk  Comments: Blurred vision         Subjective    Patient was awake in bed and stated he did not know what he could do today due to extreme fatigue and two hours of sleep last night.      Objective       06/25/23 0831   OT Charge Group   OT Self Care / ADL (Units) 4   OT Therapy Activity (Units) 1   OT Total Time Spent   OT Individual Total Time Spent (Mins) 60   Precautions   Precautions Fall Risk   Functional Level of Assist   Grooming Supervision   Grooming Description Set-up of equipment;Other (comment)  (in bed due to waiting for RN to cath patient)   Upper Body Dressing Minimal Assist   Upper Body Dressing Description Set-up of equipment;Supervision for safety  (min A to pull shirt up trunk with patient in bed to doff)   Lower Body Dressing Moderate Assist   Lower Body Dressing Description Set-up of equipment;Supervision for safety;Verbal cueing;Reacher  (able to don shorts in bed with reacher and bridging/rolling, assist to don socks in bed)   Bed Mobility    Rolling Standby Assist  (with rails, R and L)   Interdisciplinary Plan of Care Collaboration   IDT Collaboration with  Nursing   Patient Position at End of Therapy In Bed;Call Light within Reach;Tray Table within Reach;Phone within Reach;Bed Alarm On   Collaboration Comments RN cathed patient and gave meds         Assessment    Patient was able to thread feet in shorts with reacher in bed and pull up over hips with rolling and bridging.  He requested assistance to socks in bed.  Time ran out in session before he was ready to transfer to /.  He was left in bed to nap before Rec therapy and PT.  He was unable to void in urinal and requested to be cathed.  Strengths: Able to follow instructions, Alert and oriented, Effective communication skills, Independent prior level  of function, Motivated for self care and independence, Pleasant and cooperative, Willingly participates in therapeutic activities    Plan    UB strengthening as limited by fatigue, functional transfers progressing to standing from SB, ADLs with AE as needed, IADLs, incorporate FM skills for BUE    Occupational Therapy Goals (Active)       Problem: Dressing       Dates: Start:  06/18/23         Goal: STG-Within one week, patient will dress LB with mod A overall using AE/DME as needed.       Dates: Start:  06/18/23               Problem: Functional Transfers       Dates: Start:  06/18/23         Goal: STG-Within one week, patient will transfer to toilet with max A x1 overall using AE/DME as needed.       Dates: Start:  06/18/23               Problem: OT Long Term Goals       Dates: Start:  06/18/23         Goal: LTG-By discharge, patient will complete basic self care tasks with min-supervision overall using AE/DME as needed.       Dates: Start:  06/18/23            Goal: LTG-By discharge, patient will perform bathroom transfers with SBA overall using AE/DME as needed.       Dates: Start:  06/18/23               Problem: Toileting       Dates: Start:  06/18/23         Goal: STG-Within one week, patient will complete toileting tasks with Max A overall using AE/DME as needed       Dates: Start:  06/18/23

## 2023-06-25 NOTE — CARE PLAN
The patient is Watcher - Medium risk of patient condition declining or worsening    Shift Goals  Clinical Goals: Infection prevention, safety  Patient Goals: Safety  Family Goals: Education    Progress made toward(s) clinical / shift goals:    Problem: Pain - Standard  Goal: Alleviation of pain or a reduction in pain to the patient’s comfort goal  Note: Patient able to verbalize pain level and verbalize an acceptable level of pain.      Problem: Infection  Goal: Patient will remain free from infection  Note: Patient remains free from s/s infection; afebrile. Continue on IV-Rocephin for UTI, no side effects noted. Will continue to monitor.      Problem: Neurogenic Bowel  Goal: Patient will verbalize signs and symptoms of constipation and how to prevent/alleviate  Note: Refused Dulcolax Suppository. Patient reported having  few BM's yesterday and the other day and had no problems moving his bowel at this time.        Patient is not progressing towards the following goals:      Problem: Neurogenic Bladder  Goal: Patient will demonstrate ability to take care of indwelling catheter  Outcome: Not Met  Note: Patient needed to have ICP done due to retention. ICP with 900 ml clear and yellow urine.

## 2023-06-25 NOTE — CARE PLAN
The patient is Stable - Low risk of patient condition declining or worsening      Problem: Neurogenic Bladder  Goal: Patient will demonstrate ability to take care of indwelling catheter  Outcome: Progressing  Note: Pt able to void adequate amounts of clear yellow urine this shift. At 1800 pt  ml after 200 ml void.      Problem: Mobility  Goal: Patient's capacity to carry out activities will improve  Note: Pt uses call light consistently and appropriately. Waits for assistance does not attempt self transfer this shift. Able to verbalize needs.

## 2023-06-25 NOTE — THERAPY
Physical Therapy   Daily Treatment     Patient Name: Bull Banegas  Age:  67 y.o., Sex:  male  Medical Record #: [id number]  Today's Date: 6/25/2023     Precautions  Precautions: (P) Fall Risk  Comments: (P) Blurred vision    Subjective    Pt reported that he did not have the energy to transfer OOB this session' pt is hoping he is better able to sleep tonight- hot/ cold flashes last night.      Objective       06/25/23 1301   PT Charge Group   PT Therapeutic Exercise (Units) 2   PT Therapeutic Activities (Units) 2   PT Total Time Spent   PT Individual Total Time Spent (Mins) 60   Precautions   Precautions Fall Risk   Comments Blurred vision   Transfer Functional Level of Assist   Bed, Chair, Wheelchair Transfer Refused   Supine Lower Body Exercise   Hip Abduction 1 set of 10;Bilateral   Hip Adduction  1 set of 15;Bilateral   Short Arc Quad 1 set of 10;Bilateral   Heel Slide 1 set of 10;Bilateral  (ROM to tolerance)   Ankle Pumps 1 set of 15;Bilateral   Gluteal Isometrics 1 set of 10;Bilateral   Quadriceps Isometrics 1 set of 10;Bilateral   Comments Rest between each set   Bed Mobility    Rolling Standby Assist   Interdisciplinary Plan of Care Collaboration   IDT Collaboration with  Nursing   Patient Position at End of Therapy In Bed;Call Light within Reach;Tray Table within Reach;Phone within Reach  (Left in right sidelying, supported by pillows, for position change/ skin safety)   Collaboration Comments Pt has been very fatigued today, per RN prior to entry         Assessment    Pt participated in supine therapeutic exercise. Pt presented very fatigued, and declined transfer OOB/ EOB. Pt required lengthy rest between each set of exercise. Pt was positioned in side lying post tx for a positional change, and skin safety.     Strengths: Able to follow instructions, Alert and oriented, Effective communication skills, Good carryover of learning, Independent prior level of function, Making steady progress towards goals,  Motivated for self care and independence, Pleasant and cooperative, Supportive family, Willingly participates in therapeutic activities  Barriers: Decreased endurance, Fatigue, Generalized weakness, Home accessibility, Impaired activity tolerance, Impaired balance, Limited mobility (Paraplegia)    Plan    B LE therex with caution not to overfatigue  Static standing tolerance progressing to pregait with WC follow in // bars as appropriate  Seated and supine trunk NRE  Squat pivot transfer training  B LE motomed vs Nustep    Passport items to be completed:  Get in/out of bed safely, in/out of a vehicle, safely use mobility device, walk or wheel around home/community, navigate up and down stairs, show how to get up/down from the ground, ensure home is accessible, demonstrate HEP, complete caregiver training    Physical Therapy Problems (Active)       Problem: Balance       Dates: Start:  06/18/23         Goal: STG-Within one week, patient will maintain static standing c/ BUE support >/= 2 minutes to support participation in ADLs.       Dates: Start:  06/18/23         Goal Note filed on 06/22/23 0942 by Colton Guadalupe, PT       Limited to 1 minute in parallel bars currently                 Problem: Mobility       Dates: Start:  06/18/23         Goal: STG-Within one week, patient will demonstrate ability to manage wheelchair leg rests, arm rests, and brakes in preparation for transfers at [facility] or better.        Dates: Start:  06/18/23         Goal Note filed on 06/22/23 0942 by Colton Guadalupe, PT       Cathleen for leg rest management                 Problem: Mobility Transfers       Dates: Start:  06/18/23         Goal: STG-Within one week, patient will move supine to sit at SBA or better and use of bed rail.        Dates: Start:  06/18/23         Goal Note filed on 06/22/23 0942 by Colton Guadalupe, PT       Fluctuates min-modA                 Problem: PT-Long Term Goals       Dates: Start:  06/18/23         Goal: LTG-By  discharge, patient will propel wheelchair >/= 300' and navigate inclines up to 3% (curb cuts or equivalent) c/ SBA or better.        Dates: Start:  06/18/23            Goal: LTG-By discharge, patient will ambulate >/= 50' c/ ModA or better and FWW.        Dates: Start:  06/18/23            Goal: LTG-By discharge, patient will transfer one surface to another c/ N[place]a.        Dates: Start:  06/18/23            Goal: LTG-By discharge, patient will transfer in/out of a car c/ ModA or better.        Dates: Start:  06/18/23

## 2023-06-26 ENCOUNTER — APPOINTMENT (OUTPATIENT)
Dept: PHYSICAL THERAPY | Facility: REHABILITATION | Age: 68
End: 2023-06-26
Attending: PHYSICAL MEDICINE & REHABILITATION
Payer: COMMERCIAL

## 2023-06-26 ENCOUNTER — APPOINTMENT (OUTPATIENT)
Dept: PHYSICAL THERAPY | Facility: REHABILITATION | Age: 68
DRG: 095 | End: 2023-06-26
Attending: PHYSICAL MEDICINE & REHABILITATION
Payer: MEDICARE

## 2023-06-26 ENCOUNTER — APPOINTMENT (OUTPATIENT)
Dept: OCCUPATIONAL THERAPY | Facility: REHABILITATION | Age: 68
End: 2023-06-26
Attending: PHYSICAL MEDICINE & REHABILITATION
Payer: COMMERCIAL

## 2023-06-26 PROBLEM — R50.9 FEVER: Status: ACTIVE | Noted: 2023-06-26

## 2023-06-26 LAB
ANION GAP SERPL CALC-SCNC: 13 MMOL/L (ref 7–16)
APPEARANCE UR: CLEAR
BILIRUB UR QL STRIP.AUTO: NEGATIVE
BUN SERPL-MCNC: 16 MG/DL (ref 8–22)
CALCIUM SERPL-MCNC: 8.5 MG/DL (ref 8.5–10.5)
CHLORIDE SERPL-SCNC: 98 MMOL/L (ref 96–112)
CO2 SERPL-SCNC: 22 MMOL/L (ref 20–33)
COLOR UR: YELLOW
CREAT SERPL-MCNC: 0.53 MG/DL (ref 0.5–1.4)
ERYTHROCYTE [DISTWIDTH] IN BLOOD BY AUTOMATED COUNT: 60.4 FL (ref 35.9–50)
FLUAV RNA SPEC QL NAA+PROBE: NEGATIVE
FLUBV RNA SPEC QL NAA+PROBE: NEGATIVE
GFR SERPLBLD CREATININE-BSD FMLA CKD-EPI: 111 ML/MIN/1.73 M 2
GLUCOSE SERPL-MCNC: 126 MG/DL (ref 65–99)
GLUCOSE UR STRIP.AUTO-MCNC: NEGATIVE MG/DL
HCT VFR BLD AUTO: 30.3 % (ref 42–52)
HGB BLD-MCNC: 10.5 G/DL (ref 14–18)
KETONES UR STRIP.AUTO-MCNC: NEGATIVE MG/DL
LEUKOCYTE ESTERASE UR QL STRIP.AUTO: NEGATIVE
MCH RBC QN AUTO: 31.7 PG (ref 27–33)
MCHC RBC AUTO-ENTMCNC: 34.7 G/DL (ref 32.3–36.5)
MCV RBC AUTO: 91.5 FL (ref 81.4–97.8)
MICRO URNS: NORMAL
NITRITE UR QL STRIP.AUTO: NEGATIVE
PH UR STRIP.AUTO: 7 [PH] (ref 5–8)
PLATELET # BLD AUTO: 264 K/UL (ref 164–446)
PMV BLD AUTO: 8.9 FL (ref 9–12.9)
POTASSIUM SERPL-SCNC: 4.3 MMOL/L (ref 3.6–5.5)
PROT UR QL STRIP: NEGATIVE MG/DL
RBC # BLD AUTO: 3.31 M/UL (ref 4.7–6.1)
RBC UR QL AUTO: NEGATIVE
RSV RNA SPEC QL NAA+PROBE: NEGATIVE
SARS-COV-2 RNA RESP QL NAA+PROBE: NOTDETECTED
SODIUM SERPL-SCNC: 133 MMOL/L (ref 135–145)
SP GR UR STRIP.AUTO: 1.01
SPECIMEN SOURCE: NORMAL
UROBILINOGEN UR STRIP.AUTO-MCNC: 1 MG/DL
WBC # BLD AUTO: 4.3 K/UL (ref 4.8–10.8)

## 2023-06-26 PROCEDURE — 0241U HCHG SARS-COV-2 COVID-19 NFCT DS RESP RNA 4 TRGT MIC: CPT

## 2023-06-26 PROCEDURE — 94760 N-INVAS EAR/PLS OXIMETRY 1: CPT

## 2023-06-26 PROCEDURE — 700102 HCHG RX REV CODE 250 W/ 637 OVERRIDE(OP): Performed by: HOSPITALIST

## 2023-06-26 PROCEDURE — 80048 BASIC METABOLIC PNL TOTAL CA: CPT

## 2023-06-26 PROCEDURE — A9270 NON-COVERED ITEM OR SERVICE: HCPCS | Performed by: HOSPITALIST

## 2023-06-26 PROCEDURE — 700101 HCHG RX REV CODE 250: Performed by: PHYSICAL MEDICINE & REHABILITATION

## 2023-06-26 PROCEDURE — 99232 SBSQ HOSP IP/OBS MODERATE 35: CPT | Performed by: PHYSICAL MEDICINE & REHABILITATION

## 2023-06-26 PROCEDURE — 85027 COMPLETE CBC AUTOMATED: CPT

## 2023-06-26 PROCEDURE — 36415 COLL VENOUS BLD VENIPUNCTURE: CPT

## 2023-06-26 PROCEDURE — 94150 VITAL CAPACITY TEST: CPT

## 2023-06-26 PROCEDURE — 99232 SBSQ HOSP IP/OBS MODERATE 35: CPT | Performed by: HOSPITALIST

## 2023-06-26 PROCEDURE — A9270 NON-COVERED ITEM OR SERVICE: HCPCS | Performed by: PHYSICAL MEDICINE & REHABILITATION

## 2023-06-26 PROCEDURE — 94010 BREATHING CAPACITY TEST: CPT

## 2023-06-26 PROCEDURE — 770010 HCHG ROOM/CARE - REHAB SEMI PRIVAT*

## 2023-06-26 PROCEDURE — 700102 HCHG RX REV CODE 250 W/ 637 OVERRIDE(OP): Performed by: PHYSICAL MEDICINE & REHABILITATION

## 2023-06-26 RX ORDER — BISACODYL 10 MG/1
10 SUPPOSITORY RECTAL
Status: DISCONTINUED | OUTPATIENT
Start: 2023-06-26 | End: 2023-06-29

## 2023-06-26 RX ORDER — SENNOSIDES A AND B 8.6 MG/1
17.2 TABLET, FILM COATED ORAL
Status: DISCONTINUED | OUTPATIENT
Start: 2023-06-27 | End: 2023-07-10 | Stop reason: HOSPADM

## 2023-06-26 RX ORDER — GABAPENTIN 400 MG/1
400 CAPSULE ORAL 3 TIMES DAILY
Status: DISCONTINUED | OUTPATIENT
Start: 2023-06-26 | End: 2023-06-27

## 2023-06-26 RX ORDER — PREGABALIN 100 MG/1
100 CAPSULE ORAL 3 TIMES DAILY
Status: DISCONTINUED | OUTPATIENT
Start: 2023-06-26 | End: 2023-06-27

## 2023-06-26 RX ORDER — DOCUSATE SODIUM 100 MG/1
100 CAPSULE, LIQUID FILLED ORAL 2 TIMES DAILY
Status: DISCONTINUED | OUTPATIENT
Start: 2023-06-26 | End: 2023-07-10 | Stop reason: HOSPADM

## 2023-06-26 RX ADMIN — APIXABAN 5 MG: 5 TABLET, FILM COATED ORAL at 20:51

## 2023-06-26 RX ADMIN — PREGABALIN 75 MG: 75 CAPSULE ORAL at 08:44

## 2023-06-26 RX ADMIN — MULTIPLE VITAMINS W/ MINERALS TAB 1 TABLET: TAB at 11:54

## 2023-06-26 RX ADMIN — DOCUSATE SODIUM 100 MG: 100 CAPSULE, LIQUID FILLED ORAL at 20:50

## 2023-06-26 RX ADMIN — GABAPENTIN 400 MG: 400 CAPSULE ORAL at 15:09

## 2023-06-26 RX ADMIN — TAMSULOSIN HYDROCHLORIDE 0.4 MG: 0.4 CAPSULE ORAL at 18:00

## 2023-06-26 RX ADMIN — GABAPENTIN 600 MG: 300 CAPSULE ORAL at 08:45

## 2023-06-26 RX ADMIN — MIDODRINE HYDROCHLORIDE 10 MG: 10 TABLET ORAL at 17:42

## 2023-06-26 RX ADMIN — MIDODRINE HYDROCHLORIDE 10 MG: 10 TABLET ORAL at 08:45

## 2023-06-26 RX ADMIN — Medication 6 MG: at 20:50

## 2023-06-26 RX ADMIN — GABAPENTIN 400 MG: 400 CAPSULE ORAL at 20:50

## 2023-06-26 RX ADMIN — APIXABAN 5 MG: 5 TABLET, FILM COATED ORAL at 08:45

## 2023-06-26 RX ADMIN — DOCUSATE SODIUM 200 MG: 100 CAPSULE, LIQUID FILLED ORAL at 08:44

## 2023-06-26 RX ADMIN — ASPIRIN 81 MG CHEWABLE TABLET 81 MG: 81 TABLET CHEWABLE at 08:45

## 2023-06-26 RX ADMIN — SENNOSIDES 17.2 MG: 8.6 TABLET, FILM COATED ORAL at 11:54

## 2023-06-26 RX ADMIN — Medication 1000 MG: at 10:38

## 2023-06-26 RX ADMIN — LIDOCAINE 2 PATCH: 50 PATCH TOPICAL at 11:57

## 2023-06-26 RX ADMIN — MIDODRINE HYDROCHLORIDE 10 MG: 10 TABLET ORAL at 11:54

## 2023-06-26 RX ADMIN — TEMAZEPAM 15 MG: 15 CAPSULE ORAL at 20:50

## 2023-06-26 RX ADMIN — PREGABALIN 100 MG: 100 CAPSULE ORAL at 15:08

## 2023-06-26 RX ADMIN — MENTHOL, METHYL SALICYLATE: 10; 15 CREAM TOPICAL at 09:00

## 2023-06-26 RX ADMIN — PREGABALIN 100 MG: 100 CAPSULE ORAL at 20:50

## 2023-06-26 RX ADMIN — Medication 2000 UNITS: at 08:45

## 2023-06-26 RX ADMIN — SODIUM CHLORIDE 1 G: 1 TABLET ORAL at 17:42

## 2023-06-26 RX ADMIN — ATORVASTATIN CALCIUM 10 MG: 10 TABLET, FILM COATED ORAL at 20:49

## 2023-06-26 RX ADMIN — OXYCODONE HYDROCHLORIDE 10 MG: 10 TABLET ORAL at 20:49

## 2023-06-26 RX ADMIN — OMEPRAZOLE 20 MG: 20 CAPSULE, DELAYED RELEASE ORAL at 08:45

## 2023-06-26 RX ADMIN — SODIUM CHLORIDE 1 G: 1 TABLET ORAL at 08:45

## 2023-06-26 ASSESSMENT — PATIENT HEALTH QUESTIONNAIRE - PHQ9
2. FEELING DOWN, DEPRESSED, IRRITABLE, OR HOPELESS: NOT AT ALL
SUM OF ALL RESPONSES TO PHQ9 QUESTIONS 1 AND 2: 0
1. LITTLE INTEREST OR PLEASURE IN DOING THINGS: NOT AT ALL

## 2023-06-26 ASSESSMENT — ENCOUNTER SYMPTOMS
VOMITING: 0
ABDOMINAL PAIN: 0
SHORTNESS OF BREATH: 0
BRUISES/BLEEDS EASILY: 0
EYES NEGATIVE: 1
MYALGIAS: 1
CHILLS: 1
NAUSEA: 0
PALPITATIONS: 0
FEVER: 1
FOCAL WEAKNESS: 1
POLYDIPSIA: 0
SENSORY CHANGE: 1
COUGH: 0
DIARRHEA: 0

## 2023-06-26 ASSESSMENT — PAIN DESCRIPTION - PAIN TYPE
TYPE: ACUTE PAIN
TYPE: ACUTE PAIN

## 2023-06-26 ASSESSMENT — PULMONARY FUNCTION TESTS: FVC: 2.53

## 2023-06-26 NOTE — PROGRESS NOTES
Hospital Medicine Daily Progress Note        Chief Complaint:  Hypertension  Hyponatremia    Interval History:  Pt c/o F/C and feeling ill.  But denies any specific c/o CP/SOB, abd pain/N/V/diarrhea or focal c/o pain.  Lab, microbiology, and imaging results reviewed and discussed.    Review of Systems  Review of Systems   Constitutional:  Positive for chills, fever and malaise/fatigue.   Eyes: Negative.    Respiratory:  Negative for cough and shortness of breath.    Cardiovascular:  Negative for chest pain and palpitations.   Gastrointestinal:  Negative for abdominal pain, diarrhea, nausea and vomiting.   Musculoskeletal:  Positive for myalgias.   Skin:  Negative for itching and rash.   Neurological:  Positive for sensory change and focal weakness.   Endo/Heme/Allergies:  Negative for polydipsia. Does not bruise/bleed easily.        Physical Exam  Temp:  [36.8 °C (98.2 °F)-38.1 °C (100.5 °F)] 37.1 °C (98.8 °F)  Pulse:  [] 69  Resp:  [18-20] 18  BP: ()/(61-71) 100/65  SpO2:  [92 %-98 %] 97 %    Physical Exam  Vitals reviewed.   Constitutional:       General: He is not in acute distress.     Appearance: Normal appearance. He is not ill-appearing.   HENT:      Head: Normocephalic and atraumatic.      Right Ear: External ear normal.      Left Ear: External ear normal.      Nose: Nose normal.      Mouth/Throat:      Pharynx: Oropharynx is clear.   Eyes:      General:         Right eye: No discharge.         Left eye: No discharge.      Extraocular Movements: Extraocular movements intact.      Conjunctiva/sclera: Conjunctivae normal.   Cardiovascular:      Rate and Rhythm: Normal rate and regular rhythm.   Pulmonary:      Effort: Pulmonary effort is normal. No respiratory distress.      Breath sounds: Normal breath sounds. No wheezing.   Abdominal:      General: Bowel sounds are normal. There is no distension.      Palpations: Abdomen is soft.      Tenderness: There is no abdominal tenderness. There is no  guarding or rebound.   Musculoskeletal:      Cervical back: Normal range of motion and neck supple.      Right lower leg: No edema.      Left lower leg: No edema.   Skin:     General: Skin is warm and dry.   Neurological:      Mental Status: He is alert and oriented to person, place, and time.         Fluids    Intake/Output Summary (Last 24 hours) at 6/26/2023 1216  Last data filed at 6/26/2023 0949  Gross per 24 hour   Intake 680 ml   Output 2600 ml   Net -1920 ml       Laboratory  Recent Labs     06/25/23  1625 06/26/23  0527   WBC 4.6* 4.3*   RBC 3.45* 3.31*   HEMOGLOBIN 11.0* 10.5*   HEMATOCRIT 32.3* 30.3*   MCV 93.6 91.5   MCH 31.9 31.7   MCHC 34.1 34.7   RDW 62.3* 60.4*   PLATELETCT 258 264   MPV 9.9 8.9*     Recent Labs     06/25/23  1625 06/26/23  0527   SODIUM 135 133*   POTASSIUM 4.3 4.3   CHLORIDE 99 98   CO2 22 22   GLUCOSE 108* 126*   BUN 14 16   CREATININE 0.49* 0.53   CALCIUM 8.9 8.5                   Assessment/Plan  * 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[facility])  Assessment & Plan  Had progressive BLE weakness  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[facility]  S/P IVIG x 5 doses  Ongoing management per Physiatry    Fever  Assessment & Plan  Pt c/o malaise  Tmax 100.5  Also has leukopenia  PCT 0.42  COVID/FLU/RSV negative  UA negative  CXR negative acute  BCx x 2 NGTD  Continue to monitor Temp and WBC    Ear discomfort  Assessment & Plan  Debrox no longer on hospital formulary  Trialed Docusate solution per Pharmacy recommendations    Dyslipidemia  Assessment & Plan  On Lipitor    Vitamin D deficiency  Assessment & Plan  Vit D level 19  Continue supplementation    UTI (urinary tract infection)  Assessment & Plan  Reportedly had +UCx at [facility]  Completed Rocephin per Physiatry    Essential hypertension  Assessment & Plan  On Midodrine and Flomax per Physiatry  Observe blood pressure trends    Anemia  Assessment & Plan  Has normocytic indices  Fe 96  Now off Celebrex  Continue to follow H/H  Check F/U labs in  AM    Hyponatremia  Assessment & Plan  Was on fluid restriction, Lasix, and NaCl tabs  Off Lisinopril per Dr. Ruelas  Na+ levels improved  Lasix and fluid restriction discontinued  Continue to slowly taper off NaCl  Check F/U labs in AM      Full Code    Discussed w/ pt, Charge RN (Juliet), and Dr. Terrell

## 2023-06-26 NOTE — THERAPY
"Recreational Therapy  Daily Treatment     Patient Name: Bull Banegas  AGE:  67 y.o., SEX:  male  Medical Record #: [id number]  Today's Date: 6/26/2023       Subjective    \"I have a temperature.\" Pt describing both being hot and cold the prior night.      Objective       06/26/23 0902   Procedural Tracking   Procedural Tracking Gross Motor Functional Leisure Skills   Treatment Time   Total Time Spent (mins) 0   Total Time Missed 30   Reasons for Time Missed Medical-Patient With Nursing   Interdisciplinary Plan of Care Collaboration   IDT Collaboration with  Nursing   Patient Position at End of Therapy In Bed   Collaboration Comments Covid test with nursing         Assessment    Upon speaking with the Pt the nurse was consulted and they informed this writer that they would about to perform a covid test.     Strengths: Able to follow instructions, Alert and oriented, Willingly participates in therapeutic activities, Pleasant and cooperative, Making steady progress towards goals, Motivated for self care and independence  Barriers: Decreased endurance, Fatigue, Generalized weakness, Impaired activity tolerance, Impaired balance, Limited mobility, Pain    Plan    Gross motor in standing and weight shift.     Passport items to be completed:  Verbalize two positive leisure activities, discuss returning to work, hobbies, community groups or volunteer activities, explore community resources   "

## 2023-06-26 NOTE — ASSESSMENT & PLAN NOTE
Has been afebrile for a while  Has leukopenia  PCT 0.42  COVID/FLU/RSV negative  UA negative  CXR negative acute  BC x 2 neg  Monitor for now

## 2023-06-26 NOTE — PROGRESS NOTES
Patient having chills and shaking this shift with temp of 99.3. Dr. Lenz notified and ordered labs with a full workup see orders. Pt is very tired this shift.

## 2023-06-26 NOTE — THERAPY
Missed Therapy    Patient Name: Bull Banegas  Age:  67 y.o., Sex:  male  Medical Record #: [id number]  Today's Date: 6/26/2023    Discussed missed therapy with MD and OT. Pt on hold from therapy due to severe fatigue and fluctuations between chills and diaphoresis       06/26/23 1300   Therapy Missed   Missed Therapy (Minutes) 60   Reason For Missed Therapy Medical - Patient on Hold from Therapy;Medical - Patient not Able To Participate;Medical - Other (Please Comment)  (fatigue, fluctuating between chills and diaphoresis)

## 2023-06-26 NOTE — CARE PLAN
The patient is Watcher - Medium risk of patient condition declining or worsening    Patient is not progressing towards the following goals:      Problem: Infection  Goal: Patient will remain free from infection  Outcome: Not Progressing  Note: Patient having chills and shaking this shift with temp of 99.3. Dr. Lenz notified and ordered labs with a full workup see orders. Pt is very tired this shift. Pt has hx of UTI and just finished IV rocephin on 6/24/23.

## 2023-06-26 NOTE — PROGRESS NOTES
Received bedside shift report from Buffy MORA RN regarding patient and assumed care. Patient awake, calm and stable, currently positioned in bed for comfort and safety; call light within reach. Denies pain or discomfort at this time. Will continue to monitor.

## 2023-06-26 NOTE — PROGRESS NOTES
Pt with inability to void in approximately 6 hours.  Straight cathed using aseptic technique, #14 [place] catheter.  Tolerated well, had 300 mls of clear yellow urine.  Will continue to monitor patient.

## 2023-06-26 NOTE — PROGRESS NOTES
Pt with inability to void in 6 hours.  Straight cathed using aseptic technique, #14 [place] catheter.  Tolerated well, had 750 mls of clear yellow urine.  Will continue to monitor patient.

## 2023-06-26 NOTE — PROGRESS NOTES
Transfer Level & Assistive Devices Used: N/A    Patient Position: Bed (E.g., bed, bed side commode, mobile shower commode, commode over toilet, regular toilet)     Adaptive Equipment Used? N/A (E.g., digital stimulator, toileting aid for hygiene, suppository )      Patient Sensation and Bowel Urgency Present? Yes     Incontinence? Yes     BM Results: Yes, prior to Bowel Program.    1. Caregiver Present and actively participating in training? No    2. Patient Demonstrated Understanding with Bowel Medications and Purpose: Yes     3. Patient Participation with Suppository Placement: N/A, Pt refused bowel program.     4. Patient Participation with Digital Stimulation: No     5. Patient Participation with Clothing Management: No     6. Patient Participation with Hygiene: No        (If patient meets all 6 of the criteria numbered above, consider rescheduling bowel program to evening 1700 or 0500.)      Other:  Pt refused bowel program.  Had incontinence of bowel movement tonight.

## 2023-06-26 NOTE — DISCHARGE PLANNING
CM faxed completed disability p-work to [facility] and emailed a copy to patients wife.  Copy kept to scan into EMR.

## 2023-06-26 NOTE — PROGRESS NOTES
"  Physical Medicine & Rehabilitation Progress Note    Encounter Date: 6/26/2023    Chief Complaint: Weakness in all 4 extremities    Interval Events (Subjective):    He was evaluated in his room laying comfortably in bed.    He complains of fevers chills sweating overnight.  He complains of severe fatigue, having difficulty getting up to participate with therapy.  Fever started Saturday night.  He complained of active fever this morning, reported by nursing.  Patient was given Tylenol prior to evaluation.    Bladder: ICP Q 4-6 hours 300-700 cc per catheterization, unable to void  Bowel: Soft small BM last night with bowel training program  PRN: No morphine was events yesterday    ROS:  Gen: + fatigue, no confusion, significant weight loss  Eyes: no blurry vision, double vision or pain in eyes  ENT: no changes in hearing, runny nose, nose bleeds, sinus pain  CV: No CP, palpitations,   Lungs: no shortness of breath, changes in secretions, changes in cough, pain with coughing  Abd: No abd pain, nausea, vomiting, pain with eating  : no blood in urine, pain during storage phase, bladder spasms, suprapubic pain  Ext: No swelling in legs, asymmetric atrophy  Neuro: no changes in strength or sensation  Skin: no new ulcers/skin breakdown appreciated by patient  Mood: No changes in mood today, increase in depression or anxiety  Heme: no bruising, or bleeding    Objective:  Vitals: /65   Pulse 69   Temp 37.1 °C (98.8 °F)   Resp 18   Ht 1.727 m (5' 7.99\")   Wt 116 kg (256 lb 13.4 oz)   SpO2 97%   Gen: NAD, Body mass index is 39.06 kg/m².  HEENT:  NC/AT, PERRLA, moist mucous membranes  Cardio: RRR, no mumurs  Pulm: CTAB, with normal respiratory effort  Abd: Soft NTND, active bowel sounds,   Ext: No peripheral edema. No calf tenderness. No clubbing/cyanosis. +dorsal pedalis pulses bilaterally.      Laboratory Values:  Recent Results (from the past 72 hour(s))   CBC WITH DIFFERENTIAL    Collection Time: 06/25/23  " 4:25 PM   Result Value Ref Range    WBC 4.6 (L) 4.8 - 10.8 K/uL    RBC 3.45 (L) 4.70 - 6.10 M/uL    Hemoglobin 11.0 (L) 14.0 - 18.0 g/dL    Hematocrit 32.3 (L) 42.0 - 52.0 %    MCV 93.6 81.4 - 97.8 fL    MCH 31.9 27.0 - 33.0 pg    MCHC 34.1 32.3 - 36.5 g/dL    RDW 62.3 (H) 35.9 - 50.0 fL    Platelet Count 258 164 - 446 K/uL    MPV 9.9 9.0 - 12.9 fL    Neutrophils-Polys 89.00 (H) 44.00 - 72.00 %    Lymphocytes 9.00 (L) 22.00 - 41.00 %    Monocytes 1.00 0.00 - 13.40 %    Eosinophils 1.00 0.00 - 6.90 %    Basophils 0.00 0.00 - 1.80 %    Nucleated RBC 0.00 0.00 - 0.20 /100 WBC    Neutrophils (Absolute) 4.09 1.82 - 7.42 K/uL    Lymphs (Absolute) 0.41 (L) 1.00 - 4.80 K/uL    Monos (Absolute) 0.05 0.00 - 0.85 K/uL    Eos (Absolute) 0.05 0.00 - 0.51 K/uL    Baso (Absolute) 0.00 0.00 - 0.12 K/uL    NRBC (Absolute) 0.00 K/uL    Anisocytosis 1+     Macrocytosis 1+    Comp Metabolic Panel    Collection Time: 06/25/23  4:25 PM   Result Value Ref Range    Sodium 135 135 - 145 mmol/L    Potassium 4.3 3.6 - 5.5 mmol/L    Chloride 99 96 - 112 mmol/L    Co2 22 20 - 33 mmol/L    Anion Gap 14.0 7.0 - 16.0    Glucose 108 (H) 65 - 99 mg/dL    Bun 14 8 - 22 mg/dL    Creatinine 0.49 (L) 0.50 - 1.40 mg/dL    Calcium 8.9 8.5 - 10.5 mg/dL    AST(SGOT) 29 12 - 45 U/L    ALT(SGPT) 21 2 - 50 U/L    Alkaline Phosphatase 89 30 - 99 U/L    Total Bilirubin 1.0 0.1 - 1.5 mg/dL    Albumin 3.5 3.2 - 4.9 g/dL    Total Protein 6.9 6.0 - 8.2 g/dL    Globulin 3.4 1.9 - 3.5 g/dL    A-G Ratio 1.0 g/dL   BLOOD CULTURE    Collection Time: 06/25/23  4:25 PM    Specimen: Peripheral; Blood   Result Value Ref Range    Significant Indicator NEG     Source BLD     Site PERIPHERAL     Culture Result       No Growth  Note: Blood cultures are incubated for 5 days and  are monitored continuously.Positive blood cultures  are called to the RN and reported as soon as  they are identified.     PROCALCITONIN    Collection Time: 06/25/23  4:25 PM   Result Value Ref Range     Procalcitonin 0.42 (H) <0.25 ng/mL   CORRECTED CALCIUM    Collection Time: 06/25/23  4:25 PM   Result Value Ref Range    Correct Calcium 9.3 8.5 - 10.5 mg/dL   ESTIMATED GFR    Collection Time: 06/25/23  4:25 PM   Result Value Ref Range    GFR (CKD-EPI) 113 >60 mL/min/1.73 m 2   PERIPHERAL SMEAR REVIEW    Collection Time: 06/25/23  4:25 PM   Result Value Ref Range    Peripheral Smear Review see below    PLATELET ESTIMATE    Collection Time: 06/25/23  4:25 PM   Result Value Ref Range    Plt Estimation Normal    MORPHOLOGY    Collection Time: 06/25/23  4:25 PM   Result Value Ref Range    RBC Morphology Present    DIFFERENTIAL MANUAL    Collection Time: 06/25/23  4:25 PM   Result Value Ref Range    Manual Diff Status PERFORMED    BLOOD CULTURE    Collection Time: 06/25/23  4:35 PM    Specimen: Peripheral; Blood   Result Value Ref Range    Significant Indicator NEG     Source BLD     Site PERIPHERAL     Culture Result       No Growth  Note: Blood cultures are incubated for 5 days and  are monitored continuously.Positive blood cultures  are called to the RN and reported as soon as  they are identified.     URINALYSIS    Collection Time: 06/25/23  9:40 PM   Result Value Ref Range    Color Yellow     Character Clear     Specific Gravity 1.014 <1.035    Ph 7.0 5.0 - 8.0    Glucose Negative Negative mg/dL    Ketones Negative Negative mg/dL    Protein Negative Negative mg/dL    Bilirubin Negative Negative    Urobilinogen, Urine 1.0 Negative    Nitrite Negative Negative    Leukocyte Esterase Negative Negative    Occult Blood Negative Negative    Micro Urine Req see below    CBC WITHOUT DIFFERENTIAL    Collection Time: 06/26/23  5:27 AM   Result Value Ref Range    WBC 4.3 (L) 4.8 - 10.8 K/uL    RBC 3.31 (L) 4.70 - 6.10 M/uL    Hemoglobin 10.5 (L) 14.0 - 18.0 g/dL    Hematocrit 30.3 (L) 42.0 - 52.0 %    MCV 91.5 81.4 - 97.8 fL    MCH 31.7 27.0 - 33.0 pg    MCHC 34.7 32.3 - 36.5 g/dL    RDW 60.4 (H) 35.9 - 50.0 fL    Platelet  Count 264 164 - 446 K/uL    MPV 8.9 (L) 9.0 - 12.9 fL   Basic Metabolic Panel    Collection Time: 06/26/23  5:27 AM   Result Value Ref Range    Sodium 133 (L) 135 - 145 mmol/L    Potassium 4.3 3.6 - 5.5 mmol/L    Chloride 98 96 - 112 mmol/L    Co2 22 20 - 33 mmol/L    Glucose 126 (H) 65 - 99 mg/dL    Bun 16 8 - 22 mg/dL    Creatinine 0.53 0.50 - 1.40 mg/dL    Calcium 8.5 8.5 - 10.5 mg/dL    Anion Gap 13.0 7.0 - 16.0   ESTIMATED GFR    Collection Time: 06/26/23  5:27 AM   Result Value Ref Range    GFR (CKD-EPI) 111 >60 mL/min/1.73 m 2   CoV-2, Flu A/B, And RSV by PCR ([facility])    Collection Time: 06/26/23  9:00 AM    Specimen: Respirate   Result Value Ref Range    Influenza virus A RNA Negative Negative    Influenza virus B, PCR Negative Negative    RSV, PCR Negative Negative    SARS-CoV-2 by PCR NotDetected     SARS-CoV-2 Source NP Swab        Medications:  Scheduled Medications   Medication Dose Frequency    sodium chloride  1 g BID WITH MEALS    pregabalin  75 mg TID    gabapentin  600 mg TID    apixaban  5 mg BID    midodrine  10 mg TID WITH MEALS    vitamin D3  2,000 Units DAILY    menthol-methyl salicycate   BID    omeprazole  20 mg DAILY    Pharmacy Consult Request  1 Each PHARMACY TO DOSE    therapeutic multivitamin-minerals  1 Tablet DAILY WITH LUNCH    docusate sodium  200 mg BID    And    sennosides  17.2 mg DAILY AT NOON    And    bisacodyl  10 mg QDAY    aspirin  81 mg DAILY    atorvastatin  10 mg MO, WE + FR    melatonin  6 mg QHS    temazepam  15 mg QHS    lidocaine  2 Patch Q24HR    tamsulosin  0.4 mg AFTER DINNER     PRN medications: [COMPLETED] docusate sodium **FOLLOWED BY** normal saline (PF), traMADol, [COMPLETED] acetaminophen **FOLLOWED BY** acetaminophen, acetaminophen, carboxymethylcellulose, benzocaine-menthol, mag hydrox-al hydrox-simeth, ondansetron **OR** ondansetron, traZODone, sodium chloride, Respiratory Therapy Consult, oxyCODONE immediate-release **OR** oxyCODONE immediate-release,  hydrALAZINE, docusate sodium **AND** sennosides **AND** bisacodyl **AND** magnesium hydroxide    Diet:  Current Diet Order   Procedures    Diet Order Diet: Regular       Assessment:  Active Hospital Problems    Diagnosis     *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  Lower extremity weakness and pain as well as numbness in upper extremities.  No known premorbid infection.  -PT and OT for mobility and ADLs. Per guidelines, 15 hours per week between PT, OT.  Cleared by speech therapy for swallow and speech, transition time to PT and OT  - We will hold inpatient rehabilitation with physical and Occupational Therapy for continued medical work-up and management.     Fever: 100.5 on 6/26  - Infectious work-up as per hospitalist, COVID and flu were negative UA is negative, procalcitonin was elevated on 6/25, chest x-ray was negative blood cultures to date are negative  - Check CBC and CMP in a.m.    Neurologic respiratory impairment:   Patient is at high risk for respiratory involvement given neurologic symptoms in the upper extremities.   -Vital capacity daily, 1.72 liters on 6/20  - Incentive spirometry     Lower extremity swelling: Left greater than right  - Lower extremity Doppler was negative for DVT     Hyponatremia: Likely SIADH, followed by nephrology during [facility] hospitalization  - Sodium of 126 on 6/17 --> 129 on 6/19 --> 133 on 6/20 --> 136 on 6/21 --> 133 on 6/26  - Check CMP in a.m.  - Decrease sodium chloride tablets 1 g 2 times daily as per hospitalist  - Lasix was discontinued on 6/21 as per hospitalist discontinuing Lasix versus liberalizing fluid restriction  -Free  water restriction of 500 cc/day and total p.o. fluid intake of 1.8 L, if sodium continues to improve goal will be to decrease fluid restriction.     Hypomagnesemia: Low magnesium during acute hospitalization, supplemented with p.o. magnesium  - Magnesium of 2.0 on 6/18     Urinary tract infection/pyelonephritis: Catheter associated UTI in the setting of neurogenic bladder  - WBC of 14 on 6/17 --> WBC of 10.7 on 6/20 --> 4.3 on 6/26  - Peripheral IV was removed prior to transfer to acute rehabilitation we will replace IV.  - Ceftriaxone 1 g every 24 hours, continue for another 8 days, total of 10 days given systemic factor     Neurogenic bladder: Inappropriate management of neurogenic bladder can result and hydronephrosis, increased risk of pyelonephritis, and renal failure  - Discontinued Blancas on 6/22, Start voiding trial, if can't void in 6 hours or prn check pvr and if >500cc then ICP,if able to void then check PVR, if PVR is >200cc then ICP  - Continue Flomax 0.4 mg nightly     Neurogenic bowel: Inappropriate neurogenic bowel can result in severe constipation, bowel dilation and rupture.  Severe constipation with prolonged period of time without BM.  - Continue upper motor neuron neurogenic bowel program with senna 2 tablets q. noon, Colace 100 mg twice daily and Magic bullet suppository WA daily and digital stimulation     orthostatic hypotension  Because of significant autonomic dysfunction associated with some cases of AIDP, the patient is at high risk for orthostatic hypotension.  Goal of SBP greater than 100.  -  Mechanical compression with teds and Tubi  and abd binder used prior to out of bed  - Continue midodrine 10 mg 3 times daily     Essential hypertension: SBP of greater than 200 on presentation to acute hospital.  -112 in the last 24 hours  - Lisinopril 20 mg daily held, discontinued lisinopril secondary to orthostatic hypotension as above, may restart as an outpatient with recovery of  autonomic nervous system     Dyslipidemia: Total cholesterol 137, HDL 44, LDL 75  -Lipitor 10 mg every Monday Wednesday Friday     Dysphagia: Concern for swallow dysfunction in the setting of certain variants of AIDP  - Consult speech therapy for swallow evaluation.  Cleared by speech therapy     Skin  Due to the sensory impairments following AIDP, skin protection principles are of the utmost importance.      - every two-hour turning pattern overnight while the patient is in bed. When the patient is out of bed, an every 15 minute repositioning or weight shifting pattern will be utilized in order to help train the patient for long-term skin protection. We will also discuss skin monitoring and its importance with the patient and the caregivers.     Pain:  Postoperative pain:  - Oxycodone 5-10 mg every 3 hours as needed moderate-severe pain  -Tylenol 1000 mg 3 times daily, AST 28, ALT 30, alk phos 76, within normal limits, continue current dose of scheduled Tylenol  - Lidocaine patch x2 on either side of incision, on for 12 hours off for 12 hours  - Continue tramadol 50 mg every 6 hours as needed for pain     Neuropathic pain: Burning and tingling in all 4 extremities  - Decrease gabapentin to 400 mg 3 times a day, concern for fatigue side effect  - I increase Lyrica to 100 mg 3 times daily  - Celebrex 200 mg twice daily  - Tramadol 50 mg every 6 hours as needed pain  -May benefit from a nonpharmaceutical modalities such as TENS units, compression, ice, heat, will discuss with therapy team.     Vitamin D deficiency: High risk of vitamin D deficiency in this population, goal of vitamin D level greater than 30, has been linked to improvement and central nervous system recovery  - Vitamin D level of 19 on 6/18  - Cholecalciferol 2000 units daily     Insomnia: Significant difficulty during acute hospitalization  - Restarted on Restoril 15 mg nightly  - Trazodone 50 mg nightly as needed insomnia     DVT prophylaxis  In the  setting of AIDP, the patient is at high risk of deep venous thrombosis given decreased mobility and alteration to autonomic nervous system.   -DC Lovenox  - Eliquis 5 mg twice daily for prophylaxis  ____________________________________    Mario Terrell DO  [facility] - Physical Medicine & Rehabilitation   [facility] - Spinal Cord Injury Medicine  ____________________________________

## 2023-06-26 NOTE — THERAPY
Missed Therapy    Patient Name: Bull Banegas  Age:  67 y.o., Sex:  male  Medical Record #: [id number]  Today's Date: 6/26/2023    Discussed missed therapy with Physician         06/26/23 0931   Therapy Missed   Missed Therapy (Minutes) 60   Reason For Missed Therapy Medical - Patient on Hold from Therapy;Medical - Patient with Nursing  (Covid rule out, patient unable to participate, chills)   Interdisciplinary Plan of Care Collaboration   IDT Collaboration with  Physician   Collaboration Comments Medical Hold

## 2023-06-27 ENCOUNTER — APPOINTMENT (OUTPATIENT)
Dept: PHYSICAL THERAPY | Facility: REHABILITATION | Age: 68
DRG: 095 | End: 2023-06-27
Attending: PHYSICAL MEDICINE & REHABILITATION
Payer: MEDICARE

## 2023-06-27 ENCOUNTER — APPOINTMENT (OUTPATIENT)
Dept: OCCUPATIONAL THERAPY | Facility: REHABILITATION | Age: 68
DRG: 095 | End: 2023-06-27
Attending: PHYSICAL MEDICINE & REHABILITATION
Payer: MEDICARE

## 2023-06-27 LAB
ALBUMIN SERPL BCP-MCNC: 3.1 G/DL (ref 3.2–4.9)
ALBUMIN/GLOB SERPL: 0.8 G/DL
ALP SERPL-CCNC: 73 U/L (ref 30–99)
ALT SERPL-CCNC: 20 U/L (ref 2–50)
ANION GAP SERPL CALC-SCNC: 12 MMOL/L (ref 7–16)
AST SERPL-CCNC: 22 U/L (ref 12–45)
BASOPHILS # BLD AUTO: 0.8 % (ref 0–1.8)
BASOPHILS # BLD: 0.03 K/UL (ref 0–0.12)
BILIRUB SERPL-MCNC: 0.5 MG/DL (ref 0.1–1.5)
BUN SERPL-MCNC: 14 MG/DL (ref 8–22)
CALCIUM ALBUM COR SERPL-MCNC: 9.5 MG/DL (ref 8.5–10.5)
CALCIUM SERPL-MCNC: 8.8 MG/DL (ref 8.5–10.5)
CHLORIDE SERPL-SCNC: 100 MMOL/L (ref 96–112)
CO2 SERPL-SCNC: 24 MMOL/L (ref 20–33)
CREAT SERPL-MCNC: 0.54 MG/DL (ref 0.5–1.4)
EOSINOPHIL # BLD AUTO: 0.17 K/UL (ref 0–0.51)
EOSINOPHIL NFR BLD: 4.3 % (ref 0–6.9)
ERYTHROCYTE [DISTWIDTH] IN BLOOD BY AUTOMATED COUNT: 59.7 FL (ref 35.9–50)
GFR SERPLBLD CREATININE-BSD FMLA CKD-EPI: 110 ML/MIN/1.73 M 2
GLOBULIN SER CALC-MCNC: 3.8 G/DL (ref 1.9–3.5)
GLUCOSE SERPL-MCNC: 99 MG/DL (ref 65–99)
HCT VFR BLD AUTO: 30.2 % (ref 42–52)
HGB BLD-MCNC: 10.2 G/DL (ref 14–18)
IMM GRANULOCYTES # BLD AUTO: 0.02 K/UL (ref 0–0.11)
IMM GRANULOCYTES NFR BLD AUTO: 0.5 % (ref 0–0.9)
LYMPHOCYTES # BLD AUTO: 1.11 K/UL (ref 1–4.8)
LYMPHOCYTES NFR BLD: 28.2 % (ref 22–41)
MCH RBC QN AUTO: 31.2 PG (ref 27–33)
MCHC RBC AUTO-ENTMCNC: 33.8 G/DL (ref 32.3–36.5)
MCV RBC AUTO: 92.4 FL (ref 81.4–97.8)
MONOCYTES # BLD AUTO: 0.45 K/UL (ref 0–0.85)
MONOCYTES NFR BLD AUTO: 11.4 % (ref 0–13.4)
NEUTROPHILS # BLD AUTO: 2.16 K/UL (ref 1.82–7.42)
NEUTROPHILS NFR BLD: 54.8 % (ref 44–72)
NRBC # BLD AUTO: 0 K/UL
NRBC BLD-RTO: 0 /100 WBC (ref 0–0.2)
PLATELET # BLD AUTO: 264 K/UL (ref 164–446)
PMV BLD AUTO: 8.9 FL (ref 9–12.9)
POTASSIUM SERPL-SCNC: 4.4 MMOL/L (ref 3.6–5.5)
PROT SERPL-MCNC: 6.9 G/DL (ref 6–8.2)
RBC # BLD AUTO: 3.27 M/UL (ref 4.7–6.1)
SODIUM SERPL-SCNC: 136 MMOL/L (ref 135–145)
WBC # BLD AUTO: 3.9 K/UL (ref 4.8–10.8)

## 2023-06-27 PROCEDURE — 94010 BREATHING CAPACITY TEST: CPT

## 2023-06-27 PROCEDURE — A9270 NON-COVERED ITEM OR SERVICE: HCPCS | Performed by: HOSPITALIST

## 2023-06-27 PROCEDURE — 97110 THERAPEUTIC EXERCISES: CPT

## 2023-06-27 PROCEDURE — 97530 THERAPEUTIC ACTIVITIES: CPT

## 2023-06-27 PROCEDURE — 97535 SELF CARE MNGMENT TRAINING: CPT

## 2023-06-27 PROCEDURE — A9270 NON-COVERED ITEM OR SERVICE: HCPCS | Performed by: PHYSICAL MEDICINE & REHABILITATION

## 2023-06-27 PROCEDURE — 94150 VITAL CAPACITY TEST: CPT

## 2023-06-27 PROCEDURE — 700105 HCHG RX REV CODE 258: Performed by: PHYSICAL MEDICINE & REHABILITATION

## 2023-06-27 PROCEDURE — 80053 COMPREHEN METABOLIC PANEL: CPT

## 2023-06-27 PROCEDURE — 97112 NEUROMUSCULAR REEDUCATION: CPT

## 2023-06-27 PROCEDURE — 99232 SBSQ HOSP IP/OBS MODERATE 35: CPT | Performed by: HOSPITALIST

## 2023-06-27 PROCEDURE — 36415 COLL VENOUS BLD VENIPUNCTURE: CPT

## 2023-06-27 PROCEDURE — 700102 HCHG RX REV CODE 250 W/ 637 OVERRIDE(OP): Performed by: HOSPITALIST

## 2023-06-27 PROCEDURE — 99232 SBSQ HOSP IP/OBS MODERATE 35: CPT | Performed by: PHYSICAL MEDICINE & REHABILITATION

## 2023-06-27 PROCEDURE — 770010 HCHG ROOM/CARE - REHAB SEMI PRIVAT*

## 2023-06-27 PROCEDURE — 700102 HCHG RX REV CODE 250 W/ 637 OVERRIDE(OP): Performed by: PHYSICAL MEDICINE & REHABILITATION

## 2023-06-27 PROCEDURE — 85025 COMPLETE CBC W/AUTO DIFF WBC: CPT

## 2023-06-27 RX ORDER — GABAPENTIN 300 MG/1
300 CAPSULE ORAL 3 TIMES DAILY
Status: DISCONTINUED | OUTPATIENT
Start: 2023-06-27 | End: 2023-06-28

## 2023-06-27 RX ORDER — SODIUM CHLORIDE 1 G/1
1 TABLET ORAL DAILY
Status: DISCONTINUED | OUTPATIENT
Start: 2023-06-28 | End: 2023-06-28

## 2023-06-27 RX ORDER — ACETAMINOPHEN 325 MG/1
650 TABLET ORAL EVERY 4 HOURS PRN
Status: DISCONTINUED | OUTPATIENT
Start: 2023-06-27 | End: 2023-07-10 | Stop reason: HOSPADM

## 2023-06-27 RX ORDER — SODIUM CHLORIDE 9 MG/ML
INJECTION, SOLUTION INTRAVENOUS CONTINUOUS
Status: ACTIVE | OUTPATIENT
Start: 2023-06-27 | End: 2023-06-27

## 2023-06-27 RX ORDER — PREGABALIN 150 MG/1
150 CAPSULE ORAL 3 TIMES DAILY
Status: DISCONTINUED | OUTPATIENT
Start: 2023-06-27 | End: 2023-06-30

## 2023-06-27 RX ADMIN — OMEPRAZOLE 20 MG: 20 CAPSULE, DELAYED RELEASE ORAL at 08:46

## 2023-06-27 RX ADMIN — MIDODRINE HYDROCHLORIDE 10 MG: 10 TABLET ORAL at 18:13

## 2023-06-27 RX ADMIN — OXYCODONE HYDROCHLORIDE 10 MG: 10 TABLET ORAL at 21:17

## 2023-06-27 RX ADMIN — Medication 2000 UNITS: at 08:45

## 2023-06-27 RX ADMIN — PREGABALIN 150 MG: 150 CAPSULE ORAL at 15:37

## 2023-06-27 RX ADMIN — APIXABAN 5 MG: 5 TABLET, FILM COATED ORAL at 21:17

## 2023-06-27 RX ADMIN — MIDODRINE HYDROCHLORIDE 10 MG: 10 TABLET ORAL at 08:45

## 2023-06-27 RX ADMIN — MIDODRINE HYDROCHLORIDE 10 MG: 10 TABLET ORAL at 12:29

## 2023-06-27 RX ADMIN — DOCUSATE SODIUM 100 MG: 100 CAPSULE, LIQUID FILLED ORAL at 08:46

## 2023-06-27 RX ADMIN — GABAPENTIN 400 MG: 400 CAPSULE ORAL at 08:45

## 2023-06-27 RX ADMIN — GABAPENTIN 300 MG: 300 CAPSULE ORAL at 15:37

## 2023-06-27 RX ADMIN — PREGABALIN 150 MG: 150 CAPSULE ORAL at 21:17

## 2023-06-27 RX ADMIN — GABAPENTIN 300 MG: 300 CAPSULE ORAL at 21:17

## 2023-06-27 RX ADMIN — MULTIPLE VITAMINS W/ MINERALS TAB 1 TABLET: TAB at 12:30

## 2023-06-27 RX ADMIN — MENTHOL, METHYL SALICYLATE: 10; 15 CREAM TOPICAL at 08:47

## 2023-06-27 RX ADMIN — SODIUM CHLORIDE: 9 INJECTION, SOLUTION INTRAVENOUS at 13:00

## 2023-06-27 RX ADMIN — DOCUSATE SODIUM 100 MG: 100 CAPSULE, LIQUID FILLED ORAL at 21:17

## 2023-06-27 RX ADMIN — TAMSULOSIN HYDROCHLORIDE 0.4 MG: 0.4 CAPSULE ORAL at 18:13

## 2023-06-27 RX ADMIN — Medication 6 MG: at 21:17

## 2023-06-27 RX ADMIN — PREGABALIN 100 MG: 100 CAPSULE ORAL at 08:45

## 2023-06-27 RX ADMIN — SODIUM CHLORIDE 1 G: 1 TABLET ORAL at 08:45

## 2023-06-27 RX ADMIN — ASPIRIN 81 MG CHEWABLE TABLET 81 MG: 81 TABLET CHEWABLE at 08:45

## 2023-06-27 RX ADMIN — TEMAZEPAM 15 MG: 15 CAPSULE ORAL at 21:17

## 2023-06-27 RX ADMIN — SENNOSIDES 17.2 MG: 8.6 TABLET, FILM COATED ORAL at 12:28

## 2023-06-27 RX ADMIN — APIXABAN 5 MG: 5 TABLET, FILM COATED ORAL at 08:45

## 2023-06-27 ASSESSMENT — ENCOUNTER SYMPTOMS
SHORTNESS OF BREATH: 0
DIARRHEA: 0
ABDOMINAL PAIN: 0
NERVOUS/ANXIOUS: 0
VOMITING: 0
FEVER: 0
NAUSEA: 0
CHILLS: 0

## 2023-06-27 ASSESSMENT — ACTIVITIES OF DAILY LIVING (ADL)
TOILET_TRANSFER_DESCRIPTION: GRAB BAR;INCREASED TIME;INITIAL PREPARATION FOR TASK;SET-UP OF EQUIPMENT;SUPERVISION FOR SAFETY;VERBAL CUEING

## 2023-06-27 ASSESSMENT — PAIN DESCRIPTION - PAIN TYPE
TYPE: ACUTE PAIN
TYPE: ACUTE PAIN

## 2023-06-27 ASSESSMENT — PULMONARY FUNCTION TESTS: FVC: 3.02

## 2023-06-27 NOTE — PROGRESS NOTES
"  Physical Medicine & Rehabilitation Progress Note    Encounter Date: 6/27/2023    Chief Complaint:     Interval Events (Subjective):    He was evaluated outside of his room sitting in manual wheelchair after working with occupational therapy this morning.  He reports feeling much better today, improved fatigue, no fevers chills last night.    He complains of vibration in the hands and lower extremities.    Bowel: No large incontinence  Bladder: ICP every 4 hours, 300-750 cc per catheterization  PRN: 15 MEq    ROS:  Gen: No fatigue, confusion, significant weight loss  Eyes: no blurry vision, double vision or pain in eyes  ENT: no changes in hearing, runny nose, nose bleeds, sinus pain  CV: No CP, palpitations,   Lungs: no shortness of breath, changes in secretions, changes in cough, pain with coughing  Abd: No abd pain, nausea, vomiting, pain with eating  : no blood in urine, pain during storage phase, bladder spasms, suprapubic pain  Ext: No swelling in legs, asymmetric atrophy  Neuro: no changes in strength or sensation  Skin: no new ulcers/skin breakdown appreciated by patient  Mood: No changes in mood today, increase in depression or anxiety  Heme: no bruising, or bleeding    Objective:  Vitals: /76   Pulse 83   Temp 36.6 °C (97.9 °F) (Oral)   Resp 18   Ht 1.727 m (5' 7.99\")   Wt 116 kg (256 lb 13.4 oz)   SpO2 94%   Gen: NAD, Body mass index is 39.06 kg/m².  HEENT:  NC/AT, PERRLA, moist mucous membranes  Cardio: RRR, no mumurs  Pulm: CTAB, with normal respiratory effort  Abd: Soft NTND, active bowel sounds,   Ext: No peripheral edema. No calf tenderness. No clubbing/cyanosis. +dorsal pedalis pulses bilaterally.      Laboratory Values:  Recent Results (from the past 72 hour(s))   CBC WITH DIFFERENTIAL    Collection Time: 06/25/23  4:25 PM   Result Value Ref Range    WBC 4.6 (L) 4.8 - 10.8 K/uL    RBC 3.45 (L) 4.70 - 6.10 M/uL    Hemoglobin 11.0 (L) 14.0 - 18.0 g/dL    Hematocrit 32.3 (L) 42.0 - 52.0 " %    MCV 93.6 81.4 - 97.8 fL    MCH 31.9 27.0 - 33.0 pg    MCHC 34.1 32.3 - 36.5 g/dL    RDW 62.3 (H) 35.9 - 50.0 fL    Platelet Count 258 164 - 446 K/uL    MPV 9.9 9.0 - 12.9 fL    Neutrophils-Polys 89.00 (H) 44.00 - 72.00 %    Lymphocytes 9.00 (L) 22.00 - 41.00 %    Monocytes 1.00 0.00 - 13.40 %    Eosinophils 1.00 0.00 - 6.90 %    Basophils 0.00 0.00 - 1.80 %    Nucleated RBC 0.00 0.00 - 0.20 /100 WBC    Neutrophils (Absolute) 4.09 1.82 - 7.42 K/uL    Lymphs (Absolute) 0.41 (L) 1.00 - 4.80 K/uL    Monos (Absolute) 0.05 0.00 - 0.85 K/uL    Eos (Absolute) 0.05 0.00 - 0.51 K/uL    Baso (Absolute) 0.00 0.00 - 0.12 K/uL    NRBC (Absolute) 0.00 K/uL    Anisocytosis 1+     Macrocytosis 1+    Comp Metabolic Panel    Collection Time: 06/25/23  4:25 PM   Result Value Ref Range    Sodium 135 135 - 145 mmol/L    Potassium 4.3 3.6 - 5.5 mmol/L    Chloride 99 96 - 112 mmol/L    Co2 22 20 - 33 mmol/L    Anion Gap 14.0 7.0 - 16.0    Glucose 108 (H) 65 - 99 mg/dL    Bun 14 8 - 22 mg/dL    Creatinine 0.49 (L) 0.50 - 1.40 mg/dL    Calcium 8.9 8.5 - 10.5 mg/dL    AST(SGOT) 29 12 - 45 U/L    ALT(SGPT) 21 2 - 50 U/L    Alkaline Phosphatase 89 30 - 99 U/L    Total Bilirubin 1.0 0.1 - 1.5 mg/dL    Albumin 3.5 3.2 - 4.9 g/dL    Total Protein 6.9 6.0 - 8.2 g/dL    Globulin 3.4 1.9 - 3.5 g/dL    A-G Ratio 1.0 g/dL   BLOOD CULTURE    Collection Time: 06/25/23  4:25 PM    Specimen: Peripheral; Blood   Result Value Ref Range    Significant Indicator NEG     Source BLD     Site PERIPHERAL     Culture Result       No Growth  Note: Blood cultures are incubated for 5 days and  are monitored continuously.Positive blood cultures  are called to the RN and reported as soon as  they are identified.     PROCALCITONIN    Collection Time: 06/25/23  4:25 PM   Result Value Ref Range    Procalcitonin 0.42 (H) <0.25 ng/mL   CORRECTED CALCIUM    Collection Time: 06/25/23  4:25 PM   Result Value Ref Range    Correct Calcium 9.3 8.5 - 10.5 mg/dL   ESTIMATED GFR     Collection Time: 06/25/23  4:25 PM   Result Value Ref Range    GFR (CKD-EPI) 113 >60 mL/min/1.73 m 2   PERIPHERAL SMEAR REVIEW    Collection Time: 06/25/23  4:25 PM   Result Value Ref Range    Peripheral Smear Review see below    PLATELET ESTIMATE    Collection Time: 06/25/23  4:25 PM   Result Value Ref Range    Plt Estimation Normal    MORPHOLOGY    Collection Time: 06/25/23  4:25 PM   Result Value Ref Range    RBC Morphology Present    DIFFERENTIAL MANUAL    Collection Time: 06/25/23  4:25 PM   Result Value Ref Range    Manual Diff Status PERFORMED    BLOOD CULTURE    Collection Time: 06/25/23  4:35 PM    Specimen: Peripheral; Blood   Result Value Ref Range    Significant Indicator NEG     Source BLD     Site PERIPHERAL     Culture Result       No Growth  Note: Blood cultures are incubated for 5 days and  are monitored continuously.Positive blood cultures  are called to the RN and reported as soon as  they are identified.     URINALYSIS    Collection Time: 06/25/23  9:40 PM   Result Value Ref Range    Color Yellow     Character Clear     Specific Gravity 1.014 <1.035    Ph 7.0 5.0 - 8.0    Glucose Negative Negative mg/dL    Ketones Negative Negative mg/dL    Protein Negative Negative mg/dL    Bilirubin Negative Negative    Urobilinogen, Urine 1.0 Negative    Nitrite Negative Negative    Leukocyte Esterase Negative Negative    Occult Blood Negative Negative    Micro Urine Req see below    CBC WITHOUT DIFFERENTIAL    Collection Time: 06/26/23  5:27 AM   Result Value Ref Range    WBC 4.3 (L) 4.8 - 10.8 K/uL    RBC 3.31 (L) 4.70 - 6.10 M/uL    Hemoglobin 10.5 (L) 14.0 - 18.0 g/dL    Hematocrit 30.3 (L) 42.0 - 52.0 %    MCV 91.5 81.4 - 97.8 fL    MCH 31.7 27.0 - 33.0 pg    MCHC 34.7 32.3 - 36.5 g/dL    RDW 60.4 (H) 35.9 - 50.0 fL    Platelet Count 264 164 - 446 K/uL    MPV 8.9 (L) 9.0 - 12.9 fL   Basic Metabolic Panel    Collection Time: 06/26/23  5:27 AM   Result Value Ref Range    Sodium 133 (L) 135 - 145 mmol/L     Potassium 4.3 3.6 - 5.5 mmol/L    Chloride 98 96 - 112 mmol/L    Co2 22 20 - 33 mmol/L    Glucose 126 (H) 65 - 99 mg/dL    Bun 16 8 - 22 mg/dL    Creatinine 0.53 0.50 - 1.40 mg/dL    Calcium 8.5 8.5 - 10.5 mg/dL    Anion Gap 13.0 7.0 - 16.0   ESTIMATED GFR    Collection Time: 06/26/23  5:27 AM   Result Value Ref Range    GFR (CKD-EPI) 111 >60 mL/min/1.73 m 2   CoV-2, Flu A/B, And RSV by PCR ([facility])    Collection Time: 06/26/23  9:00 AM    Specimen: Respirate   Result Value Ref Range    Influenza virus A RNA Negative Negative    Influenza virus B, PCR Negative Negative    RSV, PCR Negative Negative    SARS-CoV-2 by PCR NotDetected     SARS-CoV-2 Source NP Swab    Comp Metabolic Panel    Collection Time: 06/27/23  5:33 AM   Result Value Ref Range    Sodium 136 135 - 145 mmol/L    Potassium 4.4 3.6 - 5.5 mmol/L    Chloride 100 96 - 112 mmol/L    Co2 24 20 - 33 mmol/L    Anion Gap 12.0 7.0 - 16.0    Glucose 99 65 - 99 mg/dL    Bun 14 8 - 22 mg/dL    Creatinine 0.54 0.50 - 1.40 mg/dL    Calcium 8.8 8.5 - 10.5 mg/dL    AST(SGOT) 22 12 - 45 U/L    ALT(SGPT) 20 2 - 50 U/L    Alkaline Phosphatase 73 30 - 99 U/L    Total Bilirubin 0.5 0.1 - 1.5 mg/dL    Albumin 3.1 (L) 3.2 - 4.9 g/dL    Total Protein 6.9 6.0 - 8.2 g/dL    Globulin 3.8 (H) 1.9 - 3.5 g/dL    A-G Ratio 0.8 g/dL   CBC WITH DIFFERENTIAL    Collection Time: 06/27/23  5:33 AM   Result Value Ref Range    WBC 3.9 (L) 4.8 - 10.8 K/uL    RBC 3.27 (L) 4.70 - 6.10 M/uL    Hemoglobin 10.2 (L) 14.0 - 18.0 g/dL    Hematocrit 30.2 (L) 42.0 - 52.0 %    MCV 92.4 81.4 - 97.8 fL    MCH 31.2 27.0 - 33.0 pg    MCHC 33.8 32.3 - 36.5 g/dL    RDW 59.7 (H) 35.9 - 50.0 fL    Platelet Count 264 164 - 446 K/uL    MPV 8.9 (L) 9.0 - 12.9 fL    Neutrophils-Polys 54.80 44.00 - 72.00 %    Lymphocytes 28.20 22.00 - 41.00 %    Monocytes 11.40 0.00 - 13.40 %    Eosinophils 4.30 0.00 - 6.90 %    Basophils 0.80 0.00 - 1.80 %    Immature Granulocytes 0.50 0.00 - 0.90 %    Nucleated RBC 0.00  0.00 - 0.20 /100 WBC    Neutrophils (Absolute) 2.16 1.82 - 7.42 K/uL    Lymphs (Absolute) 1.11 1.00 - 4.80 K/uL    Monos (Absolute) 0.45 0.00 - 0.85 K/uL    Eos (Absolute) 0.17 0.00 - 0.51 K/uL    Baso (Absolute) 0.03 0.00 - 0.12 K/uL    Immature Granulocytes (abs) 0.02 0.00 - 0.11 K/uL    NRBC (Absolute) 0.00 K/uL   CORRECTED CALCIUM    Collection Time: 06/27/23  5:33 AM   Result Value Ref Range    Correct Calcium 9.5 8.5 - 10.5 mg/dL   ESTIMATED GFR    Collection Time: 06/27/23  5:33 AM   Result Value Ref Range    GFR (CKD-EPI) 110 >60 mL/min/1.73 m 2       Medications:  Scheduled Medications   Medication Dose Frequency    docusate sodium  100 mg BID    And    sennosides  17.2 mg DAILY AT NOON    And    bisacodyl  10 mg QDAY    pregabalin  100 mg TID    gabapentin  400 mg TID    sodium chloride  1 g BID WITH MEALS    apixaban  5 mg BID    midodrine  10 mg TID WITH MEALS    vitamin D3  2,000 Units DAILY    menthol-methyl salicycate   BID    omeprazole  20 mg DAILY    Pharmacy Consult Request  1 Each PHARMACY TO DOSE    therapeutic multivitamin-minerals  1 Tablet DAILY WITH LUNCH    aspirin  81 mg DAILY    atorvastatin  10 mg MO, WE + FR    melatonin  6 mg QHS    temazepam  15 mg QHS    lidocaine  2 Patch Q24HR    tamsulosin  0.4 mg AFTER DINNER     PRN medications: docusate sodium **AND** sennosides **AND** bisacodyl **AND** magnesium hydroxide, [COMPLETED] docusate sodium **FOLLOWED BY** normal saline (PF), traMADol, [COMPLETED] acetaminophen **FOLLOWED BY** acetaminophen, acetaminophen, carboxymethylcellulose, benzocaine-menthol, mag hydrox-al hydrox-simeth, ondansetron **OR** ondansetron, traZODone, sodium chloride, Respiratory Therapy Consult, oxyCODONE immediate-release **OR** oxyCODONE immediate-release, hydrALAZINE    Diet:  Current Diet Order   Procedures    Diet Order Diet: Regular       Assessment:  Active Hospital Problems    Diagnosis     *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  Lower extremity weakness and pain as well as numbness in upper extremities.  No known premorbid infection.  -PT and OT for mobility and ADLs. Per guidelines, 15 hours per week between PT, OT.  Cleared by speech therapy for swallow and speech, transition time to PT and OT  - Continue comprehensive acute inpatient rehabilitation     Fever: 100.5 on 6/26  - Infectious work-up as per hospitalist, COVID and flu were negative, UA is negative, procalcitonin was elevated on 6/25, chest x-ray was negative blood cultures to date are negative  - Discontinue Tylenol, concerned that this may be masking his fevers.  -Check BMP in a.m.     Neurologic respiratory impairment:   Patient is at high risk for respiratory involvement given neurologic symptoms in the upper extremities.   -Vital capacity daily, 1.72 liters on 6/20  - Incentive spirometry     Lower extremity swelling: Left greater than right  - Lower extremity Doppler was negative for DVT     Hyponatremia: Likely SIADH, followed by nephrology during [facility] hospitalization  - Sodium of 126 on 6/17 --> 129 on 6/19 --> 133 on 6/20 --> 136 on 6/21 --> 133 on 6/26 --> 136 on 6/27  -Decrease sodium chloride tablets to 1 g daily as per hospitalist  - Lasix was discontinued on 6/21 as per hospitalist discontinuing Lasix versus liberalizing fluid restriction  -Free water restriction of 500 cc/day and total p.o. fluid intake of 1.8 L, if sodium continues to improve goal will be to decrease fluid restriction.  -Check BMP in a.m.     Hypomagnesemia: Low magnesium during acute hospitalization, supplemented with p.o. magnesium  - Magnesium of 2.0 on 6/18     Urinary tract  infection/pyelonephritis: Catheter associated UTI in the setting of neurogenic bladder  - WBC of 14 on 6/17 --> WBC of 10.7 on 6/20 --> 4.3 on 6/26  - Peripheral IV was removed prior to transfer to acute rehabilitation we will replace IV.  - Ceftriaxone 1 g every 24 hours, continue for another 8 days, total of 10 days given systemic factor     Neurogenic bladder: Inappropriate management of neurogenic bladder can result and hydronephrosis, increased risk of pyelonephritis, and renal failure  - Discontinued Blnacas on 6/22, continue voiding trial, if can't void in 6 hours or prn check pvr and if >500cc then ICP,if able to void then check PVR, if PVR is >200cc then ICP  - Continue Flomax 0.4 mg nightly     Neurogenic bowel: Inappropriate neurogenic bowel can result in severe constipation, bowel dilation and rupture.  Severe constipation with prolonged period of time without BM.  - Continue upper motor neuron neurogenic bowel program with senna 2 tablets q. noon, Colace 100 mg twice daily and Magic bullet suppository [place] daily and digital stimulation     orthostatic hypotension  Because of significant autonomic dysfunction associated with some cases of AIDP, the patient is at high risk for orthostatic hypotension.  Goal of SBP greater than 100.  -  Mechanical compression with teds and Tubi  and abd binder used prior to out of bed  - Continue midodrine 10 mg 3 times daily     Essential hypertension: SBP of greater than 200 on presentation to acute hospital.  -112 in the last 24 hours  - Lisinopril 20 mg daily held, discontinued lisinopril secondary to orthostatic hypotension as above, may restart as an outpatient with recovery of autonomic nervous system     Dyslipidemia: Total cholesterol 137, HDL 44, LDL 75  -Lipitor 10 mg every Monday Wednesday Friday     Dysphagia: Concern for swallow dysfunction in the setting of certain variants of AIDP  - Consult speech therapy for swallow evaluation.  Cleared by speech  therapy     Skin  Due to the sensory impairments following AIDP, skin protection principles are of the utmost importance.      - every two-hour turning pattern overnight while the patient is in bed. When the patient is out of bed, an every 15 minute repositioning or weight shifting pattern will be utilized in order to help train the patient for long-term skin protection. We will also discuss skin monitoring and its importance with the patient and the caregivers.     Pain:  Postoperative pain:  - Oxycodone 5-10 mg every 3 hours as needed moderate-severe pain  - Discontinue Tylenol as this may be masking fevers  - Lidocaine patch x2 on either side of incision, on for 12 hours off for 12 hours  - Continue tramadol 50 mg every 6 hours as needed for pain     Neuropathic pain: Burning and tingling in all 4 extremities  - Decrease gabapentin to 300 mg 3 times daily, concern for fatigue side effect  - Increase Lyrica to 125 mg 3 times daily  -Discontinue Celebrex  - Tramadol 50 mg every 6 hours as needed pain  -May benefit from a nonpharmaceutical modalities such as TENS units, compression, ice, heat, will discuss with therapy team.     Vitamin D deficiency: High risk of vitamin D deficiency in this population, goal of vitamin D level greater than 30, has been linked to improvement and central nervous system recovery  - Vitamin D level of 19 on 6/18  - Cholecalciferol 2000 units daily     Insomnia: Significant difficulty during acute hospitalization  - Restarted on Restoril 15 mg nightly  - Trazodone 50 mg nightly as needed insomnia     DVT prophylaxis  In the setting of AIDP, the patient is at high risk of deep venous thrombosis given decreased mobility and alteration to autonomic nervous system.   -MICHELLE Lovenox  - Eliquis 5 mg twice daily for prophylaxis    ____________________________________    Mario Terrell DO  [facility] - Physical Medicine & Rehabilitation   [facility] - Spinal Cord Injury  Medicine  ____________________________________

## 2023-06-27 NOTE — PROGRESS NOTES
Received bedside shift report from Laya CRAMER RN regarding patient and assumed care. Patient awake, calm and stable, currently positioned in bed for comfort and safety; call light within reach. Denies pain or discomfort at this time. Will continue to monitor.

## 2023-06-27 NOTE — PROGRESS NOTES
Hospital Medicine Daily Progress Note        Chief Complaint:  Hypertension  Hyponatremia    Interval History:  Discussed about his salt levels better and have decreased his salt tabs.    Review of Systems  Review of Systems   Constitutional:  Positive for malaise/fatigue. Negative for chills and fever.   Respiratory:  Negative for shortness of breath.    Cardiovascular:  Negative for chest pain.   Gastrointestinal:  Negative for abdominal pain, diarrhea, nausea and vomiting.   Psychiatric/Behavioral:  The patient is not nervous/anxious.         Physical Exam  Temp:  [36.6 °C (97.9 °F)-36.9 °C (98.4 °F)] 36.6 °C (97.9 °F)  Pulse:  [65-94] 83  Resp:  [18] 18  BP: ()/(57-87) 117/76  SpO2:  [93 %-95 %] 94 %    Physical Exam  Vitals and nursing note reviewed.   Constitutional:       Appearance: Normal appearance.   HENT:      Head: Atraumatic.   Eyes:      Conjunctiva/sclera: Conjunctivae normal.      Pupils: Pupils are equal, round, and reactive to light.   Cardiovascular:      Rate and Rhythm: Normal rate and regular rhythm.      Heart sounds: No murmur heard.  Pulmonary:      Effort: Pulmonary effort is normal.      Breath sounds: No stridor. No wheezing or rales.   Abdominal:      General: There is no distension.      Palpations: Abdomen is soft.      Tenderness: There is no abdominal tenderness.   Musculoskeletal:      Cervical back: Normal range of motion and neck supple.      Right lower leg: No edema.      Left lower leg: No edema.   Skin:     General: Skin is warm and dry.      Findings: No rash.   Neurological:      Mental Status: He is alert and oriented to person, place, and time.   Psychiatric:         Mood and Affect: Mood normal.         Behavior: Behavior normal.         Fluids    Intake/Output Summary (Last 24 hours) at 6/27/2023 0912  Last data filed at 6/27/2023 0550  Gross per 24 hour   Intake 360 ml   Output 2150 ml   Net -1790 ml         Laboratory  Recent Labs     06/25/23  0492  06/26/23  0527 06/27/23  0533   WBC 4.6* 4.3* 3.9*   RBC 3.45* 3.31* 3.27*   HEMOGLOBIN 11.0* 10.5* 10.2*   HEMATOCRIT 32.3* 30.3* 30.2*   MCV 93.6 91.5 92.4   MCH 31.9 31.7 31.2   MCHC 34.1 34.7 33.8   RDW 62.3* 60.4* 59.7*   PLATELETCT 258 264 264   MPV 9.9 8.9* 8.9*       Recent Labs     06/25/23  1625 06/26/23  0527 06/27/23  0533   SODIUM 135 133* 136   POTASSIUM 4.3 4.3 4.4   CHLORIDE 99 98 100   CO2 22 22 24   GLUCOSE 108* 126* 99   BUN 14 16 14   CREATININE 0.49* 0.53 0.54   CALCIUM 8.9 8.5 8.8                     Assessment/Plan  * GBS (Guillain-[place] syndrome) ([facility])  Assessment & Plan  Had progressive BLE weakness  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[facility]  S/P IVIG x 5 doses     Fever  Assessment & Plan  Has occ low grade fever for a while  Currently afebrile (6/27)  Has leukopenia  PCT 0.42  COVID/FLU/RSV negative  UA negative  CXR negative acute  BC x 2 neg  Cont to monitor for now    Ear discomfort  Assessment & Plan  Debrox no longer on hospital formulary  Trialed Docusate solution per Pharmacy recommendations    Dyslipidemia  Assessment & Plan  Cont Lipitor    Vitamin D deficiency  Assessment & Plan  Vit D: 19  Cont supplements    Essential hypertension  Assessment & Plan  BP ok  Cont Midodrine  Note: on Flomax   Mgt per Physiatry    Hyponatremia  Assessment & Plan  Na: 135 --> 133 --> 136 (6/27)  Off fluid restriction  Off Lisinopril  Off Lasix  On salt tabs: 2g bid (6/24) --> will will decrease to 1g daily  Cont to monitor

## 2023-06-27 NOTE — THERAPY
Physical Therapy   Daily Treatment     Patient Name: Bull Banegas  Age:  67 y.o., Sex:  male  Medical Record #: [id number]  Today's Date: 6/27/2023     Precautions  Precautions: (P) Fall Risk  Comments: Blurred vision    Subjective    Pt was seated in w/c upon arrival and agreeable to treatment.       Objective       06/27/23 1030   PT Charge Group   PT Therapeutic Activities (Units) 2   PT Total Time Spent   PT Individual Total Time Spent (Mins) 30   Precautions   Precautions Fall Risk   Interdisciplinary Plan of Care Collaboration   Patient Position at End of Therapy Seated;Other (Comments)  (Hand off to PT)     Discussion of recent illness and recovery, POC, and goals.  Completed LE ludwig med x 5 min, level 5, 0.68 miles, 13W, 45 rpm, followed by a second bout x 5 min, 0.59 miles, 12W, level 1, 38 rpm.  Continued education on pacing and GB.     Assessment    Pt continues to be limited d/t fatigue with repeated education on pacing.  Pt is motivated to progress.   Strengths: Able to follow instructions, Alert and oriented, Effective communication skills, Good carryover of learning, Independent prior level of function, Making steady progress towards goals, Motivated for self care and independence, Pleasant and cooperative, Supportive family, Willingly participates in therapeutic activities  Barriers: Decreased endurance, Fatigue, Generalized weakness, Home accessibility, Impaired activity tolerance, Impaired balance, Limited mobility (Paraplegia)    Plan    B LE therex with caution not to overfatigue  Static standing tolerance progressing to pregait with WC follow in // bars as appropriate  Seated and supine trunk NRE  Squat pivot transfer training  B LE motomed vs Nustep     Passport items to be completed:  Get in/out of bed safely, in/out of a vehicle, safely use mobility device, walk or wheel around home/community, navigate up and down stairs, show how to get up/down from the ground, ensure home is accessible,  demonstrate HEP, complete caregiver training    Physical Therapy Problems (Active)       Problem: Balance       Dates: Start:  06/18/23         Goal: STG-Within one week, patient will maintain static standing c/ BUE support >/= 2 minutes to support participation in ADLs.       Dates: Start:  06/18/23         Goal Note filed on 06/22/23 0942 by Colton Guadalupe, PT       Limited to 1 minute in parallel bars currently                 Problem: Mobility       Dates: Start:  06/18/23         Goal: STG-Within one week, patient will demonstrate ability to manage wheelchair leg rests, arm rests, and brakes in preparation for transfers at [facility] or better.        Dates: Start:  06/18/23         Goal Note filed on 06/22/23 0942 by Colton Guadalupe, PT       Cathleen for leg rest management                 Problem: Mobility Transfers       Dates: Start:  06/18/23         Goal: STG-Within one week, patient will move supine to sit at SBA or better and use of bed rail.        Dates: Start:  06/18/23         Goal Note filed on 06/22/23 0942 by Colton Guadalupe, PT       Fluctuates min-modA                 Problem: PT-Long Term Goals       Dates: Start:  06/18/23         Goal: LTG-By discharge, patient will propel wheelchair >/= 300' and navigate inclines up to 3% (curb cuts or equivalent) c/ SBA or better.        Dates: Start:  06/18/23            Goal: LTG-By discharge, patient will ambulate >/= 50' c/ ModA or better and FWW.        Dates: Start:  06/18/23            Goal: LTG-By discharge, patient will transfer one surface to another c/ Naila.        Dates: Start:  06/18/23            Goal: LTG-By discharge, patient will transfer in/out of a car c/ ModA or better.        Dates: Start:  06/18/23

## 2023-06-27 NOTE — PROGRESS NOTES
Transfer Level & Assistive Devices Used: N/A    Patient Position: N/A (E.g., bed, bed side commode, mobile shower commode, commode over toilet, regular toilet)     Adaptive Equipment Used? N/A (E.g., digital stimulator, toileting aid for hygiene, suppository )      Patient Sensation and Bowel Urgency Present? Yes     Incontinence? Yes     BM Results: Yes, prior to bowel program.    1. Caregiver Present and actively participating in training? No    2. Patient Demonstrated Understanding with Bowel Medications and Purpose: No     3. Patient Participation with Suppository Placement: N/A     4. Patient Participation with Digital Stimulation: N/A     5. Patient Participation with Clothing Management: N/A     6. Patient Participation with Hygiene: No        (If patient meets all 6 of the criteria numbered above, consider rescheduling bowel program to evening 1700 or 0500.)      Other:   Pt refused bowel program tonight.  Re-Educated pt importance of bowel program.  Per pt, he had a bowel movement tonight prior to bowel program.  Will continue to monitor patient.

## 2023-06-27 NOTE — PROGRESS NOTES
Pt with inability to void in approximately 6 hours.  Straight cathed using aseptic technique, #14 [place] catheter.  Tolerated well, had 750 mls of clear yellow urine.  Will continue to monitor patient.

## 2023-06-27 NOTE — PROGRESS NOTES
Pt with inability to void in approximately 6 hours.  Straight cathed using aseptic technique, #14 Niuean catheter.  Tolerated well, had 500 mls of clear yellow urine.  Will continue to monitor patient.

## 2023-06-27 NOTE — CARE PLAN
"  Problem: Knowledge Deficit - Standard  Goal: Patient and family/care givers will demonstrate understanding of plan of care, disease process/condition, diagnostic tests and medications  Outcome: Progressing  Note: Pt agrees with plan of care tonight regarding medications and safety.  Will continue to monitor patient.      Problem: Fall Risk - Rehab  Goal: Patient will remain free from falls  Outcome: Progressing  Note: sIa Shay Fall risk Assessment Score:  21    High fall risk Interventions   - Alarming seatbelt  - Wander guard  - Bed and strip alarm   - Yellow sign by the door   - Yellow wrist band \"Fall risk\"  - Room near to the nurse station  - Do not leave patient unattended in the bathroom  - Fall risk education provided           The patient is Stable - Low risk of patient condition declining or worsening    Shift Goals  Clinical Goals: Safety  Patient Goals: Sleep well  Family Goals: Education    Progress made toward(s) clinical / shift goals:  progressing    "

## 2023-06-27 NOTE — THERAPY
"Occupational Therapy  Daily Treatment     Patient Name: Bull Banegas  Age:  67 y.o., Sex:  male  Medical Record #: [id number]  Today's Date: 6/27/2023     Precautions  Precautions: Fall Risk  Comments: Blurred vision         Subjective    \"I finally slept last night, and am feeling better from the weekend. I didn't get up for the last 2 days.\"     Objective       06/27/23 0701   OT Charge Group   OT Self Care / ADL (Units) 3   OT Therapy Activity (Units) 1   OT Total Time Spent   OT Individual Total Time Spent (Mins) 60   Vitals   Pulse 93   Patient BP Position Sitting   Blood Pressure  123/87   Pulse Oximetry 93 %   O2 (LPM) 0   O2 Delivery Device None - Room Air   Pain   Intervention Declines   Cognition    Level of Consciousness Alert   Functional Level of Assist   Grooming Modified Independent;Seated   Grooming Description Increased time;Seated in wheelchair at sink  (For oral care)   Bathing Supervision   Bathing Description Adaptive equipment;Grab bar;Hand held shower;Long handled bath tool;Tub bench;Assit with back;Increased time;Set-up of equipment;Set up for shower sleeve;Supervision for safety  (Pt able to reach all parts while seated on fold down shower bench using LHS for feet and lateral lean for rear.)   Upper Body Dressing Supervision   Upper Body Dressing Description Set-up of equipment  (setup to don/doff pullover shirt seated)   Lower Body Dressing Minimal Assist   Lower Body Dressing Description Grab bar;Reacher;Increased time;Initial preparation for task;Supervision for safety;Verbal cueing  (Attempted use of sock aid, but due to high humidity following bathing, OTR assisted with donning socks.)   Bed, Chair, Wheelchair Transfer Contact Guard Assist   Bed Chair Wheelchair Transfer Description Initial preparation for task;Set-up of equipment;Supervision for safety;Squat pivot transfer to wheelchair   Tub / Shower Transfers Contact Guard Assist   Tub Shower Transfer Description Shower bench;Grab " bar;Increased time;Set-up of equipment;Supervision for safety;Verbal cueing   Interdisciplinary Plan of Care Collaboration   IDT Collaboration with  Nursing   Patient Position at End of Therapy Seated;Chair Alarm On;Self Releasing Lap Belt Applied;Tray Table within Reach;Call Light within Reach   Collaboration Comments Nursing notified of rashing over uper back that was stinging and ichy.         Assessment    Pt demonstrating improved energy levels and continued motivation to achieve ind with self-care and functional transfers. He continues to required CGA-Min A for SQPT and demonstrated good carryover for AE use.  Strengths: Able to follow instructions, Alert and oriented, Effective communication skills, Independent prior level of function, Motivated for self care and independence, Pleasant and cooperative, Willingly participates in therapeutic activities    Plan    UB strengthening as limited by fatigue, functional transfers progressing to standing from SB, ADLs with AE as needed, IADLs, incorporate FM skills for BUE    Occupational Therapy Goals (Active)       Problem: Dressing       Dates: Start:  06/18/23         Goal: STG-Within one week, patient will dress LB with mod A overall using AE/DME as needed.       Dates: Start:  06/18/23               Problem: Functional Transfers       Dates: Start:  06/18/23         Goal: STG-Within one week, patient will transfer to toilet with max A x1 overall using AE/DME as needed.       Dates: Start:  06/18/23               Problem: OT Long Term Goals       Dates: Start:  06/18/23         Goal: LTG-By discharge, patient will complete basic self care tasks with min-supervision overall using AE/DME as needed.       Dates: Start:  06/18/23            Goal: LTG-By discharge, patient will perform bathroom transfers with SBA overall using AE/DME as needed.       Dates: Start:  06/18/23               Problem: Toileting       Dates: Start:  06/18/23         Goal: STG-Within one  week, patient will complete toileting tasks with Max A overall using AE/DME as needed       Dates: Start:  06/18/23

## 2023-06-27 NOTE — THERAPY
"Physical Therapy   Daily Treatment     Patient Name: Bull Banegas  Age:  67 y.o., Sex:  male  Medical Record #: [id number]  Today's Date: 6/27/2023     Precautions  Precautions: Fall Risk  Comments: Blurred vision    Subjective    \"I feel way better today. The buzzing is gone and my vision seems better. I'm still a little tingly though\"     Objective       06/27/23 1030 06/27/23 1330   PT Charge Group   PT Therapeutic Exercise (Units)  --  1   PT Neuromuscular Re-Education / Balance (Units)  --  1   PT Therapeutic Activities (Units) 2 2   PT Total Time Spent   PT Individual Total Time Spent (Mins) 30 60   Precautions   Precautions Fall Risk  --    Wheelchair Functional Level of Assist   Wheelchair Assist Stand by Assist Stand by Assist   Distance Wheelchair (Feet or Distance) 200x2  (indoors and outdoors) 200   Wheelchair Description Extra time;Leg rest management;Limited by fatigue;Supervision for safety;Verbal cueing Extra time;Limited by fatigue   Transfer Functional Level of Assist   Bed, Chair, Wheelchair Transfer  --  Minimal Assist   Bed Chair Wheelchair Transfer Description  --  Squat pivot transfer to wheelchair;Set-up of equipment;Supervision for safety;Verbal cueing;Increased time;Initial preparation for task   Toilet Transfers  --  Moderate Assist   Toilet Transfer Description  --  Grab bar;Increased time;Initial preparation for task;Set-up of equipment;Supervision for safety;Verbal cueing  (squat pivot WC>commode; modA semi stand step commode>WC)   Sitting Lower Body Exercises   Hip Abduction  --  Bilateral;1 set of 15  (manual resistance 3 sec hold)   Hip Adduction  --  1 set of 15;Bilateral  (pillow squeeze 3 sec hold)   Long Arc Quad 2 sets of 10;Right;Left 1 set of 15;Right;Left   Marching  --  2 sets of 10;Reciprocal   Bed Mobility    Supine to Sit  --  Standby Assist   Sit to Supine  --  Standby Assist   Sit to Stand Minimal Assist  (pull to stand // bars) Minimal Assist  (pull to stand // bars vs " push with 1 UE from WC armrest)   Neuro-Muscular Treatments   Comments static standing tolerance with emphasis on quad/hamstring co-contraction and glute activation with B UE support on FWW, B knee block due to instability; 2 bouts 60sec ea  --    Interdisciplinary Plan of Care Collaboration   IDT Collaboration with  Physical Therapist;Occupational Therapist;Family / Caregiver Therapy Tech;Nursing   Patient Position at End of Therapy Seated;Chair Alarm On;Call Light within Reach;Tray Table within Reach;Phone within Reach  (Simultaneous filing. User may not have seen previous data.) In Bed;Bed Alarm On;Call Light within Reach;Tray Table within Reach;Phone within Reach   Collaboration Comments improvement in function, POC; spouse present for second half of session assist with donning Vector harness, return to bed to ICP     1st tx- Completed outdoor WC mobility training with emphasis on controlling descent on ramps and allowing rest breaks when fatigued    2nd tx- Assisted pt with toileting with continent BM and depA for pericare thoroughness and pants management      Assessment    Pt demonstrated significantly improved activity tolerance today as he was able to participate in 60min straight of OOB activity on 3 occasions today, which he has been unable to do the last 3 days. Pt also demonstrated improved stability with decr knee hyperextension in static standing than previously observed.  Strengths: Able to follow instructions, Alert and oriented, Effective communication skills, Good carryover of learning, Independent prior level of function, Making steady progress towards goals, Motivated for self care and independence, Pleasant and cooperative, Supportive family, Willingly participates in therapeutic activities  Barriers: Decreased endurance, Fatigue, Generalized weakness, Home accessibility, Impaired activity tolerance, Impaired balance, Limited mobility (Paraplegia)    Plan    caution to avoid over-fatigue  Set  up Vector profile with 3XL harness  STS and gait with Vector harness and ARJO  Consider stand step vs stand pivot transfer training with bariatric FWW  Seated and standing therex  B LE motomed    Passport items to be completed:  Get in/out of bed safely, in/out of a vehicle, safely use mobility device, walk or wheel around home/community, navigate up and down stairs, show how to get up/down from the ground, ensure home is accessible, demonstrate HEP, complete caregiver training    Physical Therapy Problems (Active)       Problem: Balance       Dates: Start:  06/18/23         Goal: STG-Within one week, patient will maintain static standing c/ BUE support >/= 2 minutes to support participation in ADLs.       Dates: Start:  06/18/23         Goal Note filed on 06/22/23 0942 by Colton Guadalupe, PT       Limited to 1 minute in parallel bars currently                 Problem: Mobility       Dates: Start:  06/18/23         Goal: STG-Within one week, patient will demonstrate ability to manage wheelchair leg rests, arm rests, and brakes in preparation for transfers at [place] or better.        Dates: Start:  06/18/23         Goal Note filed on 06/22/23 0942 by Colton Guadalupe, PT       Cathleen for leg rest management                 Problem: Mobility Transfers       Dates: Start:  06/18/23         Goal: STG-Within one week, patient will move supine to sit at SBA or better and use of bed rail.        Dates: Start:  06/18/23         Goal Note filed on 06/22/23 0942 by Colton Guadalupe, PT       Fluctuates min-modA                 Problem: PT-Long Term Goals       Dates: Start:  06/18/23         Goal: LTG-By discharge, patient will propel wheelchair >/= 300' and navigate inclines up to 3% (curb cuts or equivalent) c/ SBA or better.        Dates: Start:  06/18/23            Goal: LTG-By discharge, patient will ambulate >/= 50' c/ ModA or better and FWW.        Dates: Start:  06/18/23            Goal: LTG-By discharge, patient will transfer  one surface to another c/ Naila.        Dates: Start:  06/18/23            Goal: LTG-By discharge, patient will transfer in/out of a car c/ ModA or better.        Dates: Start:  06/18/23

## 2023-06-27 NOTE — PROGRESS NOTES
Patient unable to void, ICP done q 6 hrs- see flow sheet. Patient stated not feeling good, therapies held as ordered.  Encouraged/assisted to reposition to sides.

## 2023-06-28 ENCOUNTER — APPOINTMENT (OUTPATIENT)
Dept: OCCUPATIONAL THERAPY | Facility: REHABILITATION | Age: 68
DRG: 095 | End: 2023-06-28
Attending: PHYSICAL MEDICINE & REHABILITATION
Payer: MEDICARE

## 2023-06-28 PROCEDURE — 94760 N-INVAS EAR/PLS OXIMETRY 1: CPT

## 2023-06-28 PROCEDURE — 97116 GAIT TRAINING THERAPY: CPT

## 2023-06-28 PROCEDURE — 99232 SBSQ HOSP IP/OBS MODERATE 35: CPT | Performed by: HOSPITALIST

## 2023-06-28 PROCEDURE — 97530 THERAPEUTIC ACTIVITIES: CPT

## 2023-06-28 PROCEDURE — 97110 THERAPEUTIC EXERCISES: CPT

## 2023-06-28 PROCEDURE — 770010 HCHG ROOM/CARE - REHAB SEMI PRIVAT*

## 2023-06-28 PROCEDURE — A9270 NON-COVERED ITEM OR SERVICE: HCPCS | Performed by: PHYSICAL MEDICINE & REHABILITATION

## 2023-06-28 PROCEDURE — 700102 HCHG RX REV CODE 250 W/ 637 OVERRIDE(OP): Performed by: HOSPITALIST

## 2023-06-28 PROCEDURE — 97112 NEUROMUSCULAR REEDUCATION: CPT

## 2023-06-28 PROCEDURE — 99232 SBSQ HOSP IP/OBS MODERATE 35: CPT | Performed by: PHYSICAL MEDICINE & REHABILITATION

## 2023-06-28 PROCEDURE — A9270 NON-COVERED ITEM OR SERVICE: HCPCS | Performed by: HOSPITALIST

## 2023-06-28 PROCEDURE — 700102 HCHG RX REV CODE 250 W/ 637 OVERRIDE(OP): Performed by: PHYSICAL MEDICINE & REHABILITATION

## 2023-06-28 PROCEDURE — 94150 VITAL CAPACITY TEST: CPT

## 2023-06-28 PROCEDURE — 97535 SELF CARE MNGMENT TRAINING: CPT

## 2023-06-28 RX ORDER — OXYCODONE HYDROCHLORIDE 5 MG/1
5 TABLET ORAL EVERY 4 HOURS PRN
Status: DISCONTINUED | OUTPATIENT
Start: 2023-06-28 | End: 2023-07-10

## 2023-06-28 RX ORDER — OXYCODONE HYDROCHLORIDE 10 MG/1
10 TABLET ORAL EVERY 4 HOURS PRN
Status: DISCONTINUED | OUTPATIENT
Start: 2023-06-28 | End: 2023-07-10

## 2023-06-28 RX ADMIN — MIDODRINE HYDROCHLORIDE 10 MG: 10 TABLET ORAL at 08:21

## 2023-06-28 RX ADMIN — MENTHOL, METHYL SALICYLATE: 10; 15 CREAM TOPICAL at 08:40

## 2023-06-28 RX ADMIN — Medication 2000 UNITS: at 08:21

## 2023-06-28 RX ADMIN — APIXABAN 5 MG: 5 TABLET, FILM COATED ORAL at 08:22

## 2023-06-28 RX ADMIN — PREGABALIN 150 MG: 150 CAPSULE ORAL at 08:21

## 2023-06-28 RX ADMIN — MENTHOL, METHYL SALICYLATE: 10; 15 CREAM TOPICAL at 20:24

## 2023-06-28 RX ADMIN — ATORVASTATIN CALCIUM 10 MG: 10 TABLET, FILM COATED ORAL at 20:21

## 2023-06-28 RX ADMIN — MULTIPLE VITAMINS W/ MINERALS TAB 1 TABLET: TAB at 11:36

## 2023-06-28 RX ADMIN — DOCUSATE SODIUM 100 MG: 100 CAPSULE, LIQUID FILLED ORAL at 08:21

## 2023-06-28 RX ADMIN — ACETAMINOPHEN 650 MG: 325 TABLET ORAL at 20:24

## 2023-06-28 RX ADMIN — TAMSULOSIN HYDROCHLORIDE 0.4 MG: 0.4 CAPSULE ORAL at 17:38

## 2023-06-28 RX ADMIN — PREGABALIN 150 MG: 150 CAPSULE ORAL at 14:35

## 2023-06-28 RX ADMIN — MIDODRINE HYDROCHLORIDE 10 MG: 10 TABLET ORAL at 17:38

## 2023-06-28 RX ADMIN — GABAPENTIN 300 MG: 300 CAPSULE ORAL at 08:21

## 2023-06-28 RX ADMIN — TEMAZEPAM 15 MG: 15 CAPSULE ORAL at 20:21

## 2023-06-28 RX ADMIN — SENNOSIDES 17.2 MG: 8.6 TABLET, FILM COATED ORAL at 11:36

## 2023-06-28 RX ADMIN — ASPIRIN 81 MG CHEWABLE TABLET 81 MG: 81 TABLET CHEWABLE at 08:21

## 2023-06-28 RX ADMIN — Medication 6 MG: at 20:21

## 2023-06-28 RX ADMIN — SODIUM CHLORIDE 1 G: 1 TABLET ORAL at 08:26

## 2023-06-28 RX ADMIN — APIXABAN 5 MG: 5 TABLET, FILM COATED ORAL at 20:20

## 2023-06-28 RX ADMIN — PREGABALIN 150 MG: 150 CAPSULE ORAL at 20:21

## 2023-06-28 RX ADMIN — MIDODRINE HYDROCHLORIDE 10 MG: 10 TABLET ORAL at 11:36

## 2023-06-28 RX ADMIN — OMEPRAZOLE 20 MG: 20 CAPSULE, DELAYED RELEASE ORAL at 08:21

## 2023-06-28 ASSESSMENT — PATIENT HEALTH QUESTIONNAIRE - PHQ9
SUM OF ALL RESPONSES TO PHQ9 QUESTIONS 1 AND 2: 0
1. LITTLE INTEREST OR PLEASURE IN DOING THINGS: NOT AT ALL
2. FEELING DOWN, DEPRESSED, IRRITABLE, OR HOPELESS: NOT AT ALL

## 2023-06-28 ASSESSMENT — ENCOUNTER SYMPTOMS
SHORTNESS OF BREATH: 0
FEVER: 0
BLURRED VISION: 0
NAUSEA: 0
DIZZINESS: 0
VOMITING: 0
PALPITATIONS: 0
HEADACHES: 0
HALLUCINATIONS: 0

## 2023-06-28 ASSESSMENT — GAIT ASSESSMENTS
GAIT LEVEL OF ASSIST: TOTAL ASSIST X 2
DISTANCE (FEET): 15
DEVIATION: ATAXIC;INCREASED BASE OF SUPPORT;DECREASED HEEL STRIKE

## 2023-06-28 ASSESSMENT — PAIN DESCRIPTION - PAIN TYPE
TYPE: ACUTE PAIN
TYPE: ACUTE PAIN

## 2023-06-28 ASSESSMENT — ACTIVITIES OF DAILY LIVING (ADL)
BED_CHAIR_WHEELCHAIR_TRANSFER_DESCRIPTION: SQUAT PIVOT TRANSFER TO WHEELCHAIR;SUPERVISION FOR SAFETY;SET-UP OF EQUIPMENT
BED_CHAIR_WHEELCHAIR_TRANSFER_DESCRIPTION: SET-UP OF EQUIPMENT;SUPERVISION FOR SAFETY;VERBAL CUEING;INCREASED TIME;INITIAL PREPARATION FOR TASK

## 2023-06-28 ASSESSMENT — PULMONARY FUNCTION TESTS: FVC: 3.05

## 2023-06-28 NOTE — PROGRESS NOTES
Pt with inability to void in approximately 6 hours.  Straight cathed using aseptic technique, #14 [place] Islander catheter.  Tolerated well, had 650 mls of clear yellow urine.  Will continue to monitor patient.

## 2023-06-28 NOTE — PROGRESS NOTES
Hospital Medicine Daily Progress Note        Chief Complaint:  Hypertension  Hyponatremia    Interval History:  Discussed about stopping his salt tabs and will check his levels in a few days.    Review of Systems  Review of Systems   Constitutional:  Negative for fever.   Eyes:  Negative for blurred vision.   Respiratory:  Negative for shortness of breath.    Cardiovascular:  Negative for palpitations.   Gastrointestinal:  Negative for nausea and vomiting.   Neurological:  Negative for dizziness and headaches.   Psychiatric/Behavioral:  Negative for hallucinations.         Physical Exam  Temp:  [36.7 °C (98 °F)-36.9 °C (98.4 °F)] 36.7 °C (98 °F)  Pulse:  [88-92] 92  Resp:  [18] 18  BP: (102-108)/(60-65) 102/64  SpO2:  [92 %-95 %] 93 %    Physical Exam  Vitals and nursing note reviewed.   Constitutional:       General: He is not in acute distress.  HENT:      Mouth/Throat:      Mouth: Mucous membranes are moist.      Pharynx: Oropharynx is clear.   Eyes:      General: No scleral icterus.  Cardiovascular:      Rate and Rhythm: Normal rate and regular rhythm.      Heart sounds: No murmur heard.  Pulmonary:      Effort: Pulmonary effort is normal.      Breath sounds: Normal breath sounds. No stridor.   Abdominal:      General: There is no distension.      Palpations: Abdomen is soft.      Tenderness: There is no abdominal tenderness.   Musculoskeletal:      Cervical back: No rigidity.      Right lower leg: No edema.      Left lower leg: No edema.   Skin:     General: Skin is warm and dry.      Findings: No rash.   Neurological:      Mental Status: He is alert and oriented to person, place, and time.   Psychiatric:         Mood and Affect: Mood normal.         Behavior: Behavior normal.         Fluids    Intake/Output Summary (Last 24 hours) at 6/28/2023 0907  Last data filed at 6/28/2023 0533  Gross per 24 hour   Intake 360 ml   Output 2500 ml   Net -2140 ml         Laboratory  Recent Labs     06/25/23  0945  06/26/23  0527 06/27/23  0533   WBC 4.6* 4.3* 3.9*   RBC 3.45* 3.31* 3.27*   HEMOGLOBIN 11.0* 10.5* 10.2*   HEMATOCRIT 32.3* 30.3* 30.2*   MCV 93.6 91.5 92.4   MCH 31.9 31.7 31.2   MCHC 34.1 34.7 33.8   RDW 62.3* 60.4* 59.7*   PLATELETCT 258 264 264   MPV 9.9 8.9* 8.9*       Recent Labs     06/25/23  1625 06/26/23  0527 06/27/23  0533   SODIUM 135 133* 136   POTASSIUM 4.3 4.3 4.4   CHLORIDE 99 98 100   CO2 22 22 24   GLUCOSE 108* 126* 99   BUN 14 16 14   CREATININE 0.49* 0.53 0.54   CALCIUM 8.9 8.5 8.8                     Assessment/Plan  * 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[facility])  Assessment & Plan  Had progressive BLE weakness  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[facility]  S/P IVIG x 5 doses     Fever  Assessment & Plan  Has been afebrile x 2 days  Currently afebrile (6/27)  Has leukopenia  PCT 0.42  COVID/FLU/RSV negative  UA negative  CXR negative acute  BC x 2 neg  Cont to monitor for now    Ear discomfort  Assessment & Plan  Debrox no longer on hospital formulary  Trialed Docusate solution per Pharmacy recommendations    Dyslipidemia  Assessment & Plan  Cont Lipitor    Vitamin D deficiency  Assessment & Plan  Vit D: 19  Cont supplements    Essential hypertension  Assessment & Plan  BP ok  Cont Midodrine  Note: on Flomax   Mgt per Physiatry    Hyponatremia  Assessment & Plan  Na: 135 --> 133 --> 136 (6/27)  Off fluid restriction  Off Lisinopril  Off Lasix  On salt tabs: 2g bid (6/24) --> will d/c (last dose 6/28)  Cont to monitor

## 2023-06-28 NOTE — FLOWSHEET NOTE
"   06/28/23 0845   Incentive Spirometry Treatment   Height 1.727 m (5' 7.99\")   Predicted Inspiratory Capacity 2700   60% of predicted IS capacity 1620 mL   40% of predicted IS capacity 1080 mL   Incentive Spirometer Volume 2750 mL   Pulmonary Function Group   $ FVC / Vital Capacity (liters)  3.05  (69% of predicted. Good effort)       "

## 2023-06-28 NOTE — CARE PLAN
"  Problem: Knowledge Deficit - Standard  Goal: Patient and family/care givers will demonstrate understanding of plan of care, disease process/condition, diagnostic tests and medications  Outcome: Progressing  Note: Pt agrees with plan of care tonight regarding medications and safety.  Will continue to monitor patient.      Problem: Fall Risk - Rehab  Goal: Patient will remain free from falls  Outcome: Progressing  Note: Isa Shay Fall risk Assessment Score:  21    High fall risk Interventions   - Alarming seatbelt  - Wander guard  - Bed and strip alarm   - Yellow sign by the door   - Yellow wrist band \"Fall risk\"  - Room near to the nurse station  - Do not leave patient unattended in the bathroom  - Fall risk education provided           The patient is Stable - Low risk of patient condition declining or worsening    Shift Goals  Clinical Goals: Safety  Patient Goals: Sleep well  Family Goals: Education    Progress made toward(s) clinical / shift goals:  progressing    "

## 2023-06-28 NOTE — THERAPY
Occupational Therapy  Daily Treatment     Patient Name: Bull Banegas  Age:  67 y.o., Sex:  male  Medical Record #: [id number]  Today's Date: 6/28/2023     Precautions  Precautions: Fall Risk  Comments: blurred vision, avoid over-fatiguing         Subjective    Pt supine in bed reporting a good night sleep and motivated for OT tx.     Objective       06/28/23 0701   OT Charge Group   OT Self Care / ADL (Units) 2   OT Therapy Activity (Units) 1   OT Therapeutic Exercise (Units) 1   OT Total Time Spent   OT Individual Total Time Spent (Mins) 60   Cognition    Level of Consciousness Alert   Functional Level of Assist   Grooming Modified Independent;Seated   Grooming Description Increased time;Seated in wheelchair at sink  (seated at sink for oral care and shave.)   Upper Body Dressing Modified Independent   Upper Body Dressing Description   (Mod I for clothing retrieval seated in w/c and ind to don/doff pullover shirt.)   Lower Body Dressing Standby Assist   Lower Body Dressing Description Grab bar;Reacher;Increased time;Supervision for safety;Set-up of equipment;Verbal cueing  (SBA for hip hike standing at GB in BR. AE for threading.)   Bed, Chair, Wheelchair Transfer Contact Guard Assist   Bed Chair Wheelchair Transfer Description Squat pivot transfer to wheelchair;Supervision for safety;Set-up of equipment   Sitting Upper Body Exercises   Chest Press 2 sets of 10;2 sets of 15;Bilateral;Weight (See Comments for lbs)  (15lbs equilizer)   Lat Pull 2 sets of 10;2 sets of 15;Bilateral;Weight (See Comments for lbs)  (30lbs equilizer)   Interdisciplinary Plan of Care Collaboration   IDT Collaboration with  Physical Therapist   Patient Position at End of Therapy Seated;Chair Alarm On;Self Releasing Lap Belt Applied  (Pt left seated in w/c in dining holden for breakfast.)   Collaboration Comments CLOF     Pt completed functional w/c mobility on unit at Mod I level with increased time.    Assessment    Pt continues to make good  progress towards functional goals with LB dressing this session, due to increased standing balance/endurance. He demonstrates good carryover learning of AE and has been taking appropriate breaks during session without cues. No reports of visual changes this session.  Strengths: Able to follow instructions, Alert and oriented, Effective communication skills, Independent prior level of function, Motivated for self care and independence, Pleasant and cooperative, Willingly participates in therapeutic activities    Plan    UB strengthening as limited by fatigue, functional transfers progressing to standing from SB, ADLs with AE as needed, IADLs, incorporate FM skills for BUE    Occupational Therapy Goals (Active)       Problem: Dressing       Dates: Start:  06/18/23         Goal: STG-Within one week, patient will dress LB with mod A overall using AE/DME as needed.       Dates: Start:  06/18/23               Problem: Functional Transfers       Dates: Start:  06/18/23         Goal: STG-Within one week, patient will transfer to toilet with max A x1 overall using AE/DME as needed.       Dates: Start:  06/18/23               Problem: OT Long Term Goals       Dates: Start:  06/18/23         Goal: LTG-By discharge, patient will complete basic self care tasks with min-supervision overall using AE/DME as needed.       Dates: Start:  06/18/23            Goal: LTG-By discharge, patient will perform bathroom transfers with SBA overall using AE/DME as needed.       Dates: Start:  06/18/23               Problem: Toileting       Dates: Start:  06/18/23         Goal: STG-Within one week, patient will complete toileting tasks with Max A overall using AE/DME as needed       Dates: Start:  06/18/23

## 2023-06-28 NOTE — THERAPY
"Physical Therapy   Daily Treatment     Patient Name: Bull Banegas  Age:  67 y.o., Sex:  male  Medical Record #: [id number]  Today's Date: 6/28/2023     Precautions  Precautions: Fall Risk  Comments: blurred vision, avoid over-fatiguing    Subjective    \"I feel a lot more stable when I'm standing\"     Objective       06/28/23 0930   PT Charge Group   PT Gait Training (Units) 1   PT Therapeutic Exercise (Units) 1   PT Neuromuscular Re-Education / Balance (Units) 1   PT Therapeutic Activities (Units) 1   PT Total Time Spent   PT Individual Total Time Spent (Mins) 60   Precautions   Precautions Fall Risk   Comments blurred vision, avoid over-fatiguing   Gait Functional Level of Assist    Gait Level Of Assist Total Assist X 2   Assistive Device   (Arjo walker with 20% BWS Vector)   Distance (Feet) 15   # of Times Distance was Traveled 1  (as well as 10ft x1 // bars with WC follow)   Deviation Ataxic;Increased Base Of Support;Decreased Heel Strike  (B knee instability in stance)   Wheelchair Functional Level of Assist   Wheelchair Assist Supervised   Distance Wheelchair (Feet or Distance) 200   Wheelchair Description Extra time;Supervision for safety   Stairs Functional Level of Assist   Level of Assist with Stairs Unable to Participate   Transfer Functional Level of Assist   Bed, Chair, Wheelchair Transfer Minimal Assist   Bed Chair Wheelchair Transfer Description Squat pivot transfer to wheelchair;Verbal cueing;Increased time;Initial preparation for task   Sitting Lower Body Exercises   Nustep Resistance Level 2  (B UE/LE 10 min 521 steps)   Bed Mobility    Supine to Sit Supervised   Sit to Supine Supervised   Sit to Stand Minimal Assist   Scooting Supervised   Rolling Modified Independent   Neuro-Muscular Treatments   Comments static standing balance with B UE support; pregait training with Vector harness   Interdisciplinary Plan of Care Collaboration   IDT Collaboration with  Therapy Tech   Patient Position at End of " Therapy In Bed;Call Light within Reach;Tray Table within Reach;Phone within Reach   Collaboration Comments assist with care     Pt improved standing tolerance with B UE to >2min with CGA     06/28/23 1315   Therapy Missed   Missed Therapy (Minutes) 30   Reason For Missed Therapy   (Pt with nursing for bladder catheterization, session terminated early due to fatigue and concern for over-exertion; Pt on an altered schedule)   PT Charge Group   PT Therapeutic Activities (Units) 2   PT Total Time Spent   PT Individual Total Time Spent (Mins) 30   Transfer Functional Level of Assist   Bed, Chair, Wheelchair Transfer Total Assist X 2  (CGAx2 for safety with intial trial of stand step bariatric FWW; Cathleen squt pivot)   Bed Chair Wheelchair Transfer Description Set-up of equipment;Supervision for safety;Verbal cueing;Increased time;Initial preparation for task   Toilet Transfers Minimal Assist   Toilet Transfer Description Grab bar;Increased time;Initial preparation for task;Verbal cueing;Supervision for safety;Set-up of equipment  (stand step with GB, depA for pants management)   Bed Mobility    Supine to Sit Modified Independent   Sit to Supine Modified Independent   Sit to Stand Minimal Assist   Interdisciplinary Plan of Care Collaboration   IDT Collaboration with  Therapy Tech;Nursing   Patient Position at End of Therapy In Bed;Tray Table within Reach;Call Light within Reach;Phone within Reach   Collaboration Comments assist with car; DC alarms as pt is cognitively intact and uses call light appropriately     Assisted pt to bathroom with continent HEBER, RN notified. Demonstrated stand step transfer with FWW and performed with CGAx2 for safety. No buckling observed however pt had increased instability compared to standing with // bar or grab bar support. Discussed goals for tomorrow    Assessment    Pt was able to progress ambulation distance to 15ft with [employer] platform walker and 20% body weight support with Vector overhead  harness system. Ambulation terminated due to increased knee instability and pt report of fatigue. Pt was also able to progress transfer method to perform stand step with shelbie FWW.  Strengths: Able to follow instructions, Alert and oriented, Effective communication skills, Good carryover of learning, Independent prior level of function, Making steady progress towards goals, Motivated for self care and independence, Pleasant and cooperative, Supportive family, Willingly participates in therapeutic activities  Barriers: Decreased endurance, Fatigue, Generalized weakness, Home accessibility, Impaired activity tolerance, Impaired balance, Limited mobility (Paraplegia)    Plan    caution to avoid over-fatigue  STS and gait with Vector harness and [facility] ([facility])  Continue progressing stability with stand step shelbie FWW transfers  Seated and standing therex  B LE motomed vs Nustep  WC mobility outdoors  Vibration for LE sensory integration     Passport items to be completed:  Get in/out of bed safely, in/out of a vehicle, safely use mobility device, walk or wheel around home/community, navigate up and down stairs, show how to get up/down from the ground, ensure home is accessible, demonstrate HEP, complete caregiver training        Physical Therapy Problems (Active)       Problem: Balance       Dates: Start:  06/18/23         Goal: STG-Within one week, patient will maintain static standing c/ BUE support >/= 2 minutes to support participation in ADLs.       Dates: Start:  06/18/23         Goal Note filed on 06/22/23 0942 by Colton Guadalupe, PT       Limited to 1 minute in parallel bars currently                 Problem: Mobility       Dates: Start:  06/18/23         Goal: STG-Within one week, patient will demonstrate ability to manage wheelchair leg rests, arm rests, and brakes in preparation for transfers at [place] or better.        Dates: Start:  06/18/23         Goal Note filed on 06/22/23 0942 by Colton Guadalupe, PT        Cathleen for leg rest management                 Problem: Mobility Transfers       Dates: Start:  06/18/23         Goal: STG-Within one week, patient will move supine to sit at SBA or better and use of bed rail.        Dates: Start:  06/18/23         Goal Note filed on 06/22/23 0942 by Colton Guadalupe, PT       Fluctuates min-modA                 Problem: PT-Long Term Goals       Dates: Start:  06/18/23         Goal: LTG-By discharge, patient will propel wheelchair >/= 300' and navigate inclines up to 3% (curb cuts or equivalent) c/ SBA or better.        Dates: Start:  06/18/23            Goal: LTG-By discharge, patient will ambulate >/= 50' c/ ModA or better and FWW.        Dates: Start:  06/18/23            Goal: LTG-By discharge, patient will transfer one surface to another c/ Naila.        Dates: Start:  06/18/23            Goal: LTG-By discharge, patient will transfer in/out of a car c/ ModA or better.        Dates: Start:  06/18/23

## 2023-06-28 NOTE — PROGRESS NOTES
Received bedside shift report from Marina MCARTHUR RN regarding patient and assumed care. Patient awake, calm and stable, currently positioned in bed for comfort and safety; call light within reach. Denies pain or discomfort at this time. Will continue to monitor.

## 2023-06-28 NOTE — FLOWSHEET NOTE
06/28/23 0840   Events/Summary/Plan   Events/Summary/Plan FVC/IS. Sitting up in wheelchair   Vital Signs   Pulse (!) 117   Respiration 18   Pulse Oximetry 94 %   $ Pulse Oximetry (Spot Check) Yes   Respiratory Assessment   Level of Consciousness Alert   Chest Exam   Work Of Breathing / Effort Within Normal Limits   Breath Sounds   RUL Breath Sounds Clear   RML Breath Sounds Clear   RLL Breath Sounds Clear;Diminished   JC Breath Sounds Clear   LLL Breath Sounds Clear;Diminished   Oxygen   O2 Delivery Device Room air w/o2 available

## 2023-06-28 NOTE — CARE PLAN
Problem: Recreation Therapy  Goal: STG-Within one week, patient will identify two new leisure skills.   Outcome: Met  Goal: STG-Within one week, patient will demonstrate a standing tolerance of 30 sec x 3.   Outcome: Progressing  Goal: LTG-By discharge, patient will identify and demonstrate three new leisure skills.   Outcome: Progressing  Goal: LTG-By discharge, patient will demonstrate a standing tolerance of 1 min x 3.   Outcome: Progressing

## 2023-06-28 NOTE — PROGRESS NOTES
Pt with ability to void, had 200 mls of clear yellow urine in urinal.  Pt felt that he still has a full bladder.  Straight cathed using aseptic technique, #14 Malaysian catheter.  Tolerated well, had 750 mls of clear yellow urine.  Will continue to monitor patient.

## 2023-06-28 NOTE — PROGRESS NOTES
Transfer Level & Assistive Devices Used: Max Assist x 2. Refused bowel program tonight.    Patient Position: N/A. Refused bowel program. (E.g., bed, bed side commode, mobile shower commode, commode over toilet, regular toilet)     Adaptive Equipment Used? N/A (E.g., digital stimulator, toileting aid for hygiene, suppository )      Patient Sensation and Bowel Urgency Present? Yes     Incontinence? No     BM Results: Yes, prior to bowel program.    1. Caregiver Present and actively participating in training? No    2. Patient Demonstrated Understanding with Bowel Medications and Purpose: Yes     3. Patient Participation with Suppository Placement: N/A     4. Patient Participation with Digital Stimulation: N/A     5. Patient Participation with Clothing Management: N/A     6. Patient Participation with Hygiene: N/A        (If patient meets all 6 of the criteria numbered above, consider rescheduling bowel program to evening 1700 or 0500.)      Other:  Refused bowel program tonight.  Pt stated he had 2 bowel movements earlier today.  Will continue to monitor patient.

## 2023-06-28 NOTE — CARE PLAN
"The patient is Stable - Low risk of patient condition declining or worsening    Shift Goals  Clinical Goals: Safety  Patient Goals: Sleep well  Family Goals: Education    Progress made toward(s) clinical / shift goals:    Problem: Fall Risk - Rehab  Goal: Patient will remain free from falls  Outcome: Progressing   Isa Shay Fall risk Assessment : 12    Moderate fall risk Interventions  - Bed and strip alarm   - Yellow sign by the door   - Yellow wrist band \"Fall risk\"  - Room near to the nurse station  - Do not leave patient unattended in the bathroom  - Fall risk education provided    Pt uses call light consistently and appropriately. Waits for assistance does not attempt self transfer this shift. Able to verbalize needs.        "

## 2023-06-28 NOTE — PROGRESS NOTES
"  Physical Medicine & Rehabilitation Progress Note    Encounter Date: 6/28/2023    Chief Complaint: Weakness in all 4 extremities    Interval Events (Subjective):    He was evaluated in his room laying comfortably in bed.  He reports improving sensation around, double vision is resolved, he reports improving strength in bilateral lower extremities.    He denies any increase and burning or tingling in bilateral lower extremities and hands with decreased dose of gabapentin.    He is participating well with therapy    Bowel: Large incontinence on 6/26, no BM yesterday  Bladder: Voiding trial, ICP volume 400-750 cc per catheterization  PRN: 15 MEq    ROS:  Gen: No fatigue, confusion, significant weight loss  Eyes: no blurry vision, double vision or pain in eyes  ENT: no changes in hearing, runny nose, nose bleeds, sinus pain  CV: No CP, palpitations,   Lungs: no shortness of breath, changes in secretions, changes in cough, pain with coughing  Abd: No abd pain, nausea, vomiting, pain with eating  : no blood in urine, pain during storage phase, bladder spasms, suprapubic pain  Ext: No swelling in legs, asymmetric atrophy  Neuro: no changes in strength or sensation  Skin: no new ulcers/skin breakdown appreciated by patient  Mood: No changes in mood today, increase in depression or anxiety  Heme: no bruising, or bleeding    Objective:  Vitals: /64   Pulse 92   Temp 36.7 °C (98 °F) (Oral)   Resp 18   Ht 1.727 m (5' 7.99\")   Wt 116 kg (256 lb 13.4 oz)   SpO2 93%   Gen: NAD, Body mass index is 39.06 kg/m².  HEENT:  NC/AT, PERRLA, moist mucous membranes  Cardio: RRR, no mumurs  Pulm: CTAB, with normal respiratory effort  Abd: Soft NTND, active bowel sounds,   Ext: No peripheral edema. No calf tenderness. No clubbing/cyanosis. +dorsal pedalis pulses bilaterally.      Laboratory Values:  Recent Results (from the past 72 hour(s))   CBC WITH DIFFERENTIAL    Collection Time: 06/25/23  4:25 PM   Result Value Ref Range "    WBC 4.6 (L) 4.8 - 10.8 K/uL    RBC 3.45 (L) 4.70 - 6.10 M/uL    Hemoglobin 11.0 (L) 14.0 - 18.0 g/dL    Hematocrit 32.3 (L) 42.0 - 52.0 %    MCV 93.6 81.4 - 97.8 fL    MCH 31.9 27.0 - 33.0 pg    MCHC 34.1 32.3 - 36.5 g/dL    RDW 62.3 (H) 35.9 - 50.0 fL    Platelet Count 258 164 - 446 K/uL    MPV 9.9 9.0 - 12.9 fL    Neutrophils-Polys 89.00 (H) 44.00 - 72.00 %    Lymphocytes 9.00 (L) 22.00 - 41.00 %    Monocytes 1.00 0.00 - 13.40 %    Eosinophils 1.00 0.00 - 6.90 %    Basophils 0.00 0.00 - 1.80 %    Nucleated RBC 0.00 0.00 - 0.20 /100 WBC    Neutrophils (Absolute) 4.09 1.82 - 7.42 K/uL    Lymphs (Absolute) 0.41 (L) 1.00 - 4.80 K/uL    Monos (Absolute) 0.05 0.00 - 0.85 K/uL    Eos (Absolute) 0.05 0.00 - 0.51 K/uL    Baso (Absolute) 0.00 0.00 - 0.12 K/uL    NRBC (Absolute) 0.00 K/uL    Anisocytosis 1+     Macrocytosis 1+    Comp Metabolic Panel    Collection Time: 06/25/23  4:25 PM   Result Value Ref Range    Sodium 135 135 - 145 mmol/L    Potassium 4.3 3.6 - 5.5 mmol/L    Chloride 99 96 - 112 mmol/L    Co2 22 20 - 33 mmol/L    Anion Gap 14.0 7.0 - 16.0    Glucose 108 (H) 65 - 99 mg/dL    Bun 14 8 - 22 mg/dL    Creatinine 0.49 (L) 0.50 - 1.40 mg/dL    Calcium 8.9 8.5 - 10.5 mg/dL    AST(SGOT) 29 12 - 45 U/L    ALT(SGPT) 21 2 - 50 U/L    Alkaline Phosphatase 89 30 - 99 U/L    Total Bilirubin 1.0 0.1 - 1.5 mg/dL    Albumin 3.5 3.2 - 4.9 g/dL    Total Protein 6.9 6.0 - 8.2 g/dL    Globulin 3.4 1.9 - 3.5 g/dL    A-G Ratio 1.0 g/dL   BLOOD CULTURE    Collection Time: 06/25/23  4:25 PM    Specimen: Peripheral; Blood   Result Value Ref Range    Significant Indicator NEG     Source BLD     Site PERIPHERAL     Culture Result       No Growth  Note: Blood cultures are incubated for 5 days and  are monitored continuously.Positive blood cultures  are called to the RN and reported as soon as  they are identified.     PROCALCITONIN    Collection Time: 06/25/23  4:25 PM   Result Value Ref Range    Procalcitonin 0.42 (H) <0.25 ng/mL    CORRECTED CALCIUM    Collection Time: 06/25/23  4:25 PM   Result Value Ref Range    Correct Calcium 9.3 8.5 - 10.5 mg/dL   ESTIMATED GFR    Collection Time: 06/25/23  4:25 PM   Result Value Ref Range    GFR (CKD-EPI) 113 >60 mL/min/1.73 m 2   PERIPHERAL SMEAR REVIEW    Collection Time: 06/25/23  4:25 PM   Result Value Ref Range    Peripheral Smear Review see below    PLATELET ESTIMATE    Collection Time: 06/25/23  4:25 PM   Result Value Ref Range    Plt Estimation Normal    MORPHOLOGY    Collection Time: 06/25/23  4:25 PM   Result Value Ref Range    RBC Morphology Present    DIFFERENTIAL MANUAL    Collection Time: 06/25/23  4:25 PM   Result Value Ref Range    Manual Diff Status PERFORMED    BLOOD CULTURE    Collection Time: 06/25/23  4:35 PM    Specimen: Peripheral; Blood   Result Value Ref Range    Significant Indicator NEG     Source BLD     Site PERIPHERAL     Culture Result       No Growth  Note: Blood cultures are incubated for 5 days and  are monitored continuously.Positive blood cultures  are called to the RN and reported as soon as  they are identified.     URINALYSIS    Collection Time: 06/25/23  9:40 PM   Result Value Ref Range    Color Yellow     Character Clear     Specific Gravity 1.014 <1.035    Ph 7.0 5.0 - 8.0    Glucose Negative Negative mg/dL    Ketones Negative Negative mg/dL    Protein Negative Negative mg/dL    Bilirubin Negative Negative    Urobilinogen, Urine 1.0 Negative    Nitrite Negative Negative    Leukocyte Esterase Negative Negative    Occult Blood Negative Negative    Micro Urine Req see below    CBC WITHOUT DIFFERENTIAL    Collection Time: 06/26/23  5:27 AM   Result Value Ref Range    WBC 4.3 (L) 4.8 - 10.8 K/uL    RBC 3.31 (L) 4.70 - 6.10 M/uL    Hemoglobin 10.5 (L) 14.0 - 18.0 g/dL    Hematocrit 30.3 (L) 42.0 - 52.0 %    MCV 91.5 81.4 - 97.8 fL    MCH 31.7 27.0 - 33.0 pg    MCHC 34.7 32.3 - 36.5 g/dL    RDW 60.4 (H) 35.9 - 50.0 fL    Platelet Count 264 164 - 446 K/uL    MPV 8.9  (L) 9.0 - 12.9 fL   Basic Metabolic Panel    Collection Time: 06/26/23  5:27 AM   Result Value Ref Range    Sodium 133 (L) 135 - 145 mmol/L    Potassium 4.3 3.6 - 5.5 mmol/L    Chloride 98 96 - 112 mmol/L    Co2 22 20 - 33 mmol/L    Glucose 126 (H) 65 - 99 mg/dL    Bun 16 8 - 22 mg/dL    Creatinine 0.53 0.50 - 1.40 mg/dL    Calcium 8.5 8.5 - 10.5 mg/dL    Anion Gap 13.0 7.0 - 16.0   ESTIMATED GFR    Collection Time: 06/26/23  5:27 AM   Result Value Ref Range    GFR (CKD-EPI) 111 >60 mL/min/1.73 m 2   CoV-2, Flu A/B, And RSV by PCR (Intrinsity)    Collection Time: 06/26/23  9:00 AM    Specimen: Respirate   Result Value Ref Range    Influenza virus A RNA Negative Negative    Influenza virus B, PCR Negative Negative    RSV, PCR Negative Negative    SARS-CoV-2 by PCR NotDetected     SARS-CoV-2 Source NP Swab    Comp Metabolic Panel    Collection Time: 06/27/23  5:33 AM   Result Value Ref Range    Sodium 136 135 - 145 mmol/L    Potassium 4.4 3.6 - 5.5 mmol/L    Chloride 100 96 - 112 mmol/L    Co2 24 20 - 33 mmol/L    Anion Gap 12.0 7.0 - 16.0    Glucose 99 65 - 99 mg/dL    Bun 14 8 - 22 mg/dL    Creatinine 0.54 0.50 - 1.40 mg/dL    Calcium 8.8 8.5 - 10.5 mg/dL    AST(SGOT) 22 12 - 45 U/L    ALT(SGPT) 20 2 - 50 U/L    Alkaline Phosphatase 73 30 - 99 U/L    Total Bilirubin 0.5 0.1 - 1.5 mg/dL    Albumin 3.1 (L) 3.2 - 4.9 g/dL    Total Protein 6.9 6.0 - 8.2 g/dL    Globulin 3.8 (H) 1.9 - 3.5 g/dL    A-G Ratio 0.8 g/dL   CBC WITH DIFFERENTIAL    Collection Time: 06/27/23  5:33 AM   Result Value Ref Range    WBC 3.9 (L) 4.8 - 10.8 K/uL    RBC 3.27 (L) 4.70 - 6.10 M/uL    Hemoglobin 10.2 (L) 14.0 - 18.0 g/dL    Hematocrit 30.2 (L) 42.0 - 52.0 %    MCV 92.4 81.4 - 97.8 fL    MCH 31.2 27.0 - 33.0 pg    MCHC 33.8 32.3 - 36.5 g/dL    RDW 59.7 (H) 35.9 - 50.0 fL    Platelet Count 264 164 - 446 K/uL    MPV 8.9 (L) 9.0 - 12.9 fL    Neutrophils-Polys 54.80 44.00 - 72.00 %    Lymphocytes 28.20 22.00 - 41.00 %    Monocytes 11.40 0.00 -  13.40 %    Eosinophils 4.30 0.00 - 6.90 %    Basophils 0.80 0.00 - 1.80 %    Immature Granulocytes 0.50 0.00 - 0.90 %    Nucleated RBC 0.00 0.00 - 0.20 /100 WBC    Neutrophils (Absolute) 2.16 1.82 - 7.42 K/uL    Lymphs (Absolute) 1.11 1.00 - 4.80 K/uL    Monos (Absolute) 0.45 0.00 - 0.85 K/uL    Eos (Absolute) 0.17 0.00 - 0.51 K/uL    Baso (Absolute) 0.03 0.00 - 0.12 K/uL    Immature Granulocytes (abs) 0.02 0.00 - 0.11 K/uL    NRBC (Absolute) 0.00 K/uL   CORRECTED CALCIUM    Collection Time: 06/27/23  5:33 AM   Result Value Ref Range    Correct Calcium 9.5 8.5 - 10.5 mg/dL   ESTIMATED GFR    Collection Time: 06/27/23  5:33 AM   Result Value Ref Range    GFR (CKD-EPI) 110 >60 mL/min/1.73 m 2       Medications:  Scheduled Medications   Medication Dose Frequency    sodium chloride  1 g DAILY    pregabalin  150 mg TID    gabapentin  300 mg TID    docusate sodium  100 mg BID    And    sennosides  17.2 mg DAILY AT NOON    And    bisacodyl  10 mg QDAY    apixaban  5 mg BID    midodrine  10 mg TID WITH MEALS    vitamin D3  2,000 Units DAILY    menthol-methyl salicycate   BID    omeprazole  20 mg DAILY    Pharmacy Consult Request  1 Each PHARMACY TO DOSE    therapeutic multivitamin-minerals  1 Tablet DAILY WITH LUNCH    aspirin  81 mg DAILY    atorvastatin  10 mg MO, WE + FR    melatonin  6 mg QHS    temazepam  15 mg QHS    lidocaine  2 Patch Q24HR    tamsulosin  0.4 mg AFTER DINNER     PRN medications: acetaminophen, docusate sodium **AND** sennosides **AND** bisacodyl **AND** magnesium hydroxide, [COMPLETED] docusate sodium **FOLLOWED BY** normal saline (PF), traMADol, carboxymethylcellulose, benzocaine-menthol, mag hydrox-al hydrox-simeth, ondansetron **OR** ondansetron, traZODone, sodium chloride, Respiratory Therapy Consult, oxyCODONE immediate-release **OR** oxyCODONE immediate-release, hydrALAZINE    Diet:  Current Diet Order   Procedures    Diet Order Diet: Regular       Assessment:  Active Hospital Problems     Diagnosis     *GBS (Guillain-[place] syndrome) (Formerly [facility])     Fever     Ear discomfort     Azotemia     Dyslipidemia     Essential hypertension     UTI (urinary tract infection)     Vitamin D deficiency     Hyponatremia     Anemia        Medical Decision Making and Plan:    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  Lower extremity weakness and pain as well as numbness in upper extremities.  No known premorbid infection.  -PT and OT for mobility and ADLs. Per guidelines, 15 hours per week between PT, OT.  Cleared by speech therapy for swallow and speech, transition time to PT and OT  - Continue comprehensive acute inpatient rehabilitation     Fever: 100.5 on 6/26, afebrile last 48 hours  - Infectious work-up as per hospitalist, COVID and flu were negative, UA is negative, procalcitonin was elevated on 6/25, chest x-ray was negative blood cultures to date are negative  - Discontinue Tylenol, concerned that this may be masking his fevers.     Neurologic respiratory impairment:   Patient is at high risk for respiratory involvement given neurologic symptoms in the upper extremities.   -Vital capacity daily, 1.72 liters on 6/20  - Incentive spirometry     Lower extremity swelling: Left greater than right  - Lower extremity Doppler was negative for DVT     Hyponatremia: Likely SIADH, followed by nephrology during [facility] hospitalization  - Sodium of 126 on 6/17 --> 129 on 6/19 --> 133 on 6/20 --> 136 on 6/21 --> 133 on 6/26 --> 136 on 6/27  -Decrease sodium chloride tablets to 1 g daily as per hospitalist  - Lasix was discontinued on 6/21 as per hospitalist discontinuing Lasix versus liberalizing fluid restriction  -Free water restriction of 500 cc/day and total p.o. fluid intake of 1.8 L, if sodium continues to improve goal will be to decrease fluid restriction.  -Status post 500 cc of normal saline IV fluid on 6/27  - Check BMP in a.m.      Hypomagnesemia: Low magnesium during acute hospitalization, supplemented with p.o. magnesium  - Magnesium of 2.0 on 6/18     Urinary tract infection/pyelonephritis: Catheter associated UTI in the setting of neurogenic bladder  - WBC of 14 on 6/17 --> WBC of 10.7 on 6/20 --> 4.3 on 6/26  - Peripheral IV was removed prior to transfer to acute rehabilitation we will replace IV.  - Ceftriaxone 1 g every 24 hours, completed course    Neurogenic bladder: Inappropriate management of neurogenic bladder can result and hydronephrosis, increased risk of pyelonephritis, and renal failure  - Discontinued Blancas on 6/22,   - Continue voiding trial, if can't void in 6 hours or prn check pvr and if >500cc then ICP,if able to void then check PVR, if PVR is >200cc then ICP  - Continue Flomax 0.4 mg nightly     Neurogenic bowel: Inappropriate neurogenic bowel can result in severe constipation, bowel dilation and rupture.  Severe constipation with prolonged period of time without BM.  - Continue upper motor neuron neurogenic bowel program with senna 2 tablets q. noon, Colace 100 mg twice daily and Magic bullet suppository MO daily and digital stimulation     orthostatic hypotension  Because of significant autonomic dysfunction associated with some cases of AIDP, the patient is at high risk for orthostatic hypotension.  Goal of SBP greater than 100.  -  Mechanical compression with teds and Tubi  and abd binder used prior to out of bed  - Continue midodrine 10 mg 3 times daily     Essential hypertension: SBP of greater than 200 on presentation to acute hospital.  -108 in the last 24 hours  - Lisinopril 20 mg daily held, discontinued lisinopril secondary to orthostatic hypotension as above, may restart as an outpatient with recovery of autonomic nervous system     Dyslipidemia: Total cholesterol 137, HDL 44, LDL 75  -Lipitor 10 mg every Monday Wednesday Friday     Dysphagia: Concern for swallow dysfunction in the setting of  certain variants of AIDP  - Consult speech therapy for swallow evaluation.  Cleared by speech therapy     Skin  Due to the sensory impairments following AIDP, skin protection principles are of the utmost importance.      - every two-hour turning pattern overnight while the patient is in bed. When the patient is out of bed, an every 15 minute repositioning or weight shifting pattern will be utilized in order to help train the patient for long-term skin protection. We will also discuss skin monitoring and its importance with the patient and the caregivers.     Pain:  Postoperative pain:  - Decrease oxycodone to 5-10 mg every 4 hours as needed for pain moderate to severe  - Discontinue Tylenol as this may be masking fevers  - Lidocaine patch x2 on either side of incision, on for 12 hours off for 12 hours  - Continue tramadol 50 mg every 6 hours as needed for pain     Neuropathic pain: Burning and tingling in all 4 extremities  -Discontinue gabapentin  - Increase Lyrica to 150 mg 3 times daily  - Discontinue Celebrex  - Tramadol 50 mg every 6 hours as needed pain  -May benefit from a nonpharmaceutical modalities such as TENS units, compression, ice, heat, will discuss with therapy team.     Vitamin D deficiency: High risk of vitamin D deficiency in this population, goal of vitamin D level greater than 30, has been linked to improvement and central nervous system recovery  - Vitamin D level of 19 on 6/18  - Cholecalciferol 2000 units daily     Insomnia: Significant difficulty during acute hospitalization  - Restarted on Restoril 15 mg nightly  - Trazodone 50 mg nightly as needed insomnia     DVT prophylaxis  In the setting of AIDP, the patient is at high risk of deep venous thrombosis given decreased mobility and alteration to autonomic nervous system.   -MICHELLE Lovenox  - Eliquis 5 mg twice daily for prophylaxis  ____________________________________    Mario Terrell DO  [facility] - Physical Medicine & Rehabilitation   [facility] -  Spinal Cord Injury Medicine  ____________________________________

## 2023-06-29 ENCOUNTER — APPOINTMENT (OUTPATIENT)
Dept: PHYSICAL THERAPY | Facility: REHABILITATION | Age: 68
DRG: 095 | End: 2023-06-29
Attending: PHYSICAL MEDICINE & REHABILITATION
Payer: MEDICARE

## 2023-06-29 ENCOUNTER — APPOINTMENT (OUTPATIENT)
Dept: OCCUPATIONAL THERAPY | Facility: REHABILITATION | Age: 68
DRG: 095 | End: 2023-06-29
Attending: PHYSICAL MEDICINE & REHABILITATION
Payer: MEDICARE

## 2023-06-29 LAB
ANION GAP SERPL CALC-SCNC: 14 MMOL/L (ref 7–16)
BASOPHILS # BLD AUTO: 0.6 % (ref 0–1.8)
BASOPHILS # BLD: 0.02 K/UL (ref 0–0.12)
BUN SERPL-MCNC: 14 MG/DL (ref 8–22)
CALCIUM SERPL-MCNC: 8.9 MG/DL (ref 8.5–10.5)
CHLORIDE SERPL-SCNC: 100 MMOL/L (ref 96–112)
CO2 SERPL-SCNC: 22 MMOL/L (ref 20–33)
CREAT SERPL-MCNC: 0.51 MG/DL (ref 0.5–1.4)
EOSINOPHIL # BLD AUTO: 0.04 K/UL (ref 0–0.51)
EOSINOPHIL NFR BLD: 1.3 % (ref 0–6.9)
ERYTHROCYTE [DISTWIDTH] IN BLOOD BY AUTOMATED COUNT: 57.6 FL (ref 35.9–50)
GFR SERPLBLD CREATININE-BSD FMLA CKD-EPI: 112 ML/MIN/1.73 M 2
GLUCOSE SERPL-MCNC: 101 MG/DL (ref 65–99)
HCT VFR BLD AUTO: 29.6 % (ref 42–52)
HGB BLD-MCNC: 10.1 G/DL (ref 14–18)
IMM GRANULOCYTES # BLD AUTO: 0.01 K/UL (ref 0–0.11)
IMM GRANULOCYTES NFR BLD AUTO: 0.3 % (ref 0–0.9)
LYMPHOCYTES # BLD AUTO: 0.76 K/UL (ref 1–4.8)
LYMPHOCYTES NFR BLD: 23.9 % (ref 22–41)
MCH RBC QN AUTO: 31.3 PG (ref 27–33)
MCHC RBC AUTO-ENTMCNC: 34.1 G/DL (ref 32.3–36.5)
MCV RBC AUTO: 91.6 FL (ref 81.4–97.8)
MONOCYTES # BLD AUTO: 0.43 K/UL (ref 0–0.85)
MONOCYTES NFR BLD AUTO: 13.5 % (ref 0–13.4)
NEUTROPHILS # BLD AUTO: 1.92 K/UL (ref 1.82–7.42)
NEUTROPHILS NFR BLD: 60.4 % (ref 44–72)
NRBC # BLD AUTO: 0 K/UL
NRBC BLD-RTO: 0 /100 WBC (ref 0–0.2)
PLATELET # BLD AUTO: 255 K/UL (ref 164–446)
PMV BLD AUTO: 9.2 FL (ref 9–12.9)
POTASSIUM SERPL-SCNC: 4.3 MMOL/L (ref 3.6–5.5)
RBC # BLD AUTO: 3.23 M/UL (ref 4.7–6.1)
SODIUM SERPL-SCNC: 136 MMOL/L (ref 135–145)
WBC # BLD AUTO: 3.2 K/UL (ref 4.8–10.8)

## 2023-06-29 PROCEDURE — 97530 THERAPEUTIC ACTIVITIES: CPT

## 2023-06-29 PROCEDURE — 36415 COLL VENOUS BLD VENIPUNCTURE: CPT

## 2023-06-29 PROCEDURE — 700102 HCHG RX REV CODE 250 W/ 637 OVERRIDE(OP): Performed by: HOSPITALIST

## 2023-06-29 PROCEDURE — 99231 SBSQ HOSP IP/OBS SF/LOW 25: CPT | Performed by: HOSPITALIST

## 2023-06-29 PROCEDURE — 97112 NEUROMUSCULAR REEDUCATION: CPT

## 2023-06-29 PROCEDURE — 97116 GAIT TRAINING THERAPY: CPT

## 2023-06-29 PROCEDURE — A9270 NON-COVERED ITEM OR SERVICE: HCPCS | Performed by: PHYSICAL MEDICINE & REHABILITATION

## 2023-06-29 PROCEDURE — 97110 THERAPEUTIC EXERCISES: CPT

## 2023-06-29 PROCEDURE — 94150 VITAL CAPACITY TEST: CPT

## 2023-06-29 PROCEDURE — A9270 NON-COVERED ITEM OR SERVICE: HCPCS | Performed by: HOSPITALIST

## 2023-06-29 PROCEDURE — 700101 HCHG RX REV CODE 250: Performed by: PHYSICAL MEDICINE & REHABILITATION

## 2023-06-29 PROCEDURE — 85025 COMPLETE CBC W/AUTO DIFF WBC: CPT

## 2023-06-29 PROCEDURE — 99233 SBSQ HOSP IP/OBS HIGH 50: CPT | Performed by: PHYSICAL MEDICINE & REHABILITATION

## 2023-06-29 PROCEDURE — 94760 N-INVAS EAR/PLS OXIMETRY 1: CPT

## 2023-06-29 PROCEDURE — 97535 SELF CARE MNGMENT TRAINING: CPT

## 2023-06-29 PROCEDURE — 700102 HCHG RX REV CODE 250 W/ 637 OVERRIDE(OP): Performed by: PHYSICAL MEDICINE & REHABILITATION

## 2023-06-29 PROCEDURE — 770010 HCHG ROOM/CARE - REHAB SEMI PRIVAT*

## 2023-06-29 PROCEDURE — 80048 BASIC METABOLIC PNL TOTAL CA: CPT

## 2023-06-29 PROCEDURE — 97533 SENSORY INTEGRATION: CPT

## 2023-06-29 RX ORDER — TEMAZEPAM 15 MG/1
30 CAPSULE ORAL
Status: DISCONTINUED | OUTPATIENT
Start: 2023-06-29 | End: 2023-06-30

## 2023-06-29 RX ADMIN — OMEPRAZOLE 20 MG: 20 CAPSULE, DELAYED RELEASE ORAL at 08:26

## 2023-06-29 RX ADMIN — MENTHOL, METHYL SALICYLATE: 10; 15 CREAM TOPICAL at 09:26

## 2023-06-29 RX ADMIN — APIXABAN 5 MG: 5 TABLET, FILM COATED ORAL at 20:46

## 2023-06-29 RX ADMIN — ASPIRIN 81 MG CHEWABLE TABLET 81 MG: 81 TABLET CHEWABLE at 08:26

## 2023-06-29 RX ADMIN — MIDODRINE HYDROCHLORIDE 10 MG: 10 TABLET ORAL at 17:30

## 2023-06-29 RX ADMIN — PREGABALIN 150 MG: 150 CAPSULE ORAL at 15:24

## 2023-06-29 RX ADMIN — APIXABAN 5 MG: 5 TABLET, FILM COATED ORAL at 08:25

## 2023-06-29 RX ADMIN — Medication 6 MG: at 20:46

## 2023-06-29 RX ADMIN — SENNOSIDES 17.2 MG: 8.6 TABLET, FILM COATED ORAL at 12:05

## 2023-06-29 RX ADMIN — LIDOCAINE 2 PATCH: 50 PATCH TOPICAL at 12:06

## 2023-06-29 RX ADMIN — MIDODRINE HYDROCHLORIDE 10 MG: 10 TABLET ORAL at 08:25

## 2023-06-29 RX ADMIN — MENTHOL, METHYL SALICYLATE: 10; 15 CREAM TOPICAL at 20:48

## 2023-06-29 RX ADMIN — DOCUSATE SODIUM 100 MG: 100 CAPSULE, LIQUID FILLED ORAL at 08:26

## 2023-06-29 RX ADMIN — PREGABALIN 150 MG: 150 CAPSULE ORAL at 20:46

## 2023-06-29 RX ADMIN — MULTIPLE VITAMINS W/ MINERALS TAB 1 TABLET: TAB at 12:05

## 2023-06-29 RX ADMIN — PREGABALIN 150 MG: 150 CAPSULE ORAL at 08:26

## 2023-06-29 RX ADMIN — TEMAZEPAM 30 MG: 15 CAPSULE ORAL at 20:46

## 2023-06-29 RX ADMIN — TAMSULOSIN HYDROCHLORIDE 0.4 MG: 0.4 CAPSULE ORAL at 17:30

## 2023-06-29 RX ADMIN — DOCUSATE SODIUM 100 MG: 100 CAPSULE, LIQUID FILLED ORAL at 20:46

## 2023-06-29 RX ADMIN — MIDODRINE HYDROCHLORIDE 10 MG: 10 TABLET ORAL at 12:05

## 2023-06-29 RX ADMIN — Medication 2000 UNITS: at 09:26

## 2023-06-29 ASSESSMENT — PAIN DESCRIPTION - PAIN TYPE
TYPE: ACUTE PAIN
TYPE: ACUTE PAIN

## 2023-06-29 ASSESSMENT — GAIT ASSESSMENTS
DEVIATION: ATAXIC;INCREASED BASE OF SUPPORT;DECREASED HEEL STRIKE
DISTANCE (FEET): 10
GAIT LEVEL OF ASSIST: TOTAL ASSIST X 2
ASSISTIVE DEVICE: FRONT WHEEL WALKER;PARALLEL BARS

## 2023-06-29 ASSESSMENT — PULMONARY FUNCTION TESTS: FVC: 2.76

## 2023-06-29 ASSESSMENT — ACTIVITIES OF DAILY LIVING (ADL)
BED_CHAIR_WHEELCHAIR_TRANSFER_DESCRIPTION: ADAPTIVE EQUIPMENT;INCREASED TIME;SET-UP OF EQUIPMENT;SUPERVISION FOR SAFETY;VERBAL CUEING

## 2023-06-29 ASSESSMENT — ENCOUNTER SYMPTOMS
NERVOUS/ANXIOUS: 0
FEVER: 0
DIZZINESS: 0
DIARRHEA: 0
BLURRED VISION: 0
COUGH: 0

## 2023-06-29 ASSESSMENT — PATIENT HEALTH QUESTIONNAIRE - PHQ9
1. LITTLE INTEREST OR PLEASURE IN DOING THINGS: NOT AT ALL
SUM OF ALL RESPONSES TO PHQ9 QUESTIONS 1 AND 2: 0
2. FEELING DOWN, DEPRESSED, IRRITABLE, OR HOPELESS: NOT AT ALL

## 2023-06-29 NOTE — THERAPY
Physical Therapy   Daily Treatment     Patient Name: Bull Banegas  Age:  67 y.o., Sex:  male  Medical Record #: [id number]  Today's Date: 6/29/2023     Precautions  Precautions: Fall Risk  Comments: blurred vision, avoid over-fatiguing    Subjective    Pt happy with progress with standing tolerance and gait training     Objective       06/29/23 1301   PT Charge Group   PT Gait Training (Units) 1   PT Therapeutic Exercise (Units) 1   PT Neuromuscular Re-Education / Balance (Units) 1   PT Therapeutic Activities (Units) 1   PT Total Time Spent   PT Individual Total Time Spent (Mins) 60   Gait Functional Level of Assist    Gait Level Of Assist Total Assist X 2  (CGAx2 with close WC follow)   Assistive Device Front Wheel Walker;Parallel Bars   Distance (Feet) 10   # of Times Distance was Traveled 3   Deviation Ataxic;Increased Base Of Support;Decreased Heel Strike   Wheelchair Functional Level of Assist   Wheelchair Assist Modified Independent   Distance Wheelchair (Feet or Distance) 200   Wheelchair Description Extra time   Stairs Functional Level of Assist   Level of Assist with Stairs Total Assist X 2  (CGAx2 step up to 2inch step in // bars)   Transfer Functional Level of Assist   Bed, Chair, Wheelchair Transfer Minimal Assist   Bed Chair Wheelchair Transfer Description Adaptive equipment;Initial preparation for task;Set-up of equipment;Squat pivot transfer to wheelchair;Supervision for safety;Verbal cueing  (squat pivot vs stand step FWW)   Sitting Lower Body Exercises   Nustep Resistance Level 3  (10 min 608 steps BUE/LE)   Bed Mobility    Supine to Sit Modified Independent   Sit to Supine Modified Independent   Sit to Stand Contact Guard Assist  (VC's for hand placement)   Neuro-Muscular Treatments   Comments gait training // bars vs shelbie FWW vs std FWW, step ups with emphasis on eccentric control and avoiding hyperextension in terminal stance   Interdisciplinary Plan of Care Collaboration   IDT Collaboration with   Family / Caregiver;Therapy Tech   Patient Position at End of Therapy Seated;Chair Alarm On;Self Releasing Lap Belt Applied;Family / Friend in Room;Call Light within Reach;Tray Table within Reach   Collaboration Comments assist with care, eduacted spouse on GBS recovery     Discussed potential for aquatic tx once no longer incontinent    Assessment    Pt was able to progress to ambulate 10ft with FWW and close WC follow without any buckling or knee instability. He did demonstrate increased B knee instability after static standing no UE support for 10 seconds.  Strengths: Able to follow instructions, Alert and oriented, Effective communication skills, Good carryover of learning, Independent prior level of function, Making steady progress towards goals, Motivated for self care and independence, Pleasant and cooperative, Supportive family, Willingly participates in therapeutic activities  Barriers: Decreased endurance, Fatigue, Generalized weakness, Home accessibility, Impaired activity tolerance, Impaired balance, Limited mobility (Paraplegia)    Plan    caution to avoid over-fatigue  STS and gait with Vector harness and ARJO (West trolley)  Continue progressing stability with stand step shelbie FWW transfers  Seated and standing therex  B LE motomed vs Nustep  WC mobility outdoors  Vibration for LE sensory integration     Passport items to be completed:  Get in/out of bed safely, in/out of a vehicle, safely use mobility device, walk or wheel around home/community, navigate up and down stairs, show how to get up/down from the ground, ensure home is accessible, demonstrate HEP, complete caregiver training      Physical Therapy Problems (Active)       Problem: PT-Long Term Goals       Dates: Start:  06/18/23         Goal: LTG-By discharge, patient will propel wheelchair >/= 300' and navigate inclines up to 3% (curb cuts or equivalent) c/ SBA or better.        Dates: Start:  06/18/23            Goal: LTG-By discharge,  patient will ambulate >/= 50' c/ ModA or better and FWW.        Dates: Start:  06/18/23            Goal: LTG-By discharge, patient will transfer one surface to another c/ Naila.        Dates: Start:  06/18/23            Goal: LTG-By discharge, patient will transfer in/out of a car c/ ModA or better.        Dates: Start:  06/18/23

## 2023-06-29 NOTE — PROGRESS NOTES
Patient care assumed. Report received from day RN. Patient is alert and calm, resting in bed/chair with family at bedside. Call light and bedside table within reach.

## 2023-06-29 NOTE — FLOWSHEET NOTE
06/29/23 1000   Incentive Spirometry Treatment   Incentive Spirometer Volume 3000 mL   Pulmonary Function Group   $ FVC / Vital Capacity (liters)  2.76  (62% of predicted, slouched down in bed. Good effort)

## 2023-06-29 NOTE — PROGRESS NOTES
"  Physical Medicine & Rehabilitation Progress Note    Encounter Date: 6/29/2023    Chief Complaint: Weakness in all 4 extremities    Interval Events (Subjective):    Patient was evaluated in his room laying comfortably in bed, wife is present at bedside.    He complains of difficulty with sleep last night, and urine incontinence in the middle of the night, required intermittent catheterization, woke up to pain.  He did not trial trazodone.  He reports continued improvement in sensation in hands and lower extremities.  Occupational Therapy was working with vibratory devices which has improved his hand sensation.  He denies any significant change in neuropathic pain with DC of gabapentin.    He was happy with his progression of his lower extremity strength and ability to stand up without assistance today.    He denies any fevers or chills wife reports improvement in his color he was significantly pale at beginning of the week.    Bowel: Medium soft BM, continent  bladder: Voiding trial, requiring multiple catheterizations, episode of incontinence, overflow voiding last night    ROS:  Gen: No fatigue, confusion, significant weight loss  Eyes: no blurry vision, double vision or pain in eyes  ENT: no changes in hearing, runny nose, nose bleeds, sinus pain  CV: No CP, palpitations,   Lungs: no shortness of breath, changes in secretions, changes in cough, pain with coughing  Abd: No abd pain, nausea, vomiting, pain with eating  : no blood in urine, pain during storage phase, bladder spasms, suprapubic pain  Ext: No swelling in legs, asymmetric atrophy  Neuro: Improved sensation and strength as above  Skin: no new ulcers/skin breakdown appreciated by patient  Mood: No changes in mood today, increase in depression or anxiety  Heme: no bruising, or bleeding    Objective:  Vitals: /68   Pulse 98   Temp 36.6 °C (97.8 °F) (Oral)   Resp 18   Ht 1.727 m (5' 7.99\")   Wt 116 kg (256 lb 13.4 oz)   SpO2 93%   Gen: NAD, " Body mass index is 39.06 kg/m².  HEENT:  NC/AT, PERRLA, moist mucous membranes  Cardio: RRR, no mumurs  Pulm: CTAB, with normal respiratory effort  Abd: Soft NTND, active bowel sounds,   Ext: No peripheral edema. No calf tenderness. No clubbing/cyanosis. +dorsal pedalis pulses bilaterally.      Laboratory Values:  Recent Results (from the past 72 hour(s))   Comp Metabolic Panel    Collection Time: 06/27/23  5:33 AM   Result Value Ref Range    Sodium 136 135 - 145 mmol/L    Potassium 4.4 3.6 - 5.5 mmol/L    Chloride 100 96 - 112 mmol/L    Co2 24 20 - 33 mmol/L    Anion Gap 12.0 7.0 - 16.0    Glucose 99 65 - 99 mg/dL    Bun 14 8 - 22 mg/dL    Creatinine 0.54 0.50 - 1.40 mg/dL    Calcium 8.8 8.5 - 10.5 mg/dL    AST(SGOT) 22 12 - 45 U/L    ALT(SGPT) 20 2 - 50 U/L    Alkaline Phosphatase 73 30 - 99 U/L    Total Bilirubin 0.5 0.1 - 1.5 mg/dL    Albumin 3.1 (L) 3.2 - 4.9 g/dL    Total Protein 6.9 6.0 - 8.2 g/dL    Globulin 3.8 (H) 1.9 - 3.5 g/dL    A-G Ratio 0.8 g/dL   CBC WITH DIFFERENTIAL    Collection Time: 06/27/23  5:33 AM   Result Value Ref Range    WBC 3.9 (L) 4.8 - 10.8 K/uL    RBC 3.27 (L) 4.70 - 6.10 M/uL    Hemoglobin 10.2 (L) 14.0 - 18.0 g/dL    Hematocrit 30.2 (L) 42.0 - 52.0 %    MCV 92.4 81.4 - 97.8 fL    MCH 31.2 27.0 - 33.0 pg    MCHC 33.8 32.3 - 36.5 g/dL    RDW 59.7 (H) 35.9 - 50.0 fL    Platelet Count 264 164 - 446 K/uL    MPV 8.9 (L) 9.0 - 12.9 fL    Neutrophils-Polys 54.80 44.00 - 72.00 %    Lymphocytes 28.20 22.00 - 41.00 %    Monocytes 11.40 0.00 - 13.40 %    Eosinophils 4.30 0.00 - 6.90 %    Basophils 0.80 0.00 - 1.80 %    Immature Granulocytes 0.50 0.00 - 0.90 %    Nucleated RBC 0.00 0.00 - 0.20 /100 WBC    Neutrophils (Absolute) 2.16 1.82 - 7.42 K/uL    Lymphs (Absolute) 1.11 1.00 - 4.80 K/uL    Monos (Absolute) 0.45 0.00 - 0.85 K/uL    Eos (Absolute) 0.17 0.00 - 0.51 K/uL    Baso (Absolute) 0.03 0.00 - 0.12 K/uL    Immature Granulocytes (abs) 0.02 0.00 - 0.11 K/uL    NRBC (Absolute) 0.00 K/uL    CORRECTED CALCIUM    Collection Time: 06/27/23  5:33 AM   Result Value Ref Range    Correct Calcium 9.5 8.5 - 10.5 mg/dL   ESTIMATED GFR    Collection Time: 06/27/23  5:33 AM   Result Value Ref Range    GFR (CKD-EPI) 110 >60 mL/min/1.73 m 2   CBC WITH DIFFERENTIAL    Collection Time: 06/29/23  5:50 AM   Result Value Ref Range    WBC 3.2 (L) 4.8 - 10.8 K/uL    RBC 3.23 (L) 4.70 - 6.10 M/uL    Hemoglobin 10.1 (L) 14.0 - 18.0 g/dL    Hematocrit 29.6 (L) 42.0 - 52.0 %    MCV 91.6 81.4 - 97.8 fL    MCH 31.3 27.0 - 33.0 pg    MCHC 34.1 32.3 - 36.5 g/dL    RDW 57.6 (H) 35.9 - 50.0 fL    Platelet Count 255 164 - 446 K/uL    MPV 9.2 9.0 - 12.9 fL    Neutrophils-Polys 60.40 44.00 - 72.00 %    Lymphocytes 23.90 22.00 - 41.00 %    Monocytes 13.50 (H) 0.00 - 13.40 %    Eosinophils 1.30 0.00 - 6.90 %    Basophils 0.60 0.00 - 1.80 %    Immature Granulocytes 0.30 0.00 - 0.90 %    Nucleated RBC 0.00 0.00 - 0.20 /100 WBC    Neutrophils (Absolute) 1.92 1.82 - 7.42 K/uL    Lymphs (Absolute) 0.76 (L) 1.00 - 4.80 K/uL    Monos (Absolute) 0.43 0.00 - 0.85 K/uL    Eos (Absolute) 0.04 0.00 - 0.51 K/uL    Baso (Absolute) 0.02 0.00 - 0.12 K/uL    Immature Granulocytes (abs) 0.01 0.00 - 0.11 K/uL    NRBC (Absolute) 0.00 K/uL   Basic Metabolic Panel    Collection Time: 06/29/23  5:50 AM   Result Value Ref Range    Sodium 136 135 - 145 mmol/L    Potassium 4.3 3.6 - 5.5 mmol/L    Chloride 100 96 - 112 mmol/L    Co2 22 20 - 33 mmol/L    Glucose 101 (H) 65 - 99 mg/dL    Bun 14 8 - 22 mg/dL    Creatinine 0.51 0.50 - 1.40 mg/dL    Calcium 8.9 8.5 - 10.5 mg/dL    Anion Gap 14.0 7.0 - 16.0   ESTIMATED GFR    Collection Time: 06/29/23  5:50 AM   Result Value Ref Range    GFR (CKD-EPI) 112 >60 mL/min/1.73 m 2       Medications:  Scheduled Medications   Medication Dose Frequency    pregabalin  150 mg TID    docusate sodium  100 mg BID    And    sennosides  17.2 mg DAILY AT NOON    And    bisacodyl  10 mg QDAY    apixaban  5 mg BID    midodrine  10 mg  TID WITH MEALS    vitamin D3  2,000 Units DAILY    menthol-methyl salicycate   BID    omeprazole  20 mg DAILY    Pharmacy Consult Request  1 Each PHARMACY TO DOSE    therapeutic multivitamin-minerals  1 Tablet DAILY WITH LUNCH    aspirin  81 mg DAILY    atorvastatin  10 mg MO, WE + FR    melatonin  6 mg QHS    temazepam  15 mg QHS    lidocaine  2 Patch Q24HR    tamsulosin  0.4 mg AFTER DINNER     PRN medications: oxyCODONE immediate-release **OR** oxyCODONE immediate-release, acetaminophen, docusate sodium **AND** sennosides **AND** bisacodyl **AND** magnesium hydroxide, [COMPLETED] docusate sodium **FOLLOWED BY** normal saline (PF), traMADol, carboxymethylcellulose, benzocaine-menthol, mag hydrox-al hydrox-simeth, ondansetron **OR** ondansetron, traZODone, sodium chloride, Respiratory Therapy Consult, hydrALAZINE    Diet:  Current Diet Order   Procedures    Diet Order Diet: Regular       Assessment:  Active Hospital Problems    Diagnosis     *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  Lower extremity weakness and pain as well as numbness in upper extremities.  No known premorbid infection.  -PT and OT for mobility and ADLs. Per guidelines, 15 hours per week between PT, OT.  Cleared by speech therapy for swallow and speech, transition time to PT and OT  -Continue comprehensive acute inpatient rehabilitation     Fever: 100.5 on 6/26, afebrile within the last 24 hours  - Infectious work-up as per hospitalist, COVID and flu were negative, UA is negative, procalcitonin was elevated on 6/25, chest x-ray was negative blood cultures to date are negative  - Discontinue Tylenol, concerned that  this may be masking his fevers.     Neurologic respiratory impairment:   Patient is at high risk for respiratory involvement given neurologic symptoms in the upper extremities.   -Vital capacity daily, 1.72 liters on 6/20  - Incentive spirometry     Lower extremity swelling: Left greater than right  - Lower extremity Doppler was negative for DVT     Hyponatremia: Likely SIADH, followed by nephrology during [facility] hospitalization  - Sodium of 126 on 6/17 --> 129 on 6/19 --> 133 on 6/20 --> 136 on 6/21 --> 133 on 6/26 --> 136 on 6/27 --> 136 on 6/29  -Discontinue sodium chloride tablets as per hospitalist  - Lasix was discontinued on 6/21 as per hospitalist discontinuing Lasix versus liberalizing fluid restriction  -Free water restriction of 500 cc/day and total p.o. fluid intake of 1.8 L, if sodium continues to improve goal will be to decrease fluid restriction.  -Status post 500 cc of normal saline IV fluid on 6/27     Hypomagnesemia: Low magnesium during acute hospitalization, supplemented with p.o. magnesium  - Magnesium of 2.0 on 6/18     Urinary tract infection/pyelonephritis: Catheter associated UTI in the setting of neurogenic bladder  - WBC of 14 on 6/17 --> WBC of 10.7 on 6/20 --> 4.3 on 6/26  - Peripheral IV was removed prior to transfer to acute rehabilitation we will replace IV.  - Ceftriaxone 1 g every 24 hours, completed course    Leukopenia:  - WBC of 3.2  - Discussed with hospitalist  - Check CBC in a.m.    Neurogenic bladder: Inappropriate management of neurogenic bladder can result and hydronephrosis, increased risk of pyelonephritis, and renal failure  - Discontinued Blancas on 6/22,   -Continue voiding trial, if can't void in 6 hours or prn check pvr and if >500cc then ICP,if able to void then check PVR, if PVR is >200cc then ICP  - Continue Flomax 0.4 mg nightly     Neurogenic bowel: Inappropriate neurogenic bowel can result in severe constipation, bowel dilation and rupture.  Severe  constipation with prolonged period of time without BM.  -Continue upper motor neuron neurogenic bowel program with senna 2 tablets q. noon, Colace 100 mg twice daily and hold Magic bullet suppository TN daily and digital stimulation     orthostatic hypotension  Because of significant autonomic dysfunction associated with some cases of AIDP, the patient is at high risk for orthostatic hypotension.  Goal of SBP greater than 100.  -  Mechanical compression with teds and Tubi  and abd binder used prior to out of bed  - Continue midodrine 10 mg 3 times daily     Essential hypertension: SBP of greater than 200 on presentation to acute hospital.  SBP  in the last 24 hours  - Lisinopril 20 mg daily held, discontinued lisinopril secondary to orthostatic hypotension as above, may restart as an outpatient with recovery of autonomic nervous system     Dyslipidemia: Total cholesterol 137, HDL 44, LDL 75  -Lipitor 10 mg every Monday Wednesday Friday     Dysphagia: Concern for swallow dysfunction in the setting of certain variants of AIDP  - Consult speech therapy for swallow evaluation.  Cleared by speech therapy     Skin  Due to the sensory impairments following AIDP, skin protection principles are of the utmost importance.      - every two-hour turning pattern overnight while the patient is in bed. When the patient is out of bed, an every 15 minute repositioning or weight shifting pattern will be utilized in order to help train the patient for long-term skin protection. We will also discuss skin monitoring and its importance with the patient and the caregivers.    Insomnia:  - Increase temazepam to 30 mg nightly    Pain:  Postoperative pain:  -Continue oxycodone 5-10 mg every 4 hours as needed for pain moderate to severe  - Discontinue Tylenol as this may be masking fevers  - Lidocaine patch x2 on either side of incision, on for 12 hours off for 12 hours  - Continue tramadol 50 mg every 6 hours as needed for pain      Neuropathic pain: Burning and tingling in all 4 extremities  -Discontinue gabapentin  -Continue Lyrica 150 mg 3 times daily, discussed decreasing frequency to twice daily  - Discontinue Celebrex  - Tramadol 50 mg every 6 hours as needed pain  -May benefit from a nonpharmaceutical modalities such as TENS units, compression, ice, heat, will discuss with therapy team.     Vitamin D deficiency: High risk of vitamin D deficiency in this population, goal of vitamin D level greater than 30, has been linked to improvement and central nervous system recovery  - Vitamin D level of 19 on 6/18  - Cholecalciferol 2000 units daily     Insomnia: Significant difficulty during acute hospitalization  - Restarted on Restoril 15 mg nightly  - Trazodone 50 mg nightly as needed insomnia     DVT prophylaxis  In the setting of AIDP, the patient is at high risk of deep venous thrombosis given decreased mobility and alteration to autonomic nervous system.   -DC Lovenox  - Eliquis 5 mg twice daily for prophylaxis    Physician's Interdisciplinary Team Conference Note    Nursing staff, Physical Therapist(s), Occupational Therapist(s), Case Management and Pharmacy staff were present and reported. Where appropriate Speech, Respiratory, and Recreational Therapists were present and reported.     I, Mario Terrell D.O., was present and led the interdisciplinary team conference on 6/29/2023.  I led the IDT conference and agree with the IDT conference documentation and plan of care as noted below.     RN:  Diet    % Meal     Pain improving   Sleep    Bowel  BTP   Bladder ICP + incont last night   In's & Out's      Barriers: bowel and bladder    PT:  Bed Mobility    Transfers Stand step transfers CGA X2    Mobility Total A X2, working on vector   Stairs nt   Met all STG  Barriers: LE weakness    OT:  Eating    Grooming    Bathing    UB Dressing    LB Dressing Min A   Toileting Total A   Shower & Transfer CGA - Min A   Working closing management  from standing position  No family training yet  Barriers: LE weakness and dexterity        CM:  Continues to work on disposition and DME needs.         DC/Disposition:    7/10    Above is an abridged version of the patient's status and plan of care.  For complete details please see Weekly Interdisciplinary Team Conference Note from this date.     All reporting clinicians have a working knowledge of this patient's plan of care.        ____________________________________    Mario Terrell DO  ABP - Physical Medicine & Rehabilitation   [facility] - Spinal Cord Injury Medicine  ____________________________________    Total time:  58 minutes.  I spent greater than 50% of the time for patient care and coordination on this date, including unit/floor time, and face-to-face time with the patient as per assessment and plan above.  Discussion included coordination of care as per IDT above, discussion with patient and wife on prognosis of GBS and expectations of going home, neurogenic bladder, ICP, neurogenic bowel, decreased bowel medication, insomnia, increase temazepam

## 2023-06-29 NOTE — THERAPY
"Physical Therapy   Daily Treatment     Patient Name: uBll Banegas  Age:  67 y.o., Sex:  male  Medical Record #: [id number]  Today's Date: 6/29/2023     Precautions  Precautions: Fall Risk  Comments: blurred vision, avoid over-fatiguing    Subjective    Pt received up in wc from tech in main gym after bfast, agreeable to participate. Reported that he was \"buzzing\" from using vibration with OT in earlier session. Requested to work on UB strengthening to conserve energy for gait training with PT later today.     Objective       06/29/23 0831   PT Charge Group   PT Therapeutic Exercise (Units) 2   PT Total Time Spent   PT Individual Total Time Spent (Mins) 30   Wheelchair Functional Level of Assist   Wheelchair Assist Supervised   Distance Wheelchair (Feet or Distance) 160  (plus 100 ft)   Wheelchair Description Extra time;Limited by fatigue;Supervision for safety  (BUE propulsion)   Sitting Lower Body Exercises   Comments see PT note for UB exercises performed   Interdisciplinary Plan of Care Collaboration   Patient Position at End of Therapy Seated;Chair Alarm On;Call Light within Reach;Tray Table within Reach;Phone within Reach     Seated UB strengthening exercises:  Lat pulldown 30# 4x15  Chest press 25# 4x15  Tricep pressdown forw facing 30# 2x15    Assessment    Pt with good tolerance for session focused on UB strengthening. Requested appropriate adjustments to weights and took RB prn without need for cueing.    Strengths: Able to follow instructions, Alert and oriented, Effective communication skills, Good carryover of learning, Independent prior level of function, Making steady progress towards goals, Motivated for self care and independence, Pleasant and cooperative, Supportive family, Willingly participates in therapeutic activities  Barriers: Decreased endurance, Fatigue, Generalized weakness, Home accessibility, Impaired activity tolerance, Impaired balance, Limited mobility (Paraplegia)    Plan    caution to " avoid over-fatigue  STS and gait with Vector harness and ARJO ([place])  Continue progressing stability with stand step shelbie FWW transfers  Seated and standing therex  B LE motomed vs Nustep  WC mobility outdoors  Vibration for LE sensory integration     Passport items to be completed:  Get in/out of bed safely, in/out of a vehicle, safely use mobility device, walk or wheel around home/community, navigate up and down stairs, show how to get up/down from the ground, ensure home is accessible, demonstrate HEP, complete caregiver training    Physical Therapy Problems (Active)       Problem: PT-Long Term Goals       Dates: Start:  06/18/23         Goal: LTG-By discharge, patient will propel wheelchair >/= 300' and navigate inclines up to 3% (curb cuts or equivalent) c/ SBA or better.        Dates: Start:  06/18/23            Goal: LTG-By discharge, patient will ambulate >/= 50' c/ ModA or better and FWW.        Dates: Start:  06/18/23            Goal: LTG-By discharge, patient will transfer one surface to another c/ Naila.        Dates: Start:  06/18/23            Goal: LTG-By discharge, patient will transfer in/out of a car c/ ModA or better.        Dates: Start:  06/18/23

## 2023-06-29 NOTE — PROGRESS NOTES
Hospital Medicine Daily Progress Note        Chief Complaint:  Hyponatremia    Interval History:  No complaints.  Doing ok.    Review of Systems  Review of Systems   Constitutional:  Negative for fever.   Eyes:  Negative for blurred vision.   Respiratory:  Negative for cough.    Cardiovascular:  Negative for chest pain.   Gastrointestinal:  Negative for diarrhea.   Musculoskeletal:  Negative for joint pain.   Neurological:  Negative for dizziness.   Psychiatric/Behavioral:  The patient is not nervous/anxious.         Physical Exam  Temp:  [36.5 °C (97.7 °F)-36.6 °C (97.8 °F)] 36.6 °C (97.8 °F)  Pulse:  [72-84] 84  Resp:  [18-20] 18  BP: ()/(50-70) 104/68  SpO2:  [94 %] 94 %    Physical Exam  Vitals and nursing note reviewed.   Constitutional:       Appearance: He is not diaphoretic.   HENT:      Mouth/Throat:      Pharynx: No oropharyngeal exudate or posterior oropharyngeal erythema.   Eyes:      Extraocular Movements: Extraocular movements intact.   Neck:      Vascular: No carotid bruit.   Cardiovascular:      Rate and Rhythm: Normal rate and regular rhythm.      Heart sounds: No murmur heard.  Pulmonary:      Effort: Pulmonary effort is normal.      Breath sounds: Normal breath sounds. No stridor.   Abdominal:      General: Bowel sounds are normal.      Palpations: Abdomen is soft.   Musculoskeletal:      Right lower leg: No edema.      Left lower leg: No edema.   Skin:     General: Skin is warm and dry.      Findings: No rash.   Neurological:      Mental Status: He is alert and oriented to person, place, and time.   Psychiatric:         Mood and Affect: Mood normal.         Behavior: Behavior normal.         Fluids    Intake/Output Summary (Last 24 hours) at 6/29/2023 0927  Last data filed at 6/29/2023 0900  Gross per 24 hour   Intake 1400 ml   Output 3250 ml   Net -1850 ml         Laboratory  Recent Labs     06/27/23  0533 06/29/23  0550   WBC 3.9* 3.2*   RBC 3.27* 3.23*   HEMOGLOBIN 10.2* 10.1*    HEMATOCRIT 30.2* 29.6*   MCV 92.4 91.6   MCH 31.2 31.3   MCHC 33.8 34.1   RDW 59.7* 57.6*   PLATELETCT 264 255   MPV 8.9* 9.2       Recent Labs     06/27/23  0533 06/29/23  0550   SODIUM 136 136   POTASSIUM 4.4 4.3   CHLORIDE 100 100   CO2 24 22   GLUCOSE 99 101*   BUN 14 14   CREATININE 0.54 0.51   CALCIUM 8.8 8.9                     Assessment/Plan  * 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[facility])  Assessment & Plan  Had progressive BLE weakness  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[facility]  S/P IVIG x 5 doses     Fever  Assessment & Plan  Has been afebrile for a while  Has leukopenia  PCT 0.42  COVID/FLU/RSV negative  UA negative  CXR negative acute  BC x 2 neg  Monitor for now    Ear discomfort  Assessment & Plan  Debrox no longer on hospital formulary  Trialed Docusate solution per Pharmacy recommendations    Dyslipidemia  Assessment & Plan  Cont Lipitor    Vitamin D deficiency  Assessment & Plan  Vit D: 19  Cont supplements    Essential hypertension  Assessment & Plan  BP ok  Cont Midodrine  Note: on Flomax   Mgt per Physiatry    Hyponatremia  Assessment & Plan  Na: 135 --> 133 --> 136 (6/27) --> 136 (6/29)  Off fluid restriction  Off Lisinopril  Off Lasix  Off salt tabs (last dose 1g on 6/28)  Cont to monitor

## 2023-06-29 NOTE — CARE PLAN
Problem: Balance  Goal: STG-Within one week, patient will maintain static standing c/ BUE support >/= 2 minutes to support participation in ADLs.  Outcome: Met     Problem: Mobility  Goal: STG-Within one week, patient will demonstrate ability to manage wheelchair leg rests, arm rests, and brakes in preparation for transfers at [place] or better.   Outcome: Met     Problem: Mobility Transfers  Goal: STG-Within one week, patient will move supine to sit at SBA or better and use of bed rail.   Outcome: Met

## 2023-06-29 NOTE — THERAPY
Occupational Therapy  Daily Treatment     Patient Name: Bull Banegas  Age:  67 y.o., Sex:  male  Medical Record #: [id number]  Today's Date: 6/29/2023     Precautions  Precautions: Fall Risk  Comments: blurred vision, avoid over-fatiguing         Subjective    Pt supine in bed and agreeable to OT tx, reporting poor night of sleeping.     Objective       06/29/23 0701   OT Charge Group   OT Self Care / ADL (Units) 1   OT Sensory Integration Techniques (Units) 1   OT Therapy Activity (Units) 2   OT Total Time Spent   OT Individual Total Time Spent (Mins) 60   Cognition    Level of Consciousness Alert   Functional Level of Assist   Lower Body Dressing Minimal Assist   Lower Body Dressing Description Sock aid;Reacher;Assistive devices;Increased time;Supervision for safety;Verbal cueing  (Min A for sit<>standing and steady with FWW for UB support during alternating hand hip hike following threading seated EOB with reacher. Min A for heel cup of slip on shoe due to broken down heel. Sock aid with cues for socks.)   Bed, Chair, Wheelchair Transfer Minimal Assist   Bed Chair Wheelchair Transfer Description Adaptive equipment;Increased time;Set-up of equipment;Supervision for safety;Verbal cueing  (Stand step with FWW, Min A for boost to standing with cues for hand placement. Completed 3x during session, including to mat table.)   Hand Strengthening   Hand Strengthening Theraputty (Comment on Resistance)   Comment Med-soft theraputty used for bilateral hand strengthening. See HEP in note for details.   Neuro-Muscular Treatments   Neuro-Muscular Treatments Vibration   Comments Low frequency vibration used for 5 min each over B-hands and feet for re-sensitization with pt reporting good effect, though temporary   Interdisciplinary Plan of Care Collaboration   Patient Position at End of Therapy Seated;Chair Alarm On  (Pt left seated in w/c dining room for breakfast.)     Pt completed all w/c mobility at Mod I level on  unit.    Assessment    Pt making significant progress towards goals with functional transfers, AE use, and tolerated vibration well for sensory re-training. No reports of visual changes, vision is still clear.  Strengths: Able to follow instructions, Alert and oriented, Effective communication skills, Independent prior level of function, Motivated for self care and independence, Pleasant and cooperative, Willingly participates in therapeutic activities    Plan    UB strengthening as limited by fatigue, sensory re-integration, functional transfers progressing to standing from SB, ADLs with AE as needed, IADLs, incorporate FM skills for BUE    Occupational Therapy Goals (Active)       Problem: Dressing       Dates: Start:  06/18/23         Goal: STG-Within one week, patient will dress LB with CGA overall using AE/DME as needed.       Dates: Start:  06/29/23       Description:             Problem: OT Long Term Goals       Dates: Start:  06/18/23         Goal: LTG-By discharge, patient will complete basic self care tasks with min-supervision overall using AE/DME as needed.       Dates: Start:  06/18/23            Goal: LTG-By discharge, patient will perform bathroom transfers with SBA overall using AE/DME as needed.       Dates: Start:  06/18/23               Problem: Toileting       Dates: Start:  06/18/23         Goal: STG-Within one week, patient will complete toileting tasks with Max A overall using AE/DME as needed       Dates: Start:  06/18/23

## 2023-06-29 NOTE — CARE PLAN
"The patient is Stable - Low risk of patient condition declining or worsening      Problem: Bowel Elimination  Goal: Patient will participate in bowel management program  Outcome: Progressing   Note: Patient refused scheduled suppository. Had large BM today. Will continue to monitor.       Problem: Fall Risk - Rehab  Goal: Patient will remain free from falls  Outcome: Progressing   Isa Shay Fall risk Assessment Score: 21    High fall risk Interventions     - Bed and strip alarm   - Yellow sign by the door   - Yellow wrist band \"Fall risk\"  - Do not leave patient unattended in the bathroom  - Fall risk education provided            "

## 2023-06-29 NOTE — FLOWSHEET NOTE
06/29/23 0948   Events/Summary/Plan   Events/Summary/Plan FVC   Vital Signs   Pulse 98   Respiration 18   Pulse Oximetry 93 %   $ Pulse Oximetry (Spot Check) Yes   Respiratory Assessment   Level of Consciousness Alert   Chest Exam   Work Of Breathing / Effort Within Normal Limits   Breath Sounds   RUL Breath Sounds Clear   RML Breath Sounds Clear   RLL Breath Sounds Diminished   JC Breath Sounds Clear   LLL Breath Sounds Diminished   Oxygen   O2 Delivery Device Room air w/o2 available

## 2023-06-29 NOTE — CARE PLAN
Problem: Toileting  Goal: STG-Within one week, patient will complete toileting tasks with Max A overall using AE/DME as needed  Outcome: Not Met     Problem: Dressing  Goal: STG-Within one week, patient will dress LB with mod A overall using AE/DME as needed.  Outcome: Met  Updated to: STG-Within one week, patient will dress LB with CGA overall using AE/DME as needed. (Reason: Pt achieved goal)     Problem: Functional Transfers  Goal: STG-Within one week, patient will transfer to toilet with max A x1 overall using AE/DME as needed.  Outcome: Met

## 2023-06-30 ENCOUNTER — APPOINTMENT (OUTPATIENT)
Dept: OCCUPATIONAL THERAPY | Facility: REHABILITATION | Age: 68
DRG: 095 | End: 2023-06-30
Attending: PHYSICAL MEDICINE & REHABILITATION
Payer: MEDICARE

## 2023-06-30 ENCOUNTER — APPOINTMENT (OUTPATIENT)
Dept: PHYSICAL THERAPY | Facility: REHABILITATION | Age: 68
DRG: 095 | End: 2023-06-30
Attending: PHYSICAL MEDICINE & REHABILITATION
Payer: MEDICARE

## 2023-06-30 PROBLEM — D72.819 LEUKOPENIA: Status: ACTIVE | Noted: 2023-06-30

## 2023-06-30 LAB
BACTERIA BLD CULT: NORMAL
BACTERIA BLD CULT: NORMAL
BASOPHILS # BLD AUTO: 0.3 % (ref 0–1.8)
BASOPHILS # BLD: 0.01 K/UL (ref 0–0.12)
EOSINOPHIL # BLD AUTO: 0.05 K/UL (ref 0–0.51)
EOSINOPHIL NFR BLD: 1.5 % (ref 0–6.9)
ERYTHROCYTE [DISTWIDTH] IN BLOOD BY AUTOMATED COUNT: 56 FL (ref 35.9–50)
HCT VFR BLD AUTO: 28.1 % (ref 42–52)
HGB BLD-MCNC: 9.8 G/DL (ref 14–18)
IMM GRANULOCYTES # BLD AUTO: 0.01 K/UL (ref 0–0.11)
IMM GRANULOCYTES NFR BLD AUTO: 0.3 % (ref 0–0.9)
LYMPHOCYTES # BLD AUTO: 1.05 K/UL (ref 1–4.8)
LYMPHOCYTES NFR BLD: 31.1 % (ref 22–41)
MCH RBC QN AUTO: 31.9 PG (ref 27–33)
MCHC RBC AUTO-ENTMCNC: 34.9 G/DL (ref 32.3–36.5)
MCV RBC AUTO: 91.5 FL (ref 81.4–97.8)
MONOCYTES # BLD AUTO: 0.53 K/UL (ref 0–0.85)
MONOCYTES NFR BLD AUTO: 15.7 % (ref 0–13.4)
NEUTROPHILS # BLD AUTO: 1.73 K/UL (ref 1.82–7.42)
NEUTROPHILS NFR BLD: 51.1 % (ref 44–72)
NRBC # BLD AUTO: 0 K/UL
NRBC BLD-RTO: 0 /100 WBC (ref 0–0.2)
PLATELET # BLD AUTO: 259 K/UL (ref 164–446)
PMV BLD AUTO: 9.4 FL (ref 9–12.9)
RBC # BLD AUTO: 3.07 M/UL (ref 4.7–6.1)
SIGNIFICANT IND 70042: NORMAL
SIGNIFICANT IND 70042: NORMAL
SITE SITE: NORMAL
SITE SITE: NORMAL
SOURCE SOURCE: NORMAL
SOURCE SOURCE: NORMAL
WBC # BLD AUTO: 3.4 K/UL (ref 4.8–10.8)

## 2023-06-30 PROCEDURE — 700102 HCHG RX REV CODE 250 W/ 637 OVERRIDE(OP): Performed by: PHYSICAL MEDICINE & REHABILITATION

## 2023-06-30 PROCEDURE — 36415 COLL VENOUS BLD VENIPUNCTURE: CPT

## 2023-06-30 PROCEDURE — 97110 THERAPEUTIC EXERCISES: CPT

## 2023-06-30 PROCEDURE — 97116 GAIT TRAINING THERAPY: CPT

## 2023-06-30 PROCEDURE — A9270 NON-COVERED ITEM OR SERVICE: HCPCS | Performed by: PHYSICAL MEDICINE & REHABILITATION

## 2023-06-30 PROCEDURE — A9270 NON-COVERED ITEM OR SERVICE: HCPCS | Performed by: HOSPITALIST

## 2023-06-30 PROCEDURE — 94150 VITAL CAPACITY TEST: CPT

## 2023-06-30 PROCEDURE — 97535 SELF CARE MNGMENT TRAINING: CPT

## 2023-06-30 PROCEDURE — 700102 HCHG RX REV CODE 250 W/ 637 OVERRIDE(OP): Performed by: HOSPITALIST

## 2023-06-30 PROCEDURE — 94760 N-INVAS EAR/PLS OXIMETRY 1: CPT

## 2023-06-30 PROCEDURE — 770010 HCHG ROOM/CARE - REHAB SEMI PRIVAT*

## 2023-06-30 PROCEDURE — 99232 SBSQ HOSP IP/OBS MODERATE 35: CPT | Performed by: PHYSICAL MEDICINE & REHABILITATION

## 2023-06-30 PROCEDURE — 85025 COMPLETE CBC W/AUTO DIFF WBC: CPT

## 2023-06-30 PROCEDURE — 99231 SBSQ HOSP IP/OBS SF/LOW 25: CPT | Performed by: HOSPITALIST

## 2023-06-30 RX ORDER — PREGABALIN 100 MG/1
200 CAPSULE ORAL 2 TIMES DAILY
Status: DISCONTINUED | OUTPATIENT
Start: 2023-06-30 | End: 2023-07-05

## 2023-06-30 RX ORDER — TRAZODONE HYDROCHLORIDE 50 MG/1
50 TABLET ORAL
Status: DISCONTINUED | OUTPATIENT
Start: 2023-06-30 | End: 2023-07-10 | Stop reason: HOSPADM

## 2023-06-30 RX ADMIN — PREGABALIN 150 MG: 150 CAPSULE ORAL at 08:35

## 2023-06-30 RX ADMIN — OXYCODONE 5 MG: 5 TABLET ORAL at 01:34

## 2023-06-30 RX ADMIN — MENTHOL, METHYL SALICYLATE: 10; 15 CREAM TOPICAL at 21:41

## 2023-06-30 RX ADMIN — ASPIRIN 81 MG CHEWABLE TABLET 81 MG: 81 TABLET CHEWABLE at 08:34

## 2023-06-30 RX ADMIN — APIXABAN 5 MG: 5 TABLET, FILM COATED ORAL at 20:36

## 2023-06-30 RX ADMIN — DOCUSATE SODIUM 100 MG: 100 CAPSULE, LIQUID FILLED ORAL at 20:37

## 2023-06-30 RX ADMIN — Medication 6 MG: at 20:36

## 2023-06-30 RX ADMIN — TRAZODONE HYDROCHLORIDE 50 MG: 50 TABLET ORAL at 20:38

## 2023-06-30 RX ADMIN — MULTIPLE VITAMINS W/ MINERALS TAB 1 TABLET: TAB at 11:23

## 2023-06-30 RX ADMIN — ATORVASTATIN CALCIUM 10 MG: 10 TABLET, FILM COATED ORAL at 20:36

## 2023-06-30 RX ADMIN — SENNOSIDES 17.2 MG: 8.6 TABLET, FILM COATED ORAL at 11:24

## 2023-06-30 RX ADMIN — MIDODRINE HYDROCHLORIDE 10 MG: 10 TABLET ORAL at 08:34

## 2023-06-30 RX ADMIN — MIDODRINE HYDROCHLORIDE 10 MG: 10 TABLET ORAL at 18:04

## 2023-06-30 RX ADMIN — DOCUSATE SODIUM 100 MG: 100 CAPSULE, LIQUID FILLED ORAL at 08:34

## 2023-06-30 RX ADMIN — Medication 2000 UNITS: at 08:34

## 2023-06-30 RX ADMIN — MIDODRINE HYDROCHLORIDE 10 MG: 10 TABLET ORAL at 11:24

## 2023-06-30 RX ADMIN — PREGABALIN 200 MG: 100 CAPSULE ORAL at 20:37

## 2023-06-30 RX ADMIN — TAMSULOSIN HYDROCHLORIDE 0.4 MG: 0.4 CAPSULE ORAL at 18:04

## 2023-06-30 RX ADMIN — OMEPRAZOLE 20 MG: 20 CAPSULE, DELAYED RELEASE ORAL at 08:35

## 2023-06-30 RX ADMIN — APIXABAN 5 MG: 5 TABLET, FILM COATED ORAL at 08:34

## 2023-06-30 ASSESSMENT — ENCOUNTER SYMPTOMS
SHORTNESS OF BREATH: 0
CHILLS: 0
NERVOUS/ANXIOUS: 0
ABDOMINAL PAIN: 0
FEVER: 0
DIARRHEA: 0
VOMITING: 0
NAUSEA: 0

## 2023-06-30 ASSESSMENT — ACTIVITIES OF DAILY LIVING (ADL)
BED_CHAIR_WHEELCHAIR_TRANSFER_DESCRIPTION: ADAPTIVE EQUIPMENT;INITIAL PREPARATION FOR TASK;INCREASED TIME;SET-UP OF EQUIPMENT;SUPERVISION FOR SAFETY;VERBAL CUEING

## 2023-06-30 ASSESSMENT — GAIT ASSESSMENTS
ASSISTIVE DEVICE: FRONT WHEEL WALKER
GAIT LEVEL OF ASSIST: TOTAL ASSIST X 2
DEVIATION: ATAXIC;INCREASED BASE OF SUPPORT;BRADYKINETIC
DISTANCE (FEET): 45

## 2023-06-30 ASSESSMENT — PULMONARY FUNCTION TESTS: FVC: 3.01

## 2023-06-30 ASSESSMENT — PAIN DESCRIPTION - PAIN TYPE: TYPE: ACUTE PAIN

## 2023-06-30 NOTE — THERAPY
Physical Therapy   Daily Treatment     Patient Name: Bull Banegas  Age:  67 y.o., Sex:  male  Medical Record #: [id number]  Today's Date: 6/30/2023     Precautions  Precautions: Fall Risk  Comments: blurred vision, avoid over-fatiguing    Subjective    Pt reports resolution of double vision and denies pain. He attests to some vibration sensations and blurry vision, though improving.         Objective       06/30/23 1330   PT Charge Group   PT Gait Training (Units) 1   PT Therapeutic Exercise (Units) 3   PT Total Time Spent   PT Individual Total Time Spent (Mins) 60   Gait Functional Level of Assist    Gait Level Of Assist Total Assist X 2  (Cathleen with close WC follow)   Assistive Device Front Wheel Walker   Distance (Feet) 45   # of Times Distance was Traveled 1   Deviation Ataxic;Increased Base Of Support;Bradykinetic  (mildly ataxic, occasional knee hyperextension, heavy use of UEs)   Wheelchair Functional Level of Assist   Wheelchair Assist Modified Independent   Distance Wheelchair (Feet or Distance) 200   Wheelchair Description Extra time;Limited by fatigue   Transfer Functional Level of Assist   Bed, Chair, Wheelchair Transfer Contact Guard Assist   Bed Chair Wheelchair Transfer Description Adaptive equipment;Initial preparation for task;Increased time;Set-up of equipment;Supervision for safety;Verbal cueing  (stand step FWW)   Supine Lower Body Exercise   Bridges Two Legged;2 sets of 10   Hip Flexion 2 sets of 10;Right;Left  (hookyling marching)   Hip Abduction Side Lying;2 sets of 15;Right ;Left   Hip Adduction  2 sets of 10;Bilateral  (ball squeeze)   Short Arc Quad 2 sets of 10;Right ;Left  (2.5lb ankle weight)   Bed Mobility    Supine to Sit Modified Independent   Sit to Supine Modified Independent   Sit to Stand Contact Guard Assist   Scooting Modified Independent   Rolling Modified Independent   Interdisciplinary Plan of Care Collaboration   IDT Collaboration with  Therapy Tech   Patient Position at End of  Therapy Seated;Self Releasing Lap Belt Applied;Call Light within Reach;Phone within Reach   Collaboration Comments assist with care     Discussed accomplishments of this week and set goals with pt for upcoming week including increasing ambulation distance before requiring a seated rest from room to east gym, progress to standing therex, and trialing floor recovery    Assessment    Pt was able to progress endurance with ambulation to 45ft Cathleen with FWW before requiring a seated rest break. Pt also was able to tolerate 30 minutes of supine therex, which he was previously unable to do due to back pain.  Strengths: Able to follow instructions, Alert and oriented, Effective communication skills, Good carryover of learning, Independent prior level of function, Making steady progress towards goals, Motivated for self care and independence, Pleasant and cooperative, Supportive family, Willingly participates in therapeutic activities  Barriers: Decreased endurance, Fatigue, Generalized weakness, Home accessibility, Impaired activity tolerance, Impaired balance, Limited mobility (Paraplegia)    Plan    caution to avoid over-fatigue  Gait training FWW with close WC follow  Seated and standing therex  B LE motomed vs Nustep  WC mobility outdoors  Vibration for LE sensory integration     Passport items to be completed:  Get in/out of bed safely, in/out of a vehicle, safely use mobility device, walk or wheel around home/community, navigate up and down stairs, show how to get up/down from the ground, ensure home is accessible, demonstrate HEP, complete caregiver training        Physical Therapy Problems (Active)       Problem: PT-Long Term Goals       Dates: Start:  06/18/23         Goal: LTG-By discharge, patient will propel wheelchair >/= 300' and navigate inclines up to 3% (curb cuts or equivalent) c/ SBA or better.        Dates: Start:  06/18/23            Goal: LTG-By discharge, patient will ambulate >/= 50' c/ ModA or better  and FWW.        Dates: Start:  06/18/23            Goal: LTG-By discharge, patient will transfer one surface to another c/ Naila.        Dates: Start:  06/18/23            Goal: LTG-By discharge, patient will transfer in/out of a car c/ ModA or better.        Dates: Start:  06/18/23

## 2023-06-30 NOTE — CARE PLAN
"The patient is Stable - Low risk of patient condition declining or worsening      Problem: Fall Risk - Rehab  Goal: Patient will remain free from falls  Outcome: Progressing   Isa Shay Fall risk Assessment Score: 21    High fall risk Interventions   - Bed and strip alarm   - Yellow sign by the door   - Yellow wrist band \"Fall risk\"  - Do not leave patient unattended in the bathroom  - Fall risk education provided    Problem: Neurogenic Bladder  Goal: Patient will demonstrate self-cath technique using clean technique and care of the catheter  Outcome: Progressing   Note: Reinforced education on self catheterization. Patient verbalized understanding.           "

## 2023-06-30 NOTE — FLOWSHEET NOTE
06/30/23 0840   Events/Summary/Plan   Events/Summary/Plan FVC/IS. Sitting up in wheelchair   Vital Signs   Pulse (!) 119   Respiration 20   Pulse Oximetry 94 %   $ Pulse Oximetry (Spot Check) Yes   Respiratory Assessment   Level of Consciousness Alert   Chest Exam   Work Of Breathing / Effort Within Normal Limits   Breath Sounds   RUL Breath Sounds Clear   RML Breath Sounds Clear   RLL Breath Sounds Diminished   JC Breath Sounds Clear   LLL Breath Sounds Diminished   Oxygen   O2 Delivery Device Room air w/o2 available

## 2023-06-30 NOTE — THERAPY
Occupational Therapy  Daily Treatment     Patient Name: Bull Banegas  Age:  67 y.o., Sex:  male  Medical Record #: [id number]  Today's Date: 6/30/2023     Precautions  Precautions: Fall Risk  Comments: blurred vision, avoid over-fatiguing         Subjective    Pt in bed upon arrival, reports fatigue from poor sleep but agreeable to exercises at the EOB.      Objective     06/30/23 0901   OT Charge Group   OT Therapeutic Exercise (Units) 2   OT Total Time Spent   OT Individual Total Time Spent (Mins) 30   Sitting Upper Body Exercises   Shoulder Press 2 sets of 15;Bilateral;Weight (See Comments for lbs)  (4#)   Internal Shoulder Rotation 2 sets of 15;Bilateral;Weight (See Comments for lbs)  (4#)   External Shoulder Rotation 2 sets of 15;Bilateral;Weight (See Comments for lbs)  (4#)   Bicep Curls 2 sets of 15;Bilateral;Weight (See Comments for lbs)  (4#)   Wrist Flexion / Extension 2 sets of 15;Bilateral;Weight (See Comments for lbs)  (3#)   Interdisciplinary Plan of Care Collaboration   Patient Position at End of Therapy In Bed;Tray Table within Reach;Call Light within Reach;Phone within Reach       Assessment    Pt tolerated UE exercises well to progress strength and endurance for transfers and functional daily activities. Min cues for technique with exercises, no complaints of pain or discomfort.     Strengths: Able to follow instructions, Alert and oriented, Effective communication skills, Independent prior level of function, Motivated for self care and independence, Pleasant and cooperative, Willingly participates in therapeutic activities    Plan    UB strengthening as limited by fatigue, sensory re-integration, functional transfers progressing to standing from SB, ADLs with AE as needed, IADLs, incorporate FM skills for BUE    Passport items to be completed:  Perform bathroom transfers, complete dressing, complete feeding, get ready for the day, prepare a simple meal, participate in household tasks, adapt home for  safety needs, demonstrate home exercise program, complete caregiver training     Occupational Therapy Goals (Active)       Problem: Dressing       Dates: Start:  06/18/23         Goal: STG-Within one week, patient will dress LB with CGA overall using AE/DME as needed.       Dates: Start:  06/29/23       Description:             Problem: OT Long Term Goals       Dates: Start:  06/18/23         Goal: LTG-By discharge, patient will complete basic self care tasks with min-supervision overall using AE/DME as needed.       Dates: Start:  06/18/23            Goal: LTG-By discharge, patient will perform bathroom transfers with SBA overall using AE/DME as needed.       Dates: Start:  06/18/23               Problem: Toileting       Dates: Start:  06/18/23         Goal: STG-Within one week, patient will complete toileting tasks with Max A overall using AE/DME as needed       Dates: Start:  06/18/23

## 2023-06-30 NOTE — PROGRESS NOTES
"  Physical Medicine & Rehabilitation Progress Note    Encounter Date: 6/30/2023    Chief Complaint: Weakness in all 4 extremities    Interval Events (Subjective):    Patient was evaluated in his room lying comfortably in bed.  He reports having a horrible night sleep last night, he woke up approximately 4 hours after falling asleep and could not go back to sleep.    While the tingling is still present in his hands the burning sensation has significantly improved in the lower extremities.  He is okay with decreasing the dose of his Lyrica.      Bladder: Voiding trial, 2 large ICPs of 800 850 cc at 9 PM and 4 AM, discussed mobilization of fluid  Bowel:Last BM: 06/29/23, no incont    ROS:  Gen: No fatigue, confusion, significant weight loss  Eyes: no blurry vision, double vision or pain in eyes  ENT: no changes in hearing, runny nose, nose bleeds, sinus pain  CV: No CP, palpitations,   Lungs: no shortness of breath, changes in secretions, changes in cough, pain with coughing  Abd: No abd pain, nausea, vomiting, pain with eating  : no blood in urine, pain during storage phase, bladder spasms, suprapubic pain  Ext: No swelling in legs, asymmetric atrophy  Neuro: no changes in strength or sensation  Skin: no new ulcers/skin breakdown appreciated by patient  Mood: No changes in mood today, increase in depression or anxiety  Heme: no bruising, or bleeding    Objective:  Vitals: /82   Pulse 100   Temp 37.1 °C (98.7 °F) (Oral)   Resp 17   Ht 1.727 m (5' 7.99\")   Wt 116 kg (256 lb 13.4 oz)   SpO2 95%   Gen: NAD, Body mass index is 39.06 kg/m².  HEENT:  NC/AT, PERRLA, moist mucous membranes  Cardio: RRR, no mumurs  Pulm: CTAB, with normal respiratory effort  Abd: Soft NTND, active bowel sounds,   Ext: No peripheral edema. No calf tenderness. No clubbing/cyanosis. +dorsal pedalis pulses bilaterally.    Motor Exam Lower Extremities    ? Myotome R L   Hip flexion L2 4 3   Knee extension L3 4 4   Ankle dorsiflexion L4 " 4 4   Toe extension L5 4 4   Ankle plantarflexion S1 4 4        Laboratory Values:  Recent Results (from the past 72 hour(s))   CBC WITH DIFFERENTIAL    Collection Time: 06/29/23  5:50 AM   Result Value Ref Range    WBC 3.2 (L) 4.8 - 10.8 K/uL    RBC 3.23 (L) 4.70 - 6.10 M/uL    Hemoglobin 10.1 (L) 14.0 - 18.0 g/dL    Hematocrit 29.6 (L) 42.0 - 52.0 %    MCV 91.6 81.4 - 97.8 fL    MCH 31.3 27.0 - 33.0 pg    MCHC 34.1 32.3 - 36.5 g/dL    RDW 57.6 (H) 35.9 - 50.0 fL    Platelet Count 255 164 - 446 K/uL    MPV 9.2 9.0 - 12.9 fL    Neutrophils-Polys 60.40 44.00 - 72.00 %    Lymphocytes 23.90 22.00 - 41.00 %    Monocytes 13.50 (H) 0.00 - 13.40 %    Eosinophils 1.30 0.00 - 6.90 %    Basophils 0.60 0.00 - 1.80 %    Immature Granulocytes 0.30 0.00 - 0.90 %    Nucleated RBC 0.00 0.00 - 0.20 /100 WBC    Neutrophils (Absolute) 1.92 1.82 - 7.42 K/uL    Lymphs (Absolute) 0.76 (L) 1.00 - 4.80 K/uL    Monos (Absolute) 0.43 0.00 - 0.85 K/uL    Eos (Absolute) 0.04 0.00 - 0.51 K/uL    Baso (Absolute) 0.02 0.00 - 0.12 K/uL    Immature Granulocytes (abs) 0.01 0.00 - 0.11 K/uL    NRBC (Absolute) 0.00 K/uL   Basic Metabolic Panel    Collection Time: 06/29/23  5:50 AM   Result Value Ref Range    Sodium 136 135 - 145 mmol/L    Potassium 4.3 3.6 - 5.5 mmol/L    Chloride 100 96 - 112 mmol/L    Co2 22 20 - 33 mmol/L    Glucose 101 (H) 65 - 99 mg/dL    Bun 14 8 - 22 mg/dL    Creatinine 0.51 0.50 - 1.40 mg/dL    Calcium 8.9 8.5 - 10.5 mg/dL    Anion Gap 14.0 7.0 - 16.0   ESTIMATED GFR    Collection Time: 06/29/23  5:50 AM   Result Value Ref Range    GFR (CKD-EPI) 112 >60 mL/min/1.73 m 2   CBC WITH DIFFERENTIAL    Collection Time: 06/30/23  5:57 AM   Result Value Ref Range    WBC 3.4 (L) 4.8 - 10.8 K/uL    RBC 3.07 (L) 4.70 - 6.10 M/uL    Hemoglobin 9.8 (L) 14.0 - 18.0 g/dL    Hematocrit 28.1 (L) 42.0 - 52.0 %    MCV 91.5 81.4 - 97.8 fL    MCH 31.9 27.0 - 33.0 pg    MCHC 34.9 32.3 - 36.5 g/dL    RDW 56.0 (H) 35.9 - 50.0 fL    Platelet Count 259  164 - 446 K/uL    MPV 9.4 9.0 - 12.9 fL    Neutrophils-Polys 51.10 44.00 - 72.00 %    Lymphocytes 31.10 22.00 - 41.00 %    Monocytes 15.70 (H) 0.00 - 13.40 %    Eosinophils 1.50 0.00 - 6.90 %    Basophils 0.30 0.00 - 1.80 %    Immature Granulocytes 0.30 0.00 - 0.90 %    Nucleated RBC 0.00 0.00 - 0.20 /100 WBC    Neutrophils (Absolute) 1.73 (L) 1.82 - 7.42 K/uL    Lymphs (Absolute) 1.05 1.00 - 4.80 K/uL    Monos (Absolute) 0.53 0.00 - 0.85 K/uL    Eos (Absolute) 0.05 0.00 - 0.51 K/uL    Baso (Absolute) 0.01 0.00 - 0.12 K/uL    Immature Granulocytes (abs) 0.01 0.00 - 0.11 K/uL    NRBC (Absolute) 0.00 K/uL       Medications:  Scheduled Medications   Medication Dose Frequency    pregabalin  200 mg BID    temazepam  30 mg QHS    docusate sodium  100 mg BID    And    sennosides  17.2 mg DAILY AT NOON    apixaban  5 mg BID    midodrine  10 mg TID WITH MEALS    vitamin D3  2,000 Units DAILY    menthol-methyl salicycate   BID    omeprazole  20 mg DAILY    Pharmacy Consult Request  1 Each PHARMACY TO DOSE    therapeutic multivitamin-minerals  1 Tablet DAILY WITH LUNCH    aspirin  81 mg DAILY    atorvastatin  10 mg MO, WE + FR    melatonin  6 mg QHS    lidocaine  2 Patch Q24HR    tamsulosin  0.4 mg AFTER DINNER     PRN medications: oxyCODONE immediate-release **OR** oxyCODONE immediate-release, acetaminophen, docusate sodium **AND** sennosides **AND** [DISCONTINUED] bisacodyl **AND** magnesium hydroxide, [COMPLETED] docusate sodium **FOLLOWED BY** normal saline (PF), traMADol, carboxymethylcellulose, benzocaine-menthol, mag hydrox-al hydrox-simeth, ondansetron **OR** ondansetron, traZODone, sodium chloride, Respiratory Therapy Consult, hydrALAZINE    Diet:  Current Diet Order   Procedures    Diet Order Diet: Regular       Assessment:  Active Hospital Problems    Diagnosis     *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  Lower extremity weakness and pain as well as numbness in upper extremities.  No known premorbid infection.  -PT and OT for mobility and ADLs. Per guidelines, 15 hours per week between PT, OT.  Cleared by speech therapy for swallow and speech, transition time to PT and OT  - Continue comprehensive acute inpatient rehabilitation     Fever: 100.5 on 6/26, afebrile over the last 24 hours  - Infectious work-up as per hospitalist, COVID and flu were negative, UA is negative, procalcitonin was elevated on 6/25, chest x-ray was negative blood cultures to date are negative  - Discontinue Tylenol, concerned that this may be masking his fevers.     Neurologic respiratory impairment:   Patient is at high risk for respiratory involvement given neurologic symptoms in the upper extremities.   -Vital capacity daily, 1.72 liters on 6/20  - Incentive spirometry     Lower extremity swelling: Left greater than right  - Lower extremity Doppler was negative for DVT     Hyponatremia: Likely SIADH, followed by nephrology during [facility] hospitalization  - Sodium of 126 on 6/17 --> 129 on 6/19 --> 133 on 6/20 --> 136 on 6/21 --> 133 on 6/26 --> 136 on 6/27 --> 136 on 6/29  -Discontinue sodium chloride tablets as per hospitalist  - Lasix was discontinued on 6/21 as per hospitalist discontinuing Lasix versus liberalizing fluid restriction  -Free water restriction of 500 cc/day and total p.o. fluid intake of 1.8 L, if sodium continues to improve goal will be to decrease fluid restriction.  -Status post 500 cc of normal saline IV fluid on 6/27  -Check BMP in a.m.     Hypomagnesemia: Low magnesium during acute hospitalization, supplemented with p.o. magnesium  - Magnesium of 2.0 on 6/18      Urinary tract infection/pyelonephritis: Catheter associated UTI in the setting of neurogenic bladder  - WBC of 14 on 6/17 --> WBC of 10.7 on 6/20 --> 4.3 on 6/26  - Peripheral IV was removed prior to transfer to acute rehabilitation we will replace IV.  - Ceftriaxone 1 g every 24 hours, completed course     Leukopenia:  - WBC of 3.2 --> 3.4 on 6/30  -Differential diagnosis includes drug-induced versus viral infection  - Discussed with hospitalist  -We will decrease dose of Lyrica to 200 mg twice daily, this medication should not be abruptly discontinued  - Check CBC in a.m.     Neurogenic bladder: Inappropriate management of neurogenic bladder can result and hydronephrosis, increased risk of pyelonephritis, and renal failure  - Discontinued Blancas on 6/22,   - Continue voiding trial, if can't void in 6 hours or prn check pvr and if >500cc then ICP,if able to void then check PVR, if PVR is >200cc then ICP  - Continue Flomax 0.4 mg nightly     Neurogenic bowel: Inappropriate neurogenic bowel can result in severe constipation, bowel dilation and rupture.  Severe constipation with prolonged period of time without BM.  -Continue upper motor neuron neurogenic bowel program with senna 2 tablets q. noon, Colace 100 mg twice daily and hold Magic bullet suppository AL daily and digital stimulation     orthostatic hypotension  Because of significant autonomic dysfunction associated with some cases of AIDP, the patient is at high risk for orthostatic hypotension.  Goal of SBP greater than 100.  -  Mechanical compression with teds and Tubi  and abd binder used prior to out of bed  - Continue midodrine 10 mg 3 times daily     Essential hypertension: SBP of greater than 200 on presentation to acute hospital.  SBP  in the last 24 hours  - Lisinopril 20 mg daily held, discontinued lisinopril secondary to orthostatic hypotension as above, may restart as an outpatient with recovery of autonomic nervous system      Dyslipidemia: Total cholesterol 137, HDL 44, LDL 75  -Lipitor 10 mg every Monday Wednesday Friday     Dysphagia: Concern for swallow dysfunction in the setting of certain variants of AIDP  - Consult speech therapy for swallow evaluation.  Cleared by speech therapy     Skin  Due to the sensory impairments following AIDP, skin protection principles are of the utmost importance.      - every two-hour turning pattern overnight while the patient is in bed. When the patient is out of bed, an every 15 minute repositioning or weight shifting pattern will be utilized in order to help train the patient for long-term skin protection. We will also discuss skin monitoring and its importance with the patient and the caregivers.     Insomnia:  - Discontinue temazepam  -Initiation of trazodone 50 mg nightly, may repeat x1    Pain:  Postoperative pain:  -Continue oxycodone 5-10 mg every 4 hours as needed for pain moderate to severe  - Discontinue Tylenol as this may be masking fevers  - Lidocaine patch x2 on either side of incision, on for 12 hours off for 12 hours  - Continue tramadol 50 mg every 6 hours as needed for pain     Neuropathic pain: Burning and tingling in all 4 extremities  -Discontinue gabapentin  - Decrease Lyrica to 200 mg twice daily  - Discontinue Celebrex  - Tramadol 50 mg every 6 hours as needed pain  -May benefit from a nonpharmaceutical modalities such as TENS units, compression, ice, heat, will discuss with therapy team.     Vitamin D deficiency: High risk of vitamin D deficiency in this population, goal of vitamin D level greater than 30, has been linked to improvement and central nervous system recovery  - Vitamin D level of 19 on 6/18  - Cholecalciferol 2000 units daily     Insomnia: Significant difficulty during acute hospitalization  - Restarted on Restoril 15 mg nightly  - Trazodone 50 mg nightly as needed insomnia     DVT prophylaxis  In the setting of AIDP, the patient is at high risk of deep venous  thrombosis given decreased mobility and alteration to autonomic nervous system.   -DC Lovenox  - Eliquis 5 mg twice daily for prophylaxis    ____________________________________    Mario Terrell DO  [facility] - Physical Medicine & Rehabilitation   [facility] - Spinal Cord Injury Medicine  ____________________________________

## 2023-06-30 NOTE — THERAPY
"Occupational Therapy  Daily Treatment     Patient Name: Bull Banegas  Age:  67 y.o., Sex:  male  Medical Record #: [id number]  Today's Date: 6/30/2023     Precautions  Precautions: (P) Fall Risk  Comments: (P) blurred vision, avoid over-fatiguing    Subjective    Patient in bed upon arrival, agreeable to participate in OT.     \"My hands used to be all over the place. I can feed myself better now.\"     Objective     06/30/23 0701   OT Charge Group   OT Self Care / ADL (Units) 4   OT Total Time Spent   OT Individual Total Time Spent (Mins) 60   Precautions   Precautions Fall Risk   Comments blurred vision, avoid over-fatiguing   Vitals   O2 Delivery Device None - Room Air   Cognition    Level of Consciousness Alert   Functional Level of Assist   Grooming Modified Independent  (to brush teeth, seated in w/c)   Bathing Supervision  (for all seated bathing tasks on fold down shower bench, incorporated LH sponge to facilitate independence w/ LB bathing tasks)   Upper Body Dressing Modified Independent  (T-shirt)   Lower Body Dressing Minimal Assist  (Able to thread BLEs through shorts w/ use of reacher (seated in w/c), min to CGA for shorts over hips pursuit in standing (w/ GB unilateral UE support; alternated using R/LUEs to pull up shorts), min A for socks w/ extra wide sock aid)   Bed, Chair, Wheelchair Transfer Contact Guard Assist  (lateral scoot bed > w/c)   Tub / Shower Transfers Minimal Assist  (CGA wc > fold down shower bench, min A bench > w/c (lateral scoot technique))   Interdisciplinary Plan of Care Collaboration   Patient Position at End of Therapy Seated;Self Releasing Lap Belt Applied  (in dining room for breakfast)       Assessment    Patient has made excellent functional progress thus far during rehab stay and is motivated to return to [facility]. Min A required for shower bench > w/c txfr due to fatigue and uphill (vs CGA for wc > shower bench txfr).  Extra wide sock aid provided, patient reports he prefers it " vs standard sock aid. Patient encouraged by his progress.   Strengths: Able to follow instructions, Alert and oriented, Effective communication skills, Independent prior level of function, Motivated for self care and independence, Pleasant and cooperative, Willingly participates in therapeutic activities    Plan    UB strengthening as limited by fatigue, sensory re-integration, functional transfers progressing to standing from SB, ADLs with AE as needed, IADLs, incorporate FM skills for BUE    Occupational Therapy Goals (Active)       Problem: Dressing       Dates: Start:  06/18/23         Goal: STG-Within one week, patient will dress LB with CGA overall using AE/DME as needed.       Dates: Start:  06/29/23       Description:             Problem: OT Long Term Goals       Dates: Start:  06/18/23         Goal: LTG-By discharge, patient will complete basic self care tasks with min-supervision overall using AE/DME as needed.       Dates: Start:  06/18/23            Goal: LTG-By discharge, patient will perform bathroom transfers with SBA overall using AE/DME as needed.       Dates: Start:  06/18/23               Problem: Toileting       Dates: Start:  06/18/23         Goal: STG-Within one week, patient will complete toileting tasks with Max A overall using AE/DME as needed       Dates: Start:  06/18/23

## 2023-06-30 NOTE — PROGRESS NOTES
Hospital Medicine Daily Progress Note        Chief Complaint:  Hyponatremia    Interval History:  Discussed about checking his sodium levels in a couple days.    Review of Systems  Review of Systems   Constitutional:  Negative for chills and fever.   Respiratory:  Negative for shortness of breath.    Cardiovascular:  Negative for chest pain.   Gastrointestinal:  Negative for abdominal pain, diarrhea, nausea and vomiting.   Psychiatric/Behavioral:  The patient is not nervous/anxious.         Physical Exam  Temp:  [36.4 °C (97.6 °F)-36.9 °C (98.4 °F)] 36.5 °C (97.7 °F)  Pulse:  [] 119  Resp:  [16-20] 20  BP: ()/(66-89) 107/74  SpO2:  [94 %] 94 %    Physical Exam  Vitals and nursing note reviewed.   Constitutional:       Appearance: Normal appearance.   HENT:      Head: Atraumatic.   Eyes:      Conjunctiva/sclera: Conjunctivae normal.      Pupils: Pupils are equal, round, and reactive to light.   Cardiovascular:      Rate and Rhythm: Normal rate and regular rhythm.   Pulmonary:      Effort: Pulmonary effort is normal.      Breath sounds: Normal breath sounds.   Abdominal:      General: Bowel sounds are normal.      Palpations: Abdomen is soft.   Musculoskeletal:      Cervical back: Normal range of motion and neck supple.      Right lower leg: No edema.      Left lower leg: No edema.   Skin:     General: Skin is warm and dry.   Neurological:      Mental Status: He is alert and oriented to person, place, and time.   Psychiatric:         Mood and Affect: Mood normal.         Behavior: Behavior normal.         Fluids    Intake/Output Summary (Last 24 hours) at 6/30/2023 1006  Last data filed at 6/30/2023 0900  Gross per 24 hour   Intake 916 ml   Output 2250 ml   Net -1334 ml         Laboratory  Recent Labs     06/29/23  0550 06/30/23  0557   WBC 3.2* 3.4*   RBC 3.23* 3.07*   HEMOGLOBIN 10.1* 9.8*   HEMATOCRIT 29.6* 28.1*   MCV 91.6 91.5   MCH 31.3 31.9   MCHC 34.1 34.9   RDW 57.6* 56.0*   PLATELETCT 255 259    MPV 9.2 9.4       Recent Labs     06/29/23  0550   SODIUM 136   POTASSIUM 4.3   CHLORIDE 100   CO2 22   GLUCOSE 101*   BUN 14   CREATININE 0.51   CALCIUM 8.9                     Assessment/Plan  * GBS (Guillain-[place] syndrome) ([facility])  Assessment & Plan  Had progressive BLE weakness  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[facility]  S/P IVIG x 5 doses     Leukopenia  Assessment & Plan  WBC's: 10.7 --> 7.6 --> 4.6 --> 4.3 --> 3.9 --> 3.2 --> 3.4 (6/30)  ? 2nd to meds  ? 2nd to Lyrica -- recently started (has a low SE)  Cont to monitor    Dyslipidemia  Assessment & Plan  Cont Lipitor    Vitamin D deficiency  Assessment & Plan  Vit D: 19  Cont supplements    Essential hypertension  Assessment & Plan  BP ok  Cont Midodrine  Note: on Flomax   Mgt per Physiatry    Hyponatremia  Assessment & Plan  Na: 135 --> 133 --> 136 (6/27) --> 136 (6/29)  Off fluid restriction  Off Lisinopril  Off Lasix  Off salt tabs (last dose 1g on 6/28)  Cont to monitor

## 2023-06-30 NOTE — CARE PLAN
"The patient is Watcher - Medium risk of patient condition declining or worsening      Problem: Neurogenic Bladder  Goal: Patient will demonstrate self-cath technique using clean technique and care of the catheter  Note: Patient is educated on self cath and understands process with verbal explanation.      Problem: Fall Risk - Rehab  Goal: Patient will remain free from falls  Outcome: Progressing  Note: Isa Shay Fall risk Assessment Score: 21    High fall risk Interventions   - Alarming seatbelt  - Wander guard  - Bed and strip alarm   - Yellow sign by the door   - Yellow wrist band \"Fall risk\"  - Room near to the nurse station  - Do not leave patient unattended in the bathroom  - Fall risk education provided     "

## 2023-06-30 NOTE — ASSESSMENT & PLAN NOTE
May be 2/2 meds  Possible culprits include Lyrica, Omeprazole, ASA, and Lipitor  Monitor need for G-CSF  Follow WBC/ANC

## 2023-06-30 NOTE — FLOWSHEET NOTE
06/30/23 0840   Incentive Spirometry Treatment   Incentive Spirometer Volume 3000 mL   Pulmonary Function Group   $ FVC / Vital Capacity (liters)  3.01  (68% of predicted. Sitting up in wheelchair)

## 2023-07-01 ENCOUNTER — APPOINTMENT (OUTPATIENT)
Dept: OCCUPATIONAL THERAPY | Facility: REHABILITATION | Age: 68
DRG: 095 | End: 2023-07-01
Attending: PHYSICAL MEDICINE & REHABILITATION
Payer: MEDICARE

## 2023-07-01 ENCOUNTER — APPOINTMENT (OUTPATIENT)
Dept: PHYSICAL THERAPY | Facility: REHABILITATION | Age: 68
DRG: 095 | End: 2023-07-01
Attending: PHYSICAL MEDICINE & REHABILITATION
Payer: MEDICARE

## 2023-07-01 LAB
ANION GAP SERPL CALC-SCNC: 12 MMOL/L (ref 7–16)
BUN SERPL-MCNC: 13 MG/DL (ref 8–22)
CALCIUM SERPL-MCNC: 8.8 MG/DL (ref 8.5–10.5)
CHLORIDE SERPL-SCNC: 100 MMOL/L (ref 96–112)
CO2 SERPL-SCNC: 23 MMOL/L (ref 20–33)
CREAT SERPL-MCNC: 0.53 MG/DL (ref 0.5–1.4)
GFR SERPLBLD CREATININE-BSD FMLA CKD-EPI: 111 ML/MIN/1.73 M 2
GLUCOSE SERPL-MCNC: 107 MG/DL (ref 65–99)
POTASSIUM SERPL-SCNC: 4.3 MMOL/L (ref 3.6–5.5)
SODIUM SERPL-SCNC: 135 MMOL/L (ref 135–145)

## 2023-07-01 PROCEDURE — 97535 SELF CARE MNGMENT TRAINING: CPT

## 2023-07-01 PROCEDURE — 700102 HCHG RX REV CODE 250 W/ 637 OVERRIDE(OP): Performed by: HOSPITALIST

## 2023-07-01 PROCEDURE — 97116 GAIT TRAINING THERAPY: CPT

## 2023-07-01 PROCEDURE — 94760 N-INVAS EAR/PLS OXIMETRY 1: CPT

## 2023-07-01 PROCEDURE — 700102 HCHG RX REV CODE 250 W/ 637 OVERRIDE(OP): Performed by: PHYSICAL MEDICINE & REHABILITATION

## 2023-07-01 PROCEDURE — A9270 NON-COVERED ITEM OR SERVICE: HCPCS | Performed by: PHYSICAL MEDICINE & REHABILITATION

## 2023-07-01 PROCEDURE — 97530 THERAPEUTIC ACTIVITIES: CPT

## 2023-07-01 PROCEDURE — 94150 VITAL CAPACITY TEST: CPT

## 2023-07-01 PROCEDURE — 770010 HCHG ROOM/CARE - REHAB SEMI PRIVAT*

## 2023-07-01 PROCEDURE — 80048 BASIC METABOLIC PNL TOTAL CA: CPT

## 2023-07-01 PROCEDURE — 36415 COLL VENOUS BLD VENIPUNCTURE: CPT

## 2023-07-01 PROCEDURE — 97110 THERAPEUTIC EXERCISES: CPT

## 2023-07-01 PROCEDURE — A9270 NON-COVERED ITEM OR SERVICE: HCPCS | Performed by: HOSPITALIST

## 2023-07-01 PROCEDURE — 99231 SBSQ HOSP IP/OBS SF/LOW 25: CPT | Performed by: HOSPITALIST

## 2023-07-01 RX ADMIN — PREGABALIN 200 MG: 100 CAPSULE ORAL at 20:40

## 2023-07-01 RX ADMIN — MENTHOL, METHYL SALICYLATE: 10; 15 CREAM TOPICAL at 20:47

## 2023-07-01 RX ADMIN — APIXABAN 5 MG: 5 TABLET, FILM COATED ORAL at 08:51

## 2023-07-01 RX ADMIN — ASPIRIN 81 MG CHEWABLE TABLET 81 MG: 81 TABLET CHEWABLE at 08:51

## 2023-07-01 RX ADMIN — Medication 2000 UNITS: at 08:51

## 2023-07-01 RX ADMIN — PREGABALIN 200 MG: 100 CAPSULE ORAL at 06:14

## 2023-07-01 RX ADMIN — DOCUSATE SODIUM 100 MG: 100 CAPSULE, LIQUID FILLED ORAL at 20:40

## 2023-07-01 RX ADMIN — MIDODRINE HYDROCHLORIDE 10 MG: 10 TABLET ORAL at 18:04

## 2023-07-01 RX ADMIN — MIDODRINE HYDROCHLORIDE 10 MG: 10 TABLET ORAL at 12:01

## 2023-07-01 RX ADMIN — OMEPRAZOLE 20 MG: 20 CAPSULE, DELAYED RELEASE ORAL at 09:00

## 2023-07-01 RX ADMIN — TAMSULOSIN HYDROCHLORIDE 0.4 MG: 0.4 CAPSULE ORAL at 18:04

## 2023-07-01 RX ADMIN — APIXABAN 5 MG: 5 TABLET, FILM COATED ORAL at 20:40

## 2023-07-01 RX ADMIN — MIDODRINE HYDROCHLORIDE 10 MG: 10 TABLET ORAL at 08:51

## 2023-07-01 RX ADMIN — Medication 6 MG: at 20:40

## 2023-07-01 RX ADMIN — MULTIPLE VITAMINS W/ MINERALS TAB 1 TABLET: TAB at 12:01

## 2023-07-01 RX ADMIN — SENNOSIDES 17.2 MG: 8.6 TABLET, FILM COATED ORAL at 12:01

## 2023-07-01 RX ADMIN — TRAZODONE HYDROCHLORIDE 50 MG: 50 TABLET ORAL at 20:41

## 2023-07-01 RX ADMIN — DOCUSATE SODIUM 100 MG: 100 CAPSULE, LIQUID FILLED ORAL at 08:51

## 2023-07-01 ASSESSMENT — ENCOUNTER SYMPTOMS
HEADACHES: 0
VOMITING: 0
DIZZINESS: 0
FEVER: 0
BLURRED VISION: 0
NAUSEA: 0
PALPITATIONS: 0
HALLUCINATIONS: 0
SHORTNESS OF BREATH: 0

## 2023-07-01 ASSESSMENT — GAIT ASSESSMENTS
ASSISTIVE DEVICE: FRONT WHEEL WALKER
GAIT LEVEL OF ASSIST: CONTACT GUARD ASSIST
DISTANCE (FEET): 100
DEVIATION: ATAXIC;INCREASED BASE OF SUPPORT;BRADYKINETIC

## 2023-07-01 ASSESSMENT — ACTIVITIES OF DAILY LIVING (ADL)
BED_CHAIR_WHEELCHAIR_TRANSFER_DESCRIPTION: INCREASED TIME;ADAPTIVE EQUIPMENT;SET-UP OF EQUIPMENT;SUPERVISION FOR SAFETY
BED_CHAIR_WHEELCHAIR_TRANSFER_DESCRIPTION: INCREASED TIME;SET-UP OF EQUIPMENT;SUPERVISION FOR SAFETY
BED_CHAIR_WHEELCHAIR_TRANSFER_DESCRIPTION: INCREASED TIME;SQUAT PIVOT TRANSFER TO WHEELCHAIR

## 2023-07-01 ASSESSMENT — PULMONARY FUNCTION TESTS: FVC: 2.62

## 2023-07-01 NOTE — FLOWSHEET NOTE
07/01/23 0915   Incentive Spirometry Treatment   Incentive Spirometer Volume 3000 mL   Pulmonary Function Group   $ FVC / Vital Capacity (liters)  2.62  (59% of predicted. sitting higher up in bed)

## 2023-07-01 NOTE — THERAPY
"Physical Therapy   Daily Treatment     Patient Name: Bull Banegas  Age:  67 y.o., Sex:  male  Medical Record #: [id number]  Today's Date: 7/1/2023     Precautions  Precautions: Fall Risk  Comments: blurred vision, avoid over-fatiguing    Subjective    Pt agreeable to therapy.   PM session after gait and standing balance: \"I'll be honest with you, I am getting tired\"     Objective       07/01/23 1100 07/01/23 1301   PT Charge Group   PT Gait Training (Units) 1 2   PT Therapeutic Exercise (Units) 1 2   PT Total Time Spent   PT Individual Total Time Spent (Mins) 30 60   Gait Functional Level of Assist    Gait Level Of Assist  --  Contact Guard Assist  (with close w/c follow)   Assistive Device  --  Front Wheel Walker   Distance (Feet)  --  100   # of Times Distance was Traveled  --  1  (Additional 50 feet with FWW. Also 2x10 feet in parallel bars.)   Deviation  --  Ataxic;Increased Base Of Support;Bradykinetic  (L toes caught floor on one step and patient was able to self recover.)   Wheelchair Functional Level of Assist   Wheelchair Assist Modified Independent Modified Independent   Distance Wheelchair (Feet or Distance) 630  (6 minute wheelchair test) 300  (room to gym)   Wheelchair Description Extra time Extra time   Transfer Functional Level of Assist   Bed, Chair, Wheelchair Transfer Standby Assist Standby Assist  (CGA for squat pivot at the end of session as patient was fatigued.)   Bed Chair Wheelchair Transfer Description Increased time;Set-up of equipment;Supervision for safety  (Squat pivot transfer) Increased time;Adaptive equipment;Set-up of equipment;Supervision for safety  (Stand pivot with FWW for w/c <> mat and squat pivot for w/c to bed.)   Supine Lower Body Exercise   Bridges  --  Two Legged;1 set of 10   Hip Flexion  --    (1 set of 10 marches alternating R/L)   Short Arc Quad  --  2 sets of 10;Bilateral  (2# ankle weight)   Sitting Lower Body Exercises   Ankle Pumps  --  2 sets of 15  (Heel raises and " toe raises.)   Nustep Resistance Level 4  (10 minutes resistance 4. 584 steps.)  --    Standing Lower Body Exercises   Comments  --  2x60s standing balance without UE support  (Long rest break and seated therex between sets)   Bed Mobility    Supine to Sit Modified Independent  --    Sit to Supine Modified Independent  --    Sit to Stand Contact Guard Assist  --    Scooting Modified Independent  --    Rolling Modified Independent  --    Interdisciplinary Plan of Care Collaboration   IDT Collaboration with  Occupational Therapist Family / Caregiver   Patient Position at End of Therapy Seated;Family / Friend in Room  (In cafeteria for lunch with wife.) Seated;In Bed;Call Light within Reach;Tray Table within Reach;Phone within Reach;Family / Friend in Room   Collaboration Comments CLOF Wife present for session         Assessment    Patient is making improvements with gait endurance with tolerance of 100 feet of gait with CGA in PM session. His transfers can be performed with standby assist as long as the patient is not fatigued. When patient is fatigued, he requires CGA for safety in transfers. Patient was able to propel w/c 630 feet indoors in 6 minutes. He requested that the therapist move his w/c after the 6 minute w/c test as his arms were fatigued. Outdoor wheelchair mobility was not performed, due to safety concerns, as outdoor temperatures were 85 degrees by 11am and 93 degrees in PM session.      Strengths: Able to follow instructions, Alert and oriented, Effective communication skills, Good carryover of learning, Independent prior level of function, Making steady progress towards goals, Motivated for self care and independence, Pleasant and cooperative, Supportive family, Willingly participates in therapeutic activities  Barriers: Decreased endurance, Fatigue, Generalized weakness, Home accessibility, Impaired activity tolerance, Impaired balance, Limited mobility (Paraplegia)    Plan    caution to avoid  over-fatigue  Gait training FWW with close WC follow  Seated and standing therex  B LE motomed vs Nustep  WC mobility outdoors  Vibration for LE sensory integration    Passport items to be completed:  Get in/out of bed safely, in/out of a vehicle, safely use mobility device, walk or wheel around home/community, navigate up and down stairs, show how to get up/down from the ground, ensure home is accessible, demonstrate HEP, complete caregiver training    Physical Therapy Problems (Active)       Problem: PT-Long Term Goals       Dates: Start:  06/18/23         Goal: LTG-By discharge, patient will propel wheelchair >/= 300' and navigate inclines up to 3% (curb cuts or equivalent) c/ SBA or better.        Dates: Start:  06/18/23            Goal: LTG-By discharge, patient will ambulate >/= 50' c/ ModA or better and FWW.        Dates: Start:  06/18/23            Goal: LTG-By discharge, patient will transfer one surface to another c/ Naila.        Dates: Start:  06/18/23            Goal: LTG-By discharge, patient will transfer in/out of a car c/ ModA or better.        Dates: Start:  06/18/23

## 2023-07-01 NOTE — FLOWSHEET NOTE
07/01/23 0915   Events/Summary/Plan   Events/Summary/Plan FVC/IS. Slouched down in bed   Vital Signs   Pulse (!) 101   Respiration 18   Pulse Oximetry 92 %   $ Pulse Oximetry (Spot Check) Yes   Respiratory Assessment   Level of Consciousness Alert   Chest Exam   Work Of Breathing / Effort Within Normal Limits   Breath Sounds   RUL Breath Sounds Clear   RML Breath Sounds Clear   RLL Breath Sounds Diminished   JC Breath Sounds Clear   LLL Breath Sounds Diminished   Oxygen   O2 Delivery Device Room air w/o2 available

## 2023-07-01 NOTE — CARE PLAN
The patient is Stable - Low risk of patient condition declining or worsening    Problem: Knowledge Deficit - Standard  Goal: Patient and family/care givers will demonstrate understanding of plan of care, disease process/condition, diagnostic tests and medications  Outcome: Progressing. Reviewed POC, all questions answered.        Problem: Skin Integrity  Goal: Skin integrity is maintained or improved  Outcome: Progressing. Pt's skin remains free from new or accidental injury. Skin protective measures in place.        Shift Goals  Clinical Goals: Safety  Patient Goals: Participate in therapy, Safety  Family Goals: Education

## 2023-07-01 NOTE — PROGRESS NOTES
Hospital Medicine Daily Progress Note        Chief Complaint:  Hyponatremia    Interval History:  Discussed about his sodium levels still ok after being off salt tabs for a while.    Review of Systems  Review of Systems   Constitutional:  Negative for fever.   Eyes:  Negative for blurred vision.   Respiratory:  Negative for shortness of breath.    Cardiovascular:  Negative for palpitations.   Gastrointestinal:  Negative for nausea and vomiting.   Neurological:  Negative for dizziness and headaches.   Psychiatric/Behavioral:  Negative for hallucinations.         Physical Exam  Temp:  [36.4 °C (97.6 °F)-37.1 °C (98.7 °F)] 36.4 °C (97.6 °F)  Pulse:  [] 82  Resp:  [17-18] 17  BP: (100-134)/(67-82) 100/67  SpO2:  [93 %-95 %] 94 %    Physical Exam  Vitals and nursing note reviewed.   Constitutional:       General: He is not in acute distress.  HENT:      Mouth/Throat:      Mouth: Mucous membranes are moist.      Pharynx: Oropharynx is clear.   Eyes:      General: No scleral icterus.  Cardiovascular:      Rate and Rhythm: Normal rate and regular rhythm.   Pulmonary:      Effort: Pulmonary effort is normal.      Breath sounds: No wheezing or rales.   Abdominal:      General: Bowel sounds are normal.      Palpations: Abdomen is soft.   Musculoskeletal:      Cervical back: No rigidity.      Right lower leg: No edema.      Left lower leg: No edema.   Skin:     General: Skin is warm and dry.   Neurological:      Mental Status: He is alert and oriented to person, place, and time.   Psychiatric:         Mood and Affect: Mood normal.         Behavior: Behavior normal.         Fluids    Intake/Output Summary (Last 24 hours) at 7/1/2023 0952  Last data filed at 7/1/2023 0900  Gross per 24 hour   Intake 1830 ml   Output 3050 ml   Net -1220 ml         Laboratory  Recent Labs     06/29/23  0550 06/30/23  0557   WBC 3.2* 3.4*   RBC 3.23* 3.07*   HEMOGLOBIN 10.1* 9.8*   HEMATOCRIT 29.6* 28.1*   MCV 91.6 91.5   MCH 31.3 31.9   MCHC  34.1 34.9   RDW 57.6* 56.0*   PLATELETCT 255 259   MPV 9.2 9.4       Recent Labs     06/29/23  0550 07/01/23  0523   SODIUM 136 135   POTASSIUM 4.3 4.3   CHLORIDE 100 100   CO2 22 23   GLUCOSE 101* 107*   BUN 14 13   CREATININE 0.51 0.53   CALCIUM 8.9 8.8                     Assessment/Plan  * 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 2nd to meds  ? 2nd to Lyrica -- recently started (has a low SE)  Cont to monitor    Dyslipidemia  Assessment & Plan  Cont Lipitor    Vitamin D deficiency  Assessment & Plan  Vit D: 19  Cont supplements    Essential hypertension  Assessment & Plan  BP ok  Cont Midodrine  Note: on Flomax   Mgt per Physiatry    Hyponatremia  Assessment & Plan  Na: 135 --> 133 --> 136 (6/27) --> 136 (6/29) --> 135 (7/1)  Off fluid restriction  Off Lisinopril  Off Lasix  Off salt tabs (last dose 1g on 6/28)  Cont to monitor

## 2023-07-01 NOTE — CARE PLAN
"The patient is Stable - Low risk of patient condition declining or worsening    Problem: Fall Risk - Rehab  Goal: Patient will remain free from falls  Outcome: Progressing   Isa Shay Fall risk Assessment Score: 21    High fall risk Interventions     - Bed and strip alarm   - Yellow sign by the door   - Yellow wrist band \"Fall risk  - Do not leave patient unattended in the bathroom  - Fall risk education provided      Problem: Neurogenic Bladder  Goal: Patient will demonstrate self-cath technique using clean technique and care of the catheter  Outcome: Progressing   Note: Patient was able to perform self cath using clean technique with assistance from nurse.       "

## 2023-07-01 NOTE — CARE PLAN
The patient is Stable - Low risk of patient condition declining or worsening    Problem: Knowledge Deficit - Standard  Goal: Patient and family/care givers will demonstrate understanding of plan of care, disease process/condition, diagnostic tests and medications  Outcome: Progressing. Reviewed POC, all questions answered.       Problem: Skin Integrity  Goal: Skin integrity is maintained or improved  Outcome: Progressing. Pt's skin remains free from new or accidental injury. Skin protective measures in place.        Shift Goals  Clinical Goals: Safety  Patient Goals: participate in therapy  Family Goals: Education

## 2023-07-01 NOTE — THERAPY
"Occupational Therapy  Daily Treatment     Patient Name: Bull Banegas  Age:  67 y.o., Sex:  male  Medical Record #: [id number]  Today's Date: 7/1/2023     Precautions  Precautions: (P) Fall Risk  Comments: blurred vision, avoid over-fatiguing         Subjective    \"I self-cathed for the first time yesterday and it was okay. I'm just worried about giving myself a bladder infection.\"      Objective       07/01/23 0701   OT Charge Group   Charges Yes   OT Self Care / ADL (Units) 3   OT Therapy Activity (Units) 1   OT Total Time Spent   OT Individual Total Time Spent (Mins) 60   Precautions   Precautions Fall Risk   Cognition    Cognition / Consciousness WDL   Level of Consciousness Alert   Functional Level of Assist   Grooming Modified Independent   Grooming Description Seated in wheelchair at sink   Upper Body Dressing Modified Independent   Lower Body Dressing Standby Assist   Lower Body Dressing Description Increased time;Reacher;Shoe horn  (shorts and shoes from EOB with side to side hip hike to don shorts)   Bed, Chair, Wheelchair Transfer Standby Assist   Bed Chair Wheelchair Transfer Description Increased time;Squat pivot transfer to wheelchair   Balance   Sitting Balance (Static) Good   Sitting Balance (Dynamic) Fair +   Weight Shift Sitting Fair   Bed Mobility    Supine to Sit Modified Independent   Scooting Modified Independent   Interdisciplinary Plan of Care Collaboration   Patient Position at End of Therapy Seated;Self Releasing Lap Belt Applied   Collaboration Comments escorted to dining room for breakfast         Assessment    Patient completing his morning routine from EOB and w/c level with appropriate use of AE and s/u of w/c for functional transfers.  Increased time required for LB dressing with light sponge bath option provided EOB secondary to patient perspiring at night. Good overall safety awareness and functional strength in BUE demonstrated for all aspects of self-care.     Strengths: Able to " follow instructions, Alert and oriented, Effective communication skills, Independent prior level of function, Motivated for self care and independence, Pleasant and cooperative, Willingly participates in therapeutic activities    Plan    Progress standing tolerance, standing balance, BUE endurance, incorporation of standing and sit to stand with IADL activities    Passport items to be completed:  Perform bathroom transfers, complete dressing, complete feeding, get ready for the day, prepare a simple meal, participate in household tasks, adapt home for safety needs, demonstrate home exercise program, complete caregiver training     Occupational Therapy Goals (Active)       Problem: Dressing       Dates: Start:  06/18/23         Goal: STG-Within one week, patient will dress LB with CGA overall using AE/DME as needed.       Dates: Start:  06/29/23       Description:             Problem: OT Long Term Goals       Dates: Start:  06/18/23         Goal: LTG-By discharge, patient will complete basic self care tasks with min-supervision overall using AE/DME as needed.       Dates: Start:  06/18/23            Goal: LTG-By discharge, patient will perform bathroom transfers with SBA overall using AE/DME as needed.       Dates: Start:  06/18/23               Problem: Toileting       Dates: Start:  06/18/23         Goal: STG-Within one week, patient will complete toileting tasks with Max A overall using AE/DME as needed       Dates: Start:  06/18/23

## 2023-07-02 ENCOUNTER — APPOINTMENT (OUTPATIENT)
Dept: PHYSICAL THERAPY | Facility: REHABILITATION | Age: 68
DRG: 095 | End: 2023-07-02
Attending: PHYSICAL MEDICINE & REHABILITATION
Payer: MEDICARE

## 2023-07-02 ENCOUNTER — APPOINTMENT (OUTPATIENT)
Dept: OCCUPATIONAL THERAPY | Facility: REHABILITATION | Age: 68
DRG: 095 | End: 2023-07-02
Attending: PHYSICAL MEDICINE & REHABILITATION
Payer: MEDICARE

## 2023-07-02 PROBLEM — R31.9 HEMATURIA: Status: ACTIVE | Noted: 2023-07-02

## 2023-07-02 LAB
APPEARANCE UR: ABNORMAL
BACTERIA #/AREA URNS HPF: ABNORMAL /HPF
BILIRUB UR QL STRIP.AUTO: NEGATIVE
COLOR UR: YELLOW
EPI CELLS #/AREA URNS HPF: NEGATIVE /HPF
GLUCOSE UR STRIP.AUTO-MCNC: NEGATIVE MG/DL
HYALINE CASTS #/AREA URNS LPF: ABNORMAL /LPF
KETONES UR STRIP.AUTO-MCNC: NEGATIVE MG/DL
LEUKOCYTE ESTERASE UR QL STRIP.AUTO: ABNORMAL
MICRO URNS: ABNORMAL
NITRITE UR QL STRIP.AUTO: POSITIVE
PH UR STRIP.AUTO: 6 [PH] (ref 5–8)
PROT UR QL STRIP: 100 MG/DL
RBC # URNS HPF: ABNORMAL /HPF
RBC UR QL AUTO: ABNORMAL
SP GR UR STRIP.AUTO: 1.01
UROBILINOGEN UR STRIP.AUTO-MCNC: 0.2 MG/DL
WBC #/AREA URNS HPF: ABNORMAL /HPF

## 2023-07-02 PROCEDURE — 770010 HCHG ROOM/CARE - REHAB SEMI PRIVAT*

## 2023-07-02 PROCEDURE — 97110 THERAPEUTIC EXERCISES: CPT

## 2023-07-02 PROCEDURE — 94760 N-INVAS EAR/PLS OXIMETRY 1: CPT

## 2023-07-02 PROCEDURE — 700102 HCHG RX REV CODE 250 W/ 637 OVERRIDE(OP): Performed by: PHYSICAL MEDICINE & REHABILITATION

## 2023-07-02 PROCEDURE — 87086 URINE CULTURE/COLONY COUNT: CPT

## 2023-07-02 PROCEDURE — 87077 CULTURE AEROBIC IDENTIFY: CPT

## 2023-07-02 PROCEDURE — 87186 SC STD MICRODIL/AGAR DIL: CPT

## 2023-07-02 PROCEDURE — 97535 SELF CARE MNGMENT TRAINING: CPT

## 2023-07-02 PROCEDURE — 97116 GAIT TRAINING THERAPY: CPT

## 2023-07-02 PROCEDURE — 700102 HCHG RX REV CODE 250 W/ 637 OVERRIDE(OP): Performed by: HOSPITALIST

## 2023-07-02 PROCEDURE — 97530 THERAPEUTIC ACTIVITIES: CPT

## 2023-07-02 PROCEDURE — 81001 URINALYSIS AUTO W/SCOPE: CPT

## 2023-07-02 PROCEDURE — 99231 SBSQ HOSP IP/OBS SF/LOW 25: CPT | Performed by: HOSPITALIST

## 2023-07-02 PROCEDURE — A9270 NON-COVERED ITEM OR SERVICE: HCPCS | Performed by: PHYSICAL MEDICINE & REHABILITATION

## 2023-07-02 PROCEDURE — 94150 VITAL CAPACITY TEST: CPT

## 2023-07-02 PROCEDURE — A9270 NON-COVERED ITEM OR SERVICE: HCPCS | Performed by: HOSPITALIST

## 2023-07-02 RX ADMIN — MENTHOL, METHYL SALICYLATE: 10; 15 CREAM TOPICAL at 20:20

## 2023-07-02 RX ADMIN — TRAZODONE HYDROCHLORIDE 50 MG: 50 TABLET ORAL at 20:19

## 2023-07-02 RX ADMIN — OMEPRAZOLE 20 MG: 20 CAPSULE, DELAYED RELEASE ORAL at 08:15

## 2023-07-02 RX ADMIN — MULTIPLE VITAMINS W/ MINERALS TAB 1 TABLET: TAB at 11:59

## 2023-07-02 RX ADMIN — Medication 6 MG: at 20:19

## 2023-07-02 RX ADMIN — ASPIRIN 81 MG CHEWABLE TABLET 81 MG: 81 TABLET CHEWABLE at 08:16

## 2023-07-02 RX ADMIN — DOCUSATE SODIUM 100 MG: 100 CAPSULE, LIQUID FILLED ORAL at 20:19

## 2023-07-02 RX ADMIN — PREGABALIN 200 MG: 100 CAPSULE ORAL at 06:05

## 2023-07-02 RX ADMIN — SENNOSIDES 17.2 MG: 8.6 TABLET, FILM COATED ORAL at 11:59

## 2023-07-02 RX ADMIN — TAMSULOSIN HYDROCHLORIDE 0.4 MG: 0.4 CAPSULE ORAL at 17:17

## 2023-07-02 RX ADMIN — MIDODRINE HYDROCHLORIDE 10 MG: 10 TABLET ORAL at 17:17

## 2023-07-02 RX ADMIN — MENTHOL, METHYL SALICYLATE: 10; 15 CREAM TOPICAL at 08:19

## 2023-07-02 RX ADMIN — MIDODRINE HYDROCHLORIDE 10 MG: 10 TABLET ORAL at 08:16

## 2023-07-02 RX ADMIN — ACETAMINOPHEN 650 MG: 325 TABLET ORAL at 14:28

## 2023-07-02 RX ADMIN — MIDODRINE HYDROCHLORIDE 10 MG: 10 TABLET ORAL at 12:00

## 2023-07-02 RX ADMIN — APIXABAN 5 MG: 5 TABLET, FILM COATED ORAL at 08:16

## 2023-07-02 RX ADMIN — Medication 2000 UNITS: at 08:15

## 2023-07-02 RX ADMIN — PREGABALIN 200 MG: 100 CAPSULE ORAL at 20:19

## 2023-07-02 RX ADMIN — DOCUSATE SODIUM 100 MG: 100 CAPSULE, LIQUID FILLED ORAL at 08:16

## 2023-07-02 RX ADMIN — APIXABAN 5 MG: 5 TABLET, FILM COATED ORAL at 20:19

## 2023-07-02 ASSESSMENT — ACTIVITIES OF DAILY LIVING (ADL)
BED_CHAIR_WHEELCHAIR_TRANSFER_DESCRIPTION: ADAPTIVE EQUIPMENT;INITIAL PREPARATION FOR TASK;INCREASED TIME;SET-UP OF EQUIPMENT;SUPERVISION FOR SAFETY;VERBAL CUEING
BED_CHAIR_WHEELCHAIR_TRANSFER_DESCRIPTION: ADAPTIVE EQUIPMENT;INCREASED TIME;SUPERVISION FOR SAFETY
TOILET_TRANSFER_DESCRIPTION: ADAPTIVE EQUIPMENT;GRAB BAR;INCREASED TIME;SUPERVISION FOR SAFETY;VERBAL CUEING
TUB_SHOWER_TRANSFER_DESCRIPTION: GRAB BAR;SHOWER BENCH;SUPERVISION FOR SAFETY;VERBAL CUEING

## 2023-07-02 ASSESSMENT — GAIT ASSESSMENTS
ASSISTIVE DEVICE: FRONT WHEEL WALKER
DISTANCE (FEET): 42
GAIT LEVEL OF ASSIST: CONTACT GUARD ASSIST
DEVIATION: INCREASED BASE OF SUPPORT;BRADYKINETIC

## 2023-07-02 ASSESSMENT — ENCOUNTER SYMPTOMS
NAUSEA: 0
DIARRHEA: 0
NERVOUS/ANXIOUS: 0
ABDOMINAL PAIN: 0
FEVER: 0
CHILLS: 0
VOMITING: 0
SHORTNESS OF BREATH: 0

## 2023-07-02 ASSESSMENT — PULMONARY FUNCTION TESTS: FVC: 2.54

## 2023-07-02 ASSESSMENT — PATIENT HEALTH QUESTIONNAIRE - PHQ9
SUM OF ALL RESPONSES TO PHQ9 QUESTIONS 1 AND 2: 0
1. LITTLE INTEREST OR PLEASURE IN DOING THINGS: NOT AT ALL
1. LITTLE INTEREST OR PLEASURE IN DOING THINGS: NOT AT ALL
2. FEELING DOWN, DEPRESSED, IRRITABLE, OR HOPELESS: NOT AT ALL
SUM OF ALL RESPONSES TO PHQ9 QUESTIONS 1 AND 2: 0
2. FEELING DOWN, DEPRESSED, IRRITABLE, OR HOPELESS: NOT AT ALL

## 2023-07-02 ASSESSMENT — PAIN DESCRIPTION - PAIN TYPE: TYPE: ACUTE PAIN

## 2023-07-02 NOTE — CARE PLAN
The patient is Stable - Low risk of patient condition declining or worsening    Shift Goals  Clinical Goals: Pain management, urinary management  Patient Goals: Rest, urinary management  Family Goals: Education    Progress made toward(s) clinical / shift goals:    Problem: Pain - Standard  Goal: Alleviation of pain or a reduction in pain to the patient’s comfort goal  Outcome: Progressing   Patient reports satisfactory pain control and decrease intensity after pharmacological pain management.

## 2023-07-02 NOTE — FLOWSHEET NOTE
07/02/23 0912   Incentive Spirometry Treatment   Incentive Spirometer Volume 2750 mL   Pulmonary Function Group   $ FVC / Vital Capacity (liters)  2.54  (57% of predicted. States he is tired. Gave a good effort)

## 2023-07-02 NOTE — ASSESSMENT & PLAN NOTE
UA packed WBC w/ many bacteria  UCx >100,000 Citrobacter  Continue Bactrim x 5 days based on YRIS  Check F/U labs in AM

## 2023-07-02 NOTE — PROGRESS NOTES
Hospital Medicine Daily Progress Note        Chief Complaint:  Hyponatremia    Interval History:  Pt states he had several episodes of hematuria as well as discomfort on urinating -- will get U/A.    Review of Systems  Review of Systems   Constitutional:  Negative for chills and fever.   Respiratory:  Negative for shortness of breath.    Cardiovascular:  Negative for chest pain.   Gastrointestinal:  Negative for abdominal pain, diarrhea, nausea and vomiting.   Psychiatric/Behavioral:  The patient is not nervous/anxious.         Physical Exam  Temp:  [36.8 °C (98.2 °F)-37.5 °C (99.5 °F)] 36.8 °C (98.3 °F)  Pulse:  [] 97  Resp:  [16-18] 17  BP: (107-132)/(67-94) 107/71  SpO2:  [94 %-95 %] 95 %    Physical Exam  Vitals and nursing note reviewed.   Constitutional:       Appearance: Normal appearance.   HENT:      Head: Atraumatic.   Eyes:      Conjunctiva/sclera: Conjunctivae normal.      Pupils: Pupils are equal, round, and reactive to light.   Cardiovascular:      Rate and Rhythm: Normal rate and regular rhythm.      Heart sounds: No murmur heard.  Pulmonary:      Effort: Pulmonary effort is normal.      Breath sounds: No stridor. No wheezing or rales.   Abdominal:      General: There is no distension.      Palpations: Abdomen is soft.      Tenderness: There is no abdominal tenderness.   Musculoskeletal:      Cervical back: Normal range of motion and neck supple.      Right lower leg: No edema.      Left lower leg: No edema.   Skin:     General: Skin is warm and dry.      Findings: No rash.   Neurological:      Mental Status: He is alert and oriented to person, place, and time.   Psychiatric:         Mood and Affect: Mood normal.         Behavior: Behavior normal.         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 2nd to meds  ? 2nd to Lyrica -- recently started (has a low SE)  Cont to monitor    Dyslipidemia  Assessment & Plan  Cont Lipitor    Vitamin D deficiency  Assessment & Plan  Vit D: 19  Cont supplements    Essential hypertension  Assessment & Plan  BP ok  Cont Midodrine  Note: on Flomax   Mgt per Physiatry    Hyponatremia  Assessment & Plan  Na: 135 --> 133 --> 136 (6/27) --> 136 (6/29) --> 135 (7/1)  Off fluid restriction  Off Lisinopril  Off Lasix  Off salt tabs (last dose 1g on 6/28)  Cont to monitor

## 2023-07-02 NOTE — THERAPY
"Physical Therapy   Daily Treatment     Patient Name: Bull Banegas  Age:  67 y.o., Sex:  male  Medical Record #: [id number]  Today's Date: 7/2/2023     Precautions  Precautions: Fall Risk  Comments: blurred vision, avoid over-fatiguing    Subjective    Patient sleeping upon arrival, initially reporting some fatigue but agrees to \"giving it a try\" requests to use the bathroom.      Objective       07/02/23 1301   PT Charge Group   PT Gait Training (Units) 1   PT Therapeutic Exercise (Units) 2   PT Therapeutic Activities (Units) 1   PT Total Time Spent   PT Individual Total Time Spent (Mins) 60   Vitals   Vitals Comments no s/s of distress, generalized fatigue   Gait Functional Level of Assist    Gait Level Of Assist Contact Guard Assist  (and close w/c follow)   Assistive Device Front Wheel Walker   Distance (Feet) 42   # of Times Distance was Traveled 2   Deviation Increased Base Of Support;Bradykinetic   Transfer Functional Level of Assist   Bed, Chair, Wheelchair Transfer Contact Guard Assist   Bed Chair Wheelchair Transfer Description Adaptive equipment;Initial preparation for task;Increased time;Set-up of equipment;Supervision for safety;Verbal cueing  (squat pivot bed <> w/c.)   Toilet Transfers Contact Guard Assist   Toilet Transfer Description Adaptive equipment;Grab bar;Increased time;Supervision for safety;Verbal cueing  (SPT w/c <> toilet, and standing at grab bar for urinal use x 3 min.)   Standing Lower Body Exercises   Hip Extension 1 set of 10;Bilateral    Hip Abduction 1 set of 10;Bilateral   Marching 1 set of 10   Heel Rise 1 set of 10;Bilateral   Other Exercises standing balloon volley x 2 min w/ alternating UE use on parallel bars   Comments in parallel bars, PT support at LEs to avoid buckling and to facilitate exercise safety. thorough rests b/t sets to avoid over fatiguing.   Bed Mobility    Supine to Sit Modified Independent   Sit to Supine Modified Independent   Sit to Stand Contact Guard Assist "   Rolling Modified Independent   Interdisciplinary Plan of Care Collaboration   IDT Collaboration with  Certified Nursing Assistant   Patient Position at End of Therapy In Bed;Call Light within Reach;Tray Table within Reach;Phone within Reach   Collaboration Comments CNA collects urine sample for possible UTI>     Therapeutic rests w/ discussion on activity pacing/energy conservation strategies.     Assessment    Patient continues to make strides in functional mobility. More limited in gait distance today due to fatigue, and session intensity adjusted as necessary to accommodate patient. He reports feeling really pleased with the standing exercises end of the session and was happy with the benefit they provided.     Strengths: Able to follow instructions, Alert and oriented, Effective communication skills, Good carryover of learning, Independent prior level of function, Making steady progress towards goals, Motivated for self care and independence, Pleasant and cooperative, Supportive family, Willingly participates in therapeutic activities  Barriers: Decreased endurance, Fatigue, Generalized weakness, Home accessibility, Impaired activity tolerance, Impaired balance, Limited mobility (Paraplegia)    Plan  caution to avoid over-fatigue  Gait training FWW with close WC follow  Seated and standing therex  B LE motomed vs Nustep  WC mobility outdoors  Vibration for LE sensory integration     Passport items to be completed:  Get in/out of bed safely, in/out of a vehicle, safely use mobility device, walk or wheel around home/community, navigate up and down stairs, show how to get up/down from the ground, ensure home is accessible, demonstrate HEP, complete caregiver training    Physical Therapy Problems (Active)       Problem: PT-Long Term Goals       Dates: Start:  06/18/23         Goal: LTG-By discharge, patient will propel wheelchair >/= 300' and navigate inclines up to 3% (curb cuts or equivalent) c/ SBA or better.         Dates: Start:  06/18/23            Goal: LTG-By discharge, patient will ambulate >/= 50' c/ ModA or better and FWW.        Dates: Start:  06/18/23            Goal: LTG-By discharge, patient will transfer one surface to another c/ Naila.        Dates: Start:  06/18/23            Goal: LTG-By discharge, patient will transfer in/out of a car c/ ModA or better.        Dates: Start:  06/18/23

## 2023-07-02 NOTE — THERAPY
"Recreational Therapy  Daily Treatment     Patient Name: Bull Banegas  AGE:  67 y.o., SEX:  male  Medical Record #: [id number]  Today's Date: 7/2/2023       Subjective    \"I was peeing blood all night.\"  Patient ready and agreeable to recreation therapy session.      Objective       07/02/23 1031   Procedural Tracking   Procedural Tracking Community Re-Integration;Community Skills Development;Leisure Skills Awareness;Leisure Skills Development;Cognitive Skills Training;Fine Motor Functional Leisure Skills;Group Treatment;Gross Motor Functional Leisure Skills;Social Skills Training   Treatment Time   Total Time Spent (mins) 60   Total Time Missed 0   Functional Ability Status - Cognitive   Attention Span Remains on Task   Comprehension Follows One Step Commands;Follows Two Step Commands   Judgment Able to Make Independent Decisions   Functional Ability Status - Emotional    Affect Appropriate   Mood Appropriate   Behavior Appropriate;Cooperative   Skilled Intervention    Skilled Intervention Gross Motor Leisure;Fine Motor Leisure;Cognitive Leisure;Social Skills;Group Participation   Skilled Intervention Comments Phase 10   Interdisciplinary Plan of Care Collaboration   Patient Position at End of Therapy Seated;Other (Comments)  (cafeteria for lunch)   Strengths & Barriers   Strengths Able to follow instructions;Alert and oriented;Effective communication skills;Willingly participates in therapeutic activities;Pleasant and cooperative;Motivated for self care and independence   Barriers Fatigue;Generalized weakness;Impaired activity tolerance;Impaired balance;Limited mobility;Pain         Assessment    Patient participated in recreation therapy group session, card game Phase 10. Patient was socially acceptable throughout group. Patient required min cues for decision making, strategy, processing, and sequencing. Patient required mod I for fine/gross motor aspects of group.       Strengths: (P) Able to follow instructions, " "Alert and oriented, Effective communication skills, Willingly participates in therapeutic activities, Pleasant and cooperative, Motivated for self care and independence  Barriers: (P) Fatigue, Generalized weakness, Impaired activity tolerance, Impaired balance, Limited mobility, Pain    Plan    Patient will benefit from continued recreation therapy sessions.     Passport items to be completed:  Verbalize two positive leisure activities, participate in community outing, know how to use \"To Go Kit\", discuss returning to work, community groups or volunteer activities, explore community resources, schedule meeting with peer mentor   "

## 2023-07-02 NOTE — THERAPY
"Occupational Therapy  Daily Treatment     Patient Name: Bull Banegas  Age:  67 y.o., Sex:  male  Medical Record #: [id number]  Today's Date: 7/2/2023     Precautions  Precautions: Fall Risk  Comments: blurred vision, avoid over-fatiguing    Safety   ADL Safety : Requires Supervision for Safety, Requires Cueing for Safety  Bathroom Safety: Requires Supervision for Safety, Requires Cuing for Safety    Subjective    Pt was alert and cooperative  \"I'd like to shower if I can\"     Objective       07/02/23 0801   Precautions   Precautions Fall Risk   Safety    ADL Safety  Requires Supervision for Safety;Requires Cueing for Safety   Bathroom Safety Requires Supervision for Safety;Requires Cuing for Safety   Pain   Intervention Declines   Non Verbal Descriptors   Non Verbal Scale  Calm;Unlabored Breathing   Cognition    Cognition / Consciousness WDL   Level of Consciousness Alert   ABS (Agitated Behavior Scale)   Agitated Behavior Scale Performed No   Functional Level of Assist   Grooming Modified Independent   Grooming Description Seated in wheelchair at sink;Increased time   Bathing Supervision   Bathing Description Adaptive equipment;Grab bar;Hand held shower;Long handled bath tool;Tub bench;Increased time;Supervision for safety;Verbal cueing   Upper Body Dressing Modified Independent   Lower Body Dressing Supervision   Lower Body Dressing Description Assistive devices;Grab bar;Reacher;Shoe horn;Increased time;Supervision for safety   Bed, Chair, Wheelchair Transfer Standby Assist   Bed Chair Wheelchair Transfer Description Adaptive equipment;Increased time;Supervision for safety   Tub / Shower Transfers Contact Guard Assist   Tub Shower Transfer Description Grab bar;Shower bench;Supervision for safety;Verbal cueing   Bed Mobility    Supine to Sit Modified Independent   Sit to Supine Modified Independent   Scooting Modified Independent         Assessment    Pt completed shower/dressing task w/ set up provided.  Use of FWW, " grab bars, AE.  Sup/SBA and VQ's for safety/environmental awareness.  Additional time required secondary limited activity tolerance.  Pt reported fatigue secondary poor sleeping last night w/ frqnt urination throughout the evening/early morning.  Strengths: Able to follow instructions, Alert and oriented, Effective communication skills, Independent prior level of function, Motivated for self care and independence, Pleasant and cooperative, Willingly participates in therapeutic activities    Plan    Refer to primary OT POC    Passport items to be completed:  Perform bathroom transfers, complete dressing, complete feeding, get ready for the day, prepare a simple meal, participate in household tasks, adapt home for safety needs, demonstrate home exercise program, complete caregiver training     Occupational Therapy Goals (Active)       Problem: Dressing       Dates: Start:  06/18/23         Goal: STG-Within one week, patient will dress LB with CGA overall using AE/DME as needed.       Dates: Start:  06/29/23       Description:             Problem: OT Long Term Goals       Dates: Start:  06/18/23         Goal: LTG-By discharge, patient will complete basic self care tasks with min-supervision overall using AE/DME as needed.       Dates: Start:  06/18/23            Goal: LTG-By discharge, patient will perform bathroom transfers with SBA overall using AE/DME as needed.       Dates: Start:  06/18/23               Problem: Toileting       Dates: Start:  06/18/23         Goal: STG-Within one week, patient will complete toileting tasks with Max A overall using AE/DME as needed       Dates: Start:  06/18/23

## 2023-07-02 NOTE — FLOWSHEET NOTE
07/02/23 0910   Events/Summary/Plan   Events/Summary/Plan FVC and IS. Sitting up in bed   Vital Signs   Pulse 93   Respiration 18   Pulse Oximetry 94 %   $ Pulse Oximetry (Spot Check) Yes   Respiratory Assessment   Level of Consciousness Alert   Chest Exam   Work Of Breathing / Effort Within Normal Limits   Breath Sounds   RUL Breath Sounds Clear   RML Breath Sounds Clear   RLL Breath Sounds Diminished   JC Breath Sounds Clear   LLL Breath Sounds Diminished   Oxygen   O2 Delivery Device Room air w/o2 available

## 2023-07-02 NOTE — CARE PLAN
Problem: Bladder / Voiding  Goal: Patient will establish and maintain regular urinary output  Outcome: Progressing  Patient has been adequately urinating this shift.     Problem: Bladder / Voiding  Goal: Patient will establish and maintain bladder regimen  Outcome: Progressing  Patient has been educated in using the urinal and in calling for assistance when needed.  Patient verbalized understanding.     Problem: Fall Risk - Rehab  Goal: Patient will remain free from falls  Outcome: Progressing  Patient verbalized understanding to request assistance from staff to ambulate/transfer and for toileting.    The patient is Stable - Low risk of patient condition declining or worsening    Shift Goals  Clinical Goals: Safety, urinary management  Patient Goals: Rest, urinary management  Family Goals: Education    Progress made toward(s) clinical / shift goals:  Progressing    Patient is not progressing towards the following goals:

## 2023-07-03 ENCOUNTER — APPOINTMENT (OUTPATIENT)
Dept: PHYSICAL THERAPY | Facility: REHABILITATION | Age: 68
DRG: 095 | End: 2023-07-03
Attending: PHYSICAL MEDICINE & REHABILITATION
Payer: MEDICARE

## 2023-07-03 ENCOUNTER — APPOINTMENT (OUTPATIENT)
Dept: OCCUPATIONAL THERAPY | Facility: REHABILITATION | Age: 68
DRG: 095 | End: 2023-07-03
Attending: PHYSICAL MEDICINE & REHABILITATION
Payer: MEDICARE

## 2023-07-03 PROCEDURE — 97116 GAIT TRAINING THERAPY: CPT

## 2023-07-03 PROCEDURE — 97110 THERAPEUTIC EXERCISES: CPT

## 2023-07-03 PROCEDURE — 97530 THERAPEUTIC ACTIVITIES: CPT

## 2023-07-03 PROCEDURE — 700102 HCHG RX REV CODE 250 W/ 637 OVERRIDE(OP): Performed by: HOSPITALIST

## 2023-07-03 PROCEDURE — 94150 VITAL CAPACITY TEST: CPT

## 2023-07-03 PROCEDURE — 770010 HCHG ROOM/CARE - REHAB SEMI PRIVAT*

## 2023-07-03 PROCEDURE — 700102 HCHG RX REV CODE 250 W/ 637 OVERRIDE(OP): Performed by: PHYSICAL MEDICINE & REHABILITATION

## 2023-07-03 PROCEDURE — 99231 SBSQ HOSP IP/OBS SF/LOW 25: CPT | Performed by: HOSPITALIST

## 2023-07-03 PROCEDURE — 99232 SBSQ HOSP IP/OBS MODERATE 35: CPT | Performed by: PHYSICAL MEDICINE & REHABILITATION

## 2023-07-03 PROCEDURE — A9270 NON-COVERED ITEM OR SERVICE: HCPCS | Performed by: HOSPITALIST

## 2023-07-03 PROCEDURE — 94760 N-INVAS EAR/PLS OXIMETRY 1: CPT

## 2023-07-03 PROCEDURE — A9270 NON-COVERED ITEM OR SERVICE: HCPCS | Performed by: PHYSICAL MEDICINE & REHABILITATION

## 2023-07-03 PROCEDURE — 94010 BREATHING CAPACITY TEST: CPT

## 2023-07-03 RX ADMIN — MIDODRINE HYDROCHLORIDE 10 MG: 10 TABLET ORAL at 17:24

## 2023-07-03 RX ADMIN — ASPIRIN 81 MG CHEWABLE TABLET 81 MG: 81 TABLET CHEWABLE at 08:01

## 2023-07-03 RX ADMIN — TAMSULOSIN HYDROCHLORIDE 0.4 MG: 0.4 CAPSULE ORAL at 17:24

## 2023-07-03 RX ADMIN — APIXABAN 5 MG: 5 TABLET, FILM COATED ORAL at 08:01

## 2023-07-03 RX ADMIN — Medication 2000 UNITS: at 08:01

## 2023-07-03 RX ADMIN — PREGABALIN 200 MG: 100 CAPSULE ORAL at 06:38

## 2023-07-03 RX ADMIN — TRAZODONE HYDROCHLORIDE 50 MG: 50 TABLET ORAL at 20:18

## 2023-07-03 RX ADMIN — ATORVASTATIN CALCIUM 10 MG: 10 TABLET, FILM COATED ORAL at 20:18

## 2023-07-03 RX ADMIN — DOCUSATE SODIUM 100 MG: 100 CAPSULE, LIQUID FILLED ORAL at 20:18

## 2023-07-03 RX ADMIN — SENNOSIDES 17.2 MG: 8.6 TABLET, FILM COATED ORAL at 11:37

## 2023-07-03 RX ADMIN — Medication 6 MG: at 20:18

## 2023-07-03 RX ADMIN — OMEPRAZOLE 20 MG: 20 CAPSULE, DELAYED RELEASE ORAL at 08:01

## 2023-07-03 RX ADMIN — MENTHOL, METHYL SALICYLATE: 10; 15 CREAM TOPICAL at 20:18

## 2023-07-03 RX ADMIN — MULTIPLE VITAMINS W/ MINERALS TAB 1 TABLET: TAB at 11:36

## 2023-07-03 RX ADMIN — MIDODRINE HYDROCHLORIDE 10 MG: 10 TABLET ORAL at 08:01

## 2023-07-03 RX ADMIN — PREGABALIN 200 MG: 100 CAPSULE ORAL at 20:18

## 2023-07-03 RX ADMIN — DOCUSATE SODIUM 100 MG: 100 CAPSULE, LIQUID FILLED ORAL at 08:01

## 2023-07-03 RX ADMIN — ACETAMINOPHEN 650 MG: 325 TABLET ORAL at 18:25

## 2023-07-03 RX ADMIN — MIDODRINE HYDROCHLORIDE 10 MG: 10 TABLET ORAL at 11:36

## 2023-07-03 RX ADMIN — APIXABAN 2.5 MG: 2.5 TABLET, FILM COATED ORAL at 20:18

## 2023-07-03 ASSESSMENT — PULMONARY FUNCTION TESTS
FEV1: 1.87
FVC: 2.28

## 2023-07-03 ASSESSMENT — PATIENT HEALTH QUESTIONNAIRE - PHQ9
2. FEELING DOWN, DEPRESSED, IRRITABLE, OR HOPELESS: NOT AT ALL
SUM OF ALL RESPONSES TO PHQ9 QUESTIONS 1 AND 2: 0
1. LITTLE INTEREST OR PLEASURE IN DOING THINGS: NOT AT ALL
2. FEELING DOWN, DEPRESSED, IRRITABLE, OR HOPELESS: NOT AT ALL
SUM OF ALL RESPONSES TO PHQ9 QUESTIONS 1 AND 2: 0
1. LITTLE INTEREST OR PLEASURE IN DOING THINGS: NOT AT ALL

## 2023-07-03 ASSESSMENT — GAIT ASSESSMENTS
DEVIATION: BRADYKINETIC;INCREASED BASE OF SUPPORT
DEVIATION: BRADYKINETIC;INCREASED BASE OF SUPPORT
DISTANCE (FEET): 100
GAIT LEVEL OF ASSIST: CONTACT GUARD ASSIST
ASSISTIVE DEVICE: FRONT WHEEL WALKER
DISTANCE (FEET): 145
GAIT LEVEL OF ASSIST: CONTACT GUARD ASSIST
ASSISTIVE DEVICE: FRONT WHEEL WALKER

## 2023-07-03 ASSESSMENT — ENCOUNTER SYMPTOMS
POLYDIPSIA: 0
SHORTNESS OF BREATH: 0
FOCAL WEAKNESS: 1
ABDOMINAL PAIN: 0
EYES NEGATIVE: 1
PALPITATIONS: 0
NAUSEA: 0
COUGH: 0
SENSORY CHANGE: 1
FEVER: 0
CHILLS: 0
VOMITING: 0
MUSCULOSKELETAL NEGATIVE: 1
BRUISES/BLEEDS EASILY: 0

## 2023-07-03 ASSESSMENT — PAIN DESCRIPTION - PAIN TYPE: TYPE: ACUTE PAIN

## 2023-07-03 ASSESSMENT — ACTIVITIES OF DAILY LIVING (ADL)
BED_CHAIR_WHEELCHAIR_TRANSFER_DESCRIPTION: INITIAL PREPARATION FOR TASK;INCREASED TIME;SET-UP OF EQUIPMENT;SUPERVISION FOR SAFETY;VERBAL CUEING
TOILET_TRANSFER_DESCRIPTION: GRAB BAR;INCREASED TIME;SUPERVISION FOR SAFETY;VERBAL CUEING;SET-UP OF EQUIPMENT
BED_CHAIR_WHEELCHAIR_TRANSFER_DESCRIPTION: SUPERVISION FOR SAFETY;VERBAL CUEING
BED_CHAIR_WHEELCHAIR_TRANSFER_DESCRIPTION: INITIAL PREPARATION FOR TASK;SET-UP OF EQUIPMENT;SQUAT PIVOT TRANSFER TO WHEELCHAIR;SUPERVISION FOR SAFETY;VERBAL CUEING

## 2023-07-03 NOTE — THERAPY
"Physical Therapy   Daily Treatment     Patient Name: Bull Banegas  Age:  67 y.o., Sex:  male  Medical Record #: [id number]  Today's Date: 7/3/2023     Precautions  Precautions: Fall Risk  Comments: blurred vision, avoid over-fatiguing    Subjective    Pt received resting in bed, agreeable to participate. Requested to work on standing exercises bc \"I'll be doing a lot of walking later\" planning ahead for second PT session today.     Objective       07/03/23 0901   PT Charge Group   PT Therapeutic Exercise (Units) 1   PT Therapeutic Activities (Units) 1   PT Total Time Spent   PT Individual Total Time Spent (Mins) 30   Wheelchair Functional Level of Assist   Wheelchair Assist Modified Independent   Distance Wheelchair (Feet or Distance) 300  (plus 200 ft)   Wheelchair Description Extra time  (BUE propulsion)   Transfer Functional Level of Assist   Bed, Chair, Wheelchair Transfer Standby Assist   Bed Chair Wheelchair Transfer Description Initial preparation for task;Set-up of equipment;Squat pivot transfer to wheelchair;Supervision for safety;Verbal cueing  (squat pivot bed>wc)   Standing Lower Body Exercises   Hamstring Curl 2 sets of 15;Bilateral    Marching 2 sets of 15   Heel Rise 2 sets of 15;Bilateral   Bed Mobility    Supine to Sit Modified Independent   Sit to Stand Standby Assist  (pull to stand // bars)   Interdisciplinary Plan of Care Collaboration   IDT Collaboration with  Recreation Therapist   Patient Position at End of Therapy Seated;Other (Comments)  (handoff to rec therapist in main gym)   Collaboration Comments handoff to rec therapist     Required seated RB bw ea set of 15 reps d/t fatigue.     Assessment    Pt with good tolerance for session focused on LE strengthening. Remains limited by quick onset of fatigue but took RB prn.    Strengths: Able to follow instructions, Alert and oriented, Effective communication skills, Good carryover of learning, Independent prior level of function, Making steady " progress towards goals, Motivated for self care and independence, Pleasant and cooperative, Supportive family, Willingly participates in therapeutic activities  Barriers: Decreased endurance, Fatigue, Generalized weakness, Home accessibility, Impaired activity tolerance, Impaired balance, Limited mobility (Paraplegia)    Plan    caution to avoid over-fatigue  Gait training FWW with close WC follow  Seated and standing therex  B LE motomed vs Nustep  WC mobility outdoors  Vibration for LE sensory integration     Passport items to be completed:  Get in/out of bed safely, in/out of a vehicle, safely use mobility device, walk or wheel around home/community, navigate up and down stairs, show how to get up/down from the ground, ensure home is accessible, demonstrate HEP, complete caregiver training    Physical Therapy Problems (Active)       Problem: PT-Long Term Goals       Dates: Start:  06/18/23         Goal: LTG-By discharge, patient will propel wheelchair >/= 300' and navigate inclines up to 3% (curb cuts or equivalent) c/ SBA or better.        Dates: Start:  06/18/23            Goal: LTG-By discharge, patient will ambulate >/= 50' c/ ModA or better and FWW.        Dates: Start:  06/18/23            Goal: LTG-By discharge, patient will transfer one surface to another c/ Naila.        Dates: Start:  06/18/23            Goal: LTG-By discharge, patient will transfer in/out of a car c/ ModA or better.        Dates: Start:  06/18/23

## 2023-07-03 NOTE — THERAPY
Occupational Therapy  Daily Treatment     Patient Name: Bull Banegas  Age:  67 y.o., Sex:  male  Medical Record #: [id number]  Today's Date: 7/3/2023     Precautions  Precautions: Fall Risk  Comments: blurred vision, avoid over-fatiguing    Safety   ADL Safety : Requires Supervision for Safety, Requires Cueing for Safety  Bathroom Safety: Requires Supervision for Safety, Requires Cuing for Safety    Subjective    Pt awake in bed upon arrival, agreeable to OT session.      Objective     07/03/23 0701   OT Charge Group   OT Therapy Activity (Units) 4   OT Total Time Spent   OT Individual Total Time Spent (Mins) 60   Functional Level of Assist   Grooming Modified Independent;Seated   Grooming Description Seated in wheelchair at sink   Upper Body Dressing Modified Independent   Fine Motor / Dexterity    Fine Motor / Dexterity Yes   Fine Motor / Dexterity Interventions See note for details   Interdisciplinary Plan of Care Collaboration   Patient Position at End of Therapy Seated;Other (Comments)  (wheeling self to cafeteria for breakfast)     Vibration to BUEs for sensory re-ed as preparatory activity   FMC with BUEs:   -pinching 10 beads out of kinetic sand progressing to eyes occluded to focus on sensory discrimination   -rotating stress balls   -purdue pegboard progressing from pinching each piece to place on board to using tweezers to pick them up   -resistive clothespins alternating each hand with 3 point pinch to grasp and reach to place on vertical andrzej, able to complete all colors including black (highest resistance)   -opening medication bottle and pill organizer and placing beads from bottle to organizer in each day of the week     Assessment    Pt tolerated OT session well and demo's good progress with FMC, able to complete advanced FM manipulation with use of tweezers for small purdue pegboard pieces. Was unable to switch directions with stress balls and dropped them a few times but able to rotate them in the  dominant direction. Catarino's some difficulty with sensory discrimination with locating beads from kinetic sand but was able to locate them all including the last half without using vision.     Strengths: Able to follow instructions, Alert and oriented, Effective communication skills, Independent prior level of function, Motivated for self care and independence, Pleasant and cooperative, Willingly participates in therapeutic activities    Plan    UB strengthening as limited by fatigue, sensory re-integration, functional transfers progressing to standing from SB, ADLs with AE as needed, IADLs, incorporate FM skills for BUE    Passport items to be completed:  Perform bathroom transfers, complete dressing, complete feeding, get ready for the day, prepare a simple meal, participate in household tasks, adapt home for safety needs, demonstrate home exercise program, complete caregiver training     Occupational Therapy Goals (Active)       Problem: Dressing       Dates: Start:  06/18/23         Goal: STG-Within one week, patient will dress LB with CGA overall using AE/DME as needed.       Dates: Start:  06/29/23       Description:             Problem: OT Long Term Goals       Dates: Start:  06/18/23         Goal: LTG-By discharge, patient will complete basic self care tasks with min-supervision overall using AE/DME as needed.       Dates: Start:  06/18/23            Goal: LTG-By discharge, patient will perform bathroom transfers with SBA overall using AE/DME as needed.       Dates: Start:  06/18/23               Problem: Toileting       Dates: Start:  06/18/23         Goal: STG-Within one week, patient will complete toileting tasks with Max A overall using AE/DME as needed       Dates: Start:  06/18/23

## 2023-07-03 NOTE — PROGRESS NOTES
"  Physical Medicine & Rehabilitation Progress Note    Encounter Date: 7/3/2023    Chief Complaint: Weakness in all 4 extremities    Interval Events (Subjective):    He was evaluated in his room lying comfortably in bed after therapy.  He reports continued improvement and lower extremity strength    Was notified by nursing that patient had significant while the urine yesterday, urine analysis was positive for infection.  He did reported episodes of frequency and urgency which has been worsening over the last couple of days, he also complained of dysuria over the past couple of days.    Bladder: Voiding trial PVR/void 17/300, 12/100, no incontinence, positive frequency and urgency  Bowel: Every afternoon BTP, large soft BM on 7/2, no incontinence  PRN: 0 morphine equivalents on 7/2    ROS:  Gen: No fatigue, confusion, significant weight loss  Eyes: no blurry vision, double vision or pain in eyes  ENT: no changes in hearing, runny nose, nose bleeds, sinus pain  CV: No CP, palpitations,   Lungs: no shortness of breath, changes in secretions, changes in cough, pain with coughing  Abd: No abd pain, nausea, vomiting, pain with eating  : no blood in urine, pain during storage phase, bladder spasms, suprapubic pain  Ext: No swelling in legs, asymmetric atrophy  Neuro: no changes in strength or sensation  Skin: no new ulcers/skin breakdown appreciated by patient  Mood: No changes in mood today, increase in depression or anxiety  Heme: no bruising, or bleeding    Objective:  Vitals: /86   Pulse 96   Temp 36.9 °C (98.4 °F) (Oral)   Resp 20   Ht 1.727 m (5' 7.99\")   Wt 116 kg (256 lb 13.4 oz)   SpO2 96%   Gen: NAD, Body mass index is 39.06 kg/m².  HEENT:  NC/AT, PERRLA, moist mucous membranes  Cardio: RRR, no mumurs  Pulm: CTAB, with normal respiratory effort  Abd: Soft NTND, active bowel sounds,   Ext: No peripheral edema. No calf tenderness. No clubbing/cyanosis. +dorsal pedalis pulses bilaterally.    Motor Exam " Lower Extremities    ? Myotome R L   Hip flexion L2 4 4   Knee extension L3 5 4   Ankle dorsiflexion L4 5 4   Toe extension L5 4 4   Ankle plantarflexion S1 4 4        Laboratory Values:  Recent Results (from the past 72 hour(s))   Basic Metabolic Panel    Collection Time: 07/01/23  5:23 AM   Result Value Ref Range    Sodium 135 135 - 145 mmol/L    Potassium 4.3 3.6 - 5.5 mmol/L    Chloride 100 96 - 112 mmol/L    Co2 23 20 - 33 mmol/L    Glucose 107 (H) 65 - 99 mg/dL    Bun 13 8 - 22 mg/dL    Creatinine 0.53 0.50 - 1.40 mg/dL    Calcium 8.8 8.5 - 10.5 mg/dL    Anion Gap 12.0 7.0 - 16.0   ESTIMATED GFR    Collection Time: 07/01/23  5:23 AM   Result Value Ref Range    GFR (CKD-EPI) 111 >60 mL/min/1.73 m 2   URINALYSIS    Collection Time: 07/02/23  1:10 PM   Result Value Ref Range    Color Yellow     Character Cloudy (A)     Specific Gravity 1.010 <1.035    Ph 6.0 5.0 - 8.0    Glucose Negative Negative mg/dL    Ketones Negative Negative mg/dL    Protein 100 (A) Negative mg/dL    Bilirubin Negative Negative    Urobilinogen, Urine 0.2 Negative    Nitrite Positive (A) Negative    Leukocyte Esterase Large (A) Negative    Occult Blood Moderate (A) Negative    Micro Urine Req Microscopic    URINE MICROSCOPIC (W/UA)    Collection Time: 07/02/23  1:10 PM   Result Value Ref Range    WBC Packed (A) /hpf    RBC 5-10 (A) /hpf    Bacteria Many (A) None /hpf    Epithelial Cells Negative /hpf    Hyaline Cast 0-2 /lpf       Medications:  Scheduled Medications   Medication Dose Frequency    pregabalin  200 mg BID    traZODone  50 mg QHS    docusate sodium  100 mg BID    And    sennosides  17.2 mg DAILY AT NOON    apixaban  5 mg BID    midodrine  10 mg TID WITH MEALS    vitamin D3  2,000 Units DAILY    menthol-methyl salicycate   BID    omeprazole  20 mg DAILY    Pharmacy Consult Request  1 Each PHARMACY TO DOSE    therapeutic multivitamin-minerals  1 Tablet DAILY WITH LUNCH    aspirin  81 mg DAILY    atorvastatin  10 mg MO, WE + FR     melatonin  6 mg QHS    lidocaine  2 Patch Q24HR    tamsulosin  0.4 mg AFTER DINNER     PRN medications: oxyCODONE immediate-release **OR** oxyCODONE immediate-release, acetaminophen, docusate sodium **AND** sennosides **AND** [DISCONTINUED] bisacodyl **AND** magnesium hydroxide, [COMPLETED] docusate sodium **FOLLOWED BY** normal saline (PF), traMADol, carboxymethylcellulose, benzocaine-menthol, mag hydrox-al hydrox-simeth, ondansetron **OR** ondansetron, traZODone, sodium chloride, Respiratory Therapy Consult, hydrALAZINE    Diet:  Current Diet Order   Procedures    Diet Order Diet: Regular       Assessment:  Active Hospital Problems    Diagnosis     *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  Lower extremity weakness and pain as well as numbness in upper extremities.  No known premorbid infection.  -PT and OT for mobility and ADLs. Per guidelines, 15 hours per week between PT, OT.  Cleared by speech therapy for swallow and speech, transition time to PT and OT  -Continue comprehensive acute inpatient rehabilitation     Fever: 100.5 on 6/26, afebrile over the last 24 hours  - Infectious work-up as per hospitalist, COVID and flu were negative, UA is negative, procalcitonin was elevated on 6/25, chest x-ray was negative blood cultures to date are negative  - Discontinue Tylenol, concerned that this may be masking his fevers.     Neurologic respiratory impairment:   Patient is at high risk for respiratory involvement given neurologic symptoms in the upper extremities.   -Vital capacity daily, 1.72 liters on 6/20  - Incentive spirometry     Lower extremity  swelling: Left greater than right  - Lower extremity Doppler was negative for DVT     Hyponatremia: Likely SIADH, followed by nephrology during [facility] hospitalization  - Sodium of 126 on 6/17 --> 129 on 6/19 --> 133 on 6/20 --> 136 on 6/21 --> 133 on 6/26 --> 136 on 6/27 --> 136 on 6/29 --> 135 on 7/1  -Discontinue sodium chloride tablets as per hospitalist  - Lasix was discontinued on 6/21 as per hospitalist discontinuing Lasix versus liberalizing fluid restriction  -On admission was on Free water restriction of 500 cc/day and total p.o. fluid intake of 1.8 L.  Discontinued fluid restriction  -Status post 500 cc of normal saline IV fluid on 6/27  -Check BMP in a.m.     Hypomagnesemia: Low magnesium during acute hospitalization, supplemented with p.o. magnesium  - Magnesium of 2.0 on 6/18     Urinary tract infection/pyelonephritis: Catheter associated UTI in the setting of neurogenic bladder, episode of hematuria with bladder symptoms on 7/2, dysuria, frequency, urgency  - WBC of 14 on 6/17 --> WBC of 10.7 on 6/20 --> 4.3 on 6/26  - Peripheral IV was removed prior to transfer to acute rehabilitation we will replace IV.  - Ceftriaxone 1 g every 24 hours, completed course  -Check urine culture  - Check CBC in a.m.     Leukopenia:  - WBC of 3.2 --> 3.4 on 6/30  -Differential diagnosis includes drug-induced versus viral infection  - Discussed with hospitalist  -We will decrease dose of Lyrica to 200 mg twice daily, this medication should not be abruptly discontinued  -Check CBC in a.m.     Neurogenic bladder: Inappropriate management of neurogenic bladder can result and hydronephrosis, increased risk of pyelonephritis, and renal failure  - Discontinued Blancas on 6/22,   - Continue voiding trial, if can't void in 6 hours or prn check pvr and if >500cc then ICP,if able to void then check PVR, if PVR is >200cc then ICP  - Continue Flomax 0.4 mg nightly     Neurogenic bowel: Inappropriate neurogenic bowel can result in  severe constipation, bowel dilation and rupture.  Severe constipation with prolonged period of time without BM.  -Continue upper motor neuron neurogenic bowel program with senna 2 tablets q. noon, Colace 100 mg twice daily and hold Magic bullet suppository [place] daily and digital stimulation     orthostatic hypotension  Because of significant autonomic dysfunction associated with some cases of AIDP, the patient is at high risk for orthostatic hypotension.  Goal of SBP greater than 100.  -  Mechanical compression with teds and Tubi  and abd binder used prior to out of bed  - Continue midodrine 10 mg 3 times daily     Essential hypertension: SBP of greater than 200 on presentation to acute hospital.  SBP  in the last 24 hours  - Lisinopril 20 mg daily held, discontinued lisinopril secondary to orthostatic hypotension as above, may restart as an outpatient with recovery of autonomic nervous system     Dyslipidemia: Total cholesterol 137, HDL 44, LDL 75  -Lipitor 10 mg every Monday Wednesday Friday     Dysphagia: Concern for swallow dysfunction in the setting of certain variants of AIDP  - Consult speech therapy for swallow evaluation.  Cleared by speech therapy     Skin  Due to the sensory impairments following AIDP, skin protection principles are of the utmost importance.      - every two-hour turning pattern overnight while the patient is in bed. When the patient is out of bed, an every 15 minute repositioning or weight shifting pattern will be utilized in order to help train the patient for long-term skin protection. We will also discuss skin monitoring and its importance with the patient and the caregivers.     Insomnia:  - Discontinue temazepam  -Initiation of trazodone 50 mg nightly, may repeat x1     Pain:  Postoperative pain:  -Continue oxycodone 5-10 mg every 4 hours as needed for pain moderate to severe  - Discontinue Tylenol as this may be masking fevers  - Lidocaine patch x2 on either side of  incision, on for 12 hours off for 12 hours  - Continue tramadol 50 mg every 6 hours as needed for pain     Neuropathic pain: Burning and tingling in all 4 extremities  -Discontinue gabapentin  - Decrease Lyrica to 200 mg twice daily  - Discontinue Celebrex  - Tramadol 50 mg every 6 hours as needed pain  -May benefit from a nonpharmaceutical modalities such as TENS units, compression, ice, heat, will discuss with therapy team.     Vitamin D deficiency: High risk of vitamin D deficiency in this population, goal of vitamin D level greater than 30, has been linked to improvement and central nervous system recovery  - Vitamin D level of 19 on 6/18  - Cholecalciferol 2000 units daily     Insomnia: Significant difficulty during acute hospitalization  - Restarted on Restoril 15 mg nightly  - Trazodone 50 mg nightly as needed insomnia     DVT prophylaxis  In the setting of AIDP, the patient is at high risk of deep venous thrombosis given decreased mobility and alteration to autonomic nervous system.   -DC Lovenox  - Eliquis 5 mg twice daily for prophylaxis     ____________________________________    Mario Terrell DO  [facility] - Physical Medicine & Rehabilitation   [facility] - Spinal Cord Injury Medicine  ____________________________________

## 2023-07-03 NOTE — PROGRESS NOTES
Hospital Medicine Daily Progress Note        Chief Complaint:  Hypertension  Hyponatremia    Interval History:  Doing well.  Denies new complaints.    Review of Systems  Review of Systems   Constitutional:  Negative for chills, fever and malaise/fatigue.   HENT: Negative.     Eyes: Negative.    Respiratory:  Negative for cough and shortness of breath.    Cardiovascular:  Negative for chest pain and palpitations.   Gastrointestinal:  Negative for abdominal pain, nausea and vomiting.   Genitourinary: Negative.    Musculoskeletal: Negative.    Skin:  Negative for itching and rash.   Neurological:  Positive for sensory change and focal weakness.   Endo/Heme/Allergies:  Negative for polydipsia. Does not bruise/bleed easily.        Physical Exam  Temp:  [36.9 °C (98.4 °F)-37.2 °C (98.9 °F)] 36.9 °C (98.4 °F)  Pulse:  [] 96  Resp:  [16-20] 20  BP: (108-135)/(69-86) 135/86  SpO2:  [94 %-96 %] 96 %    Physical Exam  Vitals reviewed.   Constitutional:       General: He is not in acute distress.     Appearance: Normal appearance. He is not ill-appearing.   HENT:      Head: Normocephalic and atraumatic.      Right Ear: External ear normal.      Left Ear: External ear normal.      Nose: Nose normal.      Mouth/Throat:      Pharynx: Oropharynx is clear.   Eyes:      General:         Right eye: No discharge.         Left eye: No discharge.      Extraocular Movements: Extraocular movements intact.      Conjunctiva/sclera: Conjunctivae normal.   Cardiovascular:      Rate and Rhythm: Normal rate and regular rhythm.   Pulmonary:      Effort: Pulmonary effort is normal. No respiratory distress.      Breath sounds: Normal breath sounds. No wheezing.   Abdominal:      General: Bowel sounds are normal. There is no distension.      Palpations: Abdomen is soft.      Tenderness: There is no abdominal tenderness.   Musculoskeletal:      Cervical back: Normal range of motion and neck supple.      Right lower leg: No edema.      Left  lower leg: No edema.   Skin:     General: Skin is warm and dry.   Neurological:      Mental Status: He is alert and oriented to person, place, and time.         Fluids    Intake/Output Summary (Last 24 hours) at 7/3/2023 1339  Last data filed at 7/3/2023 1100  Gross per 24 hour   Intake 1760 ml   Output 2125 ml   Net -365 ml       Laboratory        Recent Labs     07/01/23  0523   SODIUM 135   POTASSIUM 4.3   CHLORIDE 100   CO2 23   GLUCOSE 107*   BUN 13   CREATININE 0.53   CALCIUM 8.8                   Assessment/Plan  * GBS (Guillain-[place] syndrome) ([facility])  Assessment & Plan  Had progressive BLE weakness  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[facility]  S/P IVIG x 5 doses  Ongoing management per Physiatry    Hematuria  Assessment & Plan  5-10 RBC, packed WBC, many bacteria  UCx pending  Check F/U labs in AM    Leukopenia  Assessment & Plan  May be 2/2 meds  Culprits include Lyrica, Omeprazole, ASA, and Lipitor  Monitor need for G-CSF  Check F/U labs in AM    Dyslipidemia  Assessment & Plan  Continue Lipitor    Vitamin D deficiency  Assessment & Plan  Vit D level 19  Continue supplementation    Essential hypertension  Assessment & Plan  On Midodrine and Flomax per Physiatry    Anemia  Assessment & Plan  Has normocytic indices  Fe 96  Follow H/H  Check F/U labs in AM    Hyponatremia  Assessment & Plan  Was on fluid restriction, Lasix, and NaCl tabs  Lisinopril discontinued per Dr. Ruelas  Na+ levels now normalized and stable  Off NaCl, Lasix, and fluid restriction  Check F/U labs in AM      Full Code    Reviewed w/ pt and wife

## 2023-07-03 NOTE — THERAPY
"Physical Therapy   Daily Treatment     Patient Name: Bull Banegas  Age:  67 y.o., Sex:  male  Medical Record #: [id number]  Today's Date: 7/3/2023     Precautions  Precautions: Fall Risk  Comments: blurred vision, avoid over-fatiguing    Subjective    \"I'm definitely buzzing now\" -after floor recovery training. Session terminated early to avoid over fatigue, pt still will meet 3 hrs     Objective       07/03/23 1030   PT Charge Group   PT Gait Training (Units) 1   PT Therapeutic Exercise (Units) 1   PT Therapeutic Activities (Units) 1   PT Total Time Spent   PT Individual Total Time Spent (Mins) 45   Gait Functional Level of Assist    Gait Level Of Assist Contact Guard Assist   Assistive Device Front Wheel Walker   Distance (Feet) 145   # of Times Distance was Traveled 1  (as well as 298twx5)   Deviation Bradykinetic;Increased Base Of Support  (VC's for posture and decr dependence on UE support)   Wheelchair Functional Level of Assist   Wheelchair Assist Modified Independent   Distance Wheelchair (Feet or Distance) 200  (indoors)   Transfer Functional Level of Assist   Bed, Chair, Wheelchair Transfer Contact Guard Assist   Bed Chair Wheelchair Transfer Description Initial preparation for task;Increased time;Set-up of equipment;Supervision for safety;Verbal cueing  (stand step FWW)   Toilet Transfers Contact Guard Assist   Toilet Transfer Description Grab bar;Increased time;Supervision for safety;Verbal cueing;Set-up of equipment   Sitting Lower Body Exercises   Other Exercises B LE motomed lvl 4 10 minutes 1.28 mi   Bed Mobility    Supine to Sit Modified Independent   Sit to Stand Standby Assist   Interdisciplinary Plan of Care Collaboration   IDT Collaboration with  Certified Nursing Assistant;Family / Caregiver;Therapy Tech   Patient Position at End of Therapy Seated;Family / Friend in Room   Collaboration Comments assist with care; FT with spouse for WC up/down curb; handoff of care     Demonstrated CGA for " ambulation to spouse, Basilio, however she did not perform hands on assistance. Initiated FT for WC up/down curb step to negotiate front entrance of the home. Basilio was able to demonstrate competency however would benefit from teach-back review prior to DC to ensure safe performance      Assisted to bathroom at end of session per pt request. Pt able to manage pants with CGA. Notified CNA to assist pt off toilet       07/03/23 1300   PT Charge Group   PT Gait Training (Units) 1   PT Therapeutic Activities (Units) 2   PT Total Time Spent   PT Individual Total Time Spent (Mins) 45   Gait Functional Level of Assist    Gait Level Of Assist Contact Guard Assist   Assistive Device Front Wheel Walker   Distance (Feet) 100   # of Times Distance was Traveled 2   Deviation Bradykinetic;Increased Base Of Support  (R LE external rotation, flexed posture with fatigue)   Wheelchair Functional Level of Assist   Wheelchair Assist Modified Independent   Distance Wheelchair (Feet or Distance) 200   Wheelchair Description Extra time  (indoors)   Transfer Functional Level of Assist   Bed, Chair, Wheelchair Transfer Standby Assist   Bed Chair Wheelchair Transfer Description Supervision for safety;Verbal cueing  (semistand pivot)   Bed Mobility    Sit to Stand Standby Assist  (VC's for sequencing)   Neuro-Muscular Treatments   Comments prone on elbow>quadpruped transition on firm mat CGA; floor recovery with Cathleen to descend to ground via half kneel CGA from prone>quadruped>tall kneel>pull up to mat table   Interdisciplinary Plan of Care Collaboration   IDT Collaboration with  Therapy Tech;Physician;Family / Caregiver   Patient Position at End of Therapy Edge of Bed;Seated;Family / Friend in Room;Call Light within Reach   Collaboration Comments WC follow; MD rounding on pt; discussion of formal FT to be scheduled either 7/8 or 7/9     Completed floor recovery training, see above for details    Assessment    Pt was able to complete 4 bouts of  ambulation 100ft or greater with CGA using FWW and close WC follow- though he tends to flex his trunk and B knees with fatigue. Pt was also able to perform floor recovery with CGA-Cathleen, however, it was tiring and triggered a flare up of his paresthesias.  Strengths: Able to follow instructions, Alert and oriented, Effective communication skills, Good carryover of learning, Independent prior level of function, Making steady progress towards goals, Motivated for self care and independence, Pleasant and cooperative, Supportive family, Willingly participates in therapeutic activities  Barriers: Decreased endurance, Fatigue, Generalized weakness, Home accessibility, Impaired activity tolerance, Impaired balance, Limited mobility (Paraplegia)    Plan    caution to avoid over-fatigue  Gait training FWW short distances with emphasis on quality>distance  Stair training in anticipation on aquatic tx (confirm order with MD)  Seated and standing therex  B LE motomed vs Nustep  WC mobility outdoors  Vibration for LE sensory integration     Passport items to be completed:  Get in/out of bed safely, in/out of a vehicle, safely use mobility device, walk or wheel around home/community, navigate up and down stairs, show how to get up/down from the ground, ensure home is accessible, demonstrate HEP,     Physical Therapy Problems (Active)       Problem: PT-Long Term Goals       Dates: Start:  06/18/23         Goal: LTG-By discharge, patient will propel wheelchair >/= 300' and navigate inclines up to 3% (curb cuts or equivalent) c/ SBA or better.        Dates: Start:  06/18/23            Goal: LTG-By discharge, patient will ambulate >/= 50' c/ ModA or better and FWW.        Dates: Start:  06/18/23            Goal: LTG-By discharge, patient will transfer one surface to another c/ Naila.        Dates: Start:  06/18/23            Goal: LTG-By discharge, patient will transfer in/out of a car c/ ModA or better.        Dates: Start:  06/18/23

## 2023-07-03 NOTE — CARE PLAN
"  Problem: Bladder / Voiding  Goal: Patient will establish and maintain regular urinary output  Note: Pt is continent of bladder.Voiding adequate amount of urine.PVR 12.Will continue to monitor.     Problem: Bowel Elimination  Goal: Patient will participate in bowel management program  Note: Scheduled bowel meds given at hs.Continent of bowel.LBM 7/2.Will continue to monitor.     Problem: Fall Risk - Rehab  Goal: Patient will remain free from falls  Note: Isa Shay Fall risk Assessment Score: 12    Moderate fall risk Interventions  - Bed and strip alarm   - Yellow sign by the door   - Yellow wrist band \"Fall risk\"  - Room near to the nurse station  - Do not leave patient unattended in the bathroom  - Fall risk education provided                   "

## 2023-07-03 NOTE — THERAPY
Recreational Therapy  Daily Treatment     Patient Name: Bull Banegas  AGE:  67 y.o., SEX:  male  Medical Record #: [id number]  Today's Date: 7/3/2023       Subjective    Patient ready and agreeable to recreation therapy session.      Objective       07/03/23 0931   Procedural Tracking   Procedural Tracking Community Re-Integration;Community Skills Development;Leisure Skills Awareness;Leisure Skills Development;Social Skills Training;Gross Motor Functional Leisure Skills;Cognitive Skills Training;Fine Motor Functional Leisure Skills;Group Treatment   Treatment Time   Total Time Spent (mins) 30   Total Time Missed 0   Functional Ability Status - Cognitive   Attention Span Remains on Task   Comprehension Follows One Step Commands;Follows Two Step Commands   Judgment Able to Make Independent Decisions   Functional Ability Status - Emotional    Affect Appropriate   Mood Appropriate   Behavior Appropriate;Cooperative   Skilled Intervention    Skilled Intervention Gross Motor Leisure;Fine Motor Leisure;Cognitive Leisure   Skilled Intervention Comments [place]   Interdisciplinary Plan of Care Collaboration   IDT Collaboration with  Physical Therapist   Patient Position at End of Therapy Seated;Family / Friend in Room;Phone within Reach;In Bed;Tray Table within Reach   Strengths & Barriers   Strengths Able to follow instructions;Alert and oriented;Effective communication skills;Willingly participates in therapeutic activities;Supportive family;Pleasant and cooperative;Independent prior level of function   Barriers Decreased endurance;Fatigue;Generalized weakness;Impaired activity tolerance;Limited mobility;Pain;Impaired balance         Assessment    Patient participated in new game, Azimuth, during recreation therapy session. Patient required min cues for strategy, sequencing, scanning all directions and processing. Patient benefited anne extra time for fine motor aspects of activity.     Strengths: (P) Able to follow  instructions, Alert and oriented, Effective communication skills, Willingly participates in therapeutic activities, Supportive family, Pleasant and cooperative, Independent prior level of function  Barriers: (P) Decreased endurance, Fatigue, Generalized weakness, Impaired activity tolerance, Limited mobility, Pain, Impaired balance    Plan    Patient will benefit from continued recreation therapy sessions.     Passport items to be completed:  Verbalize two positive leisure activities, discuss returning to work, hobbies, community groups or volunteer activities, explore community resources

## 2023-07-04 ENCOUNTER — APPOINTMENT (OUTPATIENT)
Dept: PHYSICAL THERAPY | Facility: REHABILITATION | Age: 68
DRG: 095 | End: 2023-07-04
Attending: PHYSICAL MEDICINE & REHABILITATION
Payer: MEDICARE

## 2023-07-04 ENCOUNTER — APPOINTMENT (OUTPATIENT)
Dept: OCCUPATIONAL THERAPY | Facility: REHABILITATION | Age: 68
DRG: 095 | End: 2023-07-04
Attending: PHYSICAL MEDICINE & REHABILITATION
Payer: MEDICARE

## 2023-07-04 PROBLEM — R82.81 PYURIA: Status: ACTIVE | Noted: 2023-07-02

## 2023-07-04 LAB
ANION GAP SERPL CALC-SCNC: 13 MMOL/L (ref 7–16)
BASOPHILS # BLD AUTO: 0.6 % (ref 0–1.8)
BASOPHILS # BLD: 0.04 K/UL (ref 0–0.12)
BUN SERPL-MCNC: 12 MG/DL (ref 8–22)
CALCIUM SERPL-MCNC: 8.8 MG/DL (ref 8.5–10.5)
CHLORIDE SERPL-SCNC: 98 MMOL/L (ref 96–112)
CO2 SERPL-SCNC: 23 MMOL/L (ref 20–33)
CREAT SERPL-MCNC: 0.51 MG/DL (ref 0.5–1.4)
EOSINOPHIL # BLD AUTO: 0.02 K/UL (ref 0–0.51)
EOSINOPHIL NFR BLD: 0.3 % (ref 0–6.9)
ERYTHROCYTE [DISTWIDTH] IN BLOOD BY AUTOMATED COUNT: 57.1 FL (ref 35.9–50)
GFR SERPLBLD CREATININE-BSD FMLA CKD-EPI: 112 ML/MIN/1.73 M 2
GLUCOSE SERPL-MCNC: 114 MG/DL (ref 65–99)
HCT VFR BLD AUTO: 24.6 % (ref 42–52)
HGB BLD-MCNC: 8.4 G/DL (ref 14–18)
IMM GRANULOCYTES # BLD AUTO: 0.03 K/UL (ref 0–0.11)
IMM GRANULOCYTES NFR BLD AUTO: 0.5 % (ref 0–0.9)
LYMPHOCYTES # BLD AUTO: 1.15 K/UL (ref 1–4.8)
LYMPHOCYTES NFR BLD: 18.3 % (ref 22–41)
MCH RBC QN AUTO: 31.3 PG (ref 27–33)
MCHC RBC AUTO-ENTMCNC: 34.1 G/DL (ref 32.3–36.5)
MCV RBC AUTO: 91.8 FL (ref 81.4–97.8)
MONOCYTES # BLD AUTO: 0.64 K/UL (ref 0–0.85)
MONOCYTES NFR BLD AUTO: 10.2 % (ref 0–13.4)
NEUTROPHILS # BLD AUTO: 4.39 K/UL (ref 1.82–7.42)
NEUTROPHILS NFR BLD: 70.1 % (ref 44–72)
NRBC # BLD AUTO: 0 K/UL
NRBC BLD-RTO: 0 /100 WBC (ref 0–0.2)
PLATELET # BLD AUTO: 345 K/UL (ref 164–446)
PMV BLD AUTO: 10.1 FL (ref 9–12.9)
POTASSIUM SERPL-SCNC: 3.9 MMOL/L (ref 3.6–5.5)
PROCALCITONIN SERPL-MCNC: 0.15 NG/ML
RBC # BLD AUTO: 2.68 M/UL (ref 4.7–6.1)
SODIUM SERPL-SCNC: 134 MMOL/L (ref 135–145)
WBC # BLD AUTO: 6.3 K/UL (ref 4.8–10.8)

## 2023-07-04 PROCEDURE — 36415 COLL VENOUS BLD VENIPUNCTURE: CPT

## 2023-07-04 PROCEDURE — A9270 NON-COVERED ITEM OR SERVICE: HCPCS | Performed by: HOSPITALIST

## 2023-07-04 PROCEDURE — 97530 THERAPEUTIC ACTIVITIES: CPT

## 2023-07-04 PROCEDURE — 97116 GAIT TRAINING THERAPY: CPT

## 2023-07-04 PROCEDURE — 770010 HCHG ROOM/CARE - REHAB SEMI PRIVAT*

## 2023-07-04 PROCEDURE — 700102 HCHG RX REV CODE 250 W/ 637 OVERRIDE(OP): Performed by: HOSPITALIST

## 2023-07-04 PROCEDURE — 97110 THERAPEUTIC EXERCISES: CPT

## 2023-07-04 PROCEDURE — 94760 N-INVAS EAR/PLS OXIMETRY 1: CPT

## 2023-07-04 PROCEDURE — 97535 SELF CARE MNGMENT TRAINING: CPT

## 2023-07-04 PROCEDURE — 85025 COMPLETE CBC W/AUTO DIFF WBC: CPT

## 2023-07-04 PROCEDURE — 84145 PROCALCITONIN (PCT): CPT

## 2023-07-04 PROCEDURE — 97112 NEUROMUSCULAR REEDUCATION: CPT

## 2023-07-04 PROCEDURE — A9270 NON-COVERED ITEM OR SERVICE: HCPCS | Performed by: PHYSICAL MEDICINE & REHABILITATION

## 2023-07-04 PROCEDURE — 80048 BASIC METABOLIC PNL TOTAL CA: CPT

## 2023-07-04 PROCEDURE — 700102 HCHG RX REV CODE 250 W/ 637 OVERRIDE(OP): Performed by: PHYSICAL MEDICINE & REHABILITATION

## 2023-07-04 PROCEDURE — 94150 VITAL CAPACITY TEST: CPT

## 2023-07-04 PROCEDURE — 99232 SBSQ HOSP IP/OBS MODERATE 35: CPT | Performed by: HOSPITALIST

## 2023-07-04 RX ORDER — CEFDINIR 300 MG/1
300 CAPSULE ORAL EVERY 12 HOURS
Status: DISCONTINUED | OUTPATIENT
Start: 2023-07-04 | End: 2023-07-05

## 2023-07-04 RX ADMIN — PREGABALIN 200 MG: 100 CAPSULE ORAL at 19:47

## 2023-07-04 RX ADMIN — MIDODRINE HYDROCHLORIDE 10 MG: 10 TABLET ORAL at 11:17

## 2023-07-04 RX ADMIN — PREGABALIN 200 MG: 100 CAPSULE ORAL at 05:56

## 2023-07-04 RX ADMIN — TRAZODONE HYDROCHLORIDE 50 MG: 50 TABLET ORAL at 19:47

## 2023-07-04 RX ADMIN — DOCUSATE SODIUM 100 MG: 100 CAPSULE, LIQUID FILLED ORAL at 19:46

## 2023-07-04 RX ADMIN — ASPIRIN 81 MG CHEWABLE TABLET 81 MG: 81 TABLET CHEWABLE at 08:13

## 2023-07-04 RX ADMIN — TAMSULOSIN HYDROCHLORIDE 0.4 MG: 0.4 CAPSULE ORAL at 17:14

## 2023-07-04 RX ADMIN — MIDODRINE HYDROCHLORIDE 10 MG: 10 TABLET ORAL at 08:13

## 2023-07-04 RX ADMIN — CEFDINIR 300 MG: 300 CAPSULE ORAL at 12:57

## 2023-07-04 RX ADMIN — MULTIPLE VITAMINS W/ MINERALS TAB 1 TABLET: TAB at 11:17

## 2023-07-04 RX ADMIN — MENTHOL, METHYL SALICYLATE: 10; 15 CREAM TOPICAL at 19:46

## 2023-07-04 RX ADMIN — Medication 6 MG: at 19:47

## 2023-07-04 RX ADMIN — Medication 2000 UNITS: at 08:13

## 2023-07-04 RX ADMIN — MIDODRINE HYDROCHLORIDE 10 MG: 10 TABLET ORAL at 17:14

## 2023-07-04 RX ADMIN — CEFDINIR 300 MG: 300 CAPSULE ORAL at 19:47

## 2023-07-04 RX ADMIN — DOCUSATE SODIUM 100 MG: 100 CAPSULE, LIQUID FILLED ORAL at 08:13

## 2023-07-04 RX ADMIN — OMEPRAZOLE 20 MG: 20 CAPSULE, DELAYED RELEASE ORAL at 08:13

## 2023-07-04 RX ADMIN — SENNOSIDES 17.2 MG: 8.6 TABLET, FILM COATED ORAL at 11:17

## 2023-07-04 RX ADMIN — APIXABAN 2.5 MG: 2.5 TABLET, FILM COATED ORAL at 19:47

## 2023-07-04 RX ADMIN — APIXABAN 2.5 MG: 2.5 TABLET, FILM COATED ORAL at 08:13

## 2023-07-04 ASSESSMENT — ACTIVITIES OF DAILY LIVING (ADL)
TOILET_TRANSFER_DESCRIPTION: ADAPTIVE EQUIPMENT;GRAB BAR;SUPERVISION FOR SAFETY
TOILETING_LEVEL_OF_ASSIST_DESCRIPTION: ADAPTIVE EQUIPMENT;GRAB BAR;INCREASED TIME;SUPERVISION FOR SAFETY
BED_CHAIR_WHEELCHAIR_TRANSFER_DESCRIPTION: ADAPTIVE EQUIPMENT;SET-UP OF EQUIPMENT;SUPERVISION FOR SAFETY
BED_CHAIR_WHEELCHAIR_TRANSFER_DESCRIPTION: ADAPTIVE EQUIPMENT;SET-UP OF EQUIPMENT;SUPERVISION FOR SAFETY;VERBAL CUEING

## 2023-07-04 ASSESSMENT — GAIT ASSESSMENTS
GAIT LEVEL OF ASSIST: CONTACT GUARD ASSIST
DISTANCE (FEET): 15
ASSISTIVE DEVICE: FRONT WHEEL WALKER
DISTANCE (FEET): 50
DEVIATION: BRADYKINETIC
GAIT LEVEL OF ASSIST: CONTACT GUARD ASSIST
ASSISTIVE DEVICE: 4 WHEEL WALKER
DEVIATION: BRADYKINETIC

## 2023-07-04 ASSESSMENT — ENCOUNTER SYMPTOMS
BRUISES/BLEEDS EASILY: 0
SENSORY CHANGE: 1
MUSCULOSKELETAL NEGATIVE: 1
CHILLS: 0
COUGH: 0
EYES NEGATIVE: 1
PALPITATIONS: 0
POLYDIPSIA: 0
SHORTNESS OF BREATH: 0
VOMITING: 0
ABDOMINAL PAIN: 0
FEVER: 0
FOCAL WEAKNESS: 1
NAUSEA: 0

## 2023-07-04 ASSESSMENT — PULMONARY FUNCTION TESTS: FVC: 2.95

## 2023-07-04 NOTE — THERAPY
Recreational Therapy  Daily Treatment     Patient Name: Bull Banegas  AGE:  67 y.o., SEX:  male  Medical Record #: [id number]  Today's Date: 7/4/2023       Subjective    Patient ready and agreeable to recreation therapy session.      Objective       07/04/23 1401   Procedural Tracking   Procedural Tracking Community Re-Integration;Community Skills Development;Leisure Skills Awareness;Social Skills Training;Leisure Skills Development;Gross Motor Functional Leisure Skills;Fine Motor Functional Leisure Skills;Group Treatment;Cognitive Skills Training   Treatment Time   Total Time Spent (mins) 30   Total Time Missed 0   Functional Ability Status - Cognitive   Attention Span Remains on Task   Comprehension Follows One Step Commands;Follows Two Step Commands   Judgment Able to Make Independent Decisions   Functional Ability Status - Emotional    Affect Appropriate   Mood Appropriate   Behavior Appropriate;Cooperative   Skilled Intervention    Skilled Intervention Fine Motor Leisure;Gross Motor Leisure;Cognitive Leisure;Relaxation / Coping Skills   Interdisciplinary Plan of Care Collaboration   IDT Collaboration with  Physical Therapist   Patient Position at End of Therapy Seated;In Bed;Tray Table within Reach;Call Light within Reach;Phone within Reach   Strengths & Barriers   Strengths Able to follow instructions;Alert and oriented;Effective communication skills;Willingly participates in therapeutic activities;Pleasant and cooperative;Supportive family;Motivated for self care and independence;Good insight into deficits/needs   Barriers Decreased endurance;Fatigue;Generalized weakness;Impaired activity tolerance;Impaired balance;Limited mobility;Pain         Assessment    Patient requested to play football outside during recreation therapy session. Patient expressed enjoyment being in the sun and throwing the ball around. Patient able to recognize limited sensation in fingers and need to adjust his throw to compensate.      Strengths: (P) Able to follow instructions, Alert and oriented, Effective communication skills, Willingly participates in therapeutic activities, Pleasant and cooperative, Supportive family, Motivated for self care and independence, Good insight into deficits/needs  Barriers: (P) Decreased endurance, Fatigue, Generalized weakness, Impaired activity tolerance, Impaired balance, Limited mobility, Pain    Plan    Patient will benefit from continued recreation therapy sessions.     Passport items to be completed:  Verbalize two positive leisure activities, discuss returning to work, hobbies, community groups or volunteer activities, explore community resources

## 2023-07-04 NOTE — PROGRESS NOTES
NURSING DAILY NOTE      Name: Bull Banegas   Date of Admission: 2023   Admitting Diagnosis: GBS (Guillain-Pahala syndrome) ([facility])  Attending Physician: Mario Terrell D.o.  Allergies: Patient has no known allergies.    Safety  Patient Precautions:Fall Risk  Precaution Comments: blurred vision, avoid over-fatiguing  Bed Transfer Status: Standby Assist  Toilet Transfer Status: Contact Guard Assist  Assistive Devices: Rails, Wheelchair  Oxygen: Room air w/o2 available  Diet: See diet orders  Tdine?  Pill Administration: whole  Agitated Behavioral Scale: 16  Level of Severity: No Agitation    Fall Risk  Has the patient had a fall this admission?   No  Isa Shay Fall Risk Scorin, MODERATE RISK  Fall Risk Safety Measures:  Alarms: bed alarm and chair alarm  Supervision: (Insert Smart List with multiple select) - Patient should have supervision in the bathroom, Patient should have supervision in the shower)    Vitals  Temperature: 36.9 °C (98.4 °F)  Temp src: Oral  Pulse: 100  Respiration: 17  Blood Pressure : 121/73  Blood Pressure MAP (Calculated): 89 MM HG  BP Location: Right, Upper Arm  Patient BP Position: Sitting     Oxygen  Pulse Oximetry: 92 %  O2 (LPM): 0  FiO2%: 21 %  O2 Delivery Device: Room air w/o2 available  Incentive Spirometer Volume: 2800 mL    Bowel and Bladder  Last Bowel Movement:   23  Stool Type:   Type 4: Like a sausage or snake, smooth and soft  Bowel Device:   Bathroom  Continent:   0   Bladder function:   Urine Void (mL): 300 ml  Number of Times Voided: 1  Urine Color: Yellow  Urine Clarity: Clear  Straight Catheter: 187 ml  1  187 ml      Skin  Joel Score:   17  Sensory Interventions:   Bed Types: Standard Mattress  Skin Preventative Measures: Pillows in Use for Support / Positioning  Moisture Interventions:          Pain  Pain Rating Scale:0 - No Pain  Pain Location: Head  Pain Location Orientation: Right, Left,  Posterior, Anterior  Pain Interventions: Declines  Pain med administration?    ADLs  Bathing: Shower, * * With Assistance from, Staff  Linen Change: Complete  Personal Hygiene: Moist Selene Wipes, Perineal Care  Teeth / Dentures: Missing Teeth (Comments)  Oral Nutrition: Breakfast, Between % Consumed  Incentive Spirometer Volume: 2800 mL  Blancas Care: Given with Soap and Water  Environmental Precautions: Treaded Slipper Socks on Patient, Bed in Low Position  Patient Turns / Repositioning: Patient Turns Self from Side to Side  Assistance / Tolerance for Turning/Repositioning: Assistance of One  Head Positioning: Center  Bed Position: Bed Controls On  Bed Rails: Three (3) Bed Rails Up  Head of Bed Elevated: Self regulated  Patient Transportation: Wheelchair        Psycho/Social/neuro  PSYCHOSOCIAL:   Psychosocial (WDL):  Within Defined Limits  Neuro:   Neuro (WDL): Exceptions to WDL  Level of Consciousness: Alert  Orientation Level: Oriented X4  Cognition: Appropriate judgement, Appropriate safety awareness, Appropriate attention/concentration, Follows commands  Speech: Clear  Pupil Assesment: No  Motor Function/Sensation Assessment: Motor strength, Sensation, Motor response  RUE Motor Response: Responds to commands  RUE Sensation: Numbness, Tingling  Muscle Strength Right Arm: Good Strength Against Gravity and Moderate Resistance  LUE Motor Response: Responds to commands  LUE Sensation: Numbness, Tingling  Muscle Strength Left Arm: Good Strength Against Gravity and Moderate Resistance  RLE Motor Response: Other (Comment) (weakness)  RLE Sensation: Numbness, Tingling  Muscle Strength Right Leg: Fair Strength against Gravity but No Resistance  LLE Motor Response: Other (Comment) (weakness)  LLE Sensation: Numbness, Tingling  Muscle Strength Left Leg: Fair Strength against Gravity but No Resistance  EENT:   EENT (WDL):  WDL Except    Cardiac/Pulmonary  EDEMA:   RLE Edema: 2+  LLE Edema: 2+  RESPIRATORY BREATH SOUNDS:    RUL Breath Sounds: Clear  RML Breath Sounds: Clear  RLL Breath Sounds: Clear  JC Breath Sounds: Clear  LLL Breath Sounds: Clear  CARDIAC ASSESSMENT:   Cardiac (WDL):  WDL Except (hx htn)

## 2023-07-04 NOTE — CARE PLAN
"The patient is Stable - Low risk of patient condition declining or worsening    Shift Goals  Clinical Goals: Safety  Patient Goals:  urinary management  Family Goals: Education    Progress made toward(s) clinical / shift goals:      Isa Shay Fall risk Assessment : 12    Moderate fall risk Interventions  - Bed and strip alarm   - Yellow sign by the door   - Yellow wrist band \"Fall risk\"  - Room near to the nurse station  - Do not leave patient unattended in the bathroom  - Fall risk education provided    Pt uses call light consistently and appropriately. Waits for assistance does not attempt self transfer this shift. Able to verbalize needs.          "

## 2023-07-04 NOTE — THERAPY
Physical Therapy   Daily Treatment     Patient Name: Bull Banegas  Age:  67 y.o., Sex:  male  Medical Record #: [id number]  Today's Date: 7/4/2023     Precautions  Precautions: Fall Risk  Comments: blurred vision, avoid over-fatiguing    Subjective    Pt agreeable to both sessions. Very happy with progress today     Objective       07/04/23 0930   PT Charge Group   PT Gait Training (Units) 1   PT Neuromuscular Re-Education / Balance (Units) 1   PT Total Time Spent   PT Individual Total Time Spent (Mins) 30   Gait Functional Level of Assist    Gait Level Of Assist Contact Guard Assist   Assistive Device 4 Wheel Walker   Distance (Feet) 15   # of Times Distance was Traveled 3   Deviation Bradykinetic   Stairs Functional Level of Assist   Level of Assist with Stairs Minimal Assist   # of Stairs Climbed 4  (1 4inch B HR, 1 6inch B HR, 2 4inch platform step with FWW)   Bed Mobility    Sit to Stand Standby Assist   Interdisciplinary Plan of Care Collaboration   IDT Collaboration with  Occupational Therapist   Patient Position at End of Therapy Seated;Call Light within Reach;Tray Table within Reach;Phone within Reach   Collaboration Comments handoff of care     Trialed 4WW including parking and seated rest breaks with CGA  Initiated curb step training with FWW in // bars for safety with 4inch curb and Valentina     07/04/23 1301   PT Charge Group   PT Therapeutic Exercise (Units) 1   PT Neuromuscular Re-Education / Balance (Units) 2   PT Therapeutic Activities (Units) 1   PT Total Time Spent   PT Individual Total Time Spent (Mins) 60   Gait Functional Level of Assist    Gait Level Of Assist Contact Guard Assist   Assistive Device Front Wheel Walker   Distance (Feet) 50   # of Times Distance was Traveled 1   Deviation Bradykinetic  (decreased R LE ER and reliance upon UEs than previously observed)   Wheelchair Functional Level of Assist   Wheelchair Assist Modified Independent   Distance Wheelchair (Feet or Distance) 200x2    Wheelchair Description Extra time   Transfer Functional Level of Assist   Bed, Chair, Wheelchair Transfer Standby Assist   Bed Chair Wheelchair Transfer Description Adaptive equipment;Set-up of equipment;Supervision for safety;Verbal cueing  (stand step FWW)   Sitting Lower Body Exercises   Lat Pull 3 sets of 15;Bilateral  (35lb)   Other Exercises lifts 2x10 4lb med ball; cable chops 2x10 15lb; seated trunk rotation 4lb med ball; modified seated deadlift 2x10 4lb med ball; seated football toss for UE  and coordination   Bed Mobility    Supine to Sit Modified Independent   Sit to Stand Standby Assist   Interdisciplinary Plan of Care Collaboration   IDT Collaboration with  Recreation Therapist   Patient Position at End of Therapy Seated   Collaboration Comments handoff of care     Allowed for frequent rest breaks and hydration. Assisted pt to bathroom for standing void with urinal with CGA. Pt able to complete hand hygiene in sitting independently    Assessment    Pt is making excellent progress toward his goals, fxl mobility, and activity tolerance overall. Pt demonstrated decreased R LE external rotation and reliance on UEs with ambulation today. He was able to initiate curb step training and has good potential to be able to negotiate 6inch curb with FWW and assist from his spouse upon DC.   Strengths: Able to follow instructions, Alert and oriented, Effective communication skills, Good carryover of learning, Independent prior level of function, Making steady progress towards goals, Motivated for self care and independence, Pleasant and cooperative, Supportive family, Willingly participates in therapeutic activities  Barriers: Decreased endurance, Fatigue, Generalized weakness, Home accessibility, Impaired activity tolerance, Impaired balance, Limited mobility (Paraplegia)    Plan    6 inch curb step FWW in // bars for safety  Gait FWW with WC in tow vs 4WW- both with emphasis on quality over distance  B LE  and trunk NRE  WC mobility outdoors    Passport items to be completed:  Get in/out of bed safely, in/out of a vehicle, safely use mobility device, walk or wheel around home/community, navigate up and down stairs, show how to get up/down from the ground, ensure home is accessible, demonstrate HEP, complete caregiver training    Physical Therapy Problems (Active)       Problem: PT-Long Term Goals       Dates: Start:  06/18/23         Goal: LTG-By discharge, patient will propel wheelchair >/= 300' and navigate inclines up to 3% (curb cuts or equivalent) c/ SBA or better.        Dates: Start:  06/18/23            Goal: LTG-By discharge, patient will ambulate >/= 50' c/ ModA or better and FWW.        Dates: Start:  06/18/23            Goal: LTG-By discharge, patient will transfer one surface to another c/ Naila.        Dates: Start:  06/18/23            Goal: LTG-By discharge, patient will transfer in/out of a car c/ ModA or better.        Dates: Start:  06/18/23

## 2023-07-04 NOTE — THERAPY
"Occupational Therapy  Daily Treatment     Patient Name: Bull Banegas  Age:  67 y.o., Sex:  male  Medical Record #: [id number]  Today's Date: 7/4/2023     Precautions  Precautions: Fall Risk  Comments: blurred vision, avoid over-fatiguing    Safety   ADL Safety : Requires Supervision for Safety, Requires Cueing for Safety  Bathroom Safety: Requires Supervision for Safety, Requires Cuing for Safety    Subjective    \"I usually spend a lot of time in the garage at home. My son and I built it together.\"     Objective       07/04/23 0701   OT Charge Group   OT Self Care / ADL (Units) 3   OT Therapy Activity (Units) 1   OT Total Time Spent   OT Individual Total Time Spent (Mins) 60   Pain   Intervention Declines   Cognition    Level of Consciousness Alert   Functional Level of Assist   Grooming Standby Assist;Standing   Grooming Description Standing at sink;Supervision for safety  (SBA standing for oral care with FWW and counter top for intermittent support)   Bathing Description   (Pt completed light bed bath, deffered shower until tomorrow.)   Upper Body Dressing Modified Independent   Upper Body Dressing Description   (Mod I for clothing retrieval seated EOB. Ind to don/doff pullover shirt)   Lower Body Dressing Standby Assist   Lower Body Dressing Description Assistive devices;Sock aid;Supervision for safety;Increased time  (Initially completing donning of sock with sock aid for R-foot and reacher for threading UW. But completed all remaining dressing seated EOB with lateral leg lift onto bed side for threading of shorts, L-sock, and shoes. SBA standing for hip hike.)   Toileting Standby Assist   Toileting Description Adaptive equipment;Grab bar;Increased time;Supervision for safety  (SBA in standing for clothing management only.)   Bed, Chair, Wheelchair Transfer Standby Assist   Bed Chair Wheelchair Transfer Description Adaptive equipment;Set-up of equipment;Supervision for safety  (Stand step with FWW)   Toilet " "Transfers Standby Assist   Toilet Transfer Description Adaptive equipment;Grab bar;Supervision for safety  (Stand step with FWW)   Fine Motor / Dexterity    Fine Motor / Dexterity Yes   Fine Motor / Dexterity Interventions Pt completed removal of 3 nuts per hand without challenge.   Neuro-Muscular Treatments   Neuro-Muscular Treatments Vibration   Comments Pt self-applied high frequency vibration to bilateral hands for 8 min with sqweezes for sensory re-ed.   Interdisciplinary Plan of Care Collaboration   IDT Collaboration with  Nursing   Patient Position at End of Therapy Seated  (Pt left seated in dining room for breakfast.)   Collaboration Comments re-void of urine     Pt completed functional mobility in room using FWW at SBA level without LOB.    Assessment    Pt making significant progress towards ADL and functional transfer goals this session with noted improvements in standing balance/endurance. Pt was able to complete most dressing skills without AE, and was able to stand for all clothing management. He reported continued intermittent \"tingle\" in bilateral hand and feet, but improved since admission.  Strengths: Able to follow instructions, Alert and oriented, Effective communication skills, Independent prior level of function, Motivated for self care and independence, Pleasant and cooperative, Willingly participates in therapeutic activities    Plan    UB strengthening as limited by fatigue, sensory re-integration, functional transfers progressing to standing from SB, ADLs with AE as needed, IADLs, incorporate FM skills for BUE     Passport items to be completed:  Perform bathroom transfers, complete dressing, complete feeding, get ready for the day, prepare a simple meal, participate in household tasks, adapt home for safety needs, demonstrate home exercise program, complete caregiver training    Occupational Therapy Goals (Active)       Problem: Dressing       Dates: Start:  06/18/23         Goal: " STG-Within one week, patient will dress LB with CGA overall using AE/DME as needed.       Dates: Start:  06/29/23       Description:             Problem: OT Long Term Goals       Dates: Start:  06/18/23         Goal: LTG-By discharge, patient will complete basic self care tasks with min-supervision overall using AE/DME as needed.       Dates: Start:  06/18/23            Goal: LTG-By discharge, patient will perform bathroom transfers with SBA overall using AE/DME as needed.       Dates: Start:  06/18/23               Problem: Toileting       Dates: Start:  06/18/23         Goal: STG-Within one week, patient will complete toileting tasks with Max A overall using AE/DME as needed       Dates: Start:  06/18/23

## 2023-07-04 NOTE — THERAPY
"Occupational Therapy  Daily Treatment     Patient Name: Bull Banegas  Age:  67 y.o., Sex:  male  Medical Record #: [id number]  Today's Date: 7/4/2023     Precautions  Precautions: Fall Risk  Comments: blurred vision, avoid over-fatiguing    Safety   ADL Safety : Requires Supervision for Safety, Requires Cueing for Safety  Bathroom Safety: Requires Supervision for Safety, Requires Cuing for Safety    Subjective    \"They put in some GB in my shower right under the controls.\"     Objective       07/04/23 1001   OT Charge Group   OT Therapy Activity (Units) 2   OT Total Time Spent   OT Individual Total Time Spent (Mins) 30   Pain   Intervention Declines   Cognition    Level of Consciousness Alert   Functional Level of Assist   Tub / Shower Transfers Standby Assist   Tub Shower Transfer Description Adaptive equipment;Grab bar;Supervision for safety;Set-up of equipment;Verbal cueing  (**Dry step in shower transfer with horizontal GB on L-side and FWW on R-side, pt able to clear a 4 inch barrier before sitting on a shower chair. Setup is very close simulation to home setup)   IADL Treatments   IADL Treatments Kitchen mobility education   Kitchen Mobility Education Initiated kitchen mobility education and training from FWW level, using counter tops intermittently at SBA level. Pt demonstrated functional reaching to upper/lower cabinets, drawers, microwave over stove, and fridge/freezer for cone collection. Educated on body mechanics, increasing accessibility, and mostly pacing/energy conservation. No LOB noted.   Interdisciplinary Plan of Care Collaboration   Patient Position at End of Therapy Seated;Tray Table within Reach;Call Light within Reach         Assessment    Pt demonstrated good carryover learning with mechanics and pacing during kitchen mobility education. No seated breaks required during mobility due to fatigue.  Strengths: Able to follow instructions, Alert and oriented, Effective communication skills, " Independent prior level of function, Motivated for self care and independence, Pleasant and cooperative, Willingly participates in therapeutic activities    Plan    Shower in the AM, UB strengthening as limited by fatigue, sensory re-integration, functional transfers progressing to standing from SB, ADLs with AE as needed, IADLs, incorporate FM skills for BUE     Passport items to be completed:  Perform bathroom transfers, complete dressing, complete feeding, get ready for the day, prepare a simple meal, participate in household tasks, adapt home for safety needs, demonstrate home exercise program, complete caregiver training    Occupational Therapy Goals (Active)       Problem: Dressing       Dates: Start:  06/18/23         Goal: STG-Within one week, patient will dress LB with CGA overall using AE/DME as needed.       Dates: Start:  06/29/23       Description:             Problem: OT Long Term Goals       Dates: Start:  06/18/23         Goal: LTG-By discharge, patient will complete basic self care tasks with min-supervision overall using AE/DME as needed.       Dates: Start:  06/18/23            Goal: LTG-By discharge, patient will perform bathroom transfers with SBA overall using AE/DME as needed.       Dates: Start:  06/18/23               Problem: Toileting       Dates: Start:  06/18/23         Goal: STG-Within one week, patient will complete toileting tasks with Max A overall using AE/DME as needed       Dates: Start:  06/18/23

## 2023-07-04 NOTE — FLOWSHEET NOTE
07/04/23 1045   Events/Summary/Plan   Events/Summary/Plan FVC and IS. Sitting up in wheelchair   Vital Signs   Pulse (!) 105   Respiration 18   Pulse Oximetry 92 %   $ Pulse Oximetry (Spot Check) Yes   Respiratory Assessment   Level of Consciousness Alert   Chest Exam   Work Of Breathing / Effort Within Normal Limits   Breath Sounds   RUL Breath Sounds Clear   RML Breath Sounds Clear   RLL Breath Sounds Diminished   JC Breath Sounds Clear   LLL Breath Sounds Diminished   Oxygen   O2 Delivery Device Room air w/o2 available

## 2023-07-04 NOTE — PROGRESS NOTES
Hospital Medicine Daily Progress Note        Chief Complaint:  Hypertension  Hyponatremia    Interval History:  Heart rates trending up; pt asymptomatic.  Lab and microbiology results reviewed.    Review of Systems  Review of Systems   Constitutional:  Negative for chills and fever.   HENT: Negative.     Eyes: Negative.    Respiratory:  Negative for cough and shortness of breath.    Cardiovascular:  Negative for chest pain and palpitations.   Gastrointestinal:  Negative for abdominal pain, nausea and vomiting.   Genitourinary: Negative.    Musculoskeletal: Negative.    Skin:  Negative for itching and rash.   Neurological:  Positive for sensory change and focal weakness.   Endo/Heme/Allergies:  Negative for polydipsia. Does not bruise/bleed easily.        Physical Exam  Temp:  [36.8 °C (98.3 °F)-37 °C (98.6 °F)] 36.8 °C (98.3 °F)  Pulse:  [] 105  Resp:  [16-18] 18  BP: (108-121)/(65-73) 108/65  SpO2:  [92 %] 92 %    Physical Exam  Vitals reviewed.   Constitutional:       General: He is not in acute distress.     Appearance: Normal appearance. He is not ill-appearing.   HENT:      Head: Normocephalic and atraumatic.      Right Ear: External ear normal.      Left Ear: External ear normal.      Nose: Nose normal.      Mouth/Throat:      Pharynx: Oropharynx is clear.   Eyes:      General:         Right eye: No discharge.         Left eye: No discharge.      Extraocular Movements: Extraocular movements intact.      Conjunctiva/sclera: Conjunctivae normal.   Cardiovascular:      Rate and Rhythm: Normal rate and regular rhythm.   Pulmonary:      Effort: Pulmonary effort is normal. No respiratory distress.      Breath sounds: Normal breath sounds. No wheezing.   Abdominal:      General: Bowel sounds are normal. There is no distension.      Palpations: Abdomen is soft.      Tenderness: There is no abdominal tenderness.   Musculoskeletal:      Cervical back: Normal range of motion and neck supple.      Right lower leg:  No edema.      Left lower leg: No edema.   Skin:     General: Skin is warm and dry.   Neurological:      Mental Status: He is alert and oriented to person, place, and time.         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[facility])  Assessment & Plan  Had progressive BLE weakness  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[facility]  S/P IVIG x 5 doses  Ongoing management per Physiatry    Pyuria  Assessment & Plan  UA packed WBC w/ many bacteria  UCx >100,000 Citrobacter  Start empiric abx given tachycardic trends and relative leukocytosis    Leukopenia  Assessment & Plan  May be 2/2 meds  Possible culprits include Lyrica, Omeprazole, ASA, and Lipitor  Monitor need for G-CSF  WBC now normal    Dyslipidemia  Assessment & Plan  Continue Lipitor    Vitamin D deficiency  Assessment & Plan  Vit D level 19  Continue supplementation    Essential hypertension  Assessment & Plan  On Midodrine and Flomax per Physiatry    Anemia  Assessment & Plan  Has normocytic indices  Fe 96  Follow H/H    Hyponatremia  Assessment & Plan  Was on fluid restriction, Lasix, and NaCl tabs  Lisinopril discontinued per Dr. Ruelas  Na+ levels now normalized and stable  Off NaCl, Lasix, and fluid restriction  Continue to follow electrolytes      Full Code    Discussed w/ pt and Dr. New

## 2023-07-04 NOTE — CARE PLAN
Problem: Bladder / Voiding  Goal: Patient will establish and maintain regular urinary output  Outcome: Progressing     Problem: Mobility  Goal: Patient's capacity to carry out activities will improve  Outcome: Progressing     Problem: Fall Risk - Rehab  Goal: Patient will remain free from falls  Outcome: Progressing       The patient is Stable - Low risk of patient condition declining or worsening    Shift Goals  Clinical Goals: Safety  Patient Goals: Rest, urinary management  Family Goals: Education

## 2023-07-04 NOTE — CARE PLAN
Problem: Bladder / Voiding  Goal: Patient will establish and maintain regular urinary output  Note: Pt is continent of bladder.Voiding adequate amount of urine.PVR 46.Will continue to monitor.     Problem: Bowel Elimination  Goal: Patient will participate in bowel management program  Note: Scheduled colace given at hs.Continent of bowel.LBM 7/3.Will continue to monitor.

## 2023-07-04 NOTE — FLOWSHEET NOTE
07/04/23 1045   Incentive Spirometry Treatment   Incentive Spirometer Volume 3000 mL   Pulmonary Function Group   $ FVC / Vital Capacity (liters)  2.95  (66% of predicted. Good effort. Sitting up in wheelchair)

## 2023-07-05 ENCOUNTER — APPOINTMENT (OUTPATIENT)
Dept: PHYSICAL THERAPY | Facility: REHABILITATION | Age: 68
DRG: 095 | End: 2023-07-05
Attending: PHYSICAL MEDICINE & REHABILITATION
Payer: MEDICARE

## 2023-07-05 ENCOUNTER — APPOINTMENT (OUTPATIENT)
Dept: OCCUPATIONAL THERAPY | Facility: REHABILITATION | Age: 68
DRG: 095 | End: 2023-07-05
Attending: PHYSICAL MEDICINE & REHABILITATION
Payer: MEDICARE

## 2023-07-05 LAB
BACTERIA UR CULT: ABNORMAL
BACTERIA UR CULT: ABNORMAL
SIGNIFICANT IND 70042: ABNORMAL
SITE SITE: ABNORMAL
SOURCE SOURCE: ABNORMAL

## 2023-07-05 PROCEDURE — 97535 SELF CARE MNGMENT TRAINING: CPT

## 2023-07-05 PROCEDURE — 97110 THERAPEUTIC EXERCISES: CPT

## 2023-07-05 PROCEDURE — A9270 NON-COVERED ITEM OR SERVICE: HCPCS | Performed by: HOSPITALIST

## 2023-07-05 PROCEDURE — A9270 NON-COVERED ITEM OR SERVICE: HCPCS | Performed by: PHYSICAL MEDICINE & REHABILITATION

## 2023-07-05 PROCEDURE — 700102 HCHG RX REV CODE 250 W/ 637 OVERRIDE(OP): Performed by: HOSPITALIST

## 2023-07-05 PROCEDURE — 700102 HCHG RX REV CODE 250 W/ 637 OVERRIDE(OP): Performed by: PHYSICAL MEDICINE & REHABILITATION

## 2023-07-05 PROCEDURE — 770010 HCHG ROOM/CARE - REHAB SEMI PRIVAT*

## 2023-07-05 PROCEDURE — 97116 GAIT TRAINING THERAPY: CPT

## 2023-07-05 PROCEDURE — 99232 SBSQ HOSP IP/OBS MODERATE 35: CPT | Performed by: HOSPITALIST

## 2023-07-05 PROCEDURE — 97530 THERAPEUTIC ACTIVITIES: CPT

## 2023-07-05 PROCEDURE — 99232 SBSQ HOSP IP/OBS MODERATE 35: CPT | Performed by: PHYSICAL MEDICINE & REHABILITATION

## 2023-07-05 RX ORDER — SULFAMETHOXAZOLE AND TRIMETHOPRIM 800; 160 MG/1; MG/1
1 TABLET ORAL EVERY 12 HOURS
Status: COMPLETED | OUTPATIENT
Start: 2023-07-05 | End: 2023-07-09

## 2023-07-05 RX ORDER — PREGABALIN 150 MG/1
150 CAPSULE ORAL 2 TIMES DAILY
Status: DISCONTINUED | OUTPATIENT
Start: 2023-07-05 | End: 2023-07-06

## 2023-07-05 RX ADMIN — SENNOSIDES 17.2 MG: 8.6 TABLET, FILM COATED ORAL at 11:23

## 2023-07-05 RX ADMIN — Medication 2000 UNITS: at 08:20

## 2023-07-05 RX ADMIN — DOCUSATE SODIUM 100 MG: 100 CAPSULE, LIQUID FILLED ORAL at 20:30

## 2023-07-05 RX ADMIN — APIXABAN 2.5 MG: 2.5 TABLET, FILM COATED ORAL at 20:30

## 2023-07-05 RX ADMIN — MIDODRINE HYDROCHLORIDE 10 MG: 10 TABLET ORAL at 17:06

## 2023-07-05 RX ADMIN — Medication 6 MG: at 20:30

## 2023-07-05 RX ADMIN — SULFAMETHOXAZOLE AND TRIMETHOPRIM 1 TABLET: 800; 160 TABLET ORAL at 11:23

## 2023-07-05 RX ADMIN — TAMSULOSIN HYDROCHLORIDE 0.4 MG: 0.4 CAPSULE ORAL at 17:06

## 2023-07-05 RX ADMIN — MIDODRINE HYDROCHLORIDE 10 MG: 10 TABLET ORAL at 08:20

## 2023-07-05 RX ADMIN — APIXABAN 2.5 MG: 2.5 TABLET, FILM COATED ORAL at 08:20

## 2023-07-05 RX ADMIN — SULFAMETHOXAZOLE AND TRIMETHOPRIM 1 TABLET: 800; 160 TABLET ORAL at 20:30

## 2023-07-05 RX ADMIN — MENTHOL, METHYL SALICYLATE: 10; 15 CREAM TOPICAL at 20:30

## 2023-07-05 RX ADMIN — TRAZODONE HYDROCHLORIDE 50 MG: 50 TABLET ORAL at 20:30

## 2023-07-05 RX ADMIN — MIDODRINE HYDROCHLORIDE 10 MG: 10 TABLET ORAL at 11:23

## 2023-07-05 RX ADMIN — CEFDINIR 300 MG: 300 CAPSULE ORAL at 08:20

## 2023-07-05 RX ADMIN — OMEPRAZOLE 20 MG: 20 CAPSULE, DELAYED RELEASE ORAL at 08:19

## 2023-07-05 RX ADMIN — DOCUSATE SODIUM 100 MG: 100 CAPSULE, LIQUID FILLED ORAL at 08:20

## 2023-07-05 RX ADMIN — PREGABALIN 150 MG: 150 CAPSULE ORAL at 20:30

## 2023-07-05 RX ADMIN — MULTIPLE VITAMINS W/ MINERALS TAB 1 TABLET: TAB at 11:23

## 2023-07-05 RX ADMIN — ACETAMINOPHEN 650 MG: 325 TABLET ORAL at 05:42

## 2023-07-05 RX ADMIN — ASPIRIN 81 MG CHEWABLE TABLET 81 MG: 81 TABLET CHEWABLE at 08:20

## 2023-07-05 RX ADMIN — PREGABALIN 200 MG: 100 CAPSULE ORAL at 05:39

## 2023-07-05 RX ADMIN — ATORVASTATIN CALCIUM 10 MG: 10 TABLET, FILM COATED ORAL at 20:30

## 2023-07-05 ASSESSMENT — PATIENT HEALTH QUESTIONNAIRE - PHQ9
1. LITTLE INTEREST OR PLEASURE IN DOING THINGS: NOT AT ALL
SUM OF ALL RESPONSES TO PHQ9 QUESTIONS 1 AND 2: 0
2. FEELING DOWN, DEPRESSED, IRRITABLE, OR HOPELESS: NOT AT ALL
1. LITTLE INTEREST OR PLEASURE IN DOING THINGS: NOT AT ALL
2. FEELING DOWN, DEPRESSED, IRRITABLE, OR HOPELESS: NOT AT ALL
SUM OF ALL RESPONSES TO PHQ9 QUESTIONS 1 AND 2: 0

## 2023-07-05 ASSESSMENT — ACTIVITIES OF DAILY LIVING (ADL)
BED_CHAIR_WHEELCHAIR_TRANSFER_DESCRIPTION: ADAPTIVE EQUIPMENT;INITIAL PREPARATION FOR TASK;SET-UP OF EQUIPMENT;SUPERVISION FOR SAFETY;VERBAL CUEING
BED_CHAIR_WHEELCHAIR_TRANSFER_DESCRIPTION: SET-UP OF EQUIPMENT;SUPERVISION FOR SAFETY;VERBAL CUEING
BED_CHAIR_WHEELCHAIR_TRANSFER_DESCRIPTION: ADAPTIVE EQUIPMENT;INCREASED TIME;SUPERVISION FOR SAFETY
TUB_SHOWER_TRANSFER_DESCRIPTION: ADAPTIVE EQUIPMENT;SUPERVISION FOR SAFETY;SET-UP OF EQUIPMENT
TOILET_TRANSFER_DESCRIPTION: GRAB BAR

## 2023-07-05 ASSESSMENT — ENCOUNTER SYMPTOMS
VOMITING: 0
SHORTNESS OF BREATH: 0
EYES NEGATIVE: 1
PALPITATIONS: 0
MUSCULOSKELETAL NEGATIVE: 1
FEVER: 0
ABDOMINAL PAIN: 0
FOCAL WEAKNESS: 1
CHILLS: 0
COUGH: 0
POLYDIPSIA: 0
SENSORY CHANGE: 1
BRUISES/BLEEDS EASILY: 0
NAUSEA: 0

## 2023-07-05 ASSESSMENT — GAIT ASSESSMENTS
DISTANCE (FEET): 50
DEVIATION: BRADYKINETIC;INCREASED BASE OF SUPPORT
ASSISTIVE DEVICE: FRONT WHEEL WALKER
GAIT LEVEL OF ASSIST: CONTACT GUARD ASSIST

## 2023-07-05 ASSESSMENT — PAIN DESCRIPTION - PAIN TYPE: TYPE: ACUTE PAIN

## 2023-07-05 NOTE — PROGRESS NOTES
Hospital Medicine Daily Progress Note        Chief Complaint:  Hypertension  Hyponatremia    Interval History:  Pt reports burning on urination has improved since abx started.  Microbiology reviewed, sensitivities noted and discussed.    Review of Systems  Review of Systems   Constitutional:  Negative for chills and fever.   HENT: Negative.     Eyes: Negative.    Respiratory:  Negative for cough and shortness of breath.    Cardiovascular:  Negative for chest pain and palpitations.   Gastrointestinal:  Negative for abdominal pain, nausea and vomiting.   Genitourinary: Negative.    Musculoskeletal: Negative.    Skin:  Negative for itching and rash.   Neurological:  Positive for sensory change and focal weakness.   Endo/Heme/Allergies:  Negative for polydipsia. Does not bruise/bleed easily.        Physical Exam  Temp:  [36.8 °C (98.3 °F)-37.1 °C (98.8 °F)] 37.1 °C (98.7 °F)  Pulse:  [] 85  Resp:  [16-18] 16  BP: (105-139)/(70-78) 105/70  SpO2:  [92 %] 92 %    Physical Exam  Vitals reviewed.   Constitutional:       General: He is not in acute distress.     Appearance: Normal appearance. He is not ill-appearing.   HENT:      Head: Normocephalic and atraumatic.      Right Ear: External ear normal.      Left Ear: External ear normal.      Nose: Nose normal.      Mouth/Throat:      Pharynx: Oropharynx is clear.   Eyes:      General:         Right eye: No discharge.         Left eye: No discharge.      Extraocular Movements: Extraocular movements intact.      Conjunctiva/sclera: Conjunctivae normal.   Cardiovascular:      Rate and Rhythm: Normal rate and regular rhythm.   Pulmonary:      Effort: Pulmonary effort is normal. No respiratory distress.      Breath sounds: Normal breath sounds. No wheezing.   Abdominal:      General: Bowel sounds are normal. There is no distension.      Palpations: Abdomen is soft.      Tenderness: There is no abdominal tenderness.   Musculoskeletal:      Cervical back: Normal range of  motion and neck supple.      Right lower leg: No edema.      Left lower leg: No edema.   Skin:     General: Skin is warm and dry.   Neurological:      Mental Status: He is alert and oriented to person, place, and time.         Fluids    Intake/Output Summary (Last 24 hours) at 7/5/2023 1000  Last data filed at 7/5/2023 0500  Gross per 24 hour   Intake 1000 ml   Output 1900 ml   Net -900 ml       Laboratory  Recent Labs     07/04/23  0541   WBC 6.3   RBC 2.68*   HEMOGLOBIN 8.4*   HEMATOCRIT 24.6*   MCV 91.8   MCH 31.3   MCHC 34.1   RDW 57.1*   PLATELETCT 345   MPV 10.1       Recent Labs     07/04/23  0541   SODIUM 134*   POTASSIUM 3.9   CHLORIDE 98   CO2 23   GLUCOSE 114*   BUN 12   CREATININE 0.51   CALCIUM 8.8               Assessment/Plan  * GBS (Guillain-[place] syndrome) ([facility])  Assessment & Plan  Had progressive BLE weakness  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[facility]  S/P IVIG x 5 doses  Now on Midodrine and Flomax  Ongoing management per Physiatry    Pyuria  Assessment & Plan  UA packed WBC w/ many bacteria  UCx >100,000 Citrobacter  Change empiric Omnicef to Bactrim based on YRIS    Leukopenia  Assessment & Plan  May be 2/2 meds  Possible culprits include Lyrica, Omeprazole, ASA, and Lipitor  Monitor need for G-CSF  WBC now normal    Dyslipidemia  Assessment & Plan  Continue Lipitor    Vitamin D deficiency  Assessment & Plan  Vit D level 19  Continue supplementation    Anemia  Assessment & Plan  Has normocytic indices  Fe 96  Follow H/H    Hyponatremia  Assessment & Plan  Was on fluid restriction, Lasix, and NaCl tabs  Lisinopril discontinued per Dr. Ruelas  Na+ levels resolved and now stable  Off NaCl, Lasix, and fluid restriction  Continue to follow electrolytes      Full Code    Discussed w/ pt

## 2023-07-05 NOTE — PROGRESS NOTES
"  Physical Medicine & Rehabilitation Progress Note    Encounter Date: 7/5/2023    Chief Complaint: Lower extremity weakness    Interval Events (Subjective):    He was evaluated in the main gym working with physical therapy.    He reports improvement in sensation in bilateral hands and thighs.  He reports improving breathing/shortness of breath over the last 48 hours.  He reports improving strength in bilateral lower extremities.    PRN: 0 morphine equivalents in the last 24 hours  Bowel: Medium soft BM on 7/4  Bladder: Voiding trial PVR/void-17/475, improving dysuria, frequency and urgency    ROS:  Gen: No fatigue, confusion, significant weight loss  Eyes: no blurry vision, double vision or pain in eyes  ENT: no changes in hearing, runny nose, nose bleeds, sinus pain  CV: No CP, palpitations,   Lungs: no shortness of breath, changes in secretions, changes in cough, pain with coughing  Abd: No abd pain, nausea, vomiting, pain with eating  : no blood in urine, pain during storage phase, bladder spasms, suprapubic pain  Ext: No swelling in legs, asymmetric atrophy  Neuro: no changes in strength or sensation  Skin: no new ulcers/skin breakdown appreciated by patient  Mood: No changes in mood today, increase in depression or anxiety  Heme: no bruising, or bleeding    Objective:  Vitals: /66   Pulse 92   Temp 37 °C (98.6 °F) (Oral)   Resp 18   Ht 1.727 m (5' 7.99\")   Wt 116 kg (256 lb 13.4 oz)   SpO2 94%   Gen: NAD, Body mass index is 39.06 kg/m².  HEENT:  NC/AT, PERRLA, moist mucous membranes  Cardio: RRR, no mumurs  Pulm: CTAB, with normal respiratory effort  Abd: Soft NTND, active bowel sounds,   Ext: No peripheral edema. No calf tenderness. No clubbing/cyanosis. +dorsal pedalis pulses bilaterally.    Motor Exam Lower Extremities    ? Myotome R L   Hip flexion L2 4 4   Knee extension L3 5 4   Ankle dorsiflexion L4 5 4   Toe extension L5 5 4   Ankle plantarflexion S1 5 4        Laboratory Values:  Recent " Results (from the past 72 hour(s))   CBC WITH DIFFERENTIAL    Collection Time: 07/04/23  5:41 AM   Result Value Ref Range    WBC 6.3 4.8 - 10.8 K/uL    RBC 2.68 (L) 4.70 - 6.10 M/uL    Hemoglobin 8.4 (L) 14.0 - 18.0 g/dL    Hematocrit 24.6 (L) 42.0 - 52.0 %    MCV 91.8 81.4 - 97.8 fL    MCH 31.3 27.0 - 33.0 pg    MCHC 34.1 32.3 - 36.5 g/dL    RDW 57.1 (H) 35.9 - 50.0 fL    Platelet Count 345 164 - 446 K/uL    MPV 10.1 9.0 - 12.9 fL    Neutrophils-Polys 70.10 44.00 - 72.00 %    Lymphocytes 18.30 (L) 22.00 - 41.00 %    Monocytes 10.20 0.00 - 13.40 %    Eosinophils 0.30 0.00 - 6.90 %    Basophils 0.60 0.00 - 1.80 %    Immature Granulocytes 0.50 0.00 - 0.90 %    Nucleated RBC 0.00 0.00 - 0.20 /100 WBC    Neutrophils (Absolute) 4.39 1.82 - 7.42 K/uL    Lymphs (Absolute) 1.15 1.00 - 4.80 K/uL    Monos (Absolute) 0.64 0.00 - 0.85 K/uL    Eos (Absolute) 0.02 0.00 - 0.51 K/uL    Baso (Absolute) 0.04 0.00 - 0.12 K/uL    Immature Granulocytes (abs) 0.03 0.00 - 0.11 K/uL    NRBC (Absolute) 0.00 K/uL   Basic Metabolic Panel    Collection Time: 07/04/23  5:41 AM   Result Value Ref Range    Sodium 134 (L) 135 - 145 mmol/L    Potassium 3.9 3.6 - 5.5 mmol/L    Chloride 98 96 - 112 mmol/L    Co2 23 20 - 33 mmol/L    Glucose 114 (H) 65 - 99 mg/dL    Bun 12 8 - 22 mg/dL    Creatinine 0.51 0.50 - 1.40 mg/dL    Calcium 8.8 8.5 - 10.5 mg/dL    Anion Gap 13.0 7.0 - 16.0   PROCALCITONIN    Collection Time: 07/04/23  5:41 AM   Result Value Ref Range    Procalcitonin 0.15 <0.25 ng/mL   ESTIMATED GFR    Collection Time: 07/04/23  5:41 AM   Result Value Ref Range    GFR (CKD-EPI) 112 >60 mL/min/1.73 m 2       Medications:  Scheduled Medications   Medication Dose Frequency    sulfamethoxazole-trimethoprim  1 Tablet Q12HRS    apixaban  2.5 mg BID    pregabalin  200 mg BID    traZODone  50 mg QHS    docusate sodium  100 mg BID    And    sennosides  17.2 mg DAILY AT NOON    midodrine  10 mg TID WITH MEALS    vitamin D3  2,000 Units DAILY     menthol-methyl salicycate   BID    omeprazole  20 mg DAILY    Pharmacy Consult Request  1 Each PHARMACY TO DOSE    therapeutic multivitamin-minerals  1 Tablet DAILY WITH LUNCH    aspirin  81 mg DAILY    atorvastatin  10 mg MO, WE + FR    melatonin  6 mg QHS    lidocaine  2 Patch Q24HR    tamsulosin  0.4 mg AFTER DINNER     PRN medications: oxyCODONE immediate-release **OR** oxyCODONE immediate-release, acetaminophen, docusate sodium **AND** sennosides **AND** [DISCONTINUED] bisacodyl **AND** magnesium hydroxide, [COMPLETED] docusate sodium **FOLLOWED BY** normal saline (PF), traMADol, carboxymethylcellulose, benzocaine-menthol, mag hydrox-al hydrox-simeth, ondansetron **OR** ondansetron, traZODone, sodium chloride, Respiratory Therapy Consult, hydrALAZINE    Diet:  Current Diet Order   Procedures    Diet Order Diet: Regular       Assessment:  Active Hospital Problems    Diagnosis     *GBS (Guillain-[place] syndrome) (HCC)     Pyuria     Leukopenia     Fever     Ear discomfort     Azotemia     Dyslipidemia     Essential hypertension     UTI (urinary tract infection)     Vitamin D deficiency     Hyponatremia     Anemia        Medical Decision Making and Plan:    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  Lower extremity weakness and pain as well as numbness in upper extremities.  No known premorbid infection.  -PT and OT for mobility and ADLs. Per guidelines, 15 hours per week between PT, OT.  Cleared by speech therapy for swallow and speech, transition time to PT and OT  - Continue comprehensive acute inpatient rehabilitation  -Cleared for aqua therapy     Fever: 100.5 on 6/26, afebrile in the last 24 hours  - Infectious work-up as per hospitalist, COVID and flu were negative, UA is negative, procalcitonin was elevated on 6/25, chest x-ray was negative blood cultures to date are negative  - Discontinue Tylenol, concerned that this may  be masking his fevers.     Neurologic respiratory impairment:   Patient is at high risk for respiratory involvement given neurologic symptoms in the upper extremities.   -Vital capacity daily, 1.72 liters on 6/20, >2L 7/4, discontinue daily vital capacity  - Incentive spirometry     Lower extremity swelling: Left greater than right  - Lower extremity Doppler was negative for DVT     Hyponatremia: Likely SIADH, followed by nephrology during [facility] hospitalization  - Sodium of 126 on 6/17 --> 129 on 6/19 --> 133 on 6/20 --> 136 on 6/21 --> 133 on 6/26 --> 136 on 6/27 --> 136 on 6/29 --> 135 on 7/1 -> sodium of 134 on 7/4  -Discontinue sodium chloride tablets as per hospitalist  - Lasix was discontinued on 6/21 as per hospitalist discontinuing Lasix versus liberalizing fluid restriction  -On admission was on Free water restriction of 500 cc/day and total p.o. fluid intake of 1.8 L.  Discontinued fluid restriction  -Status post 500 cc of normal saline IV fluid on 6/27  -Check BMP in a.m.     Hypomagnesemia: Low magnesium during acute hospitalization, supplemented with p.o. magnesium  - Magnesium of 2.0 on 6/18     Urinary tract infection/pyelonephritis: Catheter associated UTI in the setting of neurogenic bladder, episode of hematuria with bladder symptoms on 7/2, dysuria, frequency, urgency  - WBC of 14 on 6/17 --> WBC of 10.7 on 6/20 --> 4.3 on 6/26  - Peripheral IV was removed prior to transfer to acute rehabilitation we will replace IV.  - Ceftriaxone 1 g every 24 hours, completed course  - Greater than 100,000 colonies of Citrobacter, resistant to ampicillin, Unasyn, cefazolin, ceftriaxone, cefuroxime.  - Started on Bactrim as per hospitalist  - Check CBC in a.m.     Leukopenia:  - WBC of 3.2 --> 3.4 on 6/30 -> 6.3 on 7/4  -Differential diagnosis includes drug-induced versus viral infection  - Discussed with hospitalist  -We will decrease dose of Lyrica to 200 mg twice daily, this medication should not be  abruptly discontinued  - Check CBC in a.m.     Neurogenic bladder: Inappropriate management of neurogenic bladder can result and hydronephrosis, increased risk of pyelonephritis, and renal failure  - Discontinued Blancas on 6/22,   - Continue voiding trial, if can't void in 6 hours or prn check pvr and if >500cc then ICP,if able to void then check PVR, if PVR is >200cc then ICP  - Continue Flomax 0.4 mg nightly     Neurogenic bowel: Inappropriate neurogenic bowel can result in severe constipation, bowel dilation and rupture.  Severe constipation with prolonged period of time without BM.  -Continue upper motor neuron neurogenic bowel program with senna 2 tablets q. noon, Colace 100 mg twice daily and hold Magic bullet suppository MI daily and digital stimulation     orthostatic hypotension  Because of significant autonomic dysfunction associated with some cases of AIDP, the patient is at high risk for orthostatic hypotension.  Goal of SBP greater than 100.  -  Mechanical compression with teds and Tubi  and abd binder used prior to out of bed  - Continue midodrine 10 mg 3 times daily     Essential hypertension: SBP of greater than 200 on presentation to acute hospital.  SBP  in the last 24 hours  - Lisinopril 20 mg daily held, discontinued lisinopril secondary to orthostatic hypotension as above, may restart as an outpatient with recovery of autonomic nervous system     Dyslipidemia: Total cholesterol 137, HDL 44, LDL 75  -Lipitor 10 mg every Monday Wednesday Friday     Dysphagia: Concern for swallow dysfunction in the setting of certain variants of AIDP  - Consult speech therapy for swallow evaluation.  Cleared by speech therapy     Skin  Due to the sensory impairments following AIDP, skin protection principles are of the utmost importance.      - every two-hour turning pattern overnight while the patient is in bed. When the patient is out of bed, an every 15 minute repositioning or weight shifting pattern  will be utilized in order to help train the patient for long-term skin protection. We will also discuss skin monitoring and its importance with the patient and the caregivers.     Insomnia:  - Discontinue temazepam  -Initiation of trazodone 50 mg nightly, may repeat x1     Pain:  Postoperative pain:  -Continue oxycodone 5-10 mg every 4 hours as needed for pain moderate to severe  - Discontinue Tylenol as this may be masking fevers  - Lidocaine patch x2 on either side of incision, on for 12 hours off for 12 hours  - Continue tramadol 50 mg every 6 hours as needed for pain     Neuropathic pain: Burning and tingling in all 4 extremities  -Discontinue gabapentin  - Decrease Lyrica to 150 mg twice daily  - Discontinue Celebrex  - Tramadol 50 mg every 6 hours as needed pain  -May benefit from a nonpharmaceutical modalities such as TENS units, compression, ice, heat, will discuss with therapy team.     Vitamin D deficiency: High risk of vitamin D deficiency in this population, goal of vitamin D level greater than 30, has been linked to improvement and central nervous system recovery  - Vitamin D level of 19 on 6/18  - Cholecalciferol 2000 units daily     Insomnia: Significant difficulty during acute hospitalization  - Restarted on Restoril 15 mg nightly  - Trazodone 50 mg nightly as needed insomnia     DVT prophylaxis  In the setting of AIDP, the patient is at high risk of deep venous thrombosis given decreased mobility and alteration to autonomic nervous system.   -DC Lovenox  - Eliquis 5 mg twice daily for prophylaxis  ____________________________________    Mario Terrell DO  [facility] - Physical Medicine & Rehabilitation   [facility] - Spinal Cord Injury Medicine  ____________________________________

## 2023-07-05 NOTE — CARE PLAN
Problem: Bowel Elimination  Goal: Patient will participate in bowel management program  Note: Scheduled colace given at hs.Continent of bowel.LBM 7/4.Will continue to monitor.     Problem: Infection  Goal: Patient will remain free from infection  Note: Pt is on scheduled Omnicef.No adverse reaction noted.Will continue to monitor.

## 2023-07-05 NOTE — THERAPY
"Physical Therapy   Daily Treatment     Patient Name: Bull Banegas  Age:  67 y.o., Sex:  male  Medical Record #: [id number]  Today's Date: 7/5/2023     Precautions  Precautions: Fall Risk  Comments: blurred vision, avoid over-fatiguing    Subjective    AM: \"My knee just gave out out of nowhere\" regarding near fall on stairs  Pt denied pain or injury after this occurred     PM: \"I want to work on strengthening my left leg\"     Objective       07/05/23 1001   PT Charge Group   PT Therapeutic Activities (Units) 3   PT Total Time Spent   PT Individual Total Time Spent (Mins) 45   Pain 0 - 10 Group   Pain Rating Scale (NPRS) 0   Wheelchair Functional Level of Assist   Wheelchair Assist Modified Independent   Distance Wheelchair (Feet or Distance) 150x2   Wheelchair Description Extra time   Stairs Functional Level of Assist   # of Stairs Climbed 6  (CGA 4 inch steps sidestepping, near fall on 6inch step attempt requiring 3 person assist to recover see note for details)   Transfer Functional Level of Assist   Bed, Chair, Wheelchair Transfer Standby Assist   Bed Chair Wheelchair Transfer Description Set-up of equipment;Supervision for safety;Verbal cueing  (semistand pivot)   Bed Mobility    Supine to Sit Modified Independent   Sit to Supine Modified Independent   Sit to Stand Standby Assist   Interdisciplinary Plan of Care Collaboration   IDT Collaboration with  Occupational Therapist;Recreation Therapist;Nursing   Patient Position at End of Therapy In Bed;Call Light within Reach;Tray Table within Reach;Phone within Reach   Collaboration Comments assisted recovery on stairs     Discussed initiating aquatic tx tomorrow and trialed stairs in anticipation. MD present to round on pt    Pt negotiated 4inch steps by sidestepping leading with R LE and B UE support on railing with CGA    After seated rest break, pt attempted to negotiate 6 inch steps and was able to completed 1 step with Cathleen and no evidence of imbalance. Upon second " step, pt's knee buckled however he was able to support himself with B UE support on rail and maxA from therapist. Two other therapists quickly joined to assist pt into standing and pt was able to descend steps to WC. Pt also able to maintain full upright standing with B UE support for a few seconds prior to sitting to WC upon request. Pt denied injury or pain after this occurred. Confirmed with all involved that pt did not touch the step with any body part aside from his feet    Educated pt on neuro recovery from GBS and that when his nerves are fatigued they will not fire as strongly as evidence by this event    Discussed with both Pts who will lead aqua tx tomorrow and recommend using lift to exit pool due to likelihood of fatigue           07/05/23 1300   PT Charge Group   PT Gait Training (Units) 1   PT Therapeutic Exercise (Units) 2   PT Therapeutic Activities (Units) 1   PT Total Time Spent   PT Individual Total Time Spent (Mins) 60   Vitals   Pulse 92   Patient BP Position Supine   Blood Pressure  113/66   Respiration 18   Pulse Oximetry 94 %   Room Air Oximetry 94   O2 Delivery Device None - Room Air   Sleep/Wake Cycle   Sleep & Rest Awake   Gait Functional Level of Assist    Gait Level Of Assist Contact Guard Assist   Assistive Device Front Wheel Walker   Distance (Feet) 50   # of Times Distance was Traveled 1   Deviation Bradykinetic;Increased Base Of Support   Transfer Functional Level of Assist   Bed, Chair, Wheelchair Transfer Contact Guard Assist   Bed Chair Wheelchair Transfer Description Adaptive equipment;Initial preparation for task;Set-up of equipment;Supervision for safety;Verbal cueing  (stand step FWW)   Toilet Transfers Contact Guard Assist   Toilet Transfer Description Grab bar  (standing to void bladder c urinal)   Supine Lower Body Exercise   Bridges Two Legged;3 sets of 10   Straight Leg Raises Left  (2 sets of 5)   Short Arc Quad 2 sets of 10;Left  (2.5)   Sitting Lower Body Exercises    Sit to Stand 1 set of 10  (5 reps biasing L LE, 5 reps no UE support)   Bed Mobility    Supine to Sit Modified Independent   Sit to Supine Modified Independent   Sit to Stand Standby Assist   Interdisciplinary Plan of Care Collaboration   IDT Collaboration with  Family / Caregiver   Patient Position at End of Therapy In Bed;Call Light within Reach;Tray Table within Reach;Phone within Reach;Family / Friend in Room   Collaboration Comments FT for Sunday 7/9 at 9am     assisted pt with toileting due urinary urgency, continent void of 400 ml    Initiated FT with pt's spouse for CGA stand step FWW and squat pivot transfers    Educated pt in methods of biasing L LE forced use with fxl mobility to build strength    Assessment    Pt negotiated 6 steps leading with R LE and B UE on L HR with ease, however when he attempted to lead with the L LE his knee buckled and he experienced a near fall. Pt's wife demonstrated competency assisting pt with stand step transfers with FWW and she will be present on Sunday 7/9 at 9am to continue training.  Strengths: Able to follow instructions, Alert and oriented, Effective communication skills, Good carryover of learning, Independent prior level of function, Making steady progress towards goals, Motivated for self care and independence, Pleasant and cooperative, Supportive family, Willingly participates in therapeutic activities  Barriers: Decreased endurance, Fatigue, Generalized weakness, Home accessibility, Impaired activity tolerance, Impaired balance, Limited mobility (Paraplegia)    Plan    6 inch curb step FWW in // bars for safety leading with R LE ascending and L LE descending  Gait FWW with WC in tow vs 4WW- both with emphasis on quality over distance  B LE and trunk NRE  WC mobility outdoors     Passport items to be completed:  Get in/out of bed safely, in/out of a vehicle, safely use mobility device, walk or wheel around home/community, navigate up and down stairs, show how  to get up/down from the ground, ensure home is accessible, demonstrate HEP, complete caregiver training      Physical Therapy Problems (Active)       Problem: PT-Long Term Goals       Dates: Start:  06/18/23         Goal: LTG-By discharge, patient will propel wheelchair >/= 300' and navigate inclines up to 3% (curb cuts or equivalent) c/ SBA or better.        Dates: Start:  06/18/23            Goal: LTG-By discharge, patient will ambulate >/= 50' c/ ModA or better and FWW.        Dates: Start:  06/18/23            Goal: LTG-By discharge, patient will transfer one surface to another c/ Naila.        Dates: Start:  06/18/23            Goal: LTG-By discharge, patient will transfer in/out of a car c/ ModA or better.        Dates: Start:  06/18/23

## 2023-07-05 NOTE — THERAPY
"Occupational Therapy  Daily Treatment     Patient Name: Bull Banegas  Age:  67 y.o., Sex:  male  Medical Record #: [id number]  Today's Date: 7/5/2023     Precautions  Precautions: Fall Risk  Comments: blurred vision, avoid over-fatiguing    Safety   ADL Safety : Requires Supervision for Safety, Requires Cueing for Safety  Bathroom Safety: Requires Supervision for Safety, Requires Cuing for Safety    Subjective    \"I could brown some meat and we could do some hard shell tortillas.\" Pt discussing plans for simple meal prep later this week.     Objective       07/05/23 0701   OT Charge Group   OT Self Care / ADL (Units) 3   OT Therapy Activity (Units) 1   OT Total Time Spent   OT Individual Total Time Spent (Mins) 60   Pain   Intervention Declines   Cognition    Level of Consciousness Alert   Functional Level of Assist   Eating Independent   Grooming Modified Independent;Seated   Grooming Description Seated in wheelchair at sink  (for oral care and shave.)   Bathing Supervision  (Dist SPV-Mod I)   Bathing Description Set-up of equipment;Increased time   Upper Body Dressing Modified Independent   Upper Body Dressing Description Increased time  (Mod I for clothing retrieval seated EOB. Ind to don/doff pullover shirt)   Lower Body Dressing Standby Assist  (SBA-SPV)   Lower Body Dressing Description Assistive devices;Set-up of equipment;Increased time;Supervision for safety  (SBA when standing for hip hike. EOB with lateral leg lift onto bed side for threading of shorts, socks, and shoes)   Bed, Chair, Wheelchair Transfer Standby Assist   Bed Chair Wheelchair Transfer Description Adaptive equipment;Increased time;Supervision for safety  (SBA for stand step with FWW)   Tub / Shower Transfers Standby Assist   Tub Shower Transfer Description Adaptive equipment;Supervision for safety;Set-up of equipment  (SBA for stand step with FWW)   Balance   Comments Pt completed functional mobility from room to nursing station using FWW at " SBA level with w/c in toe, then to holden BR for shave, and lastly to dining room for breakfast. Pt with seated breaks at each stopping point.   Bed Mobility    Supine to Sit Modified Independent   Scooting Modified Independent   Rolling Modified Independent   Interdisciplinary Plan of Care Collaboration   IDT Collaboration with  Physical Therapist   Patient Position at End of Therapy Seated  (Pt left seated in w/c in dining room for breakfast.)   Collaboration Comments re:CLOF       Assessment    Pt continues to make significant progress towards functional goals with ADLs and functional transfers this session, achieving grossly SBA-SPV for all ADLs. With pt's improvements to standing balance, endurance, and overall strength with normalizing sensation, pt has demonstrated improved safety with self-care skills.   Strengths: Able to follow instructions, Alert and oriented, Effective communication skills, Independent prior level of function, Motivated for self care and independence, Pleasant and cooperative, Willingly participates in therapeutic activities    Plan    UB strengthening as limited by fatigue, sensory re-integration, functional transfers progressing to standing from SB, ADLs with AE as needed, IADLs, incorporate FM skills for BUE    *Meal prep Friday with primary therapist, link.     Passport items to be completed:  Perform bathroom transfers, complete dressing, complete feeding, get ready for the day, prepare a simple meal, participate in household tasks, adapt home for safety needs, demonstrate home exercise program, complete caregiver training    Occupational Therapy Goals (Active)       Problem: Dressing       Dates: Start:  06/18/23         Goal: STG-Within one week, patient will dress LB with CGA overall using AE/DME as needed.       Dates: Start:  06/29/23       Description:             Problem: OT Long Term Goals       Dates: Start:  06/18/23         Goal: LTG-By discharge, patient will complete  basic self care tasks with min-supervision overall using AE/DME as needed.       Dates: Start:  06/18/23            Goal: LTG-By discharge, patient will perform bathroom transfers with SBA overall using AE/DME as needed.       Dates: Start:  06/18/23               Problem: Toileting       Dates: Start:  06/18/23         Goal: STG-Within one week, patient will complete toileting tasks with Max A overall using AE/DME as needed       Dates: Start:  06/18/23

## 2023-07-05 NOTE — CARE PLAN
Problem: PT-Long Term Goals  Goal: LTG-By discharge, patient will propel wheelchair >/= 300' and navigate inclines up to 3% (curb cuts or equivalent) c/ SBA or better.   Outcome: Progressing  Goal: LTG-By discharge, patient will transfer one surface to another c/ Naila.   Outcome: Progressing  Goal: LTG-By discharge, patient will transfer in/out of a car c/ ModA or better.   Outcome: Progressing     Problem: PT-Long Term Goals  Goal: LTG-By discharge, patient will ambulate >/= 50' c/ ModA or better and FWW.   Outcome: Met

## 2023-07-06 ENCOUNTER — APPOINTMENT (OUTPATIENT)
Dept: OCCUPATIONAL THERAPY | Facility: REHABILITATION | Age: 68
DRG: 095 | End: 2023-07-06
Attending: PHYSICAL MEDICINE & REHABILITATION
Payer: MEDICARE

## 2023-07-06 ENCOUNTER — APPOINTMENT (OUTPATIENT)
Dept: PHYSICAL THERAPY | Facility: REHABILITATION | Age: 68
DRG: 095 | End: 2023-07-06
Attending: PHYSICAL MEDICINE & REHABILITATION
Payer: MEDICARE

## 2023-07-06 PROCEDURE — 770010 HCHG ROOM/CARE - REHAB SEMI PRIVAT*

## 2023-07-06 PROCEDURE — A9270 NON-COVERED ITEM OR SERVICE: HCPCS | Performed by: HOSPITALIST

## 2023-07-06 PROCEDURE — 97535 SELF CARE MNGMENT TRAINING: CPT

## 2023-07-06 PROCEDURE — 97113 AQUATIC THERAPY/EXERCISES: CPT

## 2023-07-06 PROCEDURE — 700102 HCHG RX REV CODE 250 W/ 637 OVERRIDE(OP): Performed by: HOSPITALIST

## 2023-07-06 PROCEDURE — 700102 HCHG RX REV CODE 250 W/ 637 OVERRIDE(OP): Performed by: PHYSICAL MEDICINE & REHABILITATION

## 2023-07-06 PROCEDURE — 97110 THERAPEUTIC EXERCISES: CPT

## 2023-07-06 PROCEDURE — 99233 SBSQ HOSP IP/OBS HIGH 50: CPT | Performed by: PHYSICAL MEDICINE & REHABILITATION

## 2023-07-06 PROCEDURE — A9270 NON-COVERED ITEM OR SERVICE: HCPCS | Performed by: PHYSICAL MEDICINE & REHABILITATION

## 2023-07-06 PROCEDURE — 99232 SBSQ HOSP IP/OBS MODERATE 35: CPT | Performed by: HOSPITALIST

## 2023-07-06 RX ORDER — PREGABALIN 100 MG/1
100 CAPSULE ORAL 2 TIMES DAILY
Status: DISCONTINUED | OUTPATIENT
Start: 2023-07-06 | End: 2023-07-07

## 2023-07-06 RX ADMIN — DOCUSATE SODIUM 100 MG: 100 CAPSULE, LIQUID FILLED ORAL at 21:01

## 2023-07-06 RX ADMIN — TRAZODONE HYDROCHLORIDE 50 MG: 50 TABLET ORAL at 21:01

## 2023-07-06 RX ADMIN — APIXABAN 2.5 MG: 2.5 TABLET, FILM COATED ORAL at 07:29

## 2023-07-06 RX ADMIN — SULFAMETHOXAZOLE AND TRIMETHOPRIM 1 TABLET: 800; 160 TABLET ORAL at 21:01

## 2023-07-06 RX ADMIN — MIDODRINE HYDROCHLORIDE 10 MG: 10 TABLET ORAL at 17:48

## 2023-07-06 RX ADMIN — PREGABALIN 150 MG: 150 CAPSULE ORAL at 05:36

## 2023-07-06 RX ADMIN — SENNOSIDES 17.2 MG: 8.6 TABLET, FILM COATED ORAL at 11:57

## 2023-07-06 RX ADMIN — MAGNESIUM HYDROXIDE 30 ML: 1200 LIQUID ORAL at 17:54

## 2023-07-06 RX ADMIN — APIXABAN 2.5 MG: 2.5 TABLET, FILM COATED ORAL at 21:00

## 2023-07-06 RX ADMIN — OMEPRAZOLE 20 MG: 20 CAPSULE, DELAYED RELEASE ORAL at 07:28

## 2023-07-06 RX ADMIN — Medication 2000 UNITS: at 07:29

## 2023-07-06 RX ADMIN — MIDODRINE HYDROCHLORIDE 10 MG: 10 TABLET ORAL at 07:28

## 2023-07-06 RX ADMIN — Medication 6 MG: at 21:00

## 2023-07-06 RX ADMIN — TAMSULOSIN HYDROCHLORIDE 0.4 MG: 0.4 CAPSULE ORAL at 17:48

## 2023-07-06 RX ADMIN — DOCUSATE SODIUM 100 MG: 100 CAPSULE, LIQUID FILLED ORAL at 07:28

## 2023-07-06 RX ADMIN — SULFAMETHOXAZOLE AND TRIMETHOPRIM 1 TABLET: 800; 160 TABLET ORAL at 07:28

## 2023-07-06 RX ADMIN — MIDODRINE HYDROCHLORIDE 10 MG: 10 TABLET ORAL at 11:57

## 2023-07-06 RX ADMIN — MENTHOL, METHYL SALICYLATE: 10; 15 CREAM TOPICAL at 21:01

## 2023-07-06 RX ADMIN — ASPIRIN 81 MG CHEWABLE TABLET 81 MG: 81 TABLET CHEWABLE at 07:28

## 2023-07-06 RX ADMIN — MULTIPLE VITAMINS W/ MINERALS TAB 1 TABLET: TAB at 11:57

## 2023-07-06 RX ADMIN — PREGABALIN 100 MG: 100 CAPSULE ORAL at 21:00

## 2023-07-06 ASSESSMENT — ACTIVITIES OF DAILY LIVING (ADL)
BED_CHAIR_WHEELCHAIR_TRANSFER_DESCRIPTION: ADAPTIVE EQUIPMENT;INITIAL PREPARATION FOR TASK;SET-UP OF EQUIPMENT;SUPERVISION FOR SAFETY;VERBAL CUEING
TOILETING_LEVEL_OF_ASSIST_DESCRIPTION: ADAPTIVE EQUIPMENT;INCREASED TIME;SUPERVISION FOR SAFETY
TOILET_TRANSFER_DESCRIPTION: SUPERVISION FOR SAFETY

## 2023-07-06 ASSESSMENT — ENCOUNTER SYMPTOMS
SENSORY CHANGE: 1
FEVER: 0
ABDOMINAL PAIN: 0
MUSCULOSKELETAL NEGATIVE: 1
FOCAL WEAKNESS: 1
PALPITATIONS: 0
VOMITING: 0
CHILLS: 0
POLYDIPSIA: 0
SHORTNESS OF BREATH: 0
BRUISES/BLEEDS EASILY: 0
EYES NEGATIVE: 1
COUGH: 0
NAUSEA: 0

## 2023-07-06 ASSESSMENT — PATIENT HEALTH QUESTIONNAIRE - PHQ9
1. LITTLE INTEREST OR PLEASURE IN DOING THINGS: NOT AT ALL
2. FEELING DOWN, DEPRESSED, IRRITABLE, OR HOPELESS: NOT AT ALL
SUM OF ALL RESPONSES TO PHQ9 QUESTIONS 1 AND 2: 0
SUM OF ALL RESPONSES TO PHQ9 QUESTIONS 1 AND 2: 0
1. LITTLE INTEREST OR PLEASURE IN DOING THINGS: NOT AT ALL
2. FEELING DOWN, DEPRESSED, IRRITABLE, OR HOPELESS: NOT AT ALL

## 2023-07-06 ASSESSMENT — PAIN DESCRIPTION - PAIN TYPE: TYPE: ACUTE PAIN

## 2023-07-06 NOTE — CARE PLAN
Problem: Dressing  Goal: STG-Within one week, patient will dress LB with CGA overall using AE/DME as needed.  Outcome: Met     Problem: Toileting  Goal: STG-Within one week, patient will complete toileting tasks with Max A overall using AE/DME as needed  Outcome: Met

## 2023-07-06 NOTE — DISCHARGE PLANNING
VALERI    Home health sent to [facility].    Maren sent to [facility].

## 2023-07-06 NOTE — CARE PLAN
Problem: Bladder / Voiding  Goal: Patient will establish and maintain regular urinary output  Note: Pt is continent of bladder using urinal at hs.Voiding adequate amount of urine.Will continue to monitor.     Problem: Infection  Goal: Patient will remain free from infection  Note: Pt is on scheduled Bactrim DS.No adverse reaction noted.Will continue to monitor.

## 2023-07-06 NOTE — PROGRESS NOTES
Hospital Medicine Daily Progress Note        Chief Complaint:  Hypertension  Hyponatremia    Interval History:  Pt reports dysuria now completely gone.    Review of Systems  Review of Systems   Constitutional:  Negative for chills and fever.   HENT: Negative.     Eyes: Negative.    Respiratory:  Negative for cough and shortness of breath.    Cardiovascular:  Negative for chest pain and palpitations.   Gastrointestinal:  Negative for abdominal pain, nausea and vomiting.   Genitourinary: Negative.    Musculoskeletal: Negative.    Skin:  Negative for itching and rash.   Neurological:  Positive for sensory change and focal weakness.   Endo/Heme/Allergies:  Negative for polydipsia. Does not bruise/bleed easily.        Physical Exam  Temp:  [36.6 °C (97.9 °F)-37.3 °C (99.2 °F)] 36.7 °C (98.1 °F)  Pulse:  [88-93] 89  Resp:  [18] 18  BP: (115-138)/(65-80) 138/80  SpO2:  [92 %-95 %] 95 %    Physical Exam  Vitals reviewed.   Constitutional:       General: He is not in acute distress.     Appearance: Normal appearance. He is not ill-appearing.   HENT:      Head: Normocephalic and atraumatic.      Right Ear: External ear normal.      Left Ear: External ear normal.      Nose: Nose normal.      Mouth/Throat:      Pharynx: Oropharynx is clear.   Eyes:      General:         Right eye: No discharge.         Left eye: No discharge.      Extraocular Movements: Extraocular movements intact.      Conjunctiva/sclera: Conjunctivae normal.   Cardiovascular:      Rate and Rhythm: Normal rate and regular rhythm.   Pulmonary:      Effort: Pulmonary effort is normal. No respiratory distress.      Breath sounds: Normal breath sounds. No wheezing.   Abdominal:      General: Bowel sounds are normal. There is no distension.      Palpations: Abdomen is soft.      Tenderness: There is no abdominal tenderness.   Musculoskeletal:      Cervical back: Normal range of motion and neck supple.      Right lower leg: No edema.      Left lower leg: No edema.    Skin:     General: Skin is warm and dry.   Neurological:      Mental Status: He is alert and oriented to person, place, and time.         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[facility])  Assessment & Plan  Had progressive BLE weakness  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[facility]  S/P IVIG x 5 doses  Now on Midodrine and Flomax  Ongoing management per Physiatry    Pyuria  Assessment & Plan  UA packed WBC w/ many bacteria  UCx >100,000 Citrobacter  Continue Bactrim x 5 days based on YRIS  Check F/U labs in AM    Leukopenia  Assessment & Plan  May be 2/2 meds  Possible culprits include Lyrica, Omeprazole, ASA, and Lipitor  Monitor need for G-CSF  WBC now normal  Check F/U labs in AM    Dyslipidemia  Assessment & Plan  Continue Lipitor    Vitamin D deficiency  Assessment & Plan  Vit D level 19  Continue supplementation    Anemia  Assessment & Plan  Has normocytic indices  Fe 96  Follow H/H  Check F/U labs in AM    Hyponatremia  Assessment & Plan  Was on fluid restriction, Lasix, and NaCl tabs  Lisinopril discontinued per Dr. Ruelas  Na+ levels resolved and now stable  Off NaCl, Lasix, and fluid restriction  Continue to follow electrolytes  Check F/U labs in AM      Full Code    Discussed w/ pt

## 2023-07-06 NOTE — PROGRESS NOTES
Patient care assumed. Report received from night RN. Patient is alert and calm, resting in bed. Call light and bedside table within reach.

## 2023-07-06 NOTE — THERAPY
"Physical Therapy   Daily Treatment     Patient Name: Bull Banegas  Age:  67 y.o., Sex:  male  Medical Record #: [id number]  Today's Date: 7/6/2023     Precautions  Precautions: Fall Risk  Comments: blurred vision, avoid over-fatiguing    Subjective    Pt received up in wc from tech, eager to participate in aquatic therapy. \"Can I try breaststroke?\"     Objective       07/06/23 1345   PT Charge Group   PT Aquatic Therapy  4   PT Total Time Spent   PT Individual Total Time Spent (Mins) 60   Interdisciplinary Plan of Care Collaboration   IDT Collaboration with  Physical Therapist;Therapy Tech   Patient Position at End of Therapy Seated;Other (Comments)  (self-propelling in wc back to room from pool with tech)   Collaboration Comments assist from other PT for lift mgt; tech transport     Pt participated in 60 minutes of aquatic therapy with 30% weightbearing submerged to lower sternum.  Pt amb with SBA and no UE support. Patient amb forw across length of pool x4, bwd across length of pool x4, and sidestepping across length of pool x4 in both directions.  -prone exercises with B elbow support on pool bar including bicycles x2 min and flutter kicks x1 min  -supine exercises with B elbow support on pool bar and pool noodle under hips for add'l lift including flutter kicks and scissor kicks x1 min ea  -seated exercises including bicycles, flutter kicks, scissor kicks x2 min ea  -single leg hops forw/bwd and side to side with SBA and no UE support x1 min ea   -double leg jumps with SBA and no UE support 2x10  -B hip hikes onto pool bench in quadruped x3 times  -standing core strengthening with submerged B aqua paddles including horiz shoulder abd/add, straight arm shoulder flexion/ext, shoulder lateral raises x15 ea    Pt entered the pool via stairs sidestepping with LLE leading and BUE support on LHR plus CGA-SBA. Pt exited the pool via lift with total A d/t fatigue as anticipated.     Assessment    Pt tolerated therapy " session well focusing on buoyancy assisted general endurance, LE power generation, and core strengthening. Pt expressed feeling fatigued towards end of session so transitioned from LE to core exercises as described above with good tolerance. Pt will benefit from aquatic therapy to enhance overall functional outcome with overground amb, stairs, and balance in anticipation of d/c home on Monday.     Strengths: Able to follow instructions, Alert and oriented, Effective communication skills, Good carryover of learning, Independent prior level of function, Making steady progress towards goals, Motivated for self care and independence, Pleasant and cooperative, Supportive family, Willingly participates in therapeutic activities  Barriers: Decreased endurance, Fatigue, Generalized weakness, Home accessibility, Impaired activity tolerance, Impaired balance, Limited mobility (Paraplegia)    Plan    6 inch curb step FWW in // bars for safety leading with R LE ascending and L LE descending  Gait FWW with WC in tow vs 4WW- both with emphasis on quality over distance  B LE and trunk NRE  WC mobility outdoors  Aquatic therapy on Sat 7/8 @ 2-3 pm     Passport items to be completed:  Get in/out of bed safely, in/out of a vehicle, safely use mobility device, walk or wheel around home/community, navigate up and down stairs, show how to get up/down from the ground, ensure home is accessible, demonstrate HEP, complete caregiver training    Physical Therapy Problems (Active)       Problem: PT-Long Term Goals       Dates: Start:  06/18/23         Goal: LTG-By discharge, patient will propel wheelchair >/= 300' and navigate inclines up to 3% (curb cuts or equivalent) c/ SBA or better.        Dates: Start:  06/18/23            Goal: LTG-By discharge, patient will transfer one surface to another c/ Naila.        Dates: Start:  06/18/23            Goal: LTG-By discharge, patient will transfer in/out of a car c/ ModA or better.        Dates:  Start:  06/18/23

## 2023-07-06 NOTE — PROGRESS NOTES
"  Physical Medicine & Rehabilitation Progress Note    Encounter Date: 7/6/2023    Chief Complaint: Lower extremity weakness    Interval Events (Subjective):    He was evaluated in his room sitting comfortably in manual wheelchair, he reports being sad for his roommate who had a CODE BLUE called on him this morning.    He reports improving sensation in bilateral hands and bilateral feet  He reports improving strength in bilateral lower extremities  He slept well last night  Is participating well with therapy    Last BM: 07/04/23      ROS:  Gen: No fatigue, confusion, significant weight loss  Eyes: no blurry vision, double vision or pain in eyes  ENT: no changes in hearing, runny nose, nose bleeds, sinus pain  CV: No CP, palpitations,   Lungs: no shortness of breath, changes in secretions, changes in cough, pain with coughing  Abd: No abd pain, nausea, vomiting, pain with eating  : no blood in urine, pain during storage phase, bladder spasms, suprapubic pain  Ext: No swelling in legs, asymmetric atrophy  Neuro: Improving strength and sensation over the last 24 hours  Skin: no new ulcers/skin breakdown appreciated by patient  Mood: No changes in mood today, increase in depression or anxiety  Heme: no bruising, or bleeding    Objective:  Vitals: /71   Pulse 88   Temp 36.6 °C (97.9 °F) (Oral)   Resp 18   Ht 1.727 m (5' 7.99\")   Wt 116 kg (256 lb 13.4 oz)   SpO2 93%   Gen: NAD, Body mass index is 39.06 kg/m².  HEENT:  NC/AT, PERRLA, moist mucous membranes  Cardio: RRR, no mumurs  Pulm: CTAB, with normal respiratory effort  Abd: Soft NTND, active bowel sounds,   Ext: No peripheral edema. No calf tenderness. No clubbing/cyanosis. +dorsal pedalis pulses bilaterally.    Motor Exam Lower Extremities    ? Myotome R L   Hip flexion L2 4 4   Knee extension L3 5 4   Ankle dorsiflexion L4 5 4   Toe extension L5 5 4   Ankle plantarflexion S1 5 4        Laboratory Values:  Recent Results (from the past 72 hour(s))   CBC " WITH DIFFERENTIAL    Collection Time: 07/04/23  5:41 AM   Result Value Ref Range    WBC 6.3 4.8 - 10.8 K/uL    RBC 2.68 (L) 4.70 - 6.10 M/uL    Hemoglobin 8.4 (L) 14.0 - 18.0 g/dL    Hematocrit 24.6 (L) 42.0 - 52.0 %    MCV 91.8 81.4 - 97.8 fL    MCH 31.3 27.0 - 33.0 pg    MCHC 34.1 32.3 - 36.5 g/dL    RDW 57.1 (H) 35.9 - 50.0 fL    Platelet Count 345 164 - 446 K/uL    MPV 10.1 9.0 - 12.9 fL    Neutrophils-Polys 70.10 44.00 - 72.00 %    Lymphocytes 18.30 (L) 22.00 - 41.00 %    Monocytes 10.20 0.00 - 13.40 %    Eosinophils 0.30 0.00 - 6.90 %    Basophils 0.60 0.00 - 1.80 %    Immature Granulocytes 0.50 0.00 - 0.90 %    Nucleated RBC 0.00 0.00 - 0.20 /100 WBC    Neutrophils (Absolute) 4.39 1.82 - 7.42 K/uL    Lymphs (Absolute) 1.15 1.00 - 4.80 K/uL    Monos (Absolute) 0.64 0.00 - 0.85 K/uL    Eos (Absolute) 0.02 0.00 - 0.51 K/uL    Baso (Absolute) 0.04 0.00 - 0.12 K/uL    Immature Granulocytes (abs) 0.03 0.00 - 0.11 K/uL    NRBC (Absolute) 0.00 K/uL   Basic Metabolic Panel    Collection Time: 07/04/23  5:41 AM   Result Value Ref Range    Sodium 134 (L) 135 - 145 mmol/L    Potassium 3.9 3.6 - 5.5 mmol/L    Chloride 98 96 - 112 mmol/L    Co2 23 20 - 33 mmol/L    Glucose 114 (H) 65 - 99 mg/dL    Bun 12 8 - 22 mg/dL    Creatinine 0.51 0.50 - 1.40 mg/dL    Calcium 8.8 8.5 - 10.5 mg/dL    Anion Gap 13.0 7.0 - 16.0   PROCALCITONIN    Collection Time: 07/04/23  5:41 AM   Result Value Ref Range    Procalcitonin 0.15 <0.25 ng/mL   ESTIMATED GFR    Collection Time: 07/04/23  5:41 AM   Result Value Ref Range    GFR (CKD-EPI) 112 >60 mL/min/1.73 m 2       Medications:  Scheduled Medications   Medication Dose Frequency    sulfamethoxazole-trimethoprim  1 Tablet Q12HRS    pregabalin  150 mg BID    apixaban  2.5 mg BID    traZODone  50 mg QHS    docusate sodium  100 mg BID    And    sennosides  17.2 mg DAILY AT NOON    midodrine  10 mg TID WITH MEALS    vitamin D3  2,000 Units DAILY    menthol-methyl salicycate   BID    omeprazole   20 mg DAILY    Pharmacy Consult Request  1 Each PHARMACY TO DOSE    therapeutic multivitamin-minerals  1 Tablet DAILY WITH LUNCH    aspirin  81 mg DAILY    atorvastatin  10 mg MO, WE + FR    melatonin  6 mg QHS    lidocaine  2 Patch Q24HR    tamsulosin  0.4 mg AFTER DINNER     PRN medications: oxyCODONE immediate-release **OR** oxyCODONE immediate-release, acetaminophen, docusate sodium **AND** sennosides **AND** [DISCONTINUED] bisacodyl **AND** magnesium hydroxide, [COMPLETED] docusate sodium **FOLLOWED BY** normal saline (PF), traMADol, carboxymethylcellulose, benzocaine-menthol, mag hydrox-al hydrox-simeth, ondansetron **OR** ondansetron, traZODone, sodium chloride, Respiratory Therapy Consult, hydrALAZINE    Diet:  Current Diet Order   Procedures    Diet Order Diet: Regular       Assessment:  Active Hospital Problems    Diagnosis     *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)     Pyuria     Leukopenia     Fever     Ear discomfort     Azotemia     Dyslipidemia     Essential hypertension     UTI (urinary tract infection)     Vitamin D deficiency     Hyponatremia     Anemia        Medical Decision Making and Plan:    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  Lower extremity weakness and pain as well as numbness in upper extremities.  No known premorbid infection.  -PT and OT for mobility and ADLs. Per guidelines, 15 hours per week between PT, OT.  Cleared by speech therapy for swallow and speech, transition time to PT and OT  - Continue comprehensive acute inpatient rehabilitation  -Cleared for aqua therapy     Fever: 100.5 on 6/26, febrile over the last 24 hours  - Infectious work-up as per hospitalist, COVID and flu were negative, UA is negative, procalcitonin was elevated on 6/25, chest x-ray was negative blood cultures to date are negative  - Discontinued Tylenol, concerned that this may be masking his fevers.     Neurologic  respiratory impairment:   Patient is at high risk for respiratory involvement given neurologic symptoms in the upper extremities.   -Vital capacity daily, 1.72 liters on 6/20, >2L 7/4, discontinue daily vital capacity  - Incentive spirometry     Lower extremity swelling: Left greater than right  - Lower extremity Doppler was negative for DVT     Hyponatremia: Likely SIADH, followed by nephrology during [facility] hospitalization  - Sodium of 126 on 6/17 --> 129 on 6/19 --> 133 on 6/20 --> 136 on 6/21 --> 133 on 6/26 --> 136 on 6/27 --> 136 on 6/29 --> 135 on 7/1 -> sodium of 134 on 7/4  -Discontinue sodium chloride tablets as per hospitalist  - Lasix was discontinued on 6/21 as per hospitalist discontinuing Lasix versus liberalizing fluid restriction  -On admission was on Free water restriction of 500 cc/day and total p.o. fluid intake of 1.8 L.  Discontinued fluid restriction  -Status post 500 cc of normal saline IV fluid on 6/27  - Check BMP in a.m.     Hypomagnesemia: Low magnesium during acute hospitalization, supplemented with p.o. magnesium  - Magnesium of 2.0 on 6/18     Urinary tract infection/pyelonephritis: Catheter associated UTI in the setting of neurogenic bladder, episode of hematuria with bladder symptoms on 7/2, dysuria, frequency, urgency  - WBC of 14 on 6/17 --> WBC of 10.7 on 6/20 --> 4.3 on 6/26  - Peripheral IV was removed prior to transfer to acute rehabilitation we will replace IV.  - Ceftriaxone 1 g every 24 hours, completed course  - Greater than 100,000 colonies of Citrobacter, resistant to ampicillin, Unasyn, cefazolin, ceftriaxone, cefuroxime.  - Started on Bactrim as per hospitalist  - Check CBC in a.m.     Leukopenia:  - WBC of 3.2 --> 3.4 on 6/30 -> 6.3 on 7/4  -Differential diagnosis includes drug-induced versus viral infection  - Discussed with hospitalist  -We will decrease dose of Lyrica to 200 mg twice daily, this medication should not be abruptly discontinued  - Check CBC in  a.m.     Neurogenic bladder: Inappropriate management of neurogenic bladder can result and hydronephrosis, increased risk of pyelonephritis, and renal failure  - Discontinued Blancas on 6/22,   - Continue voiding trial if can't void in 6 hours or prn check pvr and if >500cc then ICP,if able to void then check PVR, if PVR is >200cc then ICP  - Continue Flomax 0.4 mg nightly, discussed discontinuing this medication in the near future     Neurogenic bowel: Inappropriate neurogenic bowel can result in severe constipation, bowel dilation and rupture.  Severe constipation with prolonged period of time without BM.  -Continue upper motor neuron neurogenic bowel program with senna 2 tablets q. noon, Colace 100 mg twice daily and hold Magic bullet suppository WI daily and digital stimulation     orthostatic hypotension  Because of significant autonomic dysfunction associated with some cases of AIDP, the patient is at high risk for orthostatic hypotension.  Goal of SBP greater than 100.  -  Mechanical compression with teds and Tubi  and abd binder used prior to out of bed  - Continue midodrine 10 mg 3 times daily     Essential hypertension: SBP of greater than 200 on presentation to acute hospital.  SBP  in the last 24 hours  - Lisinopril 20 mg daily held, discontinued lisinopril secondary to orthostatic hypotension as above, may restart as an outpatient with recovery of autonomic nervous system     Dyslipidemia: Total cholesterol 137, HDL 44, LDL 75  -Lipitor 10 mg every Monday Wednesday Friday     Dysphagia: Concern for swallow dysfunction in the setting of certain variants of AIDP  - Consult speech therapy for swallow evaluation.  Cleared by speech therapy     Skin  Due to the sensory impairments following AIDP, skin protection principles are of the utmost importance.      - every two-hour turning pattern overnight while the patient is in bed. When the patient is out of bed, an every 15 minute repositioning or  weight shifting pattern will be utilized in order to help train the patient for long-term skin protection. We will also discuss skin monitoring and its importance with the patient and the caregivers.     Insomnia:  - Discontinue temazepam  -Initiation of trazodone 50 mg nightly, may repeat x1     Pain:  Postoperative pain:  -Continue oxycodone 5-10 mg every 4 hours as needed for pain moderate to severe  - Discontinued Tylenol as this may be masking fevers  - Lidocaine patch x2 on either side of incision, on for 12 hours off for 12 hours  - Discontinue tramadol     Neuropathic pain: Burning and tingling in all 4 extremities  -Discontinue gabapentin  - Decrease Lyrica to 100 mg twice daily  - Discontinue Celebrex  - Discontinue tramadol  -May benefit from a nonpharmaceutical modalities such as TENS units, compression, ice, heat, will discuss with therapy team.     Vitamin D deficiency: High risk of vitamin D deficiency in this population, goal of vitamin D level greater than 30, has been linked to improvement and central nervous system recovery  - Vitamin D level of 19 on 6/18  - Cholecalciferol 2000 units daily     Insomnia: Significant difficulty during acute hospitalization  - DC Restoril 15 mg nightly  - Trazodone 50 mg nightly as needed insomnia     DVT prophylaxis  In the setting of AIDP, the patient is at high risk of deep venous thrombosis given decreased mobility and alteration to autonomic nervous system.   -DC Lovenox  - Eliquis 5 mg twice daily for prophylaxis      Physician's Interdisciplinary Team Conference Note    Nursing staff, Physical Therapist(s), Occupational Therapist(s), Case Management and Pharmacy staff were present and reported. Where appropriate Speech, Respiratory, and Recreational Therapists were present and reported.     I, Mario Terrell D.O., was present and led the interdisciplinary team conference on 7/6/2023.  I led the IDT conference and agree with the IDT conference  documentation and plan of care as noted below.     RN:  Diet    % Meal     Pain Improving    Sleep    Bowel    Bladder improving   In's & Out's      Barriers: none    PT:  Bed Mobility CGA   Transfers    Mobility 50 ft CGA   Stairs Min A   Family training  STG 1/4  W/C  Walker  Barriers: knee buckle and LE weakness    OT:  Eating    Grooming    Bathing SBA   UB Dressing    LB Dressing SBA   Toileting    Shower & Transfer CGA   Intermittent using adaptive equipment  Wife is scheduled for family training  Barriers:LOB, LE weakness      CM:  Continues to work on disposition and DME needs.       DC/Disposition:  7/10    Above is an abridged version of the patient's status and plan of care.  For complete details please see Weekly Interdisciplinary Team Conference Note from this date.     All reporting clinicians have a working knowledge of this patient's plan of care.      ____________________________________    Mario Terrell DO  [facility] - Physical Medicine & Rehabilitation   [facility] - Spinal Cord Injury Medicine  ____________________________________    Total time:  56 minutes.  I spent greater than 50% of the time for patient care and coordination on this date, including unit/floor time, and face-to-face time with the patient as per assessment and plan above.  Discussion included coordination of care as per IDT above, hyponatremia, check BMP in a.m., anemia, check CBC in a.m., neuropathic pain, decrease Lyrica, nociceptive pain, DC tramadol

## 2023-07-06 NOTE — CARE PLAN
Problem: Recreation Therapy  Goal: STG-Within one week, patient will demonstrate a standing tolerance of 30 sec x 3.   Outcome: Met  Goal: LTG-By discharge, patient will identify and demonstrate three new leisure skills.   Outcome: Met  Goal: LTG-By discharge, patient will demonstrate a standing tolerance of 1 min x 3.   Outcome: Met

## 2023-07-06 NOTE — CARE PLAN
The patient is Stable - Low risk of patient condition declining or worsening    Shift Goals  Clinical Goals: Skin management  Patient Goals: Rest, urinary management  Family Goals: Education    Progress made toward(s) clinical / shift goals:    Problem: Skin Integrity  Goal: Skin integrity is maintained or improved  Outcome: Progressing   Patient's skin remains intact and free from new or accidental injury this shift.  Will continue to monitor.

## 2023-07-06 NOTE — THERAPY
Occupational Therapy  Daily Treatment     Patient Name: Bull Banegas  Age:  67 y.o., Sex:  male  Medical Record #: [id number]  Today's Date: 7/6/2023     Precautions  Precautions: Fall Risk  Comments: blurred vision, avoid over-fatiguing    Safety   ADL Safety : Requires Supervision for Safety, Requires Cueing for Safety  Bathroom Safety: Requires Supervision for Safety, Requires Cuing for Safety    Subjective    Pt seated EOB completing breakfast upon therapist arrival and agreeable to OT tx.     Objective       07/06/23 0831   OT Charge Group   OT Self Care / ADL (Units) 3   OT Therapeutic Exercise (Units) 1   OT Total Time Spent   OT Individual Total Time Spent (Mins) 60   Pain   Intervention Declines   Cognition    Level of Consciousness Alert   Functional Level of Assist   Grooming Modified Independent;Seated   Grooming Description Seated in wheelchair at sink  (for oral care)   Bathing Supervision  (SPV-Mod I)   Bathing Description Hand held shower;Tub bench;Increased time;Set-up of equipment  (Pt able to reach all parts seated on fold down shower bench with lateral lean for rear.)   Upper Body Dressing Independent   Lower Body Dressing Minimal Assist   Lower Body Dressing Description Increased time;Supervision for safety  (Min A to don socks seated in w/c, as bed was unavailable for lateral leg lift (otherwise pt had completed without AE or assist). Pt completed threading in seated and attempted figure-4 without success.)   Toileting Standby Assist   Toileting Description Adaptive equipment;Increased time;Supervision for safety  (SBA in standing for clothing management only)   Bed, Chair, Wheelchair Transfer Standby Assist   Bed Chair Wheelchair Transfer Description Adaptive equipment;Initial preparation for task;Set-up of equipment;Supervision for safety;Verbal cueing  (stand step FWW)   Toilet Transfers Standby Assist   Toilet Transfer Description Supervision for safety  (SBA for stand step with FWW)   Tub /  Shower Transfers Standby Assist   Tub Shower Transfer Description Adaptive equipment;Shower bench;Supervision for safety;Set-up of equipment  (SBA for stand step with FWW)   Sitting Upper Body Exercises   Internal Shoulder Rotation 3 sets of 15;Bilateral;Weight (See Comments for lbs)   External Shoulder Rotation 3 sets of 15;Bilateral;Weight (See Comments for lbs)   Bicep Curls 3 sets of 15;Bilateral;Weight (See Comments for lbs)   Comments using a 4lbs hand weight   Interdisciplinary Plan of Care Collaboration   IDT Collaboration with  Certified Nursing Assistant   Patient Position at End of Therapy Seated   Collaboration Comments CNA notified pt left seated in main gym for escort back to room.     Pt completed functional mobility in room at SBA level using FWW without LOB.    Assessment    Pt tolerated session well with consistent performance of ADLs, though did require assist due to positional changes for socks. He remains limited by decreased  strength/overall strength and standing balance with deficits in sensation for bilateral feet and hands.  Strengths: Able to follow instructions, Alert and oriented, Effective communication skills, Independent prior level of function, Motivated for self care and independence, Pleasant and cooperative, Willingly participates in therapeutic activities    Plan    Meal prep task tomorrow, UB strengthening as limited by fatigue, sensory re-integration, functional transfers progressing to standing from SB, ADLs with AE as needed, IADLs, incorporate FM skills for BUE    FT & DC shower on Sunday    Passport items to be completed:  Perform bathroom transfers, complete dressing, complete feeding, get ready for the day, prepare a simple meal, participate in household tasks, adapt home for safety needs, demonstrate home exercise program, complete caregiver training     Occupational Therapy Goals (Active)       Problem: OT Long Term Goals       Dates: Start:  06/18/23         Goal:  LTG-By discharge, patient will complete basic self care tasks with min-supervision overall using AE/DME as needed.       Dates: Start:  06/18/23            Goal: LTG-By discharge, patient will perform bathroom transfers with SBA overall using AE/DME as needed.       Dates: Start:  06/18/23

## 2023-07-07 ENCOUNTER — APPOINTMENT (OUTPATIENT)
Dept: PHYSICAL THERAPY | Facility: REHABILITATION | Age: 68
DRG: 095 | End: 2023-07-07
Attending: PHYSICAL MEDICINE & REHABILITATION
Payer: MEDICARE

## 2023-07-07 ENCOUNTER — APPOINTMENT (OUTPATIENT)
Dept: OCCUPATIONAL THERAPY | Facility: REHABILITATION | Age: 68
DRG: 095 | End: 2023-07-07
Attending: PHYSICAL MEDICINE & REHABILITATION
Payer: MEDICARE

## 2023-07-07 PROBLEM — R82.81 PYURIA: Status: RESOLVED | Noted: 2023-07-02 | Resolved: 2023-07-07

## 2023-07-07 PROBLEM — D75.839 THROMBOCYTOSIS: Status: ACTIVE | Noted: 2023-07-07

## 2023-07-07 LAB
ANION GAP SERPL CALC-SCNC: 12 MMOL/L (ref 7–16)
BASOPHILS # BLD AUTO: 0.7 % (ref 0–1.8)
BASOPHILS # BLD: 0.03 K/UL (ref 0–0.12)
BUN SERPL-MCNC: 14 MG/DL (ref 8–22)
CALCIUM SERPL-MCNC: 8.8 MG/DL (ref 8.5–10.5)
CHLORIDE SERPL-SCNC: 100 MMOL/L (ref 96–112)
CO2 SERPL-SCNC: 24 MMOL/L (ref 20–33)
CREAT SERPL-MCNC: 0.58 MG/DL (ref 0.5–1.4)
EOSINOPHIL # BLD AUTO: 0.03 K/UL (ref 0–0.51)
EOSINOPHIL NFR BLD: 0.7 % (ref 0–6.9)
ERYTHROCYTE [DISTWIDTH] IN BLOOD BY AUTOMATED COUNT: 56 FL (ref 35.9–50)
GFR SERPLBLD CREATININE-BSD FMLA CKD-EPI: 108 ML/MIN/1.73 M 2
GLUCOSE SERPL-MCNC: 108 MG/DL (ref 65–99)
HCT VFR BLD AUTO: 24.4 % (ref 42–52)
HGB BLD-MCNC: 8.2 G/DL (ref 14–18)
IMM GRANULOCYTES # BLD AUTO: 0.02 K/UL (ref 0–0.11)
IMM GRANULOCYTES NFR BLD AUTO: 0.5 % (ref 0–0.9)
LYMPHOCYTES # BLD AUTO: 1.1 K/UL (ref 1–4.8)
LYMPHOCYTES NFR BLD: 25.5 % (ref 22–41)
MAGNESIUM SERPL-MCNC: 2.1 MG/DL (ref 1.5–2.5)
MCH RBC QN AUTO: 31.4 PG (ref 27–33)
MCHC RBC AUTO-ENTMCNC: 33.6 G/DL (ref 32.3–36.5)
MCV RBC AUTO: 93.5 FL (ref 81.4–97.8)
MONOCYTES # BLD AUTO: 0.58 K/UL (ref 0–0.85)
MONOCYTES NFR BLD AUTO: 13.4 % (ref 0–13.4)
NEUTROPHILS # BLD AUTO: 2.56 K/UL (ref 1.82–7.42)
NEUTROPHILS NFR BLD: 59.2 % (ref 44–72)
NRBC # BLD AUTO: 0 K/UL
NRBC BLD-RTO: 0 /100 WBC (ref 0–0.2)
PHOSPHATE SERPL-MCNC: 3.8 MG/DL (ref 2.5–4.5)
PLATELET # BLD AUTO: 456 K/UL (ref 164–446)
PMV BLD AUTO: 9.8 FL (ref 9–12.9)
POTASSIUM SERPL-SCNC: 4.4 MMOL/L (ref 3.6–5.5)
RBC # BLD AUTO: 2.61 M/UL (ref 4.7–6.1)
SODIUM SERPL-SCNC: 136 MMOL/L (ref 135–145)
WBC # BLD AUTO: 4.3 K/UL (ref 4.8–10.8)

## 2023-07-07 PROCEDURE — 83735 ASSAY OF MAGNESIUM: CPT

## 2023-07-07 PROCEDURE — 84100 ASSAY OF PHOSPHORUS: CPT

## 2023-07-07 PROCEDURE — 97530 THERAPEUTIC ACTIVITIES: CPT

## 2023-07-07 PROCEDURE — 97110 THERAPEUTIC EXERCISES: CPT

## 2023-07-07 PROCEDURE — 700102 HCHG RX REV CODE 250 W/ 637 OVERRIDE(OP): Performed by: HOSPITALIST

## 2023-07-07 PROCEDURE — 36415 COLL VENOUS BLD VENIPUNCTURE: CPT

## 2023-07-07 PROCEDURE — A9270 NON-COVERED ITEM OR SERVICE: HCPCS | Performed by: HOSPITALIST

## 2023-07-07 PROCEDURE — 99232 SBSQ HOSP IP/OBS MODERATE 35: CPT | Performed by: HOSPITALIST

## 2023-07-07 PROCEDURE — 700102 HCHG RX REV CODE 250 W/ 637 OVERRIDE(OP): Performed by: PHYSICAL MEDICINE & REHABILITATION

## 2023-07-07 PROCEDURE — 80048 BASIC METABOLIC PNL TOTAL CA: CPT

## 2023-07-07 PROCEDURE — A9270 NON-COVERED ITEM OR SERVICE: HCPCS | Performed by: PHYSICAL MEDICINE & REHABILITATION

## 2023-07-07 PROCEDURE — 99232 SBSQ HOSP IP/OBS MODERATE 35: CPT | Performed by: PHYSICAL MEDICINE & REHABILITATION

## 2023-07-07 PROCEDURE — 770010 HCHG ROOM/CARE - REHAB SEMI PRIVAT*

## 2023-07-07 PROCEDURE — 85025 COMPLETE CBC W/AUTO DIFF WBC: CPT

## 2023-07-07 RX ORDER — MIDODRINE HYDROCHLORIDE 2.5 MG/1
5 TABLET ORAL
Status: DISCONTINUED | OUTPATIENT
Start: 2023-07-07 | End: 2023-07-10

## 2023-07-07 RX ORDER — PREGABALIN 75 MG/1
75 CAPSULE ORAL 2 TIMES DAILY
Status: DISCONTINUED | OUTPATIENT
Start: 2023-07-07 | End: 2023-07-10 | Stop reason: HOSPADM

## 2023-07-07 RX ADMIN — Medication 6 MG: at 21:10

## 2023-07-07 RX ADMIN — Medication 2000 UNITS: at 07:55

## 2023-07-07 RX ADMIN — DOCUSATE SODIUM 100 MG: 100 CAPSULE, LIQUID FILLED ORAL at 07:55

## 2023-07-07 RX ADMIN — ATORVASTATIN CALCIUM 10 MG: 10 TABLET, FILM COATED ORAL at 21:10

## 2023-07-07 RX ADMIN — MIDODRINE HYDROCHLORIDE 10 MG: 10 TABLET ORAL at 07:55

## 2023-07-07 RX ADMIN — PREGABALIN 75 MG: 75 CAPSULE ORAL at 21:10

## 2023-07-07 RX ADMIN — APIXABAN 2.5 MG: 2.5 TABLET, FILM COATED ORAL at 21:11

## 2023-07-07 RX ADMIN — DOCUSATE SODIUM 100 MG: 100 CAPSULE, LIQUID FILLED ORAL at 21:10

## 2023-07-07 RX ADMIN — MIDODRINE HYDROCHLORIDE 5 MG: 2.5 TABLET ORAL at 12:24

## 2023-07-07 RX ADMIN — PREGABALIN 100 MG: 100 CAPSULE ORAL at 05:08

## 2023-07-07 RX ADMIN — TAMSULOSIN HYDROCHLORIDE 0.4 MG: 0.4 CAPSULE ORAL at 17:33

## 2023-07-07 RX ADMIN — APIXABAN 2.5 MG: 2.5 TABLET, FILM COATED ORAL at 07:55

## 2023-07-07 RX ADMIN — SULFAMETHOXAZOLE AND TRIMETHOPRIM 1 TABLET: 800; 160 TABLET ORAL at 21:11

## 2023-07-07 RX ADMIN — MIDODRINE HYDROCHLORIDE 5 MG: 2.5 TABLET ORAL at 17:33

## 2023-07-07 RX ADMIN — MULTIPLE VITAMINS W/ MINERALS TAB 1 TABLET: TAB at 11:37

## 2023-07-07 RX ADMIN — OMEPRAZOLE 20 MG: 20 CAPSULE, DELAYED RELEASE ORAL at 07:55

## 2023-07-07 RX ADMIN — ASPIRIN 81 MG CHEWABLE TABLET 81 MG: 81 TABLET CHEWABLE at 07:55

## 2023-07-07 RX ADMIN — SENNOSIDES 17.2 MG: 8.6 TABLET, FILM COATED ORAL at 11:37

## 2023-07-07 RX ADMIN — TRAZODONE HYDROCHLORIDE 50 MG: 50 TABLET ORAL at 21:10

## 2023-07-07 RX ADMIN — MENTHOL, METHYL SALICYLATE: 10; 15 CREAM TOPICAL at 21:13

## 2023-07-07 RX ADMIN — SULFAMETHOXAZOLE AND TRIMETHOPRIM 1 TABLET: 800; 160 TABLET ORAL at 07:55

## 2023-07-07 ASSESSMENT — ENCOUNTER SYMPTOMS
FEVER: 0
COUGH: 0
BRUISES/BLEEDS EASILY: 0
POLYDIPSIA: 0
EYES NEGATIVE: 1
PALPITATIONS: 0
NAUSEA: 0
CHILLS: 0
ABDOMINAL PAIN: 0
VOMITING: 0
MUSCULOSKELETAL NEGATIVE: 1
SHORTNESS OF BREATH: 0
SENSORY CHANGE: 1
FOCAL WEAKNESS: 1

## 2023-07-07 ASSESSMENT — ACTIVITIES OF DAILY LIVING (ADL): BED_CHAIR_WHEELCHAIR_TRANSFER_DESCRIPTION: ADAPTIVE EQUIPMENT;INCREASED TIME;SET-UP OF EQUIPMENT;SUPERVISION FOR SAFETY

## 2023-07-07 NOTE — CARE PLAN
The patient is Stable - Low risk of patient condition declining or worsening    Shift Goals  Clinical Goals: Pain control, safety  Patient Goals: sleep  Family Goals: Education    Progress made toward(s) clinical / shift goals:    Problem: Pain - Standard  Goal: Alleviation of pain or a reduction in pain to the patient’s comfort goal  Outcome: Progressing. Patient denies having pain over shift. Has tylenol available as needed for pain.      Problem: Bladder / Voiding  Goal: Patient will establish and maintain regular urinary output  Outcome: Progressing. Patient continent of bladder over shift. Uses urinal while in bed. PVR q shift as patient is on flomax, 102 mL over shift.

## 2023-07-07 NOTE — PROGRESS NOTES
Hospital Medicine Daily Progress Note        Chief Complaint:  Hypertension  Hyponatremia    Interval History:  Doing well.  Labs reviewed and discussed.    Review of Systems  Review of Systems   Constitutional:  Negative for chills and fever.   HENT: Negative.     Eyes: Negative.    Respiratory:  Negative for cough and shortness of breath.    Cardiovascular:  Negative for chest pain and palpitations.   Gastrointestinal:  Negative for abdominal pain, nausea and vomiting.   Genitourinary: Negative.    Musculoskeletal: Negative.    Skin:  Negative for itching and rash.   Neurological:  Positive for sensory change and focal weakness.   Endo/Heme/Allergies:  Negative for polydipsia. Does not bruise/bleed easily.        Physical Exam  Temp:  [36.6 °C (97.9 °F)-37.1 °C (98.7 °F)] 36.6 °C (97.9 °F)  Pulse:  [89-92] 91  Resp:  [17-18] 18  BP: ()/(57-80) 99/57  SpO2:  [94 %-95 %] 94 %    Physical Exam  Vitals reviewed.   Constitutional:       General: He is not in acute distress.     Appearance: Normal appearance. He is not ill-appearing.   HENT:      Head: Normocephalic and atraumatic.      Right Ear: External ear normal.      Left Ear: External ear normal.      Nose: Nose normal.      Mouth/Throat:      Pharynx: Oropharynx is clear.   Eyes:      General:         Right eye: No discharge.         Left eye: No discharge.      Extraocular Movements: Extraocular movements intact.      Conjunctiva/sclera: Conjunctivae normal.   Cardiovascular:      Rate and Rhythm: Normal rate and regular rhythm.   Pulmonary:      Effort: Pulmonary effort is normal. No respiratory distress.      Breath sounds: Normal breath sounds. No wheezing.   Abdominal:      General: Bowel sounds are normal. There is no distension.      Palpations: Abdomen is soft.      Tenderness: There is no abdominal tenderness.   Musculoskeletal:      Cervical back: Normal range of motion and neck supple.      Right lower leg: No edema.      Left lower leg: No  edema.   Skin:     General: Skin is warm and dry.   Neurological:      Mental Status: He is alert and oriented to person, place, and time.         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[facility])  Assessment & Plan  Had progressive BLE weakness  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[facility]  S/P IVIG x 5 doses  Now on Midodrine and Flomax  Ongoing management per Physiatry    Thrombocytosis  Assessment & Plan  Likely reactive  Follow PLT    Leukopenia  Assessment & Plan  May be 2/2 meds  Possible culprits include Lyrica, Omeprazole, ASA, and Lipitor  Monitor need for G-CSF  Follow WBC/ANC    Dyslipidemia  Assessment & Plan  Continue Lipitor    Vitamin D deficiency  Assessment & Plan  Vit D level 19  Continue supplementation    UTI (urinary tract infection)  Assessment & Plan  UA packed WBC w/ many bacteria  UCx >100,000 Citrobacter  Continue Bactrim x 5 days based on YRIS    Anemia  Assessment & Plan  Has normocytic indices  Fe 96  Follow H/H    Hyponatremia  Assessment & Plan  Was on fluid restriction, Lasix, and NaCl tabs  Lisinopril discontinued per Dr. Ruelas  Na+ levels resolved and now stable  Off NaCl, Lasix, and fluid restriction  Continue to follow electrolytes      Full Code    Reviewed w/ pt

## 2023-07-07 NOTE — PROGRESS NOTES
"  Physical Medicine & Rehabilitation Progress Note    Encounter Date: 7/7/2023    Chief Complaint: Lower extremity weakness    Interval Events (Subjective):    He was evaluated in his room sitting comfortably in manual wheelchair after therapy.  He reports sleeping well last night, complains of significant soreness throughout his entire body after pool therapy yesterday.    He reports continued improvement in numbness and tingling and pins-and-needles in his hands and lower extremities.  He is open to continuing to decrease his Lyrica.    Last BM: 07/04/23    ROS:  Gen: No fatigue, confusion, significant weight loss  Eyes: no blurry vision, double vision or pain in eyes  ENT: no changes in hearing, runny nose, nose bleeds, sinus pain  CV: No CP, palpitations,   Lungs: no shortness of breath, changes in secretions, changes in cough, pain with coughing  Abd: No abd pain, nausea, vomiting, pain with eating  : no blood in urine, pain during storage phase, bladder spasms, suprapubic pain  Ext: No swelling in legs, asymmetric atrophy  Neuro: no changes in strength or sensation  Skin: no new ulcers/skin breakdown appreciated by patient  Mood: No changes in mood today, increase in depression or anxiety  Heme: no bruising, or bleeding    Objective:  Vitals: BP 99/57   Pulse 91   Temp 36.6 °C (97.9 °F) (Oral)   Resp 18   Ht 1.727 m (5' 7.99\")   Wt 116 kg (256 lb 13.4 oz)   SpO2 94%   Gen: NAD, Body mass index is 39.06 kg/m².  HEENT:  NC/AT, PERRLA, moist mucous membranes  Cardio: RRR, no mumurs  Pulm: CTAB, with normal respiratory effort  Abd: Soft NTND, active bowel sounds,   Ext: No peripheral edema. No calf tenderness. No clubbing/cyanosis. +dorsal pedalis pulses bilaterally.    Motor Exam Lower Extremities    ? Myotome R L   Hip flexion L2 4 4   Knee extension L3 5 4   Ankle dorsiflexion L4 5 4   Toe extension L5 5 4   Ankle plantarflexion S1 5 4      Laboratory Values:  Recent Results (from the past 72 hour(s)) "   CBC WITH DIFFERENTIAL    Collection Time: 07/07/23  5:28 AM   Result Value Ref Range    WBC 4.3 (L) 4.8 - 10.8 K/uL    RBC 2.61 (L) 4.70 - 6.10 M/uL    Hemoglobin 8.2 (L) 14.0 - 18.0 g/dL    Hematocrit 24.4 (L) 42.0 - 52.0 %    MCV 93.5 81.4 - 97.8 fL    MCH 31.4 27.0 - 33.0 pg    MCHC 33.6 32.3 - 36.5 g/dL    RDW 56.0 (H) 35.9 - 50.0 fL    Platelet Count 456 (H) 164 - 446 K/uL    MPV 9.8 9.0 - 12.9 fL    Neutrophils-Polys 59.20 44.00 - 72.00 %    Lymphocytes 25.50 22.00 - 41.00 %    Monocytes 13.40 0.00 - 13.40 %    Eosinophils 0.70 0.00 - 6.90 %    Basophils 0.70 0.00 - 1.80 %    Immature Granulocytes 0.50 0.00 - 0.90 %    Nucleated RBC 0.00 0.00 - 0.20 /100 WBC    Neutrophils (Absolute) 2.56 1.82 - 7.42 K/uL    Lymphs (Absolute) 1.10 1.00 - 4.80 K/uL    Monos (Absolute) 0.58 0.00 - 0.85 K/uL    Eos (Absolute) 0.03 0.00 - 0.51 K/uL    Baso (Absolute) 0.03 0.00 - 0.12 K/uL    Immature Granulocytes (abs) 0.02 0.00 - 0.11 K/uL    NRBC (Absolute) 0.00 K/uL   Basic Metabolic Panel    Collection Time: 07/07/23  5:28 AM   Result Value Ref Range    Sodium 136 135 - 145 mmol/L    Potassium 4.4 3.6 - 5.5 mmol/L    Chloride 100 96 - 112 mmol/L    Co2 24 20 - 33 mmol/L    Glucose 108 (H) 65 - 99 mg/dL    Bun 14 8 - 22 mg/dL    Creatinine 0.58 0.50 - 1.40 mg/dL    Calcium 8.8 8.5 - 10.5 mg/dL    Anion Gap 12.0 7.0 - 16.0   MAGNESIUM    Collection Time: 07/07/23  5:28 AM   Result Value Ref Range    Magnesium 2.1 1.5 - 2.5 mg/dL   PHOSPHORUS    Collection Time: 07/07/23  5:28 AM   Result Value Ref Range    Phosphorus 3.8 2.5 - 4.5 mg/dL   ESTIMATED GFR    Collection Time: 07/07/23  5:28 AM   Result Value Ref Range    GFR (CKD-EPI) 108 >60 mL/min/1.73 m 2       Medications:  Scheduled Medications   Medication Dose Frequency    pregabalin  100 mg BID    sulfamethoxazole-trimethoprim  1 Tablet Q12HRS    apixaban  2.5 mg BID    traZODone  50 mg QHS    docusate sodium  100 mg BID    And    sennosides  17.2 mg DAILY AT NOON     midodrine  10 mg TID WITH MEALS    vitamin D3  2,000 Units DAILY    menthol-methyl salicycate   BID    omeprazole  20 mg DAILY    Pharmacy Consult Request  1 Each PHARMACY TO DOSE    therapeutic multivitamin-minerals  1 Tablet DAILY WITH LUNCH    aspirin  81 mg DAILY    atorvastatin  10 mg MO, WE + FR    melatonin  6 mg QHS    lidocaine  2 Patch Q24HR    tamsulosin  0.4 mg AFTER DINNER     PRN medications: oxyCODONE immediate-release **OR** oxyCODONE immediate-release, acetaminophen, docusate sodium **AND** sennosides **AND** [DISCONTINUED] bisacodyl **AND** magnesium hydroxide, [COMPLETED] docusate sodium **FOLLOWED BY** normal saline (PF), carboxymethylcellulose, benzocaine-menthol, mag hydrox-al hydrox-simeth, ondansetron **OR** ondansetron, traZODone, sodium chloride, Respiratory Therapy Consult, hydrALAZINE    Diet:  Current Diet Order   Procedures    Diet Order Diet: Regular       Assessment:  Active Hospital Problems    Diagnosis     *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  Lower extremity weakness and pain as well as numbness in upper extremities.  No known premorbid infection.  -PT and OT for mobility and ADLs. Per guidelines, 15 hours per week between PT, OT.  Cleared by speech therapy for swallow and speech, transition time to PT and OT  - Continue comprehensive acute inpatient rehabilitation  - Did well with aqua therapy on 7/6     Fever: 100.5 on 6/26, febrile over the last 24 hours  - Infectious work-up as per hospitalist, COVID and flu were negative, UA is negative, procalcitonin was elevated on 6/25, chest x-ray was negative blood  "cultures to date are negative  - Discontinued Tylenol, concerned that this may be masking his fevers.     Neurologic respiratory impairment:   Patient is at high risk for respiratory involvement given neurologic symptoms in the upper extremities.   -Vital capacity daily, 1.72 liters on 6/20, >2L 7/4, discontinue daily vital capacity  - Incentive spirometry     Lower extremity swelling: Left greater than right  - Lower extremity Doppler was negative for DVT     Hyponatremia: Likely SIADH, followed by nephrology during [facility] hospitalization  - Sodium of 126 on 6/17 --> 129 on 6/19 --> 133 on 6/20 --> 136 on 6/21 --> 133 on 6/26 --> 136 on 6/27 --> 136 on 6/29 --> 135 on 7/1 -> sodium of 134 on 7/4 --> 136 on 7/7  -Discontinue sodium chloride tablets \"as per hospitalist  - Lasix was discontinued on 6/21 as per hospitalist   - On admission was on Free water restriction of 500 cc/day and total p.o. fluid intake of 1.8 L.  Discontinued fluid restriction  -Status post 500 cc of normal saline IV fluid on 6/27.     Hypomagnesemia: Low magnesium during acute hospitalization, supplemented with p.o. magnesium  - Magnesium of 2.0 on 6/18     Urinary tract infection/pyelonephritis: Catheter associated UTI in the setting of neurogenic bladder, episode of hematuria with bladder symptoms on 7/2, dysuria, frequency, urgency  - WBC of 14 on 6/17 --> WBC of 10.7 on 6/20 --> 4.3 on 6/26  - Peripheral IV was removed prior to transfer to acute rehabilitation we will replace IV.  - Ceftriaxone 1 g every 24 hours, completed course  - Greater than 100,000 colonies of Citrobacter, resistant to ampicillin, Unasyn, cefazolin, ceftriaxone, cefuroxime.  - Started on Bactrim as per hospitalist     Leukopenia:  - WBC of 3.2 --> 3.4 on 6/30 -> 6.3 on 7/4 --> 4.3 on 7/7  - As per hospitalist likely medication-induced  - Discussed with hospitalist  -Decrease Lyrica to 75 mg twice daily, leukopenia is improving without increase in neuropathic " pain     Neurogenic bladder: Inappropriate management of neurogenic bladder can result and hydronephrosis, increased risk of pyelonephritis, and renal failure  - Discontinued Blancas on 6/22,   - Continue voiding trial if can't void in 6 hours or prn check pvr and if >500cc then ICP,if able to void then check PVR, if PVR is >200cc then ICP  - Continue Flomax 0.4 mg nightly, discussed discontinuing this medication in the near future     Neurogenic bowel: Inappropriate neurogenic bowel can result in severe constipation, bowel dilation and rupture.  Severe constipation with prolonged period of time without BM.  -Continue upper motor neuron neurogenic bowel program with senna 2 tablets q. noon, Colace 100 mg twice daily and discontinue Magic bullet suppository TX daily and digital stimulation     orthostatic hypotension  Because of significant autonomic dysfunction associated with some cases of AIDP, the patient is at high risk for orthostatic hypotension.  Goal of SBP greater than 100.  -  Mechanical compression with teds and Tubi  and abd binder used prior to out of bed  - SBP  in last 24 hours  -Decrease midodrine to 5 mg 3 times daily, monitor for orthostatic hypotension symptoms     Essential hypertension: SBP of greater than 200 on presentation to acute hospital.  SBP  in the last 24 hours  - Lisinopril 20 mg daily held, discontinued lisinopril secondary to orthostatic hypotension as above, may restart as an outpatient with recovery of autonomic nervous system     Dyslipidemia: Total cholesterol 137, HDL 44, LDL 75  -Lipitor 10 mg every Monday Wednesday Friday     Dysphagia: Concern for swallow dysfunction in the setting of certain variants of AIDP  - Consult speech therapy for swallow evaluation.  Cleared by speech therapy     Skin  Due to the sensory impairments following AIDP, skin protection principles are of the utmost importance.      - every two-hour turning pattern overnight while the  patient is in bed. When the patient is out of bed, an every 15 minute repositioning or weight shifting pattern will be utilized in order to help train the patient for long-term skin protection. We will also discuss skin monitoring and its importance with the patient and the caregivers.     Insomnia:  - Discontinue temazepam  -Initiation of trazodone 50 mg nightly, may repeat x1     Pain:  Postoperative pain:  -Continue oxycodone 5-10 mg every 4 hours as needed for pain moderate to severe  - Discontinued Tylenol as this may be masking fevers  - Lidocaine patch x2 on either side of incision, on for 12 hours off for 12 hours  - Discontinue tramadol     Neuropathic pain: Burning and tingling in all 4 extremities  -Discontinue gabapentin  - Decrease Lyrica to 100 mg twice daily  - Discontinue Celebrex  - Discontinue tramadol  -May benefit from a nonpharmaceutical modalities such as TENS units, compression, ice, heat, will discuss with therapy team.     Vitamin D deficiency: High risk of vitamin D deficiency in this population, goal of vitamin D level greater than 30, has been linked to improvement and central nervous system recovery  - Vitamin D level of 19 on 6/18  - Cholecalciferol 2000 units daily     Insomnia: Significant difficulty during acute hospitalization  - DC Restoril 15 mg nightly  - Trazodone 50 mg nightly as needed insomnia     DVT prophylaxis  In the setting of AIDP, the patient is at high risk of deep venous thrombosis given decreased mobility and alteration to autonomic nervous system.   -DC Lovenox  - Eliquis 2.5 mg twice daily for prophylaxis    ____________________________________    Mario Terrell DO  [facility] - Physical Medicine & Rehabilitation   [facility] - Spinal Cord Injury Medicine  ____________________________________

## 2023-07-07 NOTE — THERAPY
Occupational Therapy  Daily Treatment     Patient Name: Bull Banegas  Age:  67 y.o., Sex:  male  Medical Record #: [id number]  Today's Date: 7/7/2023     Precautions  Precautions: Fall Risk  Comments: blurred vision, avoid over-fatiguing    Safety   ADL Safety : Requires Supervision for Safety, Requires Cueing for Safety  Bathroom Safety: Requires Supervision for Safety, Requires Cuing for Safety    Subjective    Pt supine in bed and reporting increased fatigue from previous days pool activity.     Objective       07/07/23 1001   OT Charge Group   OT Therapy Activity (Units) 6   OT Total Time Spent   OT Individual Total Time Spent (Mins) 90   Pain   Intervention Distraction;Rest   Pain 0 - 10 Group   Location Generalized   Description Aching;Sore   Comfort Goal Comfort with Movement;Perform Activity   Therapist Pain Assessment Post Activity Pain Same as Prior to Activity   Cognition    Level of Consciousness Alert   Functional Level of Assist   Lower Body Dressing Description Other (comment)  (setup to don slip on shoes seated EOB)   Bed, Chair, Wheelchair Transfer Supervised  (SPV-Mod I)   Bed Chair Wheelchair Transfer Description Adaptive equipment;Increased time;Set-up of equipment;Supervision for safety  (stand step with FWW)   IADL Treatments   IADL Treatments Meal preparation   Meal Preparation Pt participated in meal prep activity of [place] at w/c level. Initially pt attempted cutting tomatoes in standing, however, was limited by fatigue from previous day's pool activity. He required overall Min A for environmental and time management. He demonstrated good attention to safety with hygiene, stove top operation, and use of knives. He was able to cut up onion, tomato, and brown meat, open can, and open packages for seasoning without assist, with increassed time due to bilateral hand paresthesia and thigh soreness. Continued education on pacing, energy conservation, and body mechanics. Pt will have wife at home to  assist, as she was also present during entire task assisting.   Interdisciplinary Plan of Care Collaboration   IDT Collaboration with  Family / Caregiver;Physical Therapist   Patient Position at End of Therapy Seated;Family / Friend in Room   Collaboration Comments Wife present during entire session. Pass off to PT at end of session.         Assessment    Pt tolerated meal prep task fairly well from w/c level due to increased fatigue. Pt and wife very receptive to all education on pacing and compensatory methods for functional reach in sitting.  Strengths: Able to follow instructions, Alert and oriented, Effective communication skills, Independent prior level of function, Motivated for self care and independence, Pleasant and cooperative, Willingly participates in therapeutic activities    Plan    UB strengthening as limited by fatigue, sensory re-integration, functional transfers progressing to standing from SB, ADLs with AE as needed, IADLs, incorporate FM skills for BUE     FT & DC shower on Sunday     Passport items to be completed:  Perform bathroom transfers, complete dressing, complete feeding, get ready for the day, prepare a simple meal, participate in household tasks, adapt home for safety needs, demonstrate home exercise program, complete caregiver training     Occupational Therapy Goals (Active)       Problem: OT Long Term Goals       Dates: Start:  06/18/23         Goal: LTG-By discharge, patient will complete basic self care tasks with min-supervision overall using AE/DME as needed.       Dates: Start:  06/18/23            Goal: LTG-By discharge, patient will perform bathroom transfers with SBA overall using AE/DME as needed.       Dates: Start:  06/18/23

## 2023-07-07 NOTE — CARE PLAN
The patient is Stable - Low risk of patient condition declining or worsening      Problem: Skin Integrity  Goal: Skin integrity is maintained or improved  Outcome: Progressing     Problem: Pain - Standard  Goal: Alleviation of pain or a reduction in pain to the patient’s comfort goal  Outcome: Progressing     Problem: Bowel Elimination  Goal: Patient will participate in bowel management program  Outcome: Progressing  Note: No BM this shift, PRN bowel med given. Abdomen soft, passing gas, bowel sounds present in all 4 quads.      Problem: Bladder / Voiding  Goal: Patient will establish and maintain regular urinary output  Outcome: Progressing

## 2023-07-07 NOTE — THERAPY
Physical Therapy   Daily Treatment     Patient Name: Bull Banegas  Age:  67 y.o., Sex:  male  Medical Record #: [id number]  Today's Date: 7/7/2023     Precautions  Precautions: Fall Risk  Comments: blurred vision, avoid over-fatiguing    Subjective    pt reported increased soreness from aquatic tx yesterday, agreeable to light activity     Objective       07/07/23 0830   PT Charge Group   PT Therapeutic Exercise (Units) 1   PT Therapeutic Activities (Units) 1   PT Total Time Spent   PT Individual Total Time Spent (Mins) 30   Sitting Lower Body Exercises   Other Exercises B LE motomed level 2 10 min 1.31 miles   Interdisciplinary Plan of Care Collaboration   IDT Collaboration with  Physician   Patient Position at End of Therapy Seated;Call Light within Reach;Tray Table within Reach;Phone within Reach   Collaboration Comments handoff of care       provided therapeutic listening as pt discussed rapid response event that occurred in his room yesterday  discussed POC and goals prior to scheduled DC on 7/10       07/07/23 1301   PT Charge Group   PT Therapeutic Exercise (Units) 3   PT Therapeutic Activities (Units) 1   PT Total Time Spent   PT Individual Total Time Spent (Mins) 60   Wheelchair Functional Level of Assist   Wheelchair Assist Modified Independent   Distance Wheelchair (Feet or Distance) 300   Wheelchair Description   (outdoors on uneven and ramped surfaces)   Transfer Functional Level of Assist   Bed, Chair, Wheelchair Transfer Supervised   Bed Chair Wheelchair Transfer Description Squat pivot transfer to wheelchair;Supervision for safety;Verbal cueing;Adaptive equipment   Supine Lower Body Exercise   Other Exercises DKTC on physioball x20, LTRx15 ea, open books x10   Sitting Lower Body Exercises   Nustep Resistance Level 2  (B UE/LE 10 min)   Bed Mobility    Supine to Sit Independent   Sit to Supine Independent   Scooting Independent   Rolling Independent   Interdisciplinary Plan of Care Collaboration   IDT  Collaboration with  Occupational Therapist   Patient Position at End of Therapy In Bed;Call Light within Reach;Tray Table within Reach;Phone within Reach   Collaboration Comments POC, FT for 7/9     Manual therapy: STM and mobilization at L IT band for 5 minutes     Assessment    Pt was limited by increased muscle soreness today but was able to complete gentle mobility and supine stretching program. Pt is progressing toward Bartolome at WC level and CGA for standing mobility upon DC on 7/10.  Strengths: Able to follow instructions, Alert and oriented, Effective communication skills, Good carryover of learning, Independent prior level of function, Making steady progress towards goals, Motivated for self care and independence, Pleasant and cooperative, Supportive family, Willingly participates in therapeutic activities  Barriers: Decreased endurance, Fatigue, Generalized weakness, Home accessibility, Impaired activity tolerance, Impaired balance, Limited mobility (Paraplegia)    Plan    6 inch curb step FWW in // bars for safety leading with R LE ascending and L LE descending  Gait FWW with WC in tow vs 4WW- both with emphasis on quality over distance  B LE and trunk NRE  WC mobility outdoors  Aquatic therapy on Sat 7/8 @ 2-3 pm  FT on 7/9: car transfer, gait FWW, up/down curb step FWW vs WC bump     Passport items to be completed:  Get in/out of bed safely, in/out of a vehicle, safely use mobility device, walk or wheel around home/community, navigate up and down stairs, show how to get up/down from the ground, ensure home is accessible, demonstrate HEP, complete caregiver training        Physical Therapy Problems (Active)       Problem: PT-Long Term Goals       Dates: Start:  06/18/23         Goal: LTG-By discharge, patient will propel wheelchair >/= 300' and navigate inclines up to 3% (curb cuts or equivalent) c/ SBA or better.        Dates: Start:  06/18/23            Goal: LTG-By discharge, patient will transfer one  surface to another c/ Naila.        Dates: Start:  06/18/23            Goal: LTG-By discharge, patient will transfer in/out of a car c/ ModA or better.        Dates: Start:  06/18/23            Goal: LTG-By discharge, patient will ambulate 150ft with CGA using FWW       Dates: Start:  07/07/23

## 2023-07-08 ENCOUNTER — APPOINTMENT (OUTPATIENT)
Dept: PHYSICAL THERAPY | Facility: REHABILITATION | Age: 68
DRG: 095 | End: 2023-07-08
Attending: PHYSICAL MEDICINE & REHABILITATION
Payer: MEDICARE

## 2023-07-08 ENCOUNTER — APPOINTMENT (OUTPATIENT)
Dept: OCCUPATIONAL THERAPY | Facility: REHABILITATION | Age: 68
DRG: 095 | End: 2023-07-08
Attending: PHYSICAL MEDICINE & REHABILITATION
Payer: MEDICARE

## 2023-07-08 PROCEDURE — A9270 NON-COVERED ITEM OR SERVICE: HCPCS | Performed by: HOSPITALIST

## 2023-07-08 PROCEDURE — 700102 HCHG RX REV CODE 250 W/ 637 OVERRIDE(OP): Performed by: PHYSICAL MEDICINE & REHABILITATION

## 2023-07-08 PROCEDURE — 97110 THERAPEUTIC EXERCISES: CPT

## 2023-07-08 PROCEDURE — 99231 SBSQ HOSP IP/OBS SF/LOW 25: CPT | Performed by: HOSPITALIST

## 2023-07-08 PROCEDURE — 97530 THERAPEUTIC ACTIVITIES: CPT

## 2023-07-08 PROCEDURE — 700102 HCHG RX REV CODE 250 W/ 637 OVERRIDE(OP): Performed by: HOSPITALIST

## 2023-07-08 PROCEDURE — A9270 NON-COVERED ITEM OR SERVICE: HCPCS | Performed by: PHYSICAL MEDICINE & REHABILITATION

## 2023-07-08 PROCEDURE — 99232 SBSQ HOSP IP/OBS MODERATE 35: CPT | Performed by: PHYSICAL MEDICINE & REHABILITATION

## 2023-07-08 PROCEDURE — 770010 HCHG ROOM/CARE - REHAB SEMI PRIVAT*

## 2023-07-08 PROCEDURE — 97113 AQUATIC THERAPY/EXERCISES: CPT | Mod: CQ

## 2023-07-08 PROCEDURE — 97535 SELF CARE MNGMENT TRAINING: CPT

## 2023-07-08 RX ADMIN — MULTIPLE VITAMINS W/ MINERALS TAB 1 TABLET: TAB at 11:42

## 2023-07-08 RX ADMIN — TRAZODONE HYDROCHLORIDE 50 MG: 50 TABLET ORAL at 21:13

## 2023-07-08 RX ADMIN — Medication 2000 UNITS: at 08:50

## 2023-07-08 RX ADMIN — SULFAMETHOXAZOLE AND TRIMETHOPRIM 1 TABLET: 800; 160 TABLET ORAL at 21:13

## 2023-07-08 RX ADMIN — TAMSULOSIN HYDROCHLORIDE 0.4 MG: 0.4 CAPSULE ORAL at 17:18

## 2023-07-08 RX ADMIN — SENNOSIDES 17.2 MG: 8.6 TABLET, FILM COATED ORAL at 11:42

## 2023-07-08 RX ADMIN — Medication 6 MG: at 21:13

## 2023-07-08 RX ADMIN — ASPIRIN 81 MG CHEWABLE TABLET 81 MG: 81 TABLET CHEWABLE at 08:50

## 2023-07-08 RX ADMIN — APIXABAN 2.5 MG: 2.5 TABLET, FILM COATED ORAL at 21:13

## 2023-07-08 RX ADMIN — PREGABALIN 75 MG: 75 CAPSULE ORAL at 21:13

## 2023-07-08 RX ADMIN — DOCUSATE SODIUM 100 MG: 100 CAPSULE, LIQUID FILLED ORAL at 21:13

## 2023-07-08 RX ADMIN — SULFAMETHOXAZOLE AND TRIMETHOPRIM 1 TABLET: 800; 160 TABLET ORAL at 08:50

## 2023-07-08 RX ADMIN — OMEPRAZOLE 20 MG: 20 CAPSULE, DELAYED RELEASE ORAL at 08:50

## 2023-07-08 RX ADMIN — ACETAMINOPHEN 650 MG: 325 TABLET ORAL at 06:07

## 2023-07-08 RX ADMIN — APIXABAN 2.5 MG: 2.5 TABLET, FILM COATED ORAL at 08:50

## 2023-07-08 RX ADMIN — MIDODRINE HYDROCHLORIDE 5 MG: 2.5 TABLET ORAL at 08:49

## 2023-07-08 RX ADMIN — MIDODRINE HYDROCHLORIDE 5 MG: 2.5 TABLET ORAL at 17:18

## 2023-07-08 RX ADMIN — DOCUSATE SODIUM 100 MG: 100 CAPSULE, LIQUID FILLED ORAL at 08:50

## 2023-07-08 RX ADMIN — PREGABALIN 75 MG: 75 CAPSULE ORAL at 06:12

## 2023-07-08 RX ADMIN — MIDODRINE HYDROCHLORIDE 5 MG: 2.5 TABLET ORAL at 11:42

## 2023-07-08 ASSESSMENT — PATIENT HEALTH QUESTIONNAIRE - PHQ9
1. LITTLE INTEREST OR PLEASURE IN DOING THINGS: NOT AT ALL
2. FEELING DOWN, DEPRESSED, IRRITABLE, OR HOPELESS: NOT AT ALL
SUM OF ALL RESPONSES TO PHQ9 QUESTIONS 1 AND 2: 0

## 2023-07-08 ASSESSMENT — ENCOUNTER SYMPTOMS
FEVER: 0
COUGH: 0
VOMITING: 0
BRUISES/BLEEDS EASILY: 0
NAUSEA: 0
MUSCULOSKELETAL NEGATIVE: 1
POLYDIPSIA: 0
EYES NEGATIVE: 1
PALPITATIONS: 0
CHILLS: 0
FOCAL WEAKNESS: 1
ABDOMINAL PAIN: 0
SHORTNESS OF BREATH: 0
SENSORY CHANGE: 1

## 2023-07-08 ASSESSMENT — ACTIVITIES OF DAILY LIVING (ADL): BED_CHAIR_WHEELCHAIR_TRANSFER_DESCRIPTION: ADAPTIVE EQUIPMENT;SET-UP OF EQUIPMENT;SUPERVISION FOR SAFETY

## 2023-07-08 NOTE — PROGRESS NOTES
"  Physical Medicine & Rehabilitation Progress Note    Encounter Date: 7/8/2023    Chief Complaint: Decreased mobility, weakness    Interval Events (Subjective):  Patient sitting up in room. He reports he is doing well. He reports he thinks he'll be ready for discharge on Monday. Denies pain.     Objective:  VITAL SIGNS: BP 95/75   Pulse 84   Temp 37.2 °C (98.9 °F) (Oral)   Resp 16   Ht 1.727 m (5' 7.99\")   Wt 116 kg (256 lb 13.4 oz)   SpO2 95%   BMI 39.06 kg/m²   Gen: NAD  Psych: Mood and affect appropriate  CV: RRR, 1+ edema  Resp: CTAB, no upper airway sounds  Abd: NTND  Neuro: AOx4, following commands    Laboratory Values:  Recent Results (from the past 72 hour(s))   CBC WITH DIFFERENTIAL    Collection Time: 07/07/23  5:28 AM   Result Value Ref Range    WBC 4.3 (L) 4.8 - 10.8 K/uL    RBC 2.61 (L) 4.70 - 6.10 M/uL    Hemoglobin 8.2 (L) 14.0 - 18.0 g/dL    Hematocrit 24.4 (L) 42.0 - 52.0 %    MCV 93.5 81.4 - 97.8 fL    MCH 31.4 27.0 - 33.0 pg    MCHC 33.6 32.3 - 36.5 g/dL    RDW 56.0 (H) 35.9 - 50.0 fL    Platelet Count 456 (H) 164 - 446 K/uL    MPV 9.8 9.0 - 12.9 fL    Neutrophils-Polys 59.20 44.00 - 72.00 %    Lymphocytes 25.50 22.00 - 41.00 %    Monocytes 13.40 0.00 - 13.40 %    Eosinophils 0.70 0.00 - 6.90 %    Basophils 0.70 0.00 - 1.80 %    Immature Granulocytes 0.50 0.00 - 0.90 %    Nucleated RBC 0.00 0.00 - 0.20 /100 WBC    Neutrophils (Absolute) 2.56 1.82 - 7.42 K/uL    Lymphs (Absolute) 1.10 1.00 - 4.80 K/uL    Monos (Absolute) 0.58 0.00 - 0.85 K/uL    Eos (Absolute) 0.03 0.00 - 0.51 K/uL    Baso (Absolute) 0.03 0.00 - 0.12 K/uL    Immature Granulocytes (abs) 0.02 0.00 - 0.11 K/uL    NRBC (Absolute) 0.00 K/uL   Basic Metabolic Panel    Collection Time: 07/07/23  5:28 AM   Result Value Ref Range    Sodium 136 135 - 145 mmol/L    Potassium 4.4 3.6 - 5.5 mmol/L    Chloride 100 96 - 112 mmol/L    Co2 24 20 - 33 mmol/L    Glucose 108 (H) 65 - 99 mg/dL    Bun 14 8 - 22 mg/dL    Creatinine 0.58 0.50 - " 1.40 mg/dL    Calcium 8.8 8.5 - 10.5 mg/dL    Anion Gap 12.0 7.0 - 16.0   MAGNESIUM    Collection Time: 07/07/23  5:28 AM   Result Value Ref Range    Magnesium 2.1 1.5 - 2.5 mg/dL   PHOSPHORUS    Collection Time: 07/07/23  5:28 AM   Result Value Ref Range    Phosphorus 3.8 2.5 - 4.5 mg/dL   ESTIMATED GFR    Collection Time: 07/07/23  5:28 AM   Result Value Ref Range    GFR (CKD-EPI) 108 >60 mL/min/1.73 m 2       Medications:  Scheduled Medications   Medication Dose Frequency    pregabalin  75 mg BID    midodrine  5 mg TID WITH MEALS    sulfamethoxazole-trimethoprim  1 Tablet Q12HRS    apixaban  2.5 mg BID    traZODone  50 mg QHS    docusate sodium  100 mg BID    And    sennosides  17.2 mg DAILY AT NOON    vitamin D3  2,000 Units DAILY    menthol-methyl salicycate   BID    omeprazole  20 mg DAILY    Pharmacy Consult Request  1 Each PHARMACY TO DOSE    therapeutic multivitamin-minerals  1 Tablet DAILY WITH LUNCH    aspirin  81 mg DAILY    atorvastatin  10 mg MO, WE + FR    melatonin  6 mg QHS    lidocaine  2 Patch Q24HR    tamsulosin  0.4 mg AFTER DINNER     PRN medications: oxyCODONE immediate-release **OR** oxyCODONE immediate-release, acetaminophen, docusate sodium **AND** sennosides **AND** [DISCONTINUED] bisacodyl **AND** magnesium hydroxide, [COMPLETED] docusate sodium **FOLLOWED BY** normal saline (PF), carboxymethylcellulose, benzocaine-menthol, mag hydrox-al hydrox-simeth, ondansetron **OR** ondansetron, traZODone, sodium chloride, Respiratory Therapy Consult, hydrALAZINE    Diet:  Current Diet Order   Procedures    Diet Order Diet: Regular       Medical Decision Making and Plan:  Adapted from Dr. Terrell's A&P    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  Lower extremity weakness and pain as well as numbness in upper extremities.  No known premorbid infection.  -PT and OT for mobility and ADLs. Per guidelines, 15 hours per  "week between PT, OT.  Cleared by speech therapy for swallow and speech, transition time to PT and OT  - Continue comprehensive acute inpatient rehabilitation  - Did well with aqua therapy on 7/6     Fever: 100.5 on 6/26, febrile over the last 24 hours  - Infectious work-up as per hospitalist, COVID and flu were negative, UA is negative, procalcitonin was elevated on 6/25, chest x-ray was negative blood cultures to date are negative  - Discontinued Tylenol, concerned that this may be masking his fevers.     Neurologic respiratory impairment:   Patient is at high risk for respiratory involvement given neurologic symptoms in the upper extremities.   -Vital capacity daily, 1.72 liters on 6/20, >2L 7/4, discontinue daily vital capacity  - Incentive spirometry     Lower extremity swelling: Left greater than right  - Lower extremity Doppler was negative for DVT     Hyponatremia: Likely SIADH, followed by nephrology during [facility] hospitalization  - Sodium of 126 on 6/17 --> 129 on 6/19 --> 133 on 6/20 --> 136 on 6/21 --> 133 on 6/26 --> 136 on 6/27 --> 136 on 6/29 --> 135 on 7/1 -> sodium of 134 on 7/4 --> 136 on 7/7  -Discontinue sodium chloride tablets \"as per hospitalist  - Lasix was discontinued on 6/21 as per hospitalist   - On admission was on Free water restriction of 500 cc/day and total p.o. fluid intake of 1.8 L.  Discontinued fluid restriction  -Status post 500 cc of normal saline IV fluid on 6/27.     Hypomagnesemia: Low magnesium during acute hospitalization, supplemented with p.o. magnesium  - Magnesium of 2.0 on 6/18     Urinary tract infection/pyelonephritis: Catheter associated UTI in the setting of neurogenic bladder, episode of hematuria with bladder symptoms on 7/2, dysuria, frequency, urgency  - WBC of 14 on 6/17 --> WBC of 10.7 on 6/20 --> 4.3 on 6/26  - Peripheral IV was removed prior to transfer to acute rehabilitation we will replace IV.  - Ceftriaxone 1 g every 24 hours, completed course  - " Greater than 100,000 colonies of Citrobacter, resistant to ampicillin, Unasyn, cefazolin, ceftriaxone, cefuroxime.  - Started on Bactrim as per hospitalist. Continue Bactrim until 7/9     Leukopenia:  - WBC of 3.2 --> 3.4 on 6/30 -> 6.3 on 7/4 --> 4.3 on 7/7  - As per hospitalist likely medication-induced  - Discussed with hospitalist  -Decrease Lyrica to 75 mg twice daily, leukopenia is improving without increase in neuropathic pain. Continue Lyrica 75 mg BID     Neurogenic bladder: Inappropriate management of neurogenic bladder can result and hydronephrosis, increased risk of pyelonephritis, and renal failure  - Discontinued Blancas on 6/22,   - Continue voiding trial if can't void in 6 hours or prn check pvr and if >500cc then ICP,if able to void then check PVR, if PVR is >200cc then ICP  - Continue Flomax 0.4 mg nightly, discussed discontinuing this medication in the near future     Neurogenic bowel: Inappropriate neurogenic bowel can result in severe constipation, bowel dilation and rupture.  Severe constipation with prolonged period of time without BM.  -Continue upper motor neuron neurogenic bowel program with senna 2 tablets q. noon, Colace 100 mg twice daily and discontinue Magic bullet suppository ND daily and digital stimulation     orthostatic hypotension  Because of significant autonomic dysfunction associated with some cases of AIDP, the patient is at high risk for orthostatic hypotension.  Goal of SBP greater than 100.  -  Mechanical compression with teds and Tubi  and abd binder used prior to out of bed  - SBP  in last 24 hours  -Decrease midodrine to 5 mg 3 times daily, monitor for orthostatic hypotension symptoms     Essential hypertension: SBP of greater than 200 on presentation to acute hospital.  SBP  in the last 24 hours  - Lisinopril 20 mg daily held, discontinued lisinopril secondary to orthostatic hypotension as above, may restart as an outpatient with recovery of autonomic  nervous system     Dyslipidemia: Total cholesterol 137, HDL 44, LDL 75  -Lipitor 10 mg every Monday Wednesday Friday     Dysphagia: Concern for swallow dysfunction in the setting of certain variants of AIDP  - Consult speech therapy for swallow evaluation.  Cleared by speech therapy     Skin  Due to the sensory impairments following AIDP, skin protection principles are of the utmost importance.      - every two-hour turning pattern overnight while the patient is in bed. When the patient is out of bed, an every 15 minute repositioning or weight shifting pattern will be utilized in order to help train the patient for long-term skin protection. We will also discuss skin monitoring and its importance with the patient and the caregivers.     Insomnia:  - Discontinue temazepam  -Initiation of trazodone 50 mg nightly, may repeat x1     Pain:  Postoperative pain:  -Continue oxycodone 5-10 mg every 4 hours as needed for pain moderate to severe  - Discontinued Tylenol as this may be masking fevers  - Lidocaine patch x2 on either side of incision, on for 12 hours off for 12 hours  - Discontinue tramadol     Neuropathic pain: Burning and tingling in all 4 extremities  -Discontinue gabapentin  - Decrease Lyrica to 100 mg twice daily  - Discontinue Celebrex  - Discontinue tramadol  -May benefit from a nonpharmaceutical modalities such as TENS units, compression, ice, heat, will discuss with therapy team.     Vitamin D deficiency: High risk of vitamin D deficiency in this population, goal of vitamin D level greater than 30, has been linked to improvement and central nervous system recovery  - Vitamin D level of 19 on 6/18  - Cholecalciferol 2000 units daily     Insomnia: Significant difficulty during acute hospitalization  - DC Restoril 15 mg nightly  - Trazodone 50 mg nightly as needed insomnia     DVT prophylaxis  In the setting of AIDP, the patient is at high risk of deep venous thrombosis given decreased mobility and  alteration to autonomic nervous system.   -DC Lovenox  - Eliquis 2.5 mg twice daily for prophylaxis  ____________________________________    T. Dawson New MD/PhD  [facility] - Physical Medicine & Rehabilitation   [facility] - Brain Injury Medicine   ____________________________________

## 2023-07-08 NOTE — PROGRESS NOTES
Hospital Medicine Daily Progress Note        Chief Complaint:  Hypertension  Hyponatremia    Interval History:  Pt seen and examined in [facility], doing well w/o complaints.    Review of Systems  Review of Systems   Constitutional:  Negative for chills and fever.   HENT: Negative.     Eyes: Negative.    Respiratory:  Negative for cough and shortness of breath.    Cardiovascular:  Negative for chest pain and palpitations.   Gastrointestinal:  Negative for abdominal pain, nausea and vomiting.   Genitourinary: Negative.    Musculoskeletal: Negative.    Skin:  Negative for itching and rash.   Neurological:  Positive for sensory change and focal weakness.   Endo/Heme/Allergies:  Negative for polydipsia. Does not bruise/bleed easily.        Physical Exam  Temp:  [36.8 °C (98.3 °F)-37.2 °C (98.9 °F)] 37.2 °C (98.9 °F)  Pulse:  [84-98] 84  Resp:  [16-18] 16  BP: ()/(70-75) 95/75  SpO2:  [92 %-95 %] 95 %    Physical Exam  Vitals reviewed.   Constitutional:       General: He is not in acute distress.     Appearance: Normal appearance. He is not ill-appearing.   HENT:      Head: Normocephalic and atraumatic.      Right Ear: External ear normal.      Left Ear: External ear normal.      Nose: Nose normal.      Mouth/Throat:      Pharynx: Oropharynx is clear.   Eyes:      General:         Right eye: No discharge.         Left eye: No discharge.      Extraocular Movements: Extraocular movements intact.      Conjunctiva/sclera: Conjunctivae normal.   Cardiovascular:      Rate and Rhythm: Normal rate and regular rhythm.   Pulmonary:      Effort: Pulmonary effort is normal. No respiratory distress.      Breath sounds: Normal breath sounds. No wheezing.   Abdominal:      General: Bowel sounds are normal. There is no distension.      Palpations: Abdomen is soft.      Tenderness: There is no abdominal tenderness.   Musculoskeletal:      Cervical back: Normal range of motion and neck supple.      Right lower leg: No edema.       Left lower leg: No edema.   Skin:     General: Skin is warm and dry.   Neurological:      Mental Status: He is alert and oriented to person, place, and time.         Fluids    Intake/Output Summary (Last 24 hours) at 7/8/2023 1407  Last data filed at 7/8/2023 0832  Gross per 24 hour   Intake 720 ml   Output 3100 ml   Net -2380 ml       Laboratory  Recent Labs     07/07/23  0528   WBC 4.3*   RBC 2.61*   HEMOGLOBIN 8.2*   HEMATOCRIT 24.4*   MCV 93.5   MCH 31.4   MCHC 33.6   RDW 56.0*   PLATELETCT 456*   MPV 9.8       Recent Labs     07/07/23  0528   SODIUM 136   POTASSIUM 4.4   CHLORIDE 100   CO2 24   GLUCOSE 108*   BUN 14   CREATININE 0.58   CALCIUM 8.8                 Assessment/Plan  * GBS (Guillain-[place]reat syndrome) ([facility])  Assessment & Plan  Had progressive BLE weakness  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[facility]  S/P IVIG x 5 doses  On Midodrine and Flomax  Ongoing management per Physiatry    Thrombocytosis  Assessment & Plan  Likely reactive  Follow PLT    Leukopenia  Assessment & Plan  May be 2/2 meds  Possible culprits include Lyrica, Omeprazole, ASA, and Lipitor  Monitor need for G-CSF  Follow WBC/ANC    Dyslipidemia  Assessment & Plan  Continue Lipitor    Vitamin D deficiency  Assessment & Plan  Vit D level 19  Continue supplementation    UTI (urinary tract infection)  Assessment & Plan  UA packed WBC w/ many bacteria  UCx >100,000 Citrobacter  Continue Bactrim x 5 days based on YRIS    Anemia  Assessment & Plan  Has normocytic indices  Fe 96  H/H stable    Hyponatremia  Assessment & Plan  Was on fluid restriction, Lasix, and NaCl tabs  Lisinopril discontinued per Dr. Ruelas  Na+ levels resolved and now stable  Off NaCl, Lasix, and fluid restriction      Full Code    Pt w/o acute medical issues requiring Hospitalist services at this time.  Will sign off.  Please re-consult as needed.

## 2023-07-08 NOTE — THERAPY
"Physical Therapy   Daily Treatment     Patient Name: Bull Banegas  Age:  67 y.o., Sex:  male  Medical Record #: [id number]  Today's Date: 7/8/2023     Precautions  Precautions: Fall Risk  Comments: blurred vision, avoid over-fatiguing    Subjective    Pt ready for Aquatic therapy session    \"Oh I bet im gonna sleep good tonight\"     Objective       07/08/23 1331   PT Charge Group   PT Aquatic Therapy  6   Supervising Physical Therapist Colton Guadalupe   PT Total Time Spent   PT Individual Total Time Spent (Mins) 90   Interdisciplinary Plan of Care Collaboration   IDT Collaboration with  Occupational Therapist;Therapy Tech;Certified Nursing Assistant   Patient Position at End of Therapy Seated;Other (Comments)   Collaboration Comments OT: aquatic therapy/CLOF, CNA pre pool shower, tech transported pt to the room via wc     The patient participated in 90 minutes of aquatic therapy with approx 25  % weight bearing, submerged to mid sternum  Warm up pt completed LE bicycle and flutter kicks with shoulders resting on pool rail x 2 minutes each  Step ups to aqua step 1 x 10 leading L and 1 x 10 leading R LE, emphasis on balance once in standing.   4 reps of using LE's to push off from pool wall  Seated on wonderboard(buoyancy board) pt uses UE's to go 4 pool lengths   Static sitting on wonderboard pt completed B UE elbow flexion/extension and shoulder flexion and extension with buoyancy barbellsThe patient ambulated without UE  support fwrd, retro, side stepping and crossover stepping x 4 pool lengths each. Patient amb forward, backward, and side stepping.  Additionally pt amb with buoyancy cuffs on B LE to promote extensor activation during gait training  LE stretching including B hs with pool noodle proximal to ankle, vc for core stabilization against pool wall and posterior rotation of the pelvis  Pt uses standard kick board to swing 2 pool lengths, requires assist from therapist at trunk     The patient entered and exited " the pool via stairs with CGA using side stepping method descending leading with the L LE and B UE support on HR. Pt exited the pool via lift due to fatigue, as anticipated    Assessment    Pt able to self monitor fatigue and initiates seated rest breaks as needed. The patient tolerated therapy session well focusing on buoyancy assisted endurance and gait training   The patient will benefit from aquatic therapy to enhance overall functional outcome.    Strengths: Able to follow instructions, Alert and oriented, Effective communication skills, Good carryover of learning, Independent prior level of function, Making steady progress towards goals, Motivated for self care and independence, Pleasant and cooperative, Supportive family, Willingly participates in therapeutic activities  Barriers: Decreased endurance, Fatigue, Generalized weakness, Home accessibility, Impaired activity tolerance, Impaired balance, Limited mobility (Paraplegia)    Plan    6 inch curb step FWW in // bars for safety leading with R LE ascending and L LE descending  Gait FWW with WC in tow vs 4WW- both with emphasis on quality over distance  B LE and trunk NRE  WC mobility outdoors  FT on 7/9: car transfer, gait FWW, up/down curb step FWW vs WC bump     Passport items to be completed:  Get in/out of bed safely, in/out of a vehicle, safely use mobility device, walk or wheel around home/community, navigate up and down stairs, show how to get up/down from the ground, ensure home is accessible, demonstrate HEP, complete caregiver training    Physical Therapy Problems (Active)       Problem: PT-Long Term Goals       Dates: Start:  06/18/23         Goal: LTG-By discharge, patient will propel wheelchair >/= 300' and navigate inclines up to 3% (curb cuts or equivalent) c/ SBA or better.        Dates: Start:  06/18/23            Goal: LTG-By discharge, patient will transfer one surface to another c/ Naila.        Dates: Start:  06/18/23            Goal:  LTG-By discharge, patient will transfer in/out of a car c/ ModA or better.        Dates: Start:  06/18/23            Goal: LTG-By discharge, patient will ambulate 150ft with CGA using FWW       Dates: Start:  07/07/23

## 2023-07-08 NOTE — CARE PLAN
Problem: Infection  Goal: Patient will remain free from infection  Outcome: Progressing     Problem: Self Care  Goal: Patient will have the ability to perform ADLs independently or with assistance  Outcome: Progressing     Problem: Mobility  Goal: Patient's capacity to carry out activities will improve  Outcome: Progressing     Problem: Fall Risk - Rehab  Goal: Patient will remain free from falls  Outcome: Progressing         The patient is Stable - Low risk of patient condition declining or worsening    Shift Goals  Clinical Goals: Pain control, safety  Patient Goals: sleep  Family Goals: Education

## 2023-07-08 NOTE — PROGRESS NOTES
NURSING DAILY NOTE    Name: Bull Banegas   Date of Admission: 6/17/2023   Admitting Diagnosis: GBS (Guillain-[place] syndrome) ([facility])  Attending Physician: Mario Terrell D.o.  Allergies: Patient has no known allergies.    Safety  Patient Assist  sba-min  Patient Precautions  Fall Risk  Precaution Comments  blurred vision, avoid over-fatiguing  Bed Transfer Status  Supervised  Toilet Transfer Status   Standby Assist  Assistive Devices  Rails, Wheelchair  Oxygen  None - Room Air  Diet/Therapeutic Dining  Current Diet Order   Procedures    Diet Order Diet: Regular     Pill Administration  whole  Agitated Behavioral Scale  16  ABS Level of Severity  No Agitation    Fall Risk  Has the patient had a fall this admission?   No  Isa Shay Fall Risk Scoring  13, MODERATE RISK  Fall Risk Safety Measures  bed alarm and chair alarm    Vitals  Temperature: 36.8 °C (98.3 °F)  Temp src: Oral  Pulse: 95  Respiration: 18  Blood Pressure : 130/73  Blood Pressure MAP (Calculated): 92 MM HG  BP Location: Left, Upper Arm  Patient BP Position: Supine     Oxygen  Pulse Oximetry: 92 %  O2 (LPM): 0  FiO2%: 21 %  O2 Delivery Device: None - Room Air  Incentive Spirometer Volume: 3000 mL    Bowel and Bladder  Last Bowel Movement  07/07/23  Stool Type  Type 3: Like a sausage, but with cracks on its surface  Bowel Device  Bathroom  Continent  Bladder: Did not void (lo in place)   Bowel: No movement  Bladder Function  Urine Void (mL): 800 ml  Number of Times Voided: 1  Urine Color: Yellow  Urine Clarity: Clear  Straight Catheter: 187 ml  Genitourinary Assessment   Bladder Assessment (WDL):  WDL Except  Lo Catheter: Not Applicable  Lo Reasons per MD Order: Neurogenic bladder  Lo Care: Given with Soap and Water  Urine Color: Yellow  Urine Clarity: Clear  Bladder Device: Bathroom, Urinal  Time Void: Yes  Bladder Scan: Post Void  $ Bladder Scan Results (mL): 111  Bladder  Medications: Yes    Skin  Joel Score   17  Sensory Interventions   Bed Types: Standard Mattress  Skin Preventative Measures: Pillows in Use for Support / Positioning  Moisture Interventions         Pain  Pain Rating Scale  0 - No Pain  Pain Location  Generalized  Pain Location Orientation  Right, Left, Posterior, Anterior  Pain Interventions   Distraction, Rest    ADLs    Bathing   Shower, Staff (pt shower with OT)  Linen Change   Complete  Personal Hygiene  Change Selene Pads, Moist Selene Wipes, Perineal Care  Chlorhexidine Bath      Oral Care  Brushed Teeth (self)  Teeth/Dentures  Missing Teeth (Comments)  Shave  Self  Nutrition Percentage Eaten  *  * Meal *  *, Between % Consumed, Lunch  Environmental Precautions  Bed in Low Position  Patient Turns/Positioning  Patient Turns Self from Side to Side  Patient Turns Assistance/Tolerance  Assistance of One  Bed Positions  Bed Controls On  Head of Bed Elevated  Self regulated      Psychosocial/Neurologic Assessment  Psychosocial Assessment  Psychosocial (WDL):  Within Defined Limits  Neurologic Assessment  Neuro (WDL): Exceptions to WDL  Level of Consciousness: Alert  Orientation Level: Oriented X4  Cognition: Appropriate judgement, Appropriate safety awareness, Appropriate attention/concentration, Follows commands  Speech: Clear  Pupil Assesment: No  Motor Function/Sensation Assessment: Motor strength, Sensation, Motor response  RUE Motor Response: Responds to commands  RUE Sensation: Numbness, Tingling  Muscle Strength Right Arm: Good Strength Against Gravity and Moderate Resistance  LUE Motor Response: Responds to commands  LUE Sensation: Numbness, Tingling  Muscle Strength Left Arm: Good Strength Against Gravity and Moderate Resistance  RLE Motor Response: Other (Comment) (weakness)  RLE Sensation: Numbness, Tingling  Muscle Strength Right Leg: Fair Strength against Gravity but No Resistance  LLE Motor Response: Other (Comment) (weakness)  LLE Sensation:  Numbness, Tingling  Muscle Strength Left Leg: Fair Strength against Gravity but No Resistance  EENT (WDL):  WDL Except    Cardio/Pulmonary Assessment  Edema   RLE Edema: 2+  LLE Edema: 2+  Respiratory Breath Sounds  RUL Breath Sounds: Clear  RML Breath Sounds: Clear  RLL Breath Sounds: Diminished, Clear  JC Breath Sounds: Clear  LLL Breath Sounds: Diminished, Clear  Cardiac Assessment   Cardiac (WDL):  WDL Except

## 2023-07-08 NOTE — CARE PLAN
Problem: Pain - Standard  Goal: Alleviation of pain or a reduction in pain to the patient’s comfort goal  Outcome: Progressing   The patient is Watcher - Medium risk of patient condition declining or worsening    Shift Goals  Clinical Goals: Pain control, safety  Patient Goals: sleep  Family Goals: Education

## 2023-07-08 NOTE — THERAPY
"Occupational Therapy  Daily Treatment     Patient Name: Bull Banegas  Age:  67 y.o., Sex:  male  Medical Record #: [id number]  Today's Date: 7/8/2023     Precautions  Precautions: Fall Risk  Comments: blurred vision, avoid over-fatiguing    Safety   ADL Safety : Requires Supervision for Safety, Requires Cueing for Safety  Bathroom Safety: Requires Supervision for Safety, Requires Cuing for Safety    Subjective    Pt supine in bed reporting continued, but reduced soreness from pool therapy 2 days prior. Motivated and agreeable to tx.     Objective       07/08/23 0701   OT Charge Group   OT Self Care / ADL (Units) 1   OT Therapy Activity (Units) 2   OT Therapeutic Exercise (Units) 1   OT Total Time Spent   OT Individual Total Time Spent (Mins) 60   Pain   Intervention Declines   Functional Level of Assist   Upper Body Dressing Independent   Upper Body Dressing Description Increased time   Lower Body Dressing Supervision   Lower Body Dressing Description Increased time;Supervision for safety  (Pt completed seated EOB with lateral leg lift for threading and donning socks/shoes. FWW for UB support during standing hip hike.)   Bed, Chair, Wheelchair Transfer Supervised   Bed Chair Wheelchair Transfer Description Adaptive equipment;Set-up of equipment;Supervision for safety  (Stand step with FWW)   Sitting Lower Body Exercises   Nustep Resistance Level 4;Time (See Comments)  (10 min with UB/LB for .41 miles for cardiovascular endurance and strength.)   Balance   Skilled Intervention Verbal Cuing   Comments Static standing balance with rebounder for 3 sets of 30 reps with progressing increased ball weight from zero, 4lbs, and 6lbs. Seated rest breaks between sets.   Interdisciplinary Plan of Care Collaboration   Patient Position at End of Therapy Seated  (Pt left seated in w/c in dining holden for breakfast.)         Assessment    Pt tolerated OT session well, taking breaks following exertion. Reported increased \"vibration\" in " hands and feet following Nustep and rebounder, but tolerated well.  Strengths: Able to follow instructions, Alert and oriented, Effective communication skills, Independent prior level of function, Motivated for self care and independence, Pleasant and cooperative, Willingly participates in therapeutic activities    Plan    FT & DC shower on Sunday, please complete mock step in shower transfer if time. Continue education on pacing, energy conservation, and GB progression. Pt completes all dressing EOB with most ind, and usually between SBA-Mod I level. Anticipate he will require assist with IADLs mostly at home, but initially at SPV level for ADLs with wife.     Passport items to be completed:  Perform bathroom transfers, complete dressing, complete feeding, get ready for the day, prepare a simple meal, participate in household tasks, adapt home for safety needs, demonstrate home exercise program, complete caregiver training     Occupational Therapy Goals (Active)       Problem: OT Long Term Goals       Dates: Start:  06/18/23         Goal: LTG-By discharge, patient will complete basic self care tasks with min-supervision overall using AE/DME as needed.       Dates: Start:  06/18/23            Goal: LTG-By discharge, patient will perform bathroom transfers with SBA overall using AE/DME as needed.       Dates: Start:  06/18/23

## 2023-07-09 ENCOUNTER — APPOINTMENT (OUTPATIENT)
Dept: OCCUPATIONAL THERAPY | Facility: REHABILITATION | Age: 68
DRG: 095 | End: 2023-07-09
Attending: PHYSICAL MEDICINE & REHABILITATION
Payer: MEDICARE

## 2023-07-09 ENCOUNTER — APPOINTMENT (OUTPATIENT)
Dept: PHYSICAL THERAPY | Facility: REHABILITATION | Age: 68
DRG: 095 | End: 2023-07-09
Attending: PHYSICAL MEDICINE & REHABILITATION
Payer: MEDICARE

## 2023-07-09 PROCEDURE — 700102 HCHG RX REV CODE 250 W/ 637 OVERRIDE(OP): Performed by: PHYSICAL MEDICINE & REHABILITATION

## 2023-07-09 PROCEDURE — 97535 SELF CARE MNGMENT TRAINING: CPT

## 2023-07-09 PROCEDURE — A9270 NON-COVERED ITEM OR SERVICE: HCPCS | Performed by: PHYSICAL MEDICINE & REHABILITATION

## 2023-07-09 PROCEDURE — 700102 HCHG RX REV CODE 250 W/ 637 OVERRIDE(OP): Performed by: HOSPITALIST

## 2023-07-09 PROCEDURE — A9270 NON-COVERED ITEM OR SERVICE: HCPCS | Performed by: HOSPITALIST

## 2023-07-09 PROCEDURE — 770010 HCHG ROOM/CARE - REHAB SEMI PRIVAT*

## 2023-07-09 PROCEDURE — 97116 GAIT TRAINING THERAPY: CPT

## 2023-07-09 PROCEDURE — 97530 THERAPEUTIC ACTIVITIES: CPT

## 2023-07-09 RX ADMIN — PREGABALIN 75 MG: 75 CAPSULE ORAL at 05:48

## 2023-07-09 RX ADMIN — MIDODRINE HYDROCHLORIDE 5 MG: 2.5 TABLET ORAL at 11:31

## 2023-07-09 RX ADMIN — TRAZODONE HYDROCHLORIDE 50 MG: 50 TABLET ORAL at 21:44

## 2023-07-09 RX ADMIN — MULTIPLE VITAMINS W/ MINERALS TAB 1 TABLET: TAB at 11:31

## 2023-07-09 RX ADMIN — MIDODRINE HYDROCHLORIDE 5 MG: 2.5 TABLET ORAL at 08:04

## 2023-07-09 RX ADMIN — Medication 2000 UNITS: at 08:04

## 2023-07-09 RX ADMIN — ACETAMINOPHEN 650 MG: 325 TABLET ORAL at 04:26

## 2023-07-09 RX ADMIN — PREGABALIN 75 MG: 75 CAPSULE ORAL at 21:44

## 2023-07-09 RX ADMIN — ASPIRIN 81 MG CHEWABLE TABLET 81 MG: 81 TABLET CHEWABLE at 08:04

## 2023-07-09 RX ADMIN — DOCUSATE SODIUM 100 MG: 100 CAPSULE, LIQUID FILLED ORAL at 08:05

## 2023-07-09 RX ADMIN — DOCUSATE SODIUM 100 MG: 100 CAPSULE, LIQUID FILLED ORAL at 21:43

## 2023-07-09 RX ADMIN — SENNOSIDES 17.2 MG: 8.6 TABLET, FILM COATED ORAL at 11:31

## 2023-07-09 RX ADMIN — SULFAMETHOXAZOLE AND TRIMETHOPRIM 1 TABLET: 800; 160 TABLET ORAL at 21:44

## 2023-07-09 RX ADMIN — OMEPRAZOLE 20 MG: 20 CAPSULE, DELAYED RELEASE ORAL at 08:05

## 2023-07-09 RX ADMIN — MIDODRINE HYDROCHLORIDE 5 MG: 2.5 TABLET ORAL at 17:38

## 2023-07-09 RX ADMIN — TAMSULOSIN HYDROCHLORIDE 0.4 MG: 0.4 CAPSULE ORAL at 17:38

## 2023-07-09 RX ADMIN — APIXABAN 2.5 MG: 2.5 TABLET, FILM COATED ORAL at 21:44

## 2023-07-09 RX ADMIN — APIXABAN 2.5 MG: 2.5 TABLET, FILM COATED ORAL at 08:04

## 2023-07-09 RX ADMIN — Medication 6 MG: at 21:44

## 2023-07-09 RX ADMIN — SULFAMETHOXAZOLE AND TRIMETHOPRIM 1 TABLET: 800; 160 TABLET ORAL at 08:05

## 2023-07-09 ASSESSMENT — ACTIVITIES OF DAILY LIVING (ADL)
SHOWER_TRANSFER_LEVEL_OF_ASSIST: REQUIRES SUPERVISION WITH SHOWER TRANSFER
TOILET_TRANSFER_LEVEL_OF_ASSIST: REQUIRES SUPERVISION WITH TOILET TRANSFER
BED_CHAIR_WHEELCHAIR_TRANSFER_DESCRIPTION: ADAPTIVE EQUIPMENT
TOILETING_LEVEL_OF_ASSIST: ABLE TO COMPLETE TOILETING WITHOUT ASSIST
TUB_SHOWER_TRANSFER_DESCRIPTION: ADAPTIVE EQUIPMENT;GRAB BAR;SHOWER BENCH

## 2023-07-09 ASSESSMENT — GAIT ASSESSMENTS
DISTANCE (FEET): 150
GAIT LEVEL OF ASSIST: STANDBY ASSIST
ASSISTIVE DEVICE: FRONT WHEEL WALKER

## 2023-07-09 ASSESSMENT — BRIEF INTERVIEW FOR MENTAL STATUS (BIMS)
INITIAL REPETITION OF BED BLUE SOCK - FIRST ATTEMPT: 3
WHAT YEAR IS IT: CORRECT
ASKED TO RECALL SOCK: YES, NO CUE REQUIRED
BIMS SUMMARY SCORE: 15
ASKED TO RECALL BLUE: YES, NO CUE REQUIRED
WHAT DAY OF THE WEEK IS IT: CORRECT
WHAT MONTH IS IT: ACCURATE WITHIN 5 DAYS
ASKED TO RECALL BED: YES, NO CUE REQUIRED

## 2023-07-09 ASSESSMENT — FIBROSIS 4 INDEX: FIB4 SCORE: 0.7

## 2023-07-09 ASSESSMENT — PAIN DESCRIPTION - PAIN TYPE: TYPE: ACUTE PAIN

## 2023-07-09 NOTE — THERAPY
Recreational Therapy  Daily Treatment     Patient Name: Bull Banegas  AGE:  67 y.o., SEX:  male  Medical Record #: [id number]  Today's Date: 7/9/2023       Subjective    Patient agreeable to recreation therapy.      Objective       07/09/23 1301   Procedural Tracking   Procedural Tracking Community Re-Integration;Community Skills Development;Leisure Skills Awareness;Leisure Skills Development;Cognitive Skills Training;Fine Motor Functional Leisure Skills;Group Treatment;Gross Motor Functional Leisure Skills;Social Skills Training   Treatment Time   Total Time Spent (mins) 30   Total Time Missed 0   Functional Ability Status - Cognitive   Attention Span Remains on Task   Comprehension Follows One Step Commands;Follows Two Step Commands   Judgment Able to Make Independent Decisions   Functional Ability Status - Emotional    Affect Appropriate   Mood Appropriate   Behavior Appropriate;Cooperative   Skilled Intervention    Skilled Intervention Gross Motor Leisure;Fine Motor Leisure;Cognitive Leisure;Social Skills;Relaxation / Coping Skills;Community Skills;Leisure Education    Skilled Intervention Comments dc info   Interdisciplinary Plan of Care Collaboration   Patient Position at End of Therapy In Bed;Seated   Strengths & Barriers   Strengths Able to follow instructions;Alert and oriented;Effective communication skills;Willingly participates in therapeutic activities;Supportive family;Pleasant and cooperative;Motivated for self care and independence;Independent prior level of function;Making steady progress towards goals   Barriers Decreased endurance;Fatigue;Generalized weakness;Impaired balance;Impaired activity tolerance         Assessment    Patient received dc information. Open and accepting of all education provided.     Strengths: (P) Able to follow instructions, Alert and oriented, Effective communication skills, Willingly participates in therapeutic activities, Supportive family, Pleasant and cooperative,  Motivated for self care and independence, Independent prior level of function, Making steady progress towards goals  Barriers: (P) Decreased endurance, Fatigue, Generalized weakness, Impaired balance, Impaired activity tolerance    Plan    Patient will benefit from continued recreation therapy sessions.     Passport items to be completed:  Verbalize two positive leisure activities, discuss returning to work, hobbies, community groups or volunteer activities, explore community resources

## 2023-07-09 NOTE — PROGRESS NOTES
NURSING DAILY NOTE    Name: Bull Banegas  Date of Admission: 6/17/2023  Admitting Diagnosis: GBS (Guillain-Penfield syndrome) ([facility])  Attending Physician: Mario Terrell D.o.  Allergies: Patient has no known allergies.    Safety  Patient Assist  omin  Patient Precautions  oFall Risk  Precaution Comments  oblurred vision, avoid over-fatiguing  Bed Transfer Status  oSupervised  Toilet Transfer Status  oStandby Assist  Assistive Devices  oRails, Wheelchair  Oxygen  oNone - Room Air  Diet/Therapeutic Dining  o  Current Diet Order   Procedures    Diet Order Diet: Regular     Pill Administration  owhole  Agitated Behavioral Scale  o16  ABS Level of Severity  Javier Agitation    Fall Risk  Has the patient had a fall this admission?  Javier  Isa Shay Fall Risk Scoring  o13, MODERATE RISK  Fall Risk Safety Measures  azael alarm and seatbelt alarm    Vitals  Temperature: 37.2 °C (99 °F)  Temp src: Oral  Pulse: 97  Respiration: 18  Blood Pressure : 106/69  Blood Pressure MAP (Calculated): 81 MM HG  BP Location: Left, Upper Arm  Patient BP Position: Supine    Oxygen  Pulse Oximetry: 92 %  O2 (LPM): 0  FiO2%: 21 %  O2 Delivery Device: None - Room Air  Incentive Spirometer Volume: 3000 mL    Bowel and Bladder  Last Bowel Movement  o07/08/23  Stool Type  oType 4: Like a sausage or snake, smooth and soft  Bowel Device  oBathroom  Continent  oBladder: Did not void (lo in place)  oBowel: No movement  Bladder Function  oUrine Void (mL): 400 ml  Number of Times Voided: 1  Urine Color: Yellow  Urine Clarity: Clear  Straight Catheter: 187 ml  Genitourinary Assessment  oBladder Assessment (WDL):  WDL Except  Lo Catheter: Not Applicable  Lo Reasons per MD Order: Neurogenic bladder  Lo Care: Given with Soap and Water  Urine Color: Yellow  Urine Clarity: Clear  Bladder Device: Urinal  Time Void: Yes  Bladder Scan: Post Void  $ Bladder Scan Results (mL): 91  Bladder Medications: Yes    Skin  Joel Score  o 17  Sensory  Interventions  o Bed Types: Standard Mattress  Skin Preventative Measures: Pillows in Use for Support / Positioning  Moisture Interventions  o       Pain  Pain Rating Scale  o3 - Sometimes distracts me  Pain Location  oKnee  Pain Location Orientation  oRight, Left, Posterior, Anterior  Pain Interventions  oDeclines    ADLs    Bathing  oShower, * * With Assistance from, Staff  Linen Change  oComplete  Personal Hygiene  oChange Selene Pads, Moist Selene Wipes, Perineal Care  Chlorhexidine Bath  o   Oral Care  oBrushed Teeth  Teeth/Dentures  oMissing Teeth (Comments)  Shave  oSelf  Nutrition Percentage Eaten  o*  * Meal *  *, Lunch, 0% Consumed, Refused  Environmental Precautions  Tiff in Low Position  Patient Turns/Positioning  oPatient Turns Self from Side to Side  Patient Turns Assistance/Tolerance  oAssistance of One, General Weakness  Bed Positions  Tiff Controls On  Head of Bed Elevated  oSelf regulated      Psychosocial/Neurologic Assessment  Psychosocial Assessment  oPsychosocial (WDL):  Within Defined Limits  Neurologic Assessment  oNeuro (WDL): Exceptions to WDL  Level of Consciousness: Alert  Orientation Level: Oriented X4  Cognition: Appropriate judgement, Appropriate safety awareness, Appropriate attention/concentration, Follows commands  Speech: Clear  Pupil Assesment: No  Motor Function/Sensation Assessment: Motor strength, Sensation, Motor response  RUE Motor Response: Responds to commands  RUE Sensation: Numbness, Tingling  Muscle Strength Right Arm: Good Strength Against Gravity and Moderate Resistance  LUE Motor Response: Responds to commands  LUE Sensation: Numbness, Tingling  Muscle Strength Left Arm: Good Strength Against Gravity and Moderate Resistance  RLE Motor Response: Other (Comment) (weakness)  RLE Sensation: Numbness, Tingling  Muscle Strength Right Leg: Fair Strength against Gravity but No Resistance  LLE Motor Response: Other (Comment) (weakness)  LLE Sensation: Numbness, Tingling  Muscle  Strength Left Leg: Fair Strength against Gravity but No Resistance  oEENT (WDL):  WDL Except    Cardio/Pulmonary Assessment  Edema  oRLE Edema: 2+  LLE Edema: 2+  Respiratory Breath Sounds  oRUL Breath Sounds: Clear  RML Breath Sounds: Clear  RLL Breath Sounds: Diminished  JC Breath Sounds: Clear  LLL Breath Sounds: Diminished  Cardiac Assessment  oCardiac (WDL):  WDL Except

## 2023-07-09 NOTE — THERAPY
Occupational Therapy  Daily Treatment     Patient Name: Bull Banegas  Age:  67 y.o., Sex:  male  Medical Record #: [id number]  Today's Date: 7/9/2023     Precautions  Precautions: Fall Risk  Comments: blurred vision, avoid over-fatiguing    Safety   ADL Safety : Requires Supervision for Safety, Requires Cueing for Safety  Bathroom Safety: Requires Supervision for Safety, Requires Cuing for Safety    Subjective    Pt supine in bed upon arrival. Pleasant and cooperative, agreeable to therapy. SO present.      Objective       07/09/23 0901   OT Charge Group   OT Self Care / ADL (Units) 4   OT Total Time Spent   OT Individual Total Time Spent (Mins) 60   Precautions   Precautions Fall Risk   Comments blurred vision, avoid over-fatiguing   Pain   Intervention Declines   Cognition    Level of Consciousness Alert   Sleep/Wake Cycle   Sleep & Rest Awake   Functional Level of Assist   Bathing Modified Independent   Bathing Description Adaptive equipment;Grab bar;Hand held shower;Long handled bath tool;Tub bench   Upper Body Dressing Independent   Upper Body Dressing Description   (Seated in w/c)   Lower Body Dressing Modified Independent   Lower Body Dressing Description Increased time   Bed, Chair, Wheelchair Transfer Modified Independent   Bed Chair Wheelchair Transfer Description Adaptive equipment  (Stand pivot from bed to w/c)   Tub / Shower Transfers Modified Independent   Tub Shower Transfer Description Adaptive equipment;Grab bar;Shower bench  (stand pivot from w/c to fold down bench)   Interdisciplinary Plan of Care Collaboration   Patient Position at End of Therapy Seated;Chair Alarm On;Self Releasing Lap Belt Applied;Tray Table within Reach;Call Light within Reach;Phone within Reach;Family / Friend in Room  (SO present)   Eating   Assistance Needed Independent   Physical Assistance Level No physical assistance   CARE Score - Eating 6   Oral Hygiene   Assistance Needed Independent   Physical Assistance Level No  "physical assistance   CARE Score - Oral Hygiene 6   Toileting Hygiene   Assistance Needed Supervision   Physical Assistance Level No physical assistance   CARE Score - Toileting Hygiene 4   Shower/Bathe Self   Assistance Needed Independent   Physical Assistance Level No physical assistance   CARE Score - Shower/Bathe Self 6   Upper Body Dressing   Assistance Needed Independent   Physical Assistance Level No physical assistance   CARE Score - Upper Body Dressing 6   Lower Body Dressing   Assistance Needed Independent   Physical Assistance Level No physical assistance   CARE Score - Lower Body Dressing 6   Putting On/Taking Off Footwear   Assistance Needed Independent   Physical Assistance Level No physical assistance   CARE Score - Putting On/Taking Off Footwear 6   Toilet Transfer   Assistance Needed Supervision   Physical Assistance Level No physical assistance   CARE Score - Toilet Transfer 4   Cognitive Pattern Assessment   Cognitive Pattern Assessment Used BIMS   Brief Interview for Mental Status (BIMS)   Repetition of Three Words (First Attempt) 3   Temporal Orientation: Year Correct   Temporal Orientation: Month Accurate within 5 days   Temporal Orientation: Day Correct   Recall: \"Sock\" Yes, no cue required   Recall: \"Blue\" Yes, no cue required   Recall: \"Bed\" Yes, no cue required   BIMS Summary Score 15   Confusion Assessment Method (CAM)   Is there evidence of an acute change in mental status from the patient's baseline? No   Inattention Behavior not present   Disorganized thinking Behavior not present   Altered level of consciousness Behavior not present   Discharge Summary    Discharge Location  Home   Patient Discharging with Assist of Spouse / Significant Other   Level of Supervision Required Intermittent Supervision   Recommended Equipment for Discharge Front-Wheeled Walker;Manual Wheelchair;Tub Transfer Bench;Shower Chair;Grab Bars by Toilet;Grab Bars in Tub / Shower;Hand Held Shower Head;Sock " Aid;Reacher;Long Handled Shoe Horn   Recommended Services Upon Discharge No Follow-Up Occupational Therapy Recommended   Long Term Goals Met 2  (Pt met ADL and bathroom transfer goals at Supervision-Mod I level.)   Long Term Goals Not Met 0   Criteria for Termination of Services Maximum Function Achieved for Inpatient Rehabilitation   Discharge Instructions to Patient   Level of Assist Required for Eating Able to Complete Eating without Assist   Level of Assist Required for Grooming Able to Complete Grooming without Assist   Level of Assist Required for Dressing Able to Complete Dressing without Assist   Equipment for Dressing Sock Aid;Reacher;Long Handled Shoe Horn   Level of Assist Required for Toileting Able to Complete Toileting without Assist   Level of Assist Required for Toilet Transfer Requires Supervision with Toilet Transfer   Equipment for Toilet Transfer Grab Bars by Toilet   Level of Assist Required for Bathing Requires Supervision with Bathing   Equipment for Bathing Tub Transfer Bench;Grab Bars in Tub / Shower;Hand Held Shower Head;Long Handled Sponge   Level of Assist Required for Shower Transfer Requires Supervision with Shower Transfer   Equipment for Shower Transfer Grab Bars in Tub / Shower;Tub Transfer Bench   Level of Assist Required for Home Mgmt Requires Supervision with Home Management   Level of Assist Required for Meal Prep Requires Supervision with Meal Preparation   Driving May not Drive, Please Contact Physician for Further Information   Home Exercise Program Refer to Home Exercise Program Handout for Details     The following OT passport items were reviewed following a shower and dressing: Perform bathroom transfers, complete dressing, complete feeding, get ready for the day, prepare a simple meal, participate in household tasks, adapt home for safety needs, demonstrate home exercise program, and complete caregiver training.    Assessment    For FT purposes, SO provided Set up A but pt  otherwise Mod I. Pt able to direct own care to SO while completing a shower. Pt and SO feel confident in continuing care and returning home    Strengths: Able to follow instructions, Alert and oriented, Effective communication skills, Independent prior level of function, Motivated for self care and independence, Pleasant and cooperative, Willingly participates in therapeutic activities    Plan    D/c tomorrow.    Passport items to be completed:  Perform bathroom transfers, complete dressing, complete feeding, get ready for the day, prepare a simple meal, participate in household tasks, adapt home for safety needs, demonstrate home exercise program, complete caregiver training     Occupational Therapy Goals (Active)       There are no active problems.

## 2023-07-09 NOTE — CARE PLAN
Problem: OT Long Term Goals  Goal: LTG-By discharge, patient will complete basic self care tasks with min-supervision overall using AE/DME as needed.  Outcome: Met  Goal: LTG-By discharge, patient will perform bathroom transfers with SBA overall using AE/DME as needed.  Outcome: Met

## 2023-07-09 NOTE — CARE PLAN
The patient is Stable - Low risk of patient condition declining or worsening    Problem: Pain - Standard  Goal: Alleviation of pain or a reduction in pain to the patient’s comfort goal  Outcome: Progressing  Note: Pt able to participate in therapies and activities this shift. Patient able to verbalize pain level and verbalize an acceptable level of pain.      Problem: Bladder / Voiding  Goal: Patient will establish and maintain regular urinary output  Outcome: Progressing  Note: Patient is continent of bladder this shift. Patient voiding adequate amounts of clear, yellow urine. Denies dysuria and flank pain: afebrile.

## 2023-07-09 NOTE — THERAPY
Physical Therapy   Discharge Summary     Patient Name: Bull Banegas  Age:  67 y.o., Sex:  male  Medical Record #: [id number]  Today's Date: 7/9/2023     Precautions  Precautions: Fall Risk  Comments: blurred vision, avoid over-fatiguing    Subjective    Pt and spouse feel ready and prepared for DC     Objective       07/09/23 1001   PT Charge Group   PT Gait Training (Units) 1   PT Therapeutic Activities (Units) 3   PT Total Time Spent   PT Individual Total Time Spent (Mins) 60   Gait Functional Level of Assist    Gait Level Of Assist Standby Assist   Assistive Device Front Wheel Walker   Distance (Feet) 150   # of Times Distance was Traveled 2   Wheelchair Functional Level of Assist   Wheelchair Assist Modified Independent   Distance Wheelchair (Feet or Distance) 300   Wheelchair Description   (indoors and outdoors)   Stairs Functional Level of Assist   Level of Assist with Stairs Contact Guard Assist   # of Stairs Climbed 12   Stairs Description Hand rails;Limited by fatigue;Extra time;Supervision for safety;Verbal cueing   Transfer Functional Level of Assist   Bed, Chair, Wheelchair Transfer Modified Independent   Bed Chair Wheelchair Transfer Description   (Bartolome squat pivot, set up stand step FWW)   Bed Mobility    Supine to Sit Independent   Sit to Supine Independent   Sit to Stand Independent   Scooting Independent   Rolling Independent   Interdisciplinary Plan of Care Collaboration   IDT Collaboration with  Family / Caregiver   Patient Position at End of Therapy Seated;Edge of Bed;Call Light within Reach;Tray Table within Reach;Family / Friend in Room   Collaboration Comments FT   Roll Left and Right   Assistance Needed Incidental touching   CARE Score - Roll Left and Right 4   Sit to Lying   Assistance Needed Independent   CARE Score - Sit to Lying 6   Lying to Sitting on Side of Bed   Assistance Needed Independent   CARE Score - Lying to Sitting on Side of Bed 6   Sit to Stand   Assistance Needed Independent    CARE Score - Sit to Stand 6   Chair/Bed-to-Chair Transfer   Assistance Needed Independent   CARE Score - Chair/Bed-to-Chair Transfer 6   Car Transfer   Assistance Needed Incidental touching   CARE Score - Car Transfer 4   Walk 10 Feet   Assistance Needed Supervision   CARE Score - Walk 10 Feet 4   Walk 50 Feet with Two Turns   Assistance Needed Supervision   CARE Score - Walk 50 Feet with Two Turns 4   Walk 150 Feet   Assistance Needed Supervision   CARE Score - Walk 150 Feet 4   Walking 10 Feet on Uneven Surfaces   Assistance Needed Supervision   CARE Score - Walking 10 Feet on Uneven Surfaces 4   1 Step (Curb)   Assistance Needed Incidental touching   CARE Score - 1 Step (Curb) 4   4 Steps   Assistance Needed Incidental touching   CARE Score - 4 Steps 4   12 Steps   Assistance Needed Incidental touching   CARE Score - 12 Steps 4   Picking Up Object   Assistance Needed Set-up / clean-up   CARE Score - Picking Up Object 5   Wheel 50 Feet with Two Turns   Assistance Needed Independent   CARE Score - Wheel 50 Feet with Two Turns 6   Type of Wheelchair/Scooter Manual   Wheel 150 Feet   Assistance Needed Independent   CARE Score - Wheel 150 Feet 6   Type of Wheelchair/Scooter Manual   P.T. Discharge Summary   Discharge Location Home   Patient Discharging with Assist of Spouse / Significant Other   Level of Supervision Required Upon Discharge Intermittent Supervision   Recommended Equipment for Discharge Front-Wheeled Walker;Manual Wheelchair;Raised Toilet Seat with Arms;Grab Bars by Toilet;Grab Bars in Tub / Shower;Hand Held Shower Head;Shower Chair   Recommeded Services Upon Discharge Home Health Physical Therapy   Long Term Goals Met 4   Long Term Goals Not Met 0   Criteria for Termination of Services Maximum Function Achieved for Inpatient Rehabilitation   Discharge Instructions to Patient   Level of Assist Required for Ambulation Assist for Balance on Flat Surfaces;Assist for Balance on Curbs;Assist for Balance on  Stairs   Device Recommended for Ambulation Front-Wheeled Walker   Level of Assist Required to Propel Wheelchair Requires No Assist   Level of Assist Required for Transfers Supervision  (initially)   Device Recommended for Transfers Front-Wheeled Walker   Home Exercise Program Refer to Home Exercise Program Handout for Details     Completed mobility discharge IRF-Joselin (CGA car transfer, set up picking object off floor with reacher, SBA gait on ramp), completed patient passport, discussed home safety and floor recovery/fall prevention. Reviewed HEP with handout provided. Reviewed relevant precautions to maximize safety during home and community mobility. Completed family training with spouse recommending CGA for all standing mobility with FWW initially. She demonstrated competency and verbalized understanding.    Patient reports understanding and agreement c/ discharge plan.               Assessment:         Art has participated well in their inpatient rehabilitation program with significant functional gains as above in flowsheet. They are now appropriate for discharge to home with intermittent support from spouse.     Tentative discharge on 07/10/23.     Patient passport completed.             Physical Therapy Problems (Active)       There are no active problems.

## 2023-07-09 NOTE — CARE PLAN
The patient is Stable - Low risk of patient condition declining or worsening    Shift Goals  Clinical Goals: Pain control, safety  Patient Goals: sleep  Family Goals: Education    Progress made toward(s) clinical / shift goals:    Problem: Pain - Standard  Goal: Alleviation of pain or a reduction in pain to the patient’s comfort goal  Outcome: Progressing. Patient denies having pain over shift. Has prn tylenol available as needed.     Problem: Bladder / Voiding  Goal: Patient will establish and maintain regular urinary output  Outcome: Progressing. Patient continent of bladder. Uses urinal while in bed, requiring only help to empty urinal. Patient is on flomax and is a qshift pvr, which has remained fairly low, under 150 ml.

## 2023-07-09 NOTE — CARE PLAN
Problem: PT-Long Term Goals  Goal: LTG-By discharge, patient will propel wheelchair >/= 300' and navigate inclines up to 3% (curb cuts or equivalent) c/ SBA or better.   Outcome: Met  Goal: LTG-By discharge, patient will transfer one surface to another c/ Naila.   Outcome: Met  Goal: LTG-By discharge, patient will transfer in/out of a car c/ ModA or better.   Outcome: Met  Goal: LTG-By discharge, patient will ambulate 150ft with CGA using FWW  Outcome: Met

## 2023-07-09 NOTE — CARE PLAN
Problem: Knowledge Deficit - Standard  Goal: Patient and family/care givers will demonstrate understanding of plan of care, disease process/condition, diagnostic tests and medications  Outcome: Progressing     Problem: Skin Integrity  Goal: Skin integrity is maintained or improved  Note: Patient's skin remains intact and free from new or accidental injury this shift.  Will continue to monitor.    The patient is Stable - Low risk of patient condition declining or worsening    Shift Goals  Clinical Goals: Safety, pain control  Patient Goals: sleep well  Family Goals: Education

## 2023-07-10 VITALS
WEIGHT: 255.73 LBS | DIASTOLIC BLOOD PRESSURE: 78 MMHG | OXYGEN SATURATION: 98 % | HEART RATE: 70 BPM | SYSTOLIC BLOOD PRESSURE: 127 MMHG | TEMPERATURE: 98.8 F | RESPIRATION RATE: 16 BRPM | HEIGHT: 68 IN | BODY MASS INDEX: 38.76 KG/M2

## 2023-07-10 PROBLEM — R50.9 FEVER: Status: RESOLVED | Noted: 2023-06-26 | Resolved: 2023-07-10

## 2023-07-10 PROBLEM — R79.89 AZOTEMIA: Status: RESOLVED | Noted: 2023-06-21 | Resolved: 2023-07-10

## 2023-07-10 PROBLEM — N39.0 UTI (URINARY TRACT INFECTION): Status: RESOLVED | Noted: 2023-06-18 | Resolved: 2023-07-10

## 2023-07-10 PROBLEM — E87.1 HYPONATREMIA: Status: RESOLVED | Noted: 2023-06-17 | Resolved: 2023-07-10

## 2023-07-10 PROBLEM — H92.09 EAR DISCOMFORT: Status: RESOLVED | Noted: 2023-06-25 | Resolved: 2023-07-10

## 2023-07-10 PROCEDURE — A9270 NON-COVERED ITEM OR SERVICE: HCPCS | Performed by: HOSPITALIST

## 2023-07-10 PROCEDURE — A9270 NON-COVERED ITEM OR SERVICE: HCPCS | Performed by: PHYSICAL MEDICINE & REHABILITATION

## 2023-07-10 PROCEDURE — 700102 HCHG RX REV CODE 250 W/ 637 OVERRIDE(OP): Performed by: HOSPITALIST

## 2023-07-10 PROCEDURE — 700102 HCHG RX REV CODE 250 W/ 637 OVERRIDE(OP): Performed by: PHYSICAL MEDICINE & REHABILITATION

## 2023-07-10 PROCEDURE — 99239 HOSP IP/OBS DSCHRG MGMT >30: CPT | Performed by: PHYSICAL MEDICINE & REHABILITATION

## 2023-07-10 RX ORDER — ATORVASTATIN CALCIUM 10 MG/1
10 TABLET, FILM COATED ORAL
Qty: 30 TABLET | Refills: 2 | Status: SHIPPED | OUTPATIENT
Start: 2023-07-10

## 2023-07-10 RX ORDER — ASPIRIN 81 MG/1
81 TABLET, CHEWABLE ORAL DAILY
Qty: 100 TABLET | Refills: 2 | Status: SHIPPED | OUTPATIENT
Start: 2023-07-11

## 2023-07-10 RX ORDER — PREGABALIN 75 MG/1
75 CAPSULE ORAL 2 TIMES DAILY
Qty: 60 CAPSULE | Refills: 2 | Status: SHIPPED | OUTPATIENT
Start: 2023-07-10 | End: 2023-08-09

## 2023-07-10 RX ORDER — PSEUDOEPHEDRINE HCL 30 MG
100 TABLET ORAL 2 TIMES DAILY
Qty: 120 CAPSULE | Refills: 2 | Status: SHIPPED | OUTPATIENT
Start: 2023-07-10

## 2023-07-10 RX ORDER — PREGABALIN 75 MG/1
75 CAPSULE ORAL 2 TIMES DAILY
Qty: 60 CAPSULE | Refills: 2 | Status: SHIPPED | OUTPATIENT
Start: 2023-07-10 | End: 2023-07-10 | Stop reason: SDUPTHER

## 2023-07-10 RX ORDER — TRAZODONE HYDROCHLORIDE 50 MG/1
50 TABLET ORAL
Qty: 30 TABLET | Refills: 3 | Status: SHIPPED | OUTPATIENT
Start: 2023-07-10

## 2023-07-10 RX ORDER — LANOLIN ALCOHOL/MO/W.PET/CERES
6 CREAM (GRAM) TOPICAL
Qty: 60 TABLET | Refills: 2 | Status: SHIPPED | OUTPATIENT
Start: 2023-07-10

## 2023-07-10 RX ORDER — OMEPRAZOLE 20 MG/1
20 CAPSULE, DELAYED RELEASE ORAL DAILY
Qty: 30 CAPSULE | Refills: 2 | Status: SHIPPED | OUTPATIENT
Start: 2023-07-11

## 2023-07-10 RX ORDER — TAMSULOSIN HYDROCHLORIDE 0.4 MG/1
0.4 CAPSULE ORAL
Qty: 30 CAPSULE | Refills: 2 | Status: SHIPPED | OUTPATIENT
Start: 2023-07-10

## 2023-07-10 RX ADMIN — Medication 2000 UNITS: at 08:06

## 2023-07-10 RX ADMIN — OMEPRAZOLE 20 MG: 20 CAPSULE, DELAYED RELEASE ORAL at 08:06

## 2023-07-10 RX ADMIN — APIXABAN 2.5 MG: 2.5 TABLET, FILM COATED ORAL at 08:06

## 2023-07-10 RX ADMIN — ASPIRIN 81 MG CHEWABLE TABLET 81 MG: 81 TABLET CHEWABLE at 08:06

## 2023-07-10 RX ADMIN — MIDODRINE HYDROCHLORIDE 5 MG: 2.5 TABLET ORAL at 08:06

## 2023-07-10 RX ADMIN — PREGABALIN 75 MG: 75 CAPSULE ORAL at 08:06

## 2023-07-10 RX ADMIN — DOCUSATE SODIUM 100 MG: 100 CAPSULE, LIQUID FILLED ORAL at 08:06

## 2023-07-10 NOTE — PROGRESS NOTES
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  Allergies: Patient has no known allergies.    Safety  Patient Assist  min  Patient Precautions  Fall Risk  Precaution Comments  blurred vision, avoid over-fatiguing  Bed Transfer Status  Modified Independent  Toilet Transfer Status   Standby Assist  Assistive Devices  Rails, Wheelchair  Oxygen  None - Room Air  Diet/Therapeutic Dining  Current Diet Order   Procedures    Diet Order Diet: Regular     Pill Administration  whole  Agitated Behavioral Scale  16  ABS Level of Severity  No Agitation    Fall Risk  Has the patient had a fall this admission?   No  Isa Shay Fall Risk Scoring  13, MODERATE RISK  Fall Risk Safety Measures  bed alarm    Vitals  Temperature: 37.2 °C (99 °F)  Temp src: Oral  Pulse: 91  Respiration: 18  Blood Pressure : 121/68  Blood Pressure MAP (Calculated): 86 MM HG  BP Location: Left, Upper Arm  Patient BP Position: Supine     Oxygen  Pulse Oximetry: 92 %  O2 (LPM): 0  FiO2%: 21 %  O2 Delivery Device: None - Room Air  Incentive Spirometer Volume: 3000 mL    Bowel and Bladder  Last Bowel Movement  07/08/23  Stool Type  Type 4: Like a sausage or snake, smooth and soft  Bowel Device  Bathroom  Continent  Bladder: Continent void   Bowel: Continent movement  Bladder Function  Urine Void (mL): 575 ml  Number of Times Voided: 1  Urine Color: Yellow  Urine Clarity: Clear  Straight Catheter: 187 ml  Genitourinary Assessment   Bladder Assessment (WDL):  WDL Except  Blancas Catheter: Not Applicable  Blancas Reasons per MD Order: Neurogenic bladder  Blancas Care: Given with Soap and Water  Urine Color: Yellow  Urine Clarity: Clear  Bladder Device: Bathroom, Urinal  Time Void: Yes  Bladder Scan: Post Void  $ Bladder Scan Results (mL): 51  Bladder Medications: Yes    Skin  Joel  Score   19  Sensory Interventions   Bed Types: Standard Mattress  Skin Preventative Measures: Pillows in Use for Support / Positioning  Moisture Interventions         Pain  Pain Rating Scale  0 - No Pain  Pain Location  Generalized  Pain Location Orientation  Right, Left, Posterior, Anterior  Pain Interventions   Declines    ADLs    Bathing   Shower, * * With Assistance from, Staff  Linen Change   Complete  Personal Hygiene  Change Selene Pads, Moist Selene Wipes, Perineal Care  Chlorhexidine Bath      Oral Care  Brushed Teeth  Teeth/Dentures  Missing Teeth (Comments)  Shave  Self  Nutrition Percentage Eaten  Dinner, Between % Consumed  Environmental Precautions  Bed in Low Position  Patient Turns/Positioning  Patient Turns Self from Side to Side  Patient Turns Assistance/Tolerance  Assistance of One  Bed Positions  Bed Controls On  Head of Bed Elevated  Self regulated      Psychosocial/Neurologic Assessment  Psychosocial Assessment  Psychosocial (WDL):  Within Defined Limits  Neurologic Assessment  Neuro (WDL): Exceptions to WDL  Level of Consciousness: Alert  Orientation Level: Oriented X4  Cognition: Appropriate judgement, Appropriate safety awareness, Appropriate attention/concentration, Follows commands  Speech: Clear  Pupil Assesment: No  Motor Function/Sensation Assessment: Motor strength, Sensation, Motor response  RUE Motor Response: Responds to commands  RUE Sensation: Numbness, Tingling  Muscle Strength Right Arm: Good Strength Against Gravity and Moderate Resistance  LUE Motor Response: Responds to commands  LUE Sensation: Numbness, Tingling  Muscle Strength Left Arm: Good Strength Against Gravity and Moderate Resistance  RLE Motor Response: Other (Comment) (weakness)  RLE Sensation: Numbness, Tingling  Muscle Strength Right Leg: Fair Strength against Gravity but No Resistance  LLE Motor Response: Other (Comment) (weakness)  LLE Sensation: Numbness, Tingling  Muscle Strength Left Leg: Fair Strength  against Gravity but No Resistance  EENT (WDL):  WDL Except    Cardio/Pulmonary Assessment  Edema   RLE Edema: 2+  LLE Edema: 2+  Respiratory Breath Sounds  RUL Breath Sounds: Clear  RML Breath Sounds: Clear  RLL Breath Sounds: Diminished  JC Breath Sounds: Clear  LLL Breath Sounds: Diminished  Cardiac Assessment   Cardiac (WDL):  WDL Except

## 2023-07-10 NOTE — CARE PLAN
Problem: Knowledge Deficit - Standard  Goal: Patient and family/care givers will demonstrate understanding of plan of care, disease process/condition, diagnostic tests and medications  Outcome: Progressing     Problem: Skin Integrity  Goal: Skin integrity is maintained or improved  Outcome: Progressing   The patient is Stable - Low risk of patient condition declining or worsening    Shift Goals  Clinical Goals: Safety, pain control  Patient Goals: sleep well  Family Goals: Education    Progress made toward(s) clinical / shift goals:  Patient is sleeping    Patient is not progressing towards the following goals:

## 2023-07-10 NOTE — DISCHARGE SUMMARY
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  Neurology was consulted and he was diagnosed with Guillain-Barré syndrome.  He underwent 5-day course of IVIG without complications, completed on 6/11.  After completion of IVIG he has noted some improvement in strength in lower extremities but still complains of pins-and-needles in bilateral upper extremities.     On presentation to emergency room patient was noted to have significant elevation in systolic blood pressure of over 200.  This was managed with oral antihypertensives and stabilized during acute hospitalization.  Recent MRI of the brain and the spine were negative.  TSH was normal, B12 was normal, RPP and HIV were nonreactive, ammonia level was normal and ESR was within normal limits.  SPEP was negative, acetylcholine receptor antibody was negative, antimuscarinic antibody was not detected.  Hepatitis panel was performed and was negative.  LP showed high protein and normal cell count.  During his acute  hospitalization was noted to have significant hyponatremia likely from SIADH.  His most recent sodium was 126 on 6/17. He was started on sodium chloride tablets 2 g 3 times daily, 620 mg daily, and placed on 1.8 L fluid restriction with 800 cc free water restriction.  Nephrology was consulted.     He was also noted to have progressing leukocytosis with WBC of 16.3.  He was started on ceftriaxone for presumed urinary tract infection with improving WBC of 14.0 on 6/17.     His hospitalization was complicated by neurogenic bladder with urinary retention.  Blancas was initially attempted to be removed, but patient failed voiding trial required multiple in and out catheterization so Blancas was replaced.  He also had significant constipation and has not had a bowel movement since presentation.    Patient was admitted to [facility] on 6/17/2023.     Hospital Course by Problem List:    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  Lower extremity weakness and pain as well as numbness in upper extremities.  No known premorbid infection.  -Completed comprehensive acute inpatient rehabilitation with significant improvement in functional mobility and ADLs  - Did well with aqua therapy on 7/6  -Follow-up with neurology as outpatient     Fever: 100.5 on 6/26, febrile over the last 48 hours, was likely URI in setting of leukopenia  - Infectious work-up as per hospitalist, COVID and flu were negative, UA is negative, procalcitonin was elevated on 6/25, chest x-ray was negative blood cultures to date are negative  - Discontinued Tylenol, concerned that this may be masking his fevers.     Neurologic respiratory impairment:   Patient was at high risk for respiratory involvement given neurologic symptoms in the upper extremities and bulbar symptoms.   -Vital capacity daily, 1.72 liters on 6/20, >2L 7/4, discontinued daily vital  capacity  - Incentive spirometry during acute rehabilitation hospitalization.     Lower extremity swelling: Left greater than right  - Lower extremity Doppler was negative for DVT     Hyponatremia: Likely SIADH, followed by nephrology during [facility] hospitalization  - Sodium of 126 on 6/17 --> 129 on 6/19 --> 133 on 6/20 --> 136 on 6/21 --> 133 on 6/26 --> 136 on 6/27 --> 136 on 6/29 --> 135 on 7/1 -> sodium of 134 on 7/4 --> 136 on 7/7  - Discontinued sodium chloride tablets as per hospitalist  - Lasix was discontinued on 6/21 as per hospitalist   - On admission was on Free water restriction of 500 cc/day and total p.o. fluid intake of 1.8 L and sodium chloride tablets of 2 g 3 times daily.  Discontinued fluid restriction  -Status post 500 cc of normal saline IV fluid on 6/27.     Hypomagnesemia: Low magnesium during acute hospitalization, supplemented with p.o. magnesium  - Magnesium of 2.0 on 6/18  -Follow-up with PCP as outpatient     Urinary tract infection/pyelonephritis: Catheter associated UTI in the setting of neurogenic bladder, episode of hematuria with bladder symptoms on 7/2, dysuria, frequency, urgency  - WBC of 14 on 6/17 --> WBC of 10.7 on 6/20 --> 4.3 on 6/26  - Peripheral IV was removed prior to transfer to acute rehabilitation we will replace IV.  - Ceftriaxone 1 g every 24 hours, completed course  - Greater than 100,000 colonies of Citrobacter, resistant to ampicillin, Unasyn, cefazolin, ceftriaxone, cefuroxime.  -  Bactrim as per hospitalist     Leukopenia:  - WBC of 3.2 --> 3.4 on 6/30 -> 6.3 on 7/4 --> 4.3 on 7/7  - As per hospitalist likely medication-induced  -Decrease Lyrica to 75 mg twice daily, leukopenia is improving without increase in neuropathic pain  -Follow-up with PCP as outpatient     Neurogenic bladder: Inappropriate management of neurogenic bladder can result and hydronephrosis, increased risk of pyelonephritis, and renal failure  - Discontinued Blancas on 6/22,   -  Successful voiding trial, with minimal PVRs, did require intermittent catheterization for multiple weeks during acute rehabilitation hospitalization  - Continue Flomax 0.4 mg nightly, discussed discontinuing this medication in the near future  -Follow-up with PCP, would recommend trial of discontinuing Flomax     Neurogenic bowel: Inappropriate neurogenic bowel can result in severe constipation, bowel dilation and rupture.  Severe constipation with prolonged period of time without BM.  -Continue upper motor neuron neurogenic bowel program with senna 2 tablets q. noon, Colace 100 mg twice daily and discontinue Magic bullet suppository VT daily and digital stimulation     orthostatic hypotension  Because of significant autonomic dysfunction associated with some cases of AIDP, the patient is at high risk for orthostatic hypotension.  Goal of SBP greater than 100.  - -127 in last 24 hours  - Discontinue midodrine, was previously on midodrine 10 mg 3 times daily to maintain BP during acute rehabilitation hospitalization    Essential hypertension: SBP of greater than 200 on presentation to acute hospital.  SBP  in the last 24 hours  - Lisinopril 20 mg daily held, discontinued lisinopril secondary to orthostatic hypotension as above, may restart as an outpatient with recovery of autonomic nervous system  -Follow-up with PCP as outpatient     Dyslipidemia: Total cholesterol 137, HDL 44, LDL 75  -Lipitor 10 mg every Monday Wednesday Friday  -Follow-up with PCP as outpatient     Dysphagia: Concern for swallow dysfunction in the setting of certain variants of AIDP  - Consult speech therapy for swallow evaluation.  Cleared by speech therapy     Insomnia:  - Discontinued temazepam  - Initiation of trazodone 50 mg nightly, may repeat x1     Pain:  Postoperative pain:  - Discontinued oxycodone as needed prior to discharge from acute rehabilitation given significant improved pain  - Discontinued Tylenol as this may be  masking fevers  - Discontinued tramadol     Neuropathic pain: Burning and tingling in all 4 extremities  -Discontinued gabapentin  - Continue Lyrica 75 mg twice daily given mild increase in neuropathic pain after her most recent decrease.  - Discontinue Celebrex  - Discontinue tramadol     Vitamin D deficiency: High risk of vitamin D deficiency in this population, goal of vitamin D level greater than 30, has been linked to improvement and central nervous system recovery  - Vitamin D level of 19 on 6/18, follow-up in 4 weeks  - Cholecalciferol 2000 units daily     Insomnia: Significant difficulty during acute hospitalization  - DC'd Restoril 15 mg nightly  - Trazodone 50 mg nightly as needed insomnia     DVT prophylaxis  In the setting of AIDP, the patient is at high risk of deep venous thrombosis given decreased mobility and alteration to autonomic nervous system.   - DC'd Lovenox  - Eliquis 2.5 mg twice daily for prophylaxis    Functional Status at Discharge  Eating:  Independent  Eating Description:  Set-up of equipment or meal/tube feeding (setup for opening packages,)  Grooming:  Modified Independent, Seated  Grooming Description:  Seated in wheelchair at sink (for oral care)  Bathing:  Modified Independent  Bathing Description:  Adaptive equipment, Grab bar, Hand held shower, Long handled bath tool, Tub bench  Upper Body Dressing:  Independent  Upper Body Dressing Description:   (Seated in w/c)  Lower Body Dressing:  Modified Independent  Lower Body Dressing Description:  Increased time  Discharge Location : Home  Patient Discharging with Assist of: Spouse / Significant Other  Level of Supervision Required: Intermittent Supervision  Recommended Equipment for Discharge: Front-Wheeled Walker;Manual Wheelchair;Tub Transfer Bench;Shower Chair;Grab Bars by Toilet;Grab Bars in Tub / Shower;Hand Held Shower Head;Sock Aid;Reacher;Long Handled Shoe Horn  Recommended Services Upon Discharge: No Follow-Up Occupational  Therapy Recommended  Long Term Goals Met: 2 (Pt met ADL and bathroom transfer goals at Supervision-Mod I level.)  Long Term Goals Not Met: 0  Criteria for Termination of Services: Maximum Function Achieved for Inpatient Rehabilitation  Walk:  Standby Assist  Distance Walked:  150  Number of Times Distance Was Traveled:  2  Assistive Device:  Front Wheel Walker  Gait Deviation:  Bradykinetic, Increased Base Of Support  Wheelchair:  Modified Independent  Distance Propelled:  300   Wheelchair Description:   (indoors and outdoors)  Stairs Contact Guard Assist  Stairs Description Hand rails, Limited by fatigue, Extra time, Supervision for safety, Verbal cueing  Discharge Location: Home  Patient Discharging with Assist of: Spouse / Significant Other  Level of Supervision Required Upon Discharge: Intermittent Supervision  Recommended Equipment for Discharge: Front-Wheeled Walker;Manual Wheelchair;Raised Toilet Seat with Arms;Grab Bars by Toilet;Grab Bars in Tub / Shower;Hand Held Shower Head;Shower Chair  Recommeded Services Upon Discharge: Home Health Physical Therapy  Long Term Goals Met: 4  Long Term Goals Not Met: 0  Criteria for Termination of Services: Maximum Function Achieved for Inpatient Rehabilitation  Comprehension:     Comprehension Description:     Expression:     Expression Description:     Social Interaction:     Social Interaction Description:     Problem Solving:     Problem Solving Description:     Memory:     Memory Description:          Mario ANGLIN D.O., personally performed a complete drug regimen review and no potential clinically significant medication issues were identified.   Discharge Medication:     Medication List      You have not been prescribed any medications.         Discharge Diet:  Regular diet with thin liquids    Discharge Activity:  See discharge PT and OT notes for details    Disposition:  Patient to discharge home with family support and community resources.      Equipment:  See discharge PT and OT notes for details    Follow-up & Discharge Instructions:  Follow up with your primary care provider (PCP) within 7-10 days of discharge to review your medications and take over your care.     If you develop chest pain, fever, chills, change in neurologic function (weakness, sensation changes, vision changes), or other concerning sxs, seek immediate medical attention or call 911.      Condition on Discharge:  Good    More than 35 minutes was spent on discharging this patient, including face-to-face time, prescription management, and the dictation of this note.    Mario Terrell D.O.    Date of Service: 7/10/2023

## 2023-07-10 NOTE — DISCHARGE PLANNING
Case management  Reviewed signed copy of IMM and answered all questions.    Dc date /disposition: 7/10/23 with home health.

## 2023-07-10 NOTE — PROGRESS NOTES
Patient discharged to home per order.  Discharge instructions reviewed with patient and wife; they verbalize understanding and signed copies placed in chart.  Patient has all belongings; signed copy of form in chart.  Patient left facility at about 10:50 am via wheelchair accompanied by rehab staff.  Have enjoyed working with this pleasant patient.

## 2023-10-06 DIAGNOSIS — N31.9 NEUROGENIC BLADDER: ICD-10-CM

## 2023-10-06 RX ORDER — TAMSULOSIN HYDROCHLORIDE 0.4 MG/1
CAPSULE ORAL
Qty: 30 CAPSULE | Refills: 2 | OUTPATIENT
Start: 2023-10-06

## 2023-11-06 DIAGNOSIS — F51.01 PRIMARY INSOMNIA: ICD-10-CM

## 2023-11-06 RX ORDER — TRAZODONE HYDROCHLORIDE 50 MG/1
TABLET ORAL
Qty: 30 TABLET | Refills: 3 | OUTPATIENT
Start: 2023-11-06
